# Patient Record
Sex: FEMALE | Race: WHITE | Employment: UNEMPLOYED | URBAN - METROPOLITAN AREA
[De-identification: names, ages, dates, MRNs, and addresses within clinical notes are randomized per-mention and may not be internally consistent; named-entity substitution may affect disease eponyms.]

---

## 2018-02-11 ENCOUNTER — HOSPITAL ENCOUNTER (EMERGENCY)
Age: 58
Discharge: HOME | End: 2018-02-11
Attending: EMERGENCY MEDICINE
Payer: SELF-PAY

## 2018-02-11 ENCOUNTER — APPOINTMENT (OUTPATIENT)
Dept: GENERAL RADIOLOGY | Age: 58
End: 2018-02-11
Attending: EMERGENCY MEDICINE
Payer: SELF-PAY

## 2018-02-11 VITALS
TEMPERATURE: 97.8 F | HEART RATE: 87 BPM | DIASTOLIC BLOOD PRESSURE: 82 MMHG | OXYGEN SATURATION: 97 % | HEIGHT: 66 IN | SYSTOLIC BLOOD PRESSURE: 144 MMHG | RESPIRATION RATE: 16 BRPM | BODY MASS INDEX: 21.69 KG/M2 | WEIGHT: 135 LBS

## 2018-02-11 DIAGNOSIS — J06.9 VIRAL URI WITH COUGH: Primary | ICD-10-CM

## 2018-02-11 PROCEDURE — 71046 X-RAY EXAM CHEST 2 VIEWS: CPT

## 2018-02-11 PROCEDURE — 99283 EMERGENCY DEPT VISIT LOW MDM: CPT | Performed by: EMERGENCY MEDICINE

## 2018-02-11 RX ORDER — AMLODIPINE BESYLATE 10 MG/1
5 TABLET ORAL DAILY
Qty: 30 TAB | Refills: 2 | Status: SHIPPED | OUTPATIENT
Start: 2018-02-11 | End: 2020-09-04

## 2018-02-11 RX ORDER — BENZONATATE 100 MG/1
100 CAPSULE ORAL
Qty: 15 CAP | Refills: 0 | Status: SHIPPED | OUTPATIENT
Start: 2018-02-11 | End: 2018-02-16

## 2018-02-11 RX ORDER — FEXOFENADINE HCL AND PSEUDOEPHEDRINE HCI 180; 240 MG/1; MG/1
1 TABLET, EXTENDED RELEASE ORAL DAILY
Qty: 10 TAB | Refills: 0 | Status: SHIPPED | OUTPATIENT
Start: 2018-02-11 | End: 2020-09-04

## 2018-02-11 NOTE — ED NOTES
I have reviewed discharge instructions with the patient. The patient verbalized understanding. Patient left ED via Discharge Method: ambulatory to Home with self. Opportunity for questions and clarification provided. Patient given 3 scripts. To continue your aftercare when you leave the hospital, you may receive an automated call from our care team to check in on how you are doing. This is a free service and part of our promise to provide the best care and service to meet your aftercare needs.  If you have questions, or wish to unsubscribe from this service please call [phone]. Thank you for Choosing our [facility] Emergency Department.

## 2018-02-11 NOTE — ED PROVIDER NOTES
HPI Comments: 50-year-old white female has had cough and nasal congestion and watering eyes for the past 3 weeks. Was seen by urgent care when symptoms began and was placed on Augmentin and prednisone. She states the prednisone made her sick and the Augmentin gave her diarrhea. She still having symptoms. No fever. No chest pain. She does smoke three quarters of a pack per day. She does have a history of hypertension and has been out of Norvasc for 6 months. Patient is a 60 y.o. female presenting with cough. The history is provided by the patient. Cough   Pertinent negatives include no chest pain, no headaches, no shortness of breath, no nausea and no vomiting. Past Medical History:   Diagnosis Date    Fracture of right clavicle 3/2015    History of kidney stones     Hypertension     no meds--- pt stopped on her own--- off meds x 1 yr-- per pt-- b/p stable    Liver disease dx--2011    HEP C--- not currently seeing a m.d.-- due to  no insurance per pt       Past Surgical History:   Procedure Laterality Date    HX BREAST BIOPSY  1984    cyst    HX GYN      cone bx    HX [facility]OPAEDIC [facility] 3/2015 at [facility]    clavicle surg         Family History:   Problem Relation Age of Onset    Arthritis-osteo Mother     Lung Disease Father     Cancer Father     Kidney Disease Brother        Social History     Social History    Marital status:      Spouse name: N/A    Number of children: N/A    Years of education: N/A     Occupational History    Not on file. Social History Main Topics    Smoking status: Current Every Day Smoker     Packs/day: 1.00     Years: 40.00    Smokeless tobacco: Never Used    Alcohol use Yes      Comment: occ    Drug use: No    Sexual activity: Not on file     Other Topics Concern    Not on file     Social History Narrative         ALLERGIES: Review of patient's allergies indicates no known allergies.     Review of Systems   Constitutional: Negative for fever.   HENT: Positive for congestion. Respiratory: Positive for cough. Negative for shortness of breath. Cardiovascular: Negative for chest pain and leg swelling. Gastrointestinal: Negative for abdominal pain, nausea and vomiting. Genitourinary: Negative for dysuria. Musculoskeletal: Negative for back pain and neck pain. Skin: Negative for rash. Neurological: Negative for headaches. Vitals:    02/11/18 1204   BP: 142/87   Resp: 16   Temp: 98.1 °F (36.7 °C)   SpO2: 93%   Weight: 61.2 kg (135 lb)   Height: 5' 6\" (1.676 m)            Physical Exam   Constitutional: She is oriented to person, place, and time. She appears well-developed and well-nourished. No distress. HENT:   Head: Normocephalic and atraumatic. Mouth/Throat: Oropharynx is clear and moist. No oropharyngeal exudate. Eyes: Conjunctivae are normal. Pupils are equal, round, and reactive to light. Neck: Normal range of motion. Neck supple. Cardiovascular: Normal rate and regular rhythm. No murmur heard. Pulmonary/Chest: Effort normal and breath sounds normal. No stridor. She has no wheezes. Musculoskeletal: Normal range of motion. She exhibits no edema or tenderness. Neurological: She is alert and oriented to person, place, and time. Skin: Skin is warm and dry. No rash noted. Psychiatric: She has a normal mood and affect. Nursing note and vitals reviewed. MDM  Number of Diagnoses or Management Options  Diagnosis management comments: Chest x-ray normal.  Patient is afebrile and nontoxic. She has normal cardiopulmonary exam.  She is not hypoxic. She was negative for influenza 3 weeks ago and has been afebrile therefore I do not suspect this is flu. We'll treat her symptomatically with Allegra-D for rhinorrhea and congestion. Tessalon for cough. We'll refill her Norvasc.        Amount and/or Complexity of Data Reviewed  Tests in the radiology section of CPT®: ordered and reviewed    Risk of Complications, Morbidity, and/or Mortality  Presenting problems: low  Diagnostic procedures: low  Management options: low          ED Course       Procedures

## 2018-02-11 NOTE — DISCHARGE INSTRUCTIONS
Upper Respiratory Infection (Cold): Care Instructions  Your Care Instructions    An upper respiratory infection, or URI, is an infection of the nose, sinuses, or throat. URIs are spread by coughs, sneezes, and direct contact. The common cold is the most frequent kind of URI. The flu and sinus infections are other kinds of URIs. Almost all URIs are caused by viruses. Antibiotics won't cure them. But you can treat most infections with home care. This may include drinking lots of fluids and taking over-the-counter pain medicine. You will probably feel better in 4 to 10 days. The doctor has checked you carefully, but problems can develop later. If you notice any problems or new symptoms, get medical treatment right away. Follow-up care is a key part of your treatment and safety. Be sure to make and go to all appointments, and call your doctor if you are having problems. It's also a good idea to know your test results and keep a list of the medicines you take. How can you care for yourself at home? · To prevent dehydration, drink plenty of fluids, enough so that your urine is light yellow or clear like water. Choose water and other caffeine-free clear liquids until you feel better. If you have kidney, heart, or liver disease and have to limit fluids, talk with your doctor before you increase the amount of fluids you drink. · Take an over-the-counter pain medicine, such as acetaminophen (Tylenol), ibuprofen (Advil, Motrin), or naproxen (Aleve). Read and follow all instructions on the label. · Before you use cough and cold medicines, check the label. These medicines may not be safe for young children or for people with certain health problems. · Be careful when taking over-the-counter cold or flu medicines and Tylenol at the same time. Many of these medicines have acetaminophen, which is Tylenol. Read the labels to make sure that you are not taking more than the recommended dose.  Too much acetaminophen (Tylenol) can be harmful. · Get plenty of rest.  · Do not smoke or allow others to smoke around you. If you need help quitting, talk to your doctor about stop-smoking programs and medicines. These can increase your chances of quitting for good. When should you call for help? Call 911 anytime you think you may need emergency care. For example, call if:  ? · You have severe trouble breathing. ?Call your doctor now or seek immediate medical care if:  ? · You seem to be getting much sicker. ? · You have new or worse trouble breathing. ? · You have a new or higher fever. ? · You have a new rash. ? Watch closely for changes in your health, and be sure to contact your doctor if:  ? · You have a new symptom, such as a sore throat, an earache, or sinus pain. ? · You cough more deeply or more often, especially if you notice more mucus or a change in the color of your mucus. ? · You do not get better as expected. Where can you learn more? Go to http://matt-jose j.info/. Enter B720 in the search box to learn more about \"Upper Respiratory Infection (Cold): Care Instructions. \"  Current as of: May 12, 2017  Content Version: 11.4  © 5122-0824 [employer]. Care instructions adapted under license by [employer] (which disclaims liability or warranty for this information). If you have questions about a medical condition or this instruction, always ask your healthcare professional. [place] [address] any warranty or liability for your use of this information.

## 2018-02-11 NOTE — ED TRIAGE NOTES
[facility] as needed. Pt reports yellow productive cough x 3 weeks. Was given Augmentin and prednisone but did not finish the Augmentin as it made her have diarrhea.

## 2018-08-10 ENCOUNTER — HOSPITAL ENCOUNTER (EMERGENCY)
Age: 58
Discharge: HOME | End: 2018-08-10
Attending: EMERGENCY MEDICINE
Payer: SELF-PAY

## 2018-08-10 ENCOUNTER — APPOINTMENT (OUTPATIENT)
Dept: CT IMAGING | Age: 58
End: 2018-08-10
Attending: EMERGENCY MEDICINE
Payer: SELF-PAY

## 2018-08-10 VITALS
HEIGHT: 66 IN | OXYGEN SATURATION: 96 % | WEIGHT: 130 LBS | DIASTOLIC BLOOD PRESSURE: 94 MMHG | HEART RATE: 87 BPM | BODY MASS INDEX: 20.89 KG/M2 | SYSTOLIC BLOOD PRESSURE: 164 MMHG | RESPIRATION RATE: 16 BRPM

## 2018-08-10 DIAGNOSIS — Q62.11 HYDRONEPHROSIS WITH URETEROPELVIC JUNCTION (UPJ) OBSTRUCTION: ICD-10-CM

## 2018-08-10 DIAGNOSIS — E87.6 HYPOKALEMIA: ICD-10-CM

## 2018-08-10 DIAGNOSIS — R10.9 FLANK PAIN: Primary | ICD-10-CM

## 2018-08-10 LAB
ANION GAP SERPL CALC-SCNC: 9 MMOL/L (ref 7–16)
BASOPHILS # BLD: 0.1 K/UL
BASOPHILS NFR BLD: 1 % (ref 0–2)
BUN SERPL-MCNC: 6 MG/DL (ref 6–23)
CALCIUM SERPL-MCNC: 8.3 MG/DL (ref 8.3–10.4)
CHLORIDE SERPL-SCNC: 94 MMOL/L (ref 98–107)
CO2 SERPL-SCNC: 29 MMOL/L (ref 21–32)
CREAT SERPL-MCNC: 0.72 MG/DL (ref 0.6–1)
DIFFERENTIAL METHOD BLD: ABNORMAL
EOSINOPHIL # BLD: 0.2 K/UL
EOSINOPHIL NFR BLD: 3 % (ref 0.5–7.8)
ERYTHROCYTE [DISTWIDTH] IN BLOOD BY AUTOMATED COUNT: 11.9 %
GLUCOSE SERPL-MCNC: 119 MG/DL (ref 65–100)
HCT VFR BLD AUTO: 42.3 % (ref 35.8–46.3)
HGB BLD-MCNC: 15.1 G/DL (ref 11.7–15.4)
IMM GRANULOCYTES # BLD: 0 K/UL
IMM GRANULOCYTES NFR BLD AUTO: 1 % (ref 0–5)
LYMPHOCYTES # BLD: 1.8 K/UL
LYMPHOCYTES NFR BLD: 37 % (ref 13–44)
MCH RBC QN AUTO: 34.4 PG (ref 26.1–32.9)
MCHC RBC AUTO-ENTMCNC: 35.7 G/DL (ref 31.4–35)
MCV RBC AUTO: 96.4 FL (ref 79.6–97.8)
MONOCYTES # BLD: 0.5 K/UL
MONOCYTES NFR BLD: 11 % (ref 4–12)
NEUTS SEG # BLD: 2.2 K/UL
NEUTS SEG NFR BLD: 47 % (ref 43–78)
NRBC # BLD: 0 K/UL (ref 0–0.2)
PLATELET # BLD AUTO: 185 K/UL (ref 150–450)
PMV BLD AUTO: 9.8 FL (ref 9.4–12.3)
POTASSIUM SERPL-SCNC: 2.7 MMOL/L (ref 3.5–5.1)
RBC # BLD AUTO: 4.39 M/UL (ref 4.05–5.2)
SODIUM SERPL-SCNC: 132 MMOL/L (ref 136–145)
WBC # BLD AUTO: 4.7 K/UL (ref 4.3–11.1)

## 2018-08-10 PROCEDURE — 96374 THER/PROPH/DIAG INJ IV PUSH: CPT | Performed by: EMERGENCY MEDICINE

## 2018-08-10 PROCEDURE — 81003 URINALYSIS AUTO W/O SCOPE: CPT | Performed by: EMERGENCY MEDICINE

## 2018-08-10 PROCEDURE — 74011250636 HC RX REV CODE- 250/636: Performed by: EMERGENCY MEDICINE

## 2018-08-10 PROCEDURE — 96375 TX/PRO/DX INJ NEW DRUG ADDON: CPT | Performed by: EMERGENCY MEDICINE

## 2018-08-10 PROCEDURE — 80048 BASIC METABOLIC PNL TOTAL CA: CPT

## 2018-08-10 PROCEDURE — 85025 COMPLETE CBC W/AUTO DIFF WBC: CPT

## 2018-08-10 PROCEDURE — 74176 CT ABD & PELVIS W/O CONTRAST: CPT

## 2018-08-10 PROCEDURE — 96361 HYDRATE IV INFUSION ADD-ON: CPT | Performed by: EMERGENCY MEDICINE

## 2018-08-10 PROCEDURE — 99284 EMERGENCY DEPT VISIT MOD MDM: CPT | Performed by: EMERGENCY MEDICINE

## 2018-08-10 RX ORDER — HYDROMORPHONE HYDROCHLORIDE 2 MG/ML
1 INJECTION, SOLUTION INTRAMUSCULAR; INTRAVENOUS; SUBCUTANEOUS
Status: COMPLETED | OUTPATIENT
Start: 2018-08-10 | End: 2018-08-10

## 2018-08-10 RX ORDER — ONDANSETRON 2 MG/ML
4 INJECTION INTRAMUSCULAR; INTRAVENOUS
Status: COMPLETED | OUTPATIENT
Start: 2018-08-10 | End: 2018-08-10

## 2018-08-10 RX ORDER — ONDANSETRON 8 MG/1
8 TABLET, ORALLY DISINTEGRATING ORAL
Qty: 10 TAB | Refills: 0 | Status: SHIPPED | OUTPATIENT
Start: 2018-08-10 | End: 2020-09-04

## 2018-08-10 RX ORDER — HYDROCODONE BITARTRATE AND ACETAMINOPHEN 5; 325 MG/1; MG/1
1 TABLET ORAL
Qty: 15 TAB | Refills: 0 | Status: SHIPPED | OUTPATIENT
Start: 2018-08-10 | End: 2020-09-04

## 2018-08-10 RX ORDER — KETOROLAC TROMETHAMINE 30 MG/ML
15 INJECTION, SOLUTION INTRAMUSCULAR; INTRAVENOUS
Status: COMPLETED | OUTPATIENT
Start: 2018-08-10 | End: 2018-08-10

## 2018-08-10 RX ORDER — MORPHINE SULFATE 10 MG/ML
4 INJECTION, SOLUTION INTRAMUSCULAR; INTRAVENOUS
Status: COMPLETED | OUTPATIENT
Start: 2018-08-10 | End: 2018-08-10

## 2018-08-10 RX ORDER — POTASSIUM CHLORIDE 1500 MG/1
20 TABLET, FILM COATED, EXTENDED RELEASE ORAL 2 TIMES DAILY
Qty: 10 TAB | Refills: 0 | Status: SHIPPED | OUTPATIENT
Start: 2018-08-10 | End: 2018-08-15

## 2018-08-10 RX ADMIN — SODIUM CHLORIDE 1000 ML: 900 INJECTION, SOLUTION INTRAVENOUS at 21:33

## 2018-08-10 RX ADMIN — ONDANSETRON 4 MG: 2 INJECTION, SOLUTION INTRAMUSCULAR; INTRAVENOUS at 21:33

## 2018-08-10 RX ADMIN — MORPHINE SULFATE 4 MG: 10 INJECTION INTRAVENOUS at 21:33

## 2018-08-10 RX ADMIN — HYDROMORPHONE HYDROCHLORIDE 1 MG: 2 INJECTION, SOLUTION INTRAMUSCULAR; INTRAVENOUS; SUBCUTANEOUS at 22:23

## 2018-08-10 RX ADMIN — KETOROLAC TROMETHAMINE 15 MG: 30 INJECTION, SOLUTION INTRAMUSCULAR at 22:24

## 2018-08-11 NOTE — ED PROVIDER NOTES
HPI Comments: 50-year-old white female with past history of kidney stones last one approximately one year ago presents with left-sided flank pain which began around 4:00 today. She has had nausea without vomiting. She does report some urinary frequency, discomfort and difficulty but no hematuria. No aggravating or alleviating factors for pain. Patient is a 60 y.o. female presenting with flank pain. The history is provided by the patient. Flank Pain    Pertinent negatives include no chest pain, no fever, no headaches and no abdominal pain. Past Medical History:   Diagnosis Date    Fracture of right clavicle 3/2015    History of kidney stones     Hypertension     no meds--- pt stopped on her own--- off meds x 1 yr-- per pt-- b/p stable    Liver disease dx--2011    HEP C--- not currently seeing a m.d.-- due to  no insurance per pt       Past Surgical History:   Procedure Laterality Date    HX BREAST BIOPSY  1984    cyst    HX GYN      cone bx    HX OR[facility]OP[facility]EDIC [facility] 3/2015 at [facility]    clavicle surg         Family History:   Problem Relation Age of Onset    Arthritis-osteo Mother     Lung Disease Father     Cancer Father     Kidney Disease Brother        Social History     Social History    Marital status:      Spouse name: N/A    Number of children: N/A    Years of education: N/A     Occupational History    Not on file. Social History Main Topics    Smoking status: Current Every Day Smoker     Packs/day: 1.00     Years: 40.00    Smokeless tobacco: Never Used    Alcohol use Yes      Comment: occ    Drug use: No    Sexual activity: Not on file     Other Topics Concern    Not on file     Social History Narrative         ALLERGIES: Review of patient's allergies indicates no known allergies. Review of Systems   Constitutional: Negative for fever. HENT: Negative for congestion. Respiratory: Negative for cough and shortness of breath.     Cardiovascular: Negative for chest pain. Gastrointestinal: Negative for abdominal pain and vomiting. Genitourinary: Positive for flank pain. Musculoskeletal: Negative for back pain and neck pain. Skin: Negative for rash. Neurological: Negative for headaches. Vitals:    08/10/18 2029 08/10/18 2145 08/10/18 2253 08/10/18 2254   BP:  177/83 (!) 164/94    Pulse: 93   87   Resp: 16      SpO2: 97%   96%   Weight: 59 kg (130 lb)      Height: 5' 6\" (1.676 m)               Physical Exam   Constitutional: She appears well-developed and well-nourished. Pacing due to pain   HENT:   Head: Normocephalic and atraumatic. Eyes: Conjunctivae are normal. Pupils are equal, round, and reactive to light. Neck: Normal range of motion. Neck supple. Cardiovascular: Normal rate and regular rhythm. No murmur heard. Pulmonary/Chest: Effort normal and breath sounds normal.   Abdominal: Soft. She exhibits no distension. There is no tenderness. Musculoskeletal: Normal range of motion. She exhibits no edema. Neurological: She is alert. Skin: Skin is warm and dry. Psychiatric: She has a normal mood and affect. Nursing note and vitals reviewed. MDM  Number of Diagnoses or Management Options  Diagnosis management comments: Laboratory unremarkable except for mild hypokalemia at 2.7. Urinalysis no infection. CT scan shows UPJ obstruction with no stone identified. This was noted on a CT scan in 2016 however it is now significantly worse. Patient has been hydrated with normal saline and treated with IV morphine, IV Zofran, IV Toradol and IV Dilaudid. She is now feeling much better. Does appear safe for discharge home but will need to follow up with urology secondary to the hydronephrosis.        Amount and/or Complexity of Data Reviewed  Clinical lab tests: ordered and reviewed  Tests in the radiology section of CPT®: ordered and reviewed  Tests in the medicine section of CPT®: ordered and reviewed  Discuss the patient with other providers: yes  Independent visualization of images, tracings, or specimens: yes    Risk of Complications, Morbidity, and/or Mortality  Presenting problems: moderate  Diagnostic procedures: moderate  Management options: moderate          ED Course       Procedures

## 2018-08-11 NOTE — ED NOTES
I have reviewed discharge instructions with the patient. The patient verbalized understanding. Patient left ED via Discharge Method: ambulatory to Home with (mother). Opportunity for questions and clarification provided. Patient given 3 scripts. To continue your aftercare when you leave the hospital, you may receive an automated call from our care team to check in on how you are doing. This is a free service and part of our promise to provide the best care and service to meet your aftercare needs.  If you have questions, or wish to unsubscribe from this service please call [phone]. Thank you for Choosing our [facility] Emergency Department.

## 2018-08-11 NOTE — ED NOTES
Pt reports pain better after dilaudid and toradol, BP decreased though recommended that she f/u w/ her PMD when her pain is better to reevaluate her BP meds as they have not been changed in >10 yrs.

## 2018-08-11 NOTE — DISCHARGE INSTRUCTIONS
Hypokalemia: Care Instructions  Your Care Instructions    Hypokalemia (say \"si-sr-kof-LORETTA-becka-uh\") is a low level of potassium. The heart, muscles, kidneys, and nervous system all need potassium to work well. This problem has many different causes. Kidney problems, diet, and medicines like diuretics and laxatives can cause it. So can vomiting or diarrhea. In some cases, cancer is the cause. Your doctor may do tests to find the cause of your low potassium levels. You may need medicines to bring your potassium levels back to normal. You may also need regular blood tests to check your potassium. If you have very low potassium, you may need intravenous (IV) medicines. You also may need tests to check the electrical activity of your heart. Heart problems caused by low potassium levels can be very serious. Follow-up care is a key part of your treatment and safety. Be sure to make and go to all appointments, and call your doctor if you are having problems. It's also a good idea to know your test results and keep a list of the medicines you take. How can you care for yourself at home? · If your doctor recommends it, eat foods that have a lot of potassium. These include fresh fruits, juices, and vegetables. They also include nuts, beans, and milk. · Be safe with medicines. If your doctor prescribes medicines or potassium supplements, take them exactly as directed. Call your doctor if you have any problems with your medicines. · Get your potassium levels tested as often as your doctor tells you. When should you call for help? Call 911 anytime you think you may need emergency care. For example, call if:    · You feel like your heart is missing beats. Heart problems caused by low potassium can cause death.     · You passed out (lost consciousness).     · You have a seizure.    Call your doctor now or seek immediate medical care if:    · You feel weak or unusually tired.     · You have severe arm or leg cramps.   · You have tingling or numbness.     · You feel sick to your stomach, or you vomit.     · You have belly cramps.     · You feel bloated or constipated.     · You have to urinate a lot.     · You feel very thirsty most of the time.     · You are dizzy or lightheaded, or you feel like you may faint.     · You feel depressed, or you lose touch with reality.    Watch closely for changes in your health, and be sure to contact your doctor if:    · You do not get better as expected. Where can you learn more? Go to http://matt-jose j.info/. Enter G358 in the search box to learn more about \"Hypokalemia: Care Instructions. \"  Current as of: May 12, 2017  Content Version: 11.7  © 6336-0519 [employer]. Care instructions adapted under license by [employer] (which disclaims liability or warranty for this information). If you have questions about a medical condition or this instruction, always ask your healthcare professional. [place] [address] any warranty or liability for your use of this information. Flank Pain: Care Instructions  Your Care Instructions  Flank pain is pain on the side of the back just below the rib cage and above the waist. It can be on one or both sides. Flank pain has many possible causes, including a kidney stone, a urinary tract infection, or back strain. Flank pain may get better on its own. But don't ignore new symptoms, such as fever, nausea and vomiting, urination problems, pain that gets worse, and dizziness. These may be signs of a more serious problem. You may have to have tests or other treatment. Follow-up care is a key part of your treatment and safety. Be sure to make and go to all appointments, and call your doctor if you are having problems. It's also a good idea to know your test results and keep a list of the medicines you take. How can you care for yourself at home? · Rest until you feel better.   · Take pain medicines exactly as directed. ¨ If the doctor gave you a prescription medicine for pain, take it as prescribed. ¨ If you are not taking a prescription pain medicine, ask your doctor if you can take an over-the-counter pain medicine, such as acetaminophen (Tylenol), ibuprofen (Advil, Motrin), or naproxen (Aleve). Read and follow all instructions on the label. · If your doctor prescribed antibiotics, take them as directed. Do not stop taking them just because you feel better. You need to take the full course of antibiotics. · To apply heat, put a warm water bottle, a heating pad set on low, or a warm cloth on the painful area. Do not go to sleep with a heating pad on your skin. · To prevent dehydration, drink plenty of fluids, enough so that your urine is light yellow or clear like water. Choose water and other caffeine-free clear liquids until you feel better. If you have kidney, heart, or liver disease and have to limit fluids, talk with your doctor before you increase the amount of fluids you drink. When should you call for help? Call your doctor now or seek immediate medical care if:    · Your flank pain gets worse.     · You have new symptoms, such as fever, nausea, or vomiting.     · You have symptoms of a urinary problem. For example:  ¨ You have blood or pus in your urine. ¨ You have chills or body aches. ¨ It hurts to urinate. ¨ You have groin or belly pain.    Watch closely for changes in your health, and be sure to contact your doctor if you do not get better as expected. Where can you learn more? Go to http://matt-jose j.info/. Enter S191 in the search box to learn more about \"Flank Pain: Care Instructions. \"  Current as of: November 20, 2017  Content Version: 11.7  © 3230-1101 [employer]. Care instructions adapted under license by [employer] (which disclaims liability or warranty for this information).  If you have questions about a medical condition or this instruction, always ask your healthcare professional. [place] [address] any warranty or liability for your use of this information.

## 2020-08-27 ENCOUNTER — APPOINTMENT (OUTPATIENT)
Dept: GENERAL RADIOLOGY | Age: 60
DRG: 193 | End: 2020-08-27
Attending: EMERGENCY MEDICINE

## 2020-08-27 ENCOUNTER — APPOINTMENT (OUTPATIENT)
Dept: CT IMAGING | Age: 60
DRG: 193 | End: 2020-08-27
Attending: EMERGENCY MEDICINE

## 2020-08-27 ENCOUNTER — HOSPITAL ENCOUNTER (INPATIENT)
Age: 60
LOS: 6 days | Discharge: HOME | DRG: 193 | End: 2020-09-04
Attending: EMERGENCY MEDICINE | Admitting: INTERNAL MEDICINE

## 2020-08-27 DIAGNOSIS — F41.9 ANXIETY: ICD-10-CM

## 2020-08-27 DIAGNOSIS — E43 SEVERE PROTEIN-CALORIE MALNUTRITION (HCC): Primary | ICD-10-CM

## 2020-08-27 DIAGNOSIS — F10.10 ALCOHOL ABUSE: ICD-10-CM

## 2020-08-27 PROBLEM — R79.89 ELEVATED LFTS: Status: ACTIVE | Noted: 2020-08-27

## 2020-08-27 PROBLEM — E87.6 HYPOKALEMIA: Status: ACTIVE | Noted: 2020-08-27

## 2020-08-27 PROBLEM — E87.1 HYPONATREMIA: Status: ACTIVE | Noted: 2020-08-27

## 2020-08-27 PROBLEM — D72.829 LEUKOCYTOSIS: Status: ACTIVE | Noted: 2020-08-27

## 2020-08-27 PROBLEM — D64.9 NORMOCYTIC ANEMIA: Status: ACTIVE | Noted: 2020-08-27

## 2020-08-27 PROBLEM — F10.20 ALCOHOL DEPENDENCE (HCC): Status: ACTIVE | Noted: 2020-08-27

## 2020-08-27 PROBLEM — J18.9 CAP (COMMUNITY ACQUIRED PNEUMONIA): Status: ACTIVE | Noted: 2020-08-27

## 2020-08-27 LAB
ALBUMIN SERPL-MCNC: 2 G/DL (ref 3.2–4.6)
ALBUMIN/GLOB SERPL: 0.5 {RATIO} (ref 1.2–3.5)
ALP SERPL-CCNC: 129 U/L (ref 50–136)
ALT SERPL-CCNC: 80 U/L (ref 12–65)
ANION GAP SERPL CALC-SCNC: 25 MMOL/L (ref 7–16)
APTT PPP: 25.5 SEC (ref 24.3–35.4)
AST SERPL-CCNC: 381 U/L (ref 15–37)
BACTERIA URNS QL MICRO: ABNORMAL /HPF
BASOPHILS # BLD: 0.1 K/UL (ref 0–0.2)
BASOPHILS NFR BLD: 0 % (ref 0–2)
BILIRUB SERPL-MCNC: 1.3 MG/DL (ref 0.2–1.1)
BUN SERPL-MCNC: 22 MG/DL (ref 8–23)
CALCIUM SERPL-MCNC: 7.9 MG/DL (ref 8.3–10.4)
CASTS URNS QL MICRO: ABNORMAL /LPF
CHLORIDE SERPL-SCNC: 89 MMOL/L (ref 98–107)
CK SERPL-CCNC: 168 U/L (ref 21–215)
CO2 SERPL-SCNC: 15 MMOL/L (ref 21–32)
CREAT SERPL-MCNC: 0.69 MG/DL (ref 0.6–1)
DIFFERENTIAL METHOD BLD: ABNORMAL
EOSINOPHIL # BLD: 0 K/UL (ref 0–0.8)
EOSINOPHIL NFR BLD: 0 % (ref 0.5–7.8)
EPI CELLS #/AREA URNS HPF: 0 /HPF
ERYTHROCYTE [DISTWIDTH] IN BLOOD BY AUTOMATED COUNT: 21.4 % (ref 11.9–14.6)
ETHANOL SERPL-MCNC: 131 MG/DL
GLOBULIN SER CALC-MCNC: 3.7 G/DL (ref 2.3–3.5)
GLUCOSE SERPL-MCNC: 133 MG/DL (ref 65–100)
HCT VFR BLD AUTO: 25.3 % (ref 35.8–46.3)
HGB BLD-MCNC: 8.2 G/DL (ref 11.7–15.4)
IMM GRANULOCYTES # BLD AUTO: 0.6 K/UL (ref 0–0.5)
IMM GRANULOCYTES NFR BLD AUTO: 4 % (ref 0–5)
INR PPP: 1.5
LYMPHOCYTES # BLD: 1 K/UL (ref 0.5–4.6)
LYMPHOCYTES NFR BLD: 6 % (ref 13–44)
MAGNESIUM SERPL-MCNC: 1.5 MG/DL (ref 1.8–2.4)
MCH RBC QN AUTO: 27.2 PG (ref 26.1–32.9)
MCHC RBC AUTO-ENTMCNC: 32.4 G/DL (ref 31.4–35)
MCV RBC AUTO: 84.1 FL (ref 79.6–97.8)
MONOCYTES # BLD: 0.7 K/UL (ref 0.1–1.3)
MONOCYTES NFR BLD: 4 % (ref 4–12)
NEUTS SEG # BLD: 14.8 K/UL (ref 1.7–8.2)
NEUTS SEG NFR BLD: 86 % (ref 43–78)
NRBC # BLD: 0.12 K/UL (ref 0–0.2)
PHOSPHATE SERPL-MCNC: 0.8 MG/DL (ref 2.3–3.7)
PLATELET # BLD AUTO: 178 K/UL (ref 150–450)
PMV BLD AUTO: 10.6 FL (ref 9.4–12.3)
POTASSIUM SERPL-SCNC: 3 MMOL/L (ref 3.5–5.1)
PROT SERPL-MCNC: 5.7 G/DL (ref 6.3–8.2)
PROTHROMBIN TIME: 18.5 SEC (ref 12–14.7)
RBC # BLD AUTO: 3.01 M/UL (ref 4.05–5.2)
RBC #/AREA URNS HPF: ABNORMAL /HPF
SODIUM SERPL-SCNC: 129 MMOL/L (ref 136–145)
TSH SERPL DL<=0.005 MIU/L-ACNC: 0.87 UIU/ML (ref 0.36–3.74)
WBC # BLD AUTO: 17.2 K/UL (ref 4.3–11.1)
WBC URNS QL MICRO: 0 /HPF

## 2020-08-27 PROCEDURE — 84443 ASSAY THYROID STIM HORMONE: CPT

## 2020-08-27 PROCEDURE — 87086 URINE CULTURE/COLONY COUNT: CPT

## 2020-08-27 PROCEDURE — 81015 MICROSCOPIC EXAM OF URINE: CPT

## 2020-08-27 PROCEDURE — 74011250636 HC RX REV CODE- 250/636: Performed by: EMERGENCY MEDICINE

## 2020-08-27 PROCEDURE — 82550 ASSAY OF CK (CPK): CPT

## 2020-08-27 PROCEDURE — 71045 X-RAY EXAM CHEST 1 VIEW: CPT

## 2020-08-27 PROCEDURE — 96372 THER/PROPH/DIAG INJ SC/IM: CPT

## 2020-08-27 PROCEDURE — 80053 COMPREHEN METABOLIC PANEL: CPT

## 2020-08-27 PROCEDURE — 85025 COMPLETE CBC W/AUTO DIFF WBC: CPT

## 2020-08-27 PROCEDURE — 85610 PROTHROMBIN TIME: CPT

## 2020-08-27 PROCEDURE — 99285 EMERGENCY DEPT VISIT HI MDM: CPT

## 2020-08-27 PROCEDURE — 80307 DRUG TEST PRSMV CHEM ANLYZR: CPT

## 2020-08-27 PROCEDURE — 96361 HYDRATE IV INFUSION ADD-ON: CPT

## 2020-08-27 PROCEDURE — 83735 ASSAY OF MAGNESIUM: CPT

## 2020-08-27 PROCEDURE — 85730 THROMBOPLASTIN TIME PARTIAL: CPT

## 2020-08-27 PROCEDURE — 84100 ASSAY OF PHOSPHORUS: CPT

## 2020-08-27 PROCEDURE — 70450 CT HEAD/BRAIN W/O DYE: CPT

## 2020-08-27 RX ORDER — THIAMINE HYDROCHLORIDE 100 MG/ML
100 INJECTION, SOLUTION INTRAMUSCULAR; INTRAVENOUS
Status: COMPLETED | OUTPATIENT
Start: 2020-08-27 | End: 2020-08-27

## 2020-08-27 RX ORDER — SODIUM CHLORIDE 0.9 % (FLUSH) 0.9 %
5-40 SYRINGE (ML) INJECTION EVERY 8 HOURS
Status: DISCONTINUED | OUTPATIENT
Start: 2020-08-27 | End: 2020-08-28 | Stop reason: SDUPTHER

## 2020-08-27 RX ORDER — SODIUM CHLORIDE 9 MG/ML
125 INJECTION, SOLUTION INTRAVENOUS CONTINUOUS
Status: DISCONTINUED | OUTPATIENT
Start: 2020-08-27 | End: 2020-08-28

## 2020-08-27 RX ORDER — SODIUM CHLORIDE 0.9 % (FLUSH) 0.9 %
5-40 SYRINGE (ML) INJECTION AS NEEDED
Status: DISCONTINUED | OUTPATIENT
Start: 2020-08-27 | End: 2020-08-28 | Stop reason: SDUPTHER

## 2020-08-27 RX ORDER — MAGNESIUM SULFATE HEPTAHYDRATE 40 MG/ML
2 INJECTION, SOLUTION INTRAVENOUS
Status: COMPLETED | OUTPATIENT
Start: 2020-08-27 | End: 2020-08-28

## 2020-08-27 RX ADMIN — SODIUM CHLORIDE 1000 ML: 900 INJECTION, SOLUTION INTRAVENOUS at 20:46

## 2020-08-27 RX ADMIN — Medication 5 ML: at 22:20

## 2020-08-27 RX ADMIN — SODIUM CHLORIDE 125 ML/HR: 900 INJECTION, SOLUTION INTRAVENOUS at 22:31

## 2020-08-27 RX ADMIN — THIAMINE HYDROCHLORIDE 100 MG: 100 INJECTION, SOLUTION INTRAMUSCULAR; INTRAVENOUS at 22:18

## 2020-08-28 PROCEDURE — 74011000250 HC RX REV CODE- 250: Performed by: INTERNAL MEDICINE

## 2020-08-28 PROCEDURE — 74011250636 HC RX REV CODE- 250/636: Performed by: EMERGENCY MEDICINE

## 2020-08-28 PROCEDURE — 96375 TX/PRO/DX INJ NEW DRUG ADDON: CPT

## 2020-08-28 PROCEDURE — 74011250637 HC RX REV CODE- 250/637: Performed by: INTERNAL MEDICINE

## 2020-08-28 PROCEDURE — 99218 HC RM OBSERVATION: CPT

## 2020-08-28 PROCEDURE — 96365 THER/PROPH/DIAG IV INF INIT: CPT

## 2020-08-28 PROCEDURE — 74011000302 HC RX REV CODE- 302: Performed by: FAMILY MEDICINE

## 2020-08-28 PROCEDURE — 97166 OT EVAL MOD COMPLEX 45 MIN: CPT

## 2020-08-28 PROCEDURE — 96372 THER/PROPH/DIAG INJ SC/IM: CPT

## 2020-08-28 PROCEDURE — 74011000258 HC RX REV CODE- 258: Performed by: INTERNAL MEDICINE

## 2020-08-28 PROCEDURE — 74011000258 HC RX REV CODE- 258: Performed by: FAMILY MEDICINE

## 2020-08-28 PROCEDURE — 77030038269 HC DRN EXT URIN PURWCK BARD -A

## 2020-08-28 PROCEDURE — 97162 PT EVAL MOD COMPLEX 30 MIN: CPT

## 2020-08-28 PROCEDURE — 87040 BLOOD CULTURE FOR BACTERIA: CPT

## 2020-08-28 PROCEDURE — 96376 TX/PRO/DX INJ SAME DRUG ADON: CPT

## 2020-08-28 PROCEDURE — 74011250637 HC RX REV CODE- 250/637: Performed by: FAMILY MEDICINE

## 2020-08-28 PROCEDURE — 36415 COLL VENOUS BLD VENIPUNCTURE: CPT

## 2020-08-28 PROCEDURE — 86580 TB INTRADERMAL TEST: CPT | Performed by: FAMILY MEDICINE

## 2020-08-28 PROCEDURE — 97535 SELF CARE MNGMENT TRAINING: CPT

## 2020-08-28 PROCEDURE — 74011250636 HC RX REV CODE- 250/636: Performed by: INTERNAL MEDICINE

## 2020-08-28 PROCEDURE — 74011250636 HC RX REV CODE- 250/636: Performed by: FAMILY MEDICINE

## 2020-08-28 PROCEDURE — 97530 THERAPEUTIC ACTIVITIES: CPT

## 2020-08-28 RX ORDER — SODIUM CHLORIDE 0.9 % (FLUSH) 0.9 %
5-40 SYRINGE (ML) INJECTION AS NEEDED
Status: DISCONTINUED | OUTPATIENT
Start: 2020-08-28 | End: 2020-09-04 | Stop reason: HOSPADM

## 2020-08-28 RX ORDER — SODIUM CHLORIDE 0.9 % (FLUSH) 0.9 %
5-40 SYRINGE (ML) INJECTION AS NEEDED
Status: DISCONTINUED | OUTPATIENT
Start: 2020-08-28 | End: 2020-08-28 | Stop reason: SDUPTHER

## 2020-08-28 RX ORDER — ADHESIVE BANDAGE
30 BANDAGE TOPICAL DAILY PRN
Status: DISCONTINUED | OUTPATIENT
Start: 2020-08-28 | End: 2020-09-04 | Stop reason: HOSPADM

## 2020-08-28 RX ORDER — ACETAMINOPHEN 325 MG/1
650 TABLET ORAL
Status: DISCONTINUED | OUTPATIENT
Start: 2020-08-28 | End: 2020-09-04 | Stop reason: HOSPADM

## 2020-08-28 RX ORDER — IPRATROPIUM BROMIDE AND ALBUTEROL SULFATE 2.5; .5 MG/3ML; MG/3ML
3 SOLUTION RESPIRATORY (INHALATION)
Status: DISCONTINUED | OUTPATIENT
Start: 2020-08-28 | End: 2020-09-04 | Stop reason: HOSPADM

## 2020-08-28 RX ORDER — SODIUM CHLORIDE 9 MG/ML
100 INJECTION, SOLUTION INTRAVENOUS CONTINUOUS
Status: DISCONTINUED | OUTPATIENT
Start: 2020-08-28 | End: 2020-08-29

## 2020-08-28 RX ORDER — CHLORDIAZEPOXIDE HYDROCHLORIDE 25 MG/1
25 CAPSULE, GELATIN COATED ORAL
Status: DISCONTINUED | OUTPATIENT
Start: 2020-08-28 | End: 2020-09-03

## 2020-08-28 RX ORDER — AMLODIPINE BESYLATE 5 MG/1
5 TABLET ORAL DAILY
Status: DISCONTINUED | OUTPATIENT
Start: 2020-08-28 | End: 2020-08-28

## 2020-08-28 RX ORDER — LORAZEPAM 2 MG/ML
1 INJECTION INTRAMUSCULAR
Status: DISCONTINUED | OUTPATIENT
Start: 2020-08-28 | End: 2020-09-03

## 2020-08-28 RX ORDER — GUAIFENESIN 600 MG/1
600 TABLET, EXTENDED RELEASE ORAL EVERY 12 HOURS
Status: DISCONTINUED | OUTPATIENT
Start: 2020-08-28 | End: 2020-09-04 | Stop reason: HOSPADM

## 2020-08-28 RX ORDER — LORAZEPAM 2 MG/ML
2 INJECTION INTRAMUSCULAR
Status: ACTIVE | OUTPATIENT
Start: 2020-08-28 | End: 2020-08-28

## 2020-08-28 RX ORDER — ENOXAPARIN SODIUM 100 MG/ML
40 INJECTION SUBCUTANEOUS EVERY 24 HOURS
Status: DISCONTINUED | OUTPATIENT
Start: 2020-08-28 | End: 2020-09-04 | Stop reason: HOSPADM

## 2020-08-28 RX ORDER — SODIUM CHLORIDE 0.9 % (FLUSH) 0.9 %
5-40 SYRINGE (ML) INJECTION EVERY 8 HOURS
Status: DISCONTINUED | OUTPATIENT
Start: 2020-08-28 | End: 2020-08-28 | Stop reason: SDUPTHER

## 2020-08-28 RX ORDER — SODIUM CHLORIDE 0.9 % (FLUSH) 0.9 %
5-40 SYRINGE (ML) INJECTION EVERY 8 HOURS
Status: DISCONTINUED | OUTPATIENT
Start: 2020-08-28 | End: 2020-09-04 | Stop reason: HOSPADM

## 2020-08-28 RX ORDER — LORAZEPAM 1 MG/1
1-2 TABLET ORAL
Status: ACTIVE | OUTPATIENT
Start: 2020-08-28 | End: 2020-08-28

## 2020-08-28 RX ORDER — LORAZEPAM 1 MG/1
3-4 TABLET ORAL
Status: ACTIVE | OUTPATIENT
Start: 2020-08-28 | End: 2020-08-28

## 2020-08-28 RX ADMIN — SODIUM CHLORIDE: 900 INJECTION, SOLUTION INTRAVENOUS at 20:43

## 2020-08-28 RX ADMIN — SODIUM CHLORIDE: 900 INJECTION, SOLUTION INTRAVENOUS at 16:00

## 2020-08-28 RX ADMIN — ENOXAPARIN SODIUM 40 MG: 40 INJECTION SUBCUTANEOUS at 09:42

## 2020-08-28 RX ADMIN — LORAZEPAM 1 MG: 2 INJECTION INTRAMUSCULAR; INTRAVENOUS at 20:51

## 2020-08-28 RX ADMIN — ACETAMINOPHEN 650 MG: 325 TABLET, FILM COATED ORAL at 20:50

## 2020-08-28 RX ADMIN — MAGNESIUM SULFATE HEPTAHYDRATE 2 G: 40 INJECTION, SOLUTION INTRAVENOUS at 00:05

## 2020-08-28 RX ADMIN — GUAIFENESIN 600 MG: 600 TABLET ORAL at 20:41

## 2020-08-28 RX ADMIN — FOLIC ACID: 5 INJECTION, SOLUTION INTRAMUSCULAR; INTRAVENOUS; SUBCUTANEOUS at 09:00

## 2020-08-28 RX ADMIN — PIPERACILLIN SODIUM AND TAZOBACTAM SODIUM 3.38 G: 3; .375 INJECTION, POWDER, LYOPHILIZED, FOR SOLUTION INTRAVENOUS at 21:00

## 2020-08-28 RX ADMIN — GUAIFENESIN 600 MG: 600 TABLET ORAL at 17:56

## 2020-08-28 RX ADMIN — SODIUM CHLORIDE 100 ML/HR: 900 INJECTION, SOLUTION INTRAVENOUS at 05:32

## 2020-08-28 RX ADMIN — CEFTRIAXONE SODIUM 1 G: 1 INJECTION, POWDER, FOR SOLUTION INTRAMUSCULAR; INTRAVENOUS at 05:39

## 2020-08-28 RX ADMIN — ACETAMINOPHEN 650 MG: 325 TABLET, FILM COATED ORAL at 06:10

## 2020-08-28 RX ADMIN — AZITHROMYCIN 500 MG: 500 INJECTION, POWDER, LYOPHILIZED, FOR SOLUTION INTRAVENOUS at 06:11

## 2020-08-28 RX ADMIN — SODIUM CHLORIDE 100 ML/HR: 900 INJECTION, SOLUTION INTRAVENOUS at 16:07

## 2020-08-28 RX ADMIN — PIPERACILLIN SODIUM AND TAZOBACTAM SODIUM 3.38 G: 3; .375 INJECTION, POWDER, LYOPHILIZED, FOR SOLUTION INTRAVENOUS at 17:56

## 2020-08-28 RX ADMIN — TUBERCULIN PURIFIED PROTEIN DERIVATIVE 5 UNITS: 5 INJECTION, SOLUTION INTRADERMAL at 05:42

## 2020-08-28 NOTE — ED NOTES
TRANSFER - OUT REPORT:    Verbal report given to jesus(name) on Tangela Sebastian  being transferred to 81(unit) for routine progression of care       Report consisted of patients Situation, Background, Assessment and   Recommendations(SBAR). Information from the following report(s) SBAR was reviewed with the receiving nurse. Lines:   Peripheral IV 08/27/20 Left; Anterior Wrist (Active)   Site Assessment Clean, dry, & intact 08/27/20 2012   Phlebitis Assessment 0 08/27/20 2012   Infiltration Assessment 0 08/27/20 2012   Dressing Status Clean, dry, & intact 08/27/20 2012        Opportunity for questions and clarification was provided.       Patient transported with:   [employer]

## 2020-08-28 NOTE — ED PROVIDER NOTES
59-year-old lady presents with concerns about abdominal pain that has been present for about 3 days. She said that with this she has had some nausea, vomiting, and diarrhea. Patient is globally deconditioned. She said that she has not been able to get up and walk in about 5 days although she is unable to clarify why. She says approximately 2 days ago she was in a rolling chair and fell out of the chair striking the left side of her head. She does drink several drinks of vodka daily. She denies any known medical problems other than a history of kidney stones and says it has been a couple years since she seen a doctor. Review of her chart indicates a prior history of ER visits but no regular primary care. No other associated symptoms. Elements of this note were created using speech recognition software. As such, errors of speech recognition may be present.            Past Medical History:   Diagnosis Date    Fracture of right clavicle 3/2015    History of kidney stones     Hypertension     no meds--- pt stopped on her own--- off meds x 1 yr-- per pt-- b/p stable    Liver disease dx--2011    HEP C--- not currently seeing a m.d.-- due to  no insurance per pt       Past Surgical History:   Procedure Laterality Date    HX BREAST BIOPSY  1984    cyst    HX GYN      cone bx    HX [facility]OP[facility]EDIC [facility] 3/[facility] at [facility]    clavicle surg         Family History:   Problem Relation Age of Onset    Arthritis-osteo Mother     Lung Disease Father     Cancer Father     Kidney Disease Brother        Social History     Socioeconomic History    Marital status:      Spouse name: Not on file    Number of children: Not on file    Years of education: Not on file    Highest education level: Not on file   Occupational History    Not on file   Social Needs    Financial resource strain: Not on file    Food insecurity     Worry: Not on file     Inability: Not on file   Flipiture needs Medical: Not on file     Non-medical: Not on file   Tobacco Use    Smoking status: Current Every Day Smoker     Packs/day: 1.00     Years: 40.00     Pack years: 40.00    Smokeless tobacco: Never Used   Substance and Sexual Activity    Alcohol use: Yes     Comment: occ    Drug use: No    Sexual activity: Not on file   Lifestyle    Physical activity     Days per week: Not on file     Minutes per session: Not on file    Stress: Not on file   Relationships    Social connections     Talks on phone: Not on file     Gets together: Not on file     Attends Caodaism service: Not on file     Active member of club or organization: Not on file     Attends meetings of clubs or organizations: Not on file     Relationship status: Not on file    Intimate partner violence     Fear of current or ex partner: Not on file     Emotionally abused: Not on file     Physically abused: Not on file     Forced sexual activity: Not on file   Other Topics Concern    Not on file   Social History Narrative    Not on file         ALLERGIES: Patient has no known allergies. Review of Systems   Constitutional: Positive for activity change, appetite change and fatigue. Negative for chills, diaphoresis and fever. HENT: Negative for congestion, rhinorrhea and sore throat. Eyes: Negative for redness and visual disturbance. Respiratory: Negative for cough, chest tightness, shortness of breath and wheezing. Cardiovascular: Negative for chest pain and palpitations. Gastrointestinal: Positive for abdominal pain, diarrhea, nausea and vomiting. Negative for blood in stool. Endocrine: Negative for polydipsia and polyuria. Genitourinary: Negative for dysuria and hematuria. Musculoskeletal: Negative for arthralgias, myalgias and neck stiffness. Skin: Positive for wound. Negative for rash. Allergic/Immunologic: Negative for environmental allergies and food allergies. Neurological: Positive for headaches.  Negative for dizziness and weakness. Hematological: Negative for adenopathy. Does not bruise/bleed easily. Psychiatric/Behavioral: Negative for confusion and sleep disturbance. The patient is not nervous/anxious. Vitals:    08/27/20 2010 08/27/20 2012   BP: 111/70    Pulse: 99    Resp: 18    Temp: 97.7 °F (36.5 °C)    SpO2: 99% 99%   Weight: 52.2 kg (115 lb)    Height: 5' 6\" (1.676 m)             Physical Exam  Vitals signs and nursing note reviewed. Constitutional:       Comments: Very frail borderline cachectic lady who looks much older than her stated age with very poor overall personal hygiene    Sallow appearing   HENT:      Head:      Comments: Contusions to her left orbit and her left cheekbone     Nose: No congestion. Mouth/Throat:      Comments: Tacky mucous membranes  Eyes:      General:         Right eye: No discharge. Left eye: No discharge. Pupils: Pupils are equal, round, and reactive to light. Neck:      Musculoskeletal: No neck rigidity. Cardiovascular:      Rate and Rhythm: Regular rhythm. Pulses: Normal pulses. Comments: Borderline tachycardia  Pulmonary:      Effort: Pulmonary effort is normal.      Breath sounds: Normal breath sounds. Abdominal:      General: Bowel sounds are normal.      Palpations: Abdomen is soft. Tenderness: There is no abdominal tenderness. Musculoskeletal:         General: No swelling or tenderness. Lymphadenopathy:      Cervical: No cervical adenopathy. Skin:     Comments: She has some superficial skin breakdown in her sacral area   Neurological:      General: No focal deficit present. Mental Status: She is alert and oriented to person, place, and time. MDM  Number of Diagnoses or Management Options  Diagnosis management comments: I will check her basic blood work including her LFT, coags, kidney function, and electrolytes. In a recent fall I will get a CT scan of her head. We will also get a CPK and a TSH.     ED Course as of Aug 27 2335   Thu Aug 27, 2020   1819 I spoke with the hospitalist who kindly agreed to see the patient.    Bo Stuart      ED Course User Index  [AC] Mike Alicea MD       Procedures

## 2020-08-28 NOTE — ED NOTES
Pt's clothes soiled with feces, and legs strained with old feces. Pt cleaned, placed in gown, belongings at bedside in pt belonging bag, pt's shirt was wet with vodka and ginger ale per pt for the past 2 days. Pt has skin break down on her sacral area, abd pad placed on area and pt turned in bed with pillow to the left hip. Pt straight cathed for urine sample per MD order. Pt given warm blankets.

## 2020-08-28 NOTE — PROGRESS NOTES
TRANSFER - IN REPORT:    Verbal report received from Chuck Sloan RN(name) on Inga Ulrich  being received from ED(unit) for routine progression of care      Report consisted of patients Situation, Background, Assessment and   Recommendations(SBAR). Information from the following report(s) SBAR, Kardex, ED Summary, MAR, Accordion and Recent Results was reviewed with the receiving nurse. Opportunity for questions and clarification was provided. Assessment completed upon patients arrival to unit and care assumed.

## 2020-08-28 NOTE — PROGRESS NOTES
Problem: Mobility Impaired (Adult and Pediatric)  Goal: *Acute Goals and Plan of Care (Insert Text)  Note:   LTG:  (1.)Ms. Mo Osorio will move from supine to sit and sit to supine , scoot up and down, and roll side to side in bed with CONTACT GUARD ASSIST within 7 treatment day(s). (2.)Ms. Mo Osorio will transfer from bed to chair and chair to bed with MINIMAL ASSIST using the least restrictive device within 7 treatment day(s). (3.)Ms. Mo Osorio will ambulate with MINIMAL ASSIST for 150+ feet with the least restrictive device within 7 treatment day(s). ________________________________________________________________________________________________    PHYSICAL THERAPY: Initial Assessment and AM 8/28/2020  OBSERVATION: PT Visit Days : 1  Payor: /       NAME/AGE/GENDER: Monalisa Mclean is a 63 y.o. female   PRIMARY DIAGNOSIS: CAP (community acquired pneumonia) [J18.9] CAP (community acquired pneumonia) CAP (community acquired pneumonia)        ICD-10: Treatment Diagnosis:    Generalized Muscle Weakness (M62.81)  Difficulty in walking, Not elsewhere classified (R26.2)   Precaution/Allergies:  Patient has no known allergies. ASSESSMENT:     Ms. Mo Osorio presents supine at arrival with resistance to participate. She answered info questions and stating that she would not get up, but at the same time moving up to EOB, she needed initial Jermaine to sit, but able to contain self and hold self balanced. She stood 1 time and sat back down within seconds. She stood again then ambulated 20ft with RW and Jermaine. She returned to room and sat in [address] [place] [address]. Pace was slow and initially resistant, but agreeable with progress. Pt main limitation is self. Her impairments include decreased functional mobility, decreased balance, decreased strength, decreased safety awareness, increased risk for falls. Pt would benefit from further PT while in house to address these impairments to help improve to prior level of independence.       This section established at most recent assessment   PROBLEM LIST (Impairments causing functional limitations):  Decreased Strength  Decreased ADL/Functional Activities  Decreased Transfer Abilities  Decreased Ambulation Ability/Technique  Decreased Balance  Increased Pain  Decreased Activity Tolerance  Increased Shortness of Breath  Decreased Flexibility/Joint Mobility  Edema/Girth   INTERVENTIONS PLANNED: (Benefits and precautions of physical therapy have been discussed with the patient.)  Balance Exercise  Bed Mobility  Gait Training  Home Exercise Program (HEP)  Neuromuscular Re-education/Strengthening  Range of Motion (ROM)  Therapeutic Activites  Therapeutic Exercise/Strengthening  Transfer Training     TREATMENT PLAN: Frequency/Duration: 3 times a week for duration of hospital stay  Rehabilitation Potential For Stated Goals: Good     REHAB RECOMMENDATIONS (at time of discharge pending progress):    Placement: It is my opinion, based on this patient's performance to date, that Ms. Obdulio Zepeda may benefit from intensive therapy at a [address] [place] [address] after discharge due to the functional deficits listed above that are likely to improve with skilled rehabilitation and concerns that he/she may be unsafe to be unsupervised at home due to decreased function . Equipment:   None at this time              HISTORY:   History of Present Injury/Illness (Reason for Referral):  56-year-old lady presents with concerns about abdominal pain that has been present for about 3 days. She said that with this she has had some nausea, vomiting, and diarrhea. Patient is globally deconditioned. She said that she has not been able to get up and walk in about 5 days although she is unable to clarify why. She says approximately 2 days ago she was in a rolling chair and fell out of the chair striking the left side of her head. She does drink several drinks of vodka daily.      She denies any known medical problems other than a history of kidney stones and says it has been a couple years since she seen a doctor. Past Medical History/Comorbidities:   Ms. Karina Willett  has a past medical history of Fracture of right clavicle (3/2015), History of kidney stones, Hypertension, and Liver disease (dx--2011). She also has no past medical history of Adverse effect of anesthesia, Dementia, Difficult intubation, Endocrine disease, Gastrointestinal disorder, Malignant hyperthermia due to anesthesia, Nausea & vomiting, Neurological disorder, Pseudocholinesterase deficiency, Sleep disorder, or Unspecified adverse effect of anesthesia. Ms. Karina Willett  has a past surgical history that includes hx gyn; hx breast biopsy (1984); and hx orthopaedic (Right, 3/2015 at [facility]). Social History/Living Environment:   Home Environment: Other (comment)(lives with friend)  One/Two Story Residence: One story  Living Alone: No  Support Systems: Friends \ neighbors  Patient Expects to be Discharged to[de-identified] Other (comment)  Current DME Used/Available at Home: Cane, straight  Prior Level of Function/Work/Activity:  Pt lives in house with family members and reported she will be living elsewhere [de-identified] a different family member after dc. She states she walks with SPC in either hand, she has a RW she does not use. Number of Personal Factors/Comorbidities that affect the Plan of Care: 0: LOW COMPLEXITY   EXAMINATION:   Most Recent Physical Functioning:   Gross Assessment:  AROM: Generally decreased, functional  Strength: Generally decreased, functional  Coordination: Generally decreased, functional  Tone: Normal  Sensation: Intact               Posture:     Balance:  Sitting: Impaired  Sitting - Static: Fair (occasional)  Sitting - Dynamic: Fair (occasional)  Standing: Impaired  Standing - Static: Fair;Poor  Standing - Dynamic : Fair;Poor Bed Mobility:  Rolling:  Moderate assistance  Supine to Sit: Moderate assistance  Scooting: Minimum assistance  Wheelchair Mobility: Transfers:  Sit to Stand: Moderate assistance  Stand to Sit: Moderate assistance  Bed to Chair: Moderate assistance  Gait:            Body Structures Involved:  Nerves  Eyes and Ears  Bones  Joints  Muscles  Ligaments Body Functions Affected:  Mental  Sensory/Pain  Neuromusculoskeletal  Movement Related Activities and Participation Affected:  General Tasks and Demands  Mobility  [address] [place] [address] and [facility]   Number of elements that affect the Plan of Care: 1-2: LOW COMPLEXITY   CLINICAL PRESENTATION:   Presentation: Stable and uncomplicated: LOW COMPLEXITY   CLINICAL DECISION MAKING:   [place] Form  How much difficulty does the patient currently have. .. Unable A Lot A Little None   1. Turning over in bed (including adjusting bedclothes, sheets and blankets)? [] 1   [x] 2   [] 3   [] 4   2. Sitting down on and standing up from a chair with arms ( e.g., wheelchair, bedside commode, etc.)   [] 1   [x] 2   [] 3   [] 4   3. Moving from lying on back to sitting on the side of the bed? [] 1   [x] 2   [] 3   [] 4   How much help from another person does the patient currently need. .. Total A Lot A Little None   4. Moving to and from a bed to a chair (including a wheelchair)? [] 1   [x] 2   [] 3   [] 4   5. Need to walk in hospital room? [] 1   [] 2   [x] 3   [] 4   6. Climbing 3-5 steps with a railing? [] 1   [x] 2   [] 3   [] 4   © 2007, Trustees of [address] [place] [address], under license to [employer]. All rights reserved      Score:  Initial: 13 Most Recent: X (Date: -- )    Interpretation of Tool:  Represents activities that are increasingly more difficult (i.e. Bed mobility, Transfers, Gait). Medical Necessity:     Skilled intervention continues to be required due to decresed function.   Reason for Services/Other Comments:  Patient continues to require skilled intervention due to   medical complications and patient unable to attend/participate in therapy as expected  . Use of outcome tool(s) and clinical judgement create a POC that gives a: Questionable prediction of patient's progress: MODERATE COMPLEXITY            TREATMENT:   (In addition to Assessment/Re-Assessment sessions the following treatments were rendered)   Pre-treatment Symptoms/Complaints:  none  Pain: Initial:   Pain Intensity 1: 10  Pain Location 1: Buttocks  Post Session:  9     Therapeutic Activity: (    24): Therapeutic activities including Bed transfers, Chair transfers, and Ambulation on level ground to improve mobility, strength, balance, and coordination. Required maximal   to promote static and dynamic balance in standing, promote coordination of bilateral, lower extremity(s), and promote motor control of bilateral, lower extremity(s). Braces/Orthotics/Lines/Etc:   IV  O2 Device: Room air  Treatment/Session Assessment:    Response to Treatment:  see above  Interdisciplinary Collaboration:   Physical Therapist  Registered Nurse  After treatment position/precautions:   Up in chair  Call light within reach  RN notified   Compliance with Program/Exercises: Will assess as treatment progresses  Recommendations/Intent for next treatment session: \"Next visit will focus on advancements to more challenging activities and reduction in assistance provided\".   Total Treatment Duration:  PT Patient Time In/Time Out  Time In: 0915  Time Out: [address] [facility], [facility]

## 2020-08-28 NOTE — H&P
HOSPITALIST INITIAL HISTORY AND PHYSICAL    NAME:  Maria Esther Jones   Age:  63 y.o.  :   1960   MRN:   [id number]  PCP: Yusra Sawyer NP  Consulting MD:  Treatment Team: Attending Provider: Denita Chu MD; Primary Nurse: Tanner Calhoun RN    CHIEF COMPLAINT: weakness    HISTORY OF PRESENT ILLNESS:   Maria Esther Jones is a 63 y.o. female with a past medical history of alcohol dependence, hepatitis C, HTN who presents to the ER with report of weakness and inability to walk for the past 5 days. She admits to falling about 2 days ago out of her rolling chair. Denies any pain, nausea, vomiting, fevers, chills. REVIEW OF SYSTEMS: Comprehensive ROS performed. Denies fevers, chills, nausea, vomiting, otherwise negative. Past Medical History:   Diagnosis Date    Fracture of right clavicle 3/2015    History of kidney stones     Hypertension     no meds--- pt stopped on her own--- off meds x 1 yr-- per pt-- b/p stable    Liver disease dx--    HEP C--- not currently seeing a m.d.-- due to  no insurance per pt        Past Surgical History:   Procedure Laterality Date    HX BREAST BIOPSY  1984    cyst    HX GYN      cone bx    HX ORTHOPAEDIC Right 3/2015 at [facility]    clavicle surg       Prior to Admission Medications   Prescriptions Last Dose Informant Patient Reported? Taking? HYDROcodone-acetaminophen (NORCO) 5-325 mg per tablet   No No   Sig: Take 1 Tab by mouth every six (6) hours as needed for Pain. Max Daily Amount: 4 Tabs. amLODIPine (NORVASC) 10 mg tablet   No No   Sig: Take 0.5 Tabs by mouth daily. fexofenadine-pseudoephedrine (ALLEGRA-D 24) 180-240 mg per tablet   No No   Sig: Take 1 Tab by mouth daily. ondansetron (ZOFRAN ODT) 8 mg disintegrating tablet   No No   Sig: Take 1 Tab by mouth every twelve (12) hours as needed for Nausea. Facility-Administered Medications: None       No Known Allergies    FAMILY HISTORY: Reviewed.  Negative except   Family History Problem Relation Age of Onset    Arthritis-osteo Mother     Lung Disease Father     Cancer Father     Kidney Disease Brother        Social History     Tobacco Use    Smoking status: Current Every Day Smoker     Packs/day: 1.00     Years: 40.00     Pack years: 40.00    Smokeless tobacco: Never Used   Substance Use Topics    Alcohol use: Yes     Comment: occ         Objective:     Visit Vitals  /59 (BP 1 Location: Right arm, BP Patient Position: At rest)   Pulse 96   Temp 97.7 °F (36.5 °C)   Resp 14   Ht 5' 6\" (1.676 m)   Wt 52.2 kg (115 lb)   SpO2 99%   BMI 18.56 kg/m²      Temp (24hrs), Av.7 °F (36.5 °C), Min:97.7 °F (36.5 °C), Max:97.7 °F (36.5 °C)    Oxygen Therapy  O2 Sat (%): 99 % (20 025)  Pulse via Oximetry: 101 beats per minute (20)  O2 Device: Room air (20)  Physical Exam:  General:    The patient is a middle aged female appearing much older than stated age in no acute distress. Head:   Normocephalic/atraumatic. ENT:  External auricular and nasal exam within normal limits. Lungs:   No respiratory distress or accessory muscle use. Abdomen:    non-distended  Skin:     Normal color, texture, and turgor. No rashes, lesions, or jaundice.   Neurologic: CN II-XII grossly intact and symmetrical.   Psychiatric: Somnolent, appropriate affect    Data Review:   Recent Results (from the past 24 hour(s))   ETHYL ALCOHOL    Collection Time: 20  8:26 PM   Result Value Ref Range    ALCOHOL(ETHYL),SERUM 131 MG/DL   CBC WITH AUTOMATED DIFF    Collection Time: 20  8:26 PM   Result Value Ref Range    WBC 17.2 (H) 4.3 - 11.1 K/uL    RBC 3.01 (L) 4.05 - 5.2 M/uL    HGB 8.2 (L) 11.7 - 15.4 g/dL    HCT 25.3 (L) 35.8 - 46.3 %    MCV 84.1 79.6 - 97.8 FL    MCH 27.2 26.1 - 32.9 PG    MCHC 32.4 31.4 - 35.0 g/dL    RDW 21.4 (H) 11.9 - 14.6 %    PLATELET 011 644 - 689 K/uL    MPV 10.6 9.4 - 12.3 FL    ABSOLUTE NRBC 0.12 0.0 - 0.2 K/uL    DF AUTOMATED      NEUTROPHILS 86 (H) 43 - 78 %    LYMPHOCYTES 6 (L) 13 - 44 %    MONOCYTES 4 4.0 - 12.0 %    EOSINOPHILS 0 (L) 0.5 - 7.8 %    BASOPHILS 0 0.0 - 2.0 %    IMMATURE GRANULOCYTES 4 0.0 - 5.0 %    ABS. NEUTROPHILS 14.8 (H) 1.7 - 8.2 K/UL    ABS. LYMPHOCYTES 1.0 0.5 - 4.6 K/UL    ABS. MONOCYTES 0.7 0.1 - 1.3 K/UL    ABS. EOSINOPHILS 0.0 0.0 - 0.8 K/UL    ABS. BASOPHILS 0.1 0.0 - 0.2 K/UL    ABS. IMM. GRANS. 0.6 (H) 0.0 - 0.5 K/UL   MAGNESIUM    Collection Time: 08/27/20  8:26 PM   Result Value Ref Range    Magnesium 1.5 (L) 1.8 - 2.4 mg/dL   METABOLIC PANEL, COMPREHENSIVE    Collection Time: 08/27/20  8:26 PM   Result Value Ref Range    Sodium 129 (L) 136 - 145 mmol/L    Potassium 3.0 (L) 3.5 - 5.1 mmol/L    Chloride 89 (L) 98 - 107 mmol/L    CO2 15 (L) 21 - 32 mmol/L    Anion gap 25 (H) 7 - 16 mmol/L    Glucose 133 (H) 65 - 100 mg/dL    BUN 22 8 - 23 MG/DL    Creatinine 0.69 0.6 - 1.0 MG/DL    GFR est AA >60 >60 ml/min/1.73m2    GFR est non-AA >60 >60 ml/min/1.73m2    Calcium 7.9 (L) 8.3 - 10.4 MG/DL    Bilirubin, total 1.3 (H) 0.2 - 1.1 MG/DL    ALT (SGPT) 80 (H) 12 - 65 U/L    AST (SGOT) 381 (H) 15 - 37 U/L    Alk.  phosphatase 129 50 - 136 U/L    Protein, total 5.7 (L) 6.3 - 8.2 g/dL    Albumin 2.0 (L) 3.2 - 4.6 g/dL    Globulin 3.7 (H) 2.3 - 3.5 g/dL    A-G Ratio 0.5 (L) 1.2 - 3.5     PHOSPHORUS    Collection Time: 08/27/20  8:26 PM   Result Value Ref Range    Phosphorus 0.8 (LL) 2.3 - 3.7 MG/DL   PTT    Collection Time: 08/27/20  8:26 PM   Result Value Ref Range    aPTT 25.5 24.3 - 35.4 SEC   PROTHROMBIN TIME + INR    Collection Time: 08/27/20  8:26 PM   Result Value Ref Range    Prothrombin time 18.5 (H) 12.0 - 14.7 sec    INR 1.5     CK    Collection Time: 08/27/20  8:26 PM   Result Value Ref Range     21 - 215 U/L   TSH 3RD GENERATION    Collection Time: 08/27/20  8:26 PM   Result Value Ref Range    TSH 0.867 0.358 - 3.740 uIU/mL   URINE MICROSCOPIC    Collection Time: 08/27/20 10:03 PM   Result Value Ref Range    WBC 0 0 /hpf    RBC 0-3 0 /hpf    Epithelial cells 0 0 /hpf    Bacteria 1+ (H) 0 /hpf    Casts 0-3 0 /lpf       Imaging /Procedures /Studies:  Ct Head Wo Cont    Result Date: 8/27/2020  IMPRESSION: No acute process. Xr Chest Port    Result Date: 8/27/2020  IMPRESSION: New small right lung base opacity, possibly early pneumonia. Follow-up is recommended to ensure resolution. Assessment and Plan:     Principal Problem:    CAP (community acquired pneumonia) (8/27/2020)    IV Rocephin/Azithromycin. Sputum/blood cultures. Active Problems:    Leukocytosis (8/27/2020)    Follow CBC      Normocytic anemia (8/27/2020)    [place] CBC      Hyponatremia (8/27/2020)    IV NS, follow BMP. Hypokalemia (8/27/2020)    IVF, follow BMP. Elevated LFTs (8/27/2020)    Mild, likely related to alcohol and known Hep C. Consider RUQ US to further evaluate      Alcohol dependence ([facility] [address] (8/27/2020)    [facility] protocol. DVT Prophylaxis: Lovenox      Code Status: FULL CODE      Disposition: Observe on med/surg for evaluation and treatment as per above.       Anticipated discharge: < 2midnights     Signed By: Mario Morales MD     August 28, 2020

## 2020-08-28 NOTE — ED TRIAGE NOTES
Pt arrived via [facility] from home c/o abd pain X3 days, weakness, and NVD. EMS states that pt was diaphoretic, tachypneic, with a weak pulse on arrival. EMS gave 3.375mg zosyn, 4mg zofran, and 1L LR. Pt reports that she has a wound on her sacral area, but unsure if it is open and how long it has been there. Pt also reports daily ETOH intake of vodka. Denies fever and cp. Reports thick productive clear sputum when she coughs +smoker.  MD negro at bedside to evaluate

## 2020-08-28 NOTE — PROGRESS NOTES
Admission assessment complete. Patient alert, oriented x 4. IVF infusing without difficulty. Respirations even and unlabored, no distress noted. Abdomen soft, bowel sounds active in all 4 quadrants. Jalyn pad placed. Patient oriented to room and surroundings. All questions answered. Aware to call should needs arise. Will continue to monitor.

## 2020-08-28 NOTE — PROGRESS NOTES
08/28/20 0514   Dual Skin Pressure Injury Assessment   Dual Skin Pressure Injury Assessment WDL   Second Care Provider (Based on [address] [place] [address]) Blossom Cha RN   Skin Integumentary   Skin Integumentary (WDL) X   Skin Color Appropriate for ethnicity; Ecchymosis (comment)   Skin Condition/Temp Dry;Warm;Flaky   Skin Integrity Abrasion; Excoriation  (sacrum)

## 2020-08-28 NOTE — PROGRESS NOTES
While reconciling PTA meds, patient stated that she no longer took Norvasc, but did take \"something\" for her BP. Dr. Teresa Price notified via [employer].

## 2020-08-28 NOTE — PROGRESS NOTES
Occupational Therapy Note:  OT orders received, chart reviewed, and evaluation attempted. Patient with PT at this time. Will check back as schedule permits and patient is available to initiate occupational therapy evaluation.    Thank you for this consult,   Cindy Maldonado, OTR/L

## 2020-08-28 NOTE — PROGRESS NOTES
Problem: Patient Education: Go to Patient Education Activity  Goal: Patient/Family Education  Outcome: Progressing Towards Goal  Note:   1. Patient will complete total body bathing and dressing with modified independence and adaptive equipment as needed. 2. Patient will complete toileting with modified independence and adaptive equipment as needed. 3. Patient will tolerate 30 minutes of OT treatment with self-incorporated rest breaks to increase activity tolerance for ADLs. 4. Patient will complete functional transfers with modified independence and adaptive equipment as needed. 5. Patient will complete functional mobility for ADLs with modified independence and adaptive equipment as needed. Timeframe: 7 visits        OCCUPATIONAL THERAPY: Initial Assessment, Daily Note, and PM 8/28/2020  OBSERVATION: OT Visit Days: 1  Payor: /      NAME/AGE/GENDER: Inga Ulrich is a 63 y.o. female   PRIMARY DIAGNOSIS:  CAP (community acquired pneumonia) [J18.9] CAP (community acquired pneumonia) CAP (community acquired pneumonia)       ICD-10: Treatment Diagnosis:    · Generalized Muscle Weakness (M62.81)  · History of falling (Z91.81)   Precautions/Allergies:    Fall precautions    Patient has no known allergies. ASSESSMENT:     Ms. Herlinda Fagan is a 63 y.o. female admitted with CAP, weakness. Hx falls. Pt has sacral ulcers. At baseline pt has been living with mother, however her brother is moving the mother to [facility] and selling the home. Pt reports she will live in a friend's camper at d/c. Pt reports independence with ADLs, utilizes her mother's broken SPC for mobility. Assistance for tub transfers. Reports she had been eating some of her mother's food from [facility] but that they d not come anymore and she has not had access to food. Upon arrival pt alert and agreeable to OT evaluation and treatment. BUE assessment revealed AROM and strength decreased in BUEs. Sitting balance intact.  Pt completed functional transfers for ADLs with Mariano/cues for safety. Pt found to be heavily soiled and unaware. Pt practiced toileting with ModA for hygiene thoroughness, rest breaks throughout. RN present to assess and dress sacral ulcers. Upper body bathing in sitting with Mariano. Set up to change into clean gown. Ambulation for ADLs with Mariano-CGA/RW, cues for RW management/proximity. Pt debilitated, fatigues quickly, requires several rest breaks throughout. Sit > supine with SBA, pt reports she had another bowel movement. Rolling in supine with SBA for toileting with total assist. Pt left sidelying L in bed with bony prominences offloaded, head of bed raised, and with call bell within reach. Pt presents with deficits in strength, activity tolerance, balance, ambulation and transfers. Raheem Vidal is currently functioning below baseline and would benefit from continued OT to increase safety and independence with ADLs. Will follow. This section established at most recent assessment   PROBLEM LIST (Impairments causing functional limitations):  1. Decreased Strength  2. Decreased ADL/Functional Activities  3. Decreased Transfer Abilities  4. Decreased Ambulation Ability/Technique  5. Decreased Balance  6. Increased Pain  7. Decreased Activity Tolerance  8. Decreased Pacing Skills  9. Decreased Work Simplification/Energy Conservation Techniques  10. Increased Fatigue  11. Decreased Flexibility/Joint Mobility  12. Decreased Knowledge of Precautions  13. Decreased Skin Integrity/Hygeine  14. Decreased [place] with Home Exercise Program  15. Decreased Cognition   INTERVENTIONS PLANNED: (Benefits and precautions of occupational therapy have been discussed with the patient.)  1. Activities of daily living training  2. Adaptive equipment training  3. Balance training  4. Clothing management  5. Cognitive training  6. Community reintergration  7. Donning&doffing training  8. Hygiene training  9.  Neuromuscular re-eduation  10. Re-evaluation  11. Therapeutic activity  12. Therapeutic exercise     TREATMENT PLAN: Frequency/Duration: Follow patient 3x/week to address above goals. Rehabilitation Potential For Stated Goals: Good     REHAB RECOMMENDATIONS (at time of discharge pending progress):    Placement: It is my opinion, based on this patient's performance to date, that Ms. Maninder Cabello may benefit from intensive therapy at a [address] [place] [address] after discharge due to the functional deficits listed above that are likely to improve with skilled rehabilitation and concerns that he/she may be unsafe to be unsupervised at home due to impaired strength and balance impacting ADLs, increasing risk of falls. Equipment:    RW openPeople              OCCUPATIONAL PROFILE AND HISTORY:   History of Present Injury/Illness (Reason for Referral):  See H&P. Past Medical History/Comorbidities:   Ms. Maninder Cabello  has a past medical history of Fracture of right clavicle (3/2015), History of kidney stones, Hypertension, and Liver disease (dx--2011). She also has no past medical history of Adverse effect of anesthesia, Dementia, Difficult intubation, Endocrine disease, Gastrointestinal disorder, Malignant hyperthermia due to anesthesia, Nausea & vomiting, Neurological disorder, Pseudocholinesterase deficiency, Sleep disorder, or Unspecified adverse effect of anesthesia. Ms. Maninder Cabello  has a past surgical history that includes hx gyn; hx breast biopsy (1984); and hx orthopaedic (Right, 3/2015 at [facility]). Social History/Living Environment:   Home Environment: (Will d/c to friend's camper)  One/Two Story Residence: One story  Living Alone: No  Support Systems: Friends \ neighbors  Patient Expects to be Discharged to[de-identified] Unknown  Current DME Used/Available at Home: Cane, straight  Prior Level of Function/Work/Activity:  At baseline pt has been living with mother, however her brother is moving the mother to [facility] and selling the home.  Pt reports she will live in a friend's camper at d/c. Pt reports independence with ADLs, utilizes her mother's broken SPC for mobility. Assistance for tub transfers. Reports she had been eating some of her mother's food from [facility] but that they d not come anymore and she has not had access to food. Dominant Side:         RIGHT  Personal Factors:          Sex:  female        Age:  63 y.o. Other factors that influence how disability is experienced by the patient:  Multiple co-morbidities, falls    Number of Personal Factors/Comorbidities that affect the Plan of Care: Expanded review of therapy/medical records (1-2):  MODERATE COMPLEXITY   ASSESSMENT OF OCCUPATIONAL PERFORMANCE[de-identified]   Activities of Daily Living:   Basic ADLs (From Assessment) Complex ADLs (From Assessment)   Feeding: Independent  Oral Facial Hygiene/Grooming: Setup  Bathing: Moderate assistance  Upper Body Dressing: Setup  Lower Body Dressing: Moderate assistance  Toileting: Moderate assistance Instrumental ADL  Meal Preparation: Total assistance  Homemaking: Total assistance   Grooming/Bathing/Dressing Activities of Daily Living   Grooming  Washing Face: Set-up Cognitive Retraining  Safety/Judgement: Decreased awareness of environment;Decreased awareness of need for safety;Decreased insight into deficits; Fall prevention   Upper Body Bathing  Bathing Assistance: Minimum assistance  Position Performed: Seated in chair Feeding  Drink to Mouth: Set-up   Lower Body Bathing  Perineal  : Moderate assistance  Position Performed: Standing;Seated on toilet  Adaptive Equipment: Wilmer Fines  Toileting Assistance: Moderate assistance  Bladder Hygiene: Minimum assistance  Bowel Hygiene:  Moderate assistance  Clothing Management: Maximum assistance  Adaptive Equipment: Walker;Grab bars   Upper Body [address]: Set-up Functional Transfers  Bathroom Mobility: Minimum assistance  Toilet Transfer : Minimum assistance     Bed/Mat Mobility  Rolling: Stand-by assistance  Supine to Sit: Moderate assistance  Sit to Supine: Stand-by assistance  Sit to Stand: Minimum assistance  Stand to Sit: Minimum assistance  Bed to Chair: Contact guard assistance  Scooting: Stand-by assistance     Most Recent Physical Functioning:   Gross Assessment:  AROM: Generally decreased, functional(BUEs)  Strength: Generally decreased, functional(BUEs)  Coordination: Generally decreased, functional(BUEs)               Posture:     Balance:  Sitting: Impaired  Sitting - Static: Good (unsupported)  Sitting - Dynamic: Fair (occasional)  Standing: Impaired  Standing - Static: Fair;Constant support  Standing - Dynamic : Fair;Constant support Bed Mobility:  Rolling: Stand-by assistance  Supine to Sit: Moderate assistance  Sit to Supine: Stand-by assistance  Scooting: Stand-by assistance  Wheelchair Mobility:     Transfers:  Sit to Stand: Minimum assistance  Stand to Sit: Minimum assistance  Bed to Chair: Contact guard assistance            Patient Vitals for the past 6 hrs:   BP SpO2 O2 Flow Rate (L/min) Pulse   08/28/20 1245  99 % 0 l/min    08/28/20 1253 106/64 97 %  100       Mental Status  Neurologic State: Alert  Orientation Level: Oriented to person, Oriented to place  Cognition: Follows commands, Impaired decision making  Perception: Appears intact  Perseveration: No perseveration noted  Safety/Judgement: Decreased awareness of environment, Decreased awareness of need for safety, Decreased insight into deficits, Fall prevention                          Physical Skills Involved:  1. Range of Motion  2. Balance  3. Strength  4. Activity Tolerance  5. Gross Motor Control  6. Pain (acute)  7. Pain (Chronic)  8. Skin Integrity Cognitive Skills Affected (resulting in the inability to perform in a timely and safe manner):  1. Executive Function Psychosocial Skills Affected:  1. Habits/Routines  2. Environmental Adaptation  3. Social Interaction  4.  Emotional Regulation  5. Self-Awareness  6. Awareness of Others  7. Social Roles   Number of elements that affect the Plan of Care: 5+:  HIGH COMPLEXITY   CLINICAL DECISION MAKING:   [facility] [employer] AM-PAC 6 Clicks   Daily Activity Inpatient Short Form  How much help from another person does the patient currently need. .. Total A Lot A Little None   1. Putting on and taking off regular lower body clothing? [] 1   [x] 2   [] 3   [] 4   2. Bathing (including washing, rinsing, drying)? [] 1   [x] 2   [] 3   [] 4   3. Toileting, which includes using toilet, bedpan or urinal?   [] 1   [x] 2   [] 3   [] 4   4. Putting on and taking off regular upper body clothing? [] 1   [] 2   [x] 3   [] 4   5. Taking care of personal grooming such as brushing teeth? [] 1   [] 2   [x] 3   [] 4   6. Eating meals? [] 1   [] 2   [] 3   [x] 4   © 2007, Trustees of [employer], under license to [employer]. All rights reserved      Score:  Initial: 16 8/28/2020 Most Recent: X (Date: -- )    Interpretation of Tool:  Represents activities that are increasingly more difficult (i.e. Bed mobility, Transfers, Gait). Medical Necessity:     · Patient demonstrates good rehab potential due to higher previous functional level. Reason for Services/Other Comments:  · Patient continues to require skilled intervention due to inability to complete ADLs a prior level of independence.    Use of outcome tool(s) and clinical judgement create a POC that gives a: MODERATE COMPLEXITY         TREATMENT:   (In addition to Assessment/Re-Assessment sessions the following treatments were rendered)     Pre-treatment Symptoms/Complaints:    Pain: Initial:   Pain Intensity 1: 9  Pain Location 1: Buttocks  Pain Intervention(s) 1: Ambulation/Increased Activity, Repositioned  Post Session:  No complaint of pain at rest      Self Care: (70 minutes): Procedure(s) (per grid) utilized to improve and/or restore self-care/home management as related to dressing, bathing, toileting and grooming. Required moderate visual, verbal, manual and tactile cueing to facilitate activities of daily living skills and compensatory activities. Pt completed functional transfers for ADLs with Mariano/cues for safety. Pt found to be heavily soiled and unaware. Pt practiced toileting with ModA for hygiene thoroughness, rest breaks throughout. RN present to assess and dress sacral ulcers. Upper body bathing in sitting with Mariano. Set up to change into clean gown. Ambulation for ADLs with Mariano-CGA/RW, cues for RW management/proximity. Pt debilitated, fatigues quickly, requires several rest breaks throughout. Sit > supine with SBA, pt reports she had another bowel movement. Rolling in supine with SBA for toileting with total assist.    Braces/Orthotics/Lines/Etc:   · O2 Device: Room air  Treatment/Session Assessment:    Response to Treatment:  Tolerated well  · Interdisciplinary Collaboration:   o Occupational Therapist  o Registered Nurse  o Certified Nursing Assistant/Patient Care Technician  · After treatment position/precautions:   o Supine in bed  o Bed alarm/tab alert on  o Bed/Chair-wheels locked  o Bed in low position  o Call light within reach  o RN notified   · Compliance with Program/Exercises: Compliant all of the time, Will assess as treatment progresses. · Recommendations/Intent for next treatment session: \"Next visit will focus on advancements to more challenging activities and reduction in assistance provided\".   Total Treatment Duration:  OT Patient Time In/Time Out  Time In: 1350  Time Out: [address]. OSITO Lux/GINGER

## 2020-08-28 NOTE — PROGRESS NOTES
MSN, CM:  Spoke with patient this AM about discharge planning. Patient lived with her mother \"Argelia\" in mother's home with 3 steps for entrance. Patient has a brother Jean Amaral" that lives close. Patient stated Marcela Bah is cleaning and selling mother's home r/t he has placed her in an care home. Patient states she is going to live with a friend \"Qian\" in his camper upon discharge. Patient states she is independent with all ADL's and requires no equipment for ambulation. Patient does state she has a cane, walker, rollator, and BSC. Patient was questioned about ETOH use and if she wanted any help. Patient's reply was \"For what? \". Patient then questioned about wound on buttocks and if that could be related to intoxication. Patient replied \"No.\"  Patient was then questioned about her ETOH consumption and being an alcoholic. Patient stated \"Sometimes I drink, Sometimes I don't. \"  Patient stated she receives her money to pay for items from her mother. When questioned about income upon discharge patient states \"I don't know, I can't do anything because of my pain in my butt. \"  Patient has no job, income, PCP, or insurance. Patient will be referred to [facility] and given information about the free clinic to help with medical needs upon discharge. PT and OT consulted for evaluation and recommendations. Wound care also consulted for outpatient wound care needs. Case Management will continue to follow for discharge needs. Care Management Interventions  PCP Verified by CM: Yes(patient has no PCP)  Mode of Transport at Discharge:  Other (see comment)(friend to transport)  Transition of Care Consult (CM Consult): Discharge Planning  Physical Therapy Consult: Yes  Occupational Therapy Consult: Yes  Current Support Network: Relative's Home(mother lives in home)  Confirm Follow Up Transport: Family  Freedom of Choice List was Provided with Basic Dialogue that Supports the Patient's Individualized Plan of Care/Goals, Treatment Preferences and Shares the Quality Data Associated with the Providers?: Yes  Discharge Location  Discharge Placement: Unable to determine at this time

## 2020-08-28 NOTE — CONSULTS
Comprehensive Nutrition Assessment    Type and Reason for Visit: Initial, Consult, Positive nutrition screen    Nutrition Recommendations/Plan:    Continue with regular diet  Continue with Ensure Enlive TID with meals  Encourage increasing po intake at meals    Nutrition Assessment:  Pt presents with PNA. PMH includes EtOH abuse, hepatits C, and HTN. Pt reports consuming <1 meal per day and drinking 1 Ensure High Protein or Boost High Protein drink per day due to loss of appetite. Pt reports drinking 3-4 vodka drinks daily. Pt has only eaten applesauce, jello, and several bites of a cookie today. Noted Ensure Enlive at bedside table opened and observed pt sipping on it during visit. Encouraged pt to increase po intake and drinking ONS between meals. Discussed importance of adequate intake. Pt verbalized understanding. Noted current wt is stated. Order placed to weight pt. Per Lyn Bucio RN, pt is too tired to stand for wt and pt is not in a bed with scale. Pt reports UBW of 125# ~2 years ago, but does not weigh herself regularly. Limited wt hx noted per chart review. NFPE complete.     Malnutrition Assessment:  Malnutrition Status:  Severe malnutrition    Context:  Chronic illness     Findings of the 6 clinical characteristics of malnutrition:   Energy Intake:  7 - 75% or less est energy requirements for 1 month or longer  Body Fat Loss:  7 - Severe body fat loss, Fat overlying ribs, Orbital, Triceps   Muscle Mass Loss:  7 - Severe muscle mass loss, Calf (gastrocnemius), Clavicles (pectoralis &deltoids), Hand (interosseous), Scapula (trapezius), Thigh (quadraceps), Temples (temporalis)     Estimated Daily Nutrient Needs:  Energy (kcal):  1566-1827kcal(30-35kcal/kg (CBW:52.2kg))  Protein (g):  52-63gm(1-1.2gm/kg (CBW: 52.2kg))         Nutrition Related Findings:  severe muscle and subcutaneous fat loss noted      Current Nutrition Therapies:   DIET REGULAR  DIET NUTRITIONAL SUPPLEMENTS Breakfast, Lunch, Dinner; apartum Enlive    Anthropometric Measures:  · Height:  5' 5.98\" (167.6 cm)  · Current Body Wt:  52.2 kg (115 lb 1.3 oz)   · Usual Body Wt:  125 lb     · Ideal Body Wt:  130 lbs:  88.5 %   · BMI Category:  Underweight (BMI less than 18.5)       Nutrition Diagnosis:   · Severe malnutrition, In context of chronic illness related to inadequate protein-energy intake as evidenced by severe loss of subcutaneous fat, severe muscle loss, poor intake prior to admission    Nutrition Interventions:   Food and/or Nutrient Delivery: Continue current diet, Continue oral nutrition supplement  Coordination of Nutrition Care: (Discussed with Naomi Norwood RN)    Goals: Will meet >75% estimated nutrition needs within 7 days       Nutrition Monitoring and Evaluation:   Food/Nutrient Intake Outcomes: Food and nutrient intake, Supplement intake    Discharge Planning:     Too soon to determine     Electronically signed by Kylie Gurrola MS, RUTH, VINITA 8/28/2020 at 4:34 PM  Contact: [phone]

## 2020-08-29 PROBLEM — E43 SEVERE PROTEIN-CALORIE MALNUTRITION (HCC): Status: ACTIVE | Noted: 2020-08-29

## 2020-08-29 LAB
ALBUMIN SERPL-MCNC: 2 G/DL (ref 3.2–4.6)
ALBUMIN/GLOB SERPL: 0.6 {RATIO} (ref 1.2–3.5)
ALP SERPL-CCNC: 176 U/L (ref 50–136)
ALT SERPL-CCNC: 58 U/L (ref 12–65)
ANION GAP SERPL CALC-SCNC: 7 MMOL/L (ref 7–16)
AST SERPL-CCNC: 215 U/L (ref 15–37)
BASOPHILS # BLD: 0 K/UL (ref 0–0.2)
BASOPHILS NFR BLD: 0 % (ref 0–2)
BILIRUB SERPL-MCNC: 0.9 MG/DL (ref 0.2–1.1)
BUN SERPL-MCNC: 10 MG/DL (ref 8–23)
CALCIUM SERPL-MCNC: 7.8 MG/DL (ref 8.3–10.4)
CHLORIDE SERPL-SCNC: 99 MMOL/L (ref 98–107)
CO2 SERPL-SCNC: 31 MMOL/L (ref 21–32)
CREAT SERPL-MCNC: 0.51 MG/DL (ref 0.6–1)
DIFFERENTIAL METHOD BLD: ABNORMAL
EOSINOPHIL # BLD: 0.1 K/UL (ref 0–0.8)
EOSINOPHIL NFR BLD: 1 % (ref 0.5–7.8)
ERYTHROCYTE [DISTWIDTH] IN BLOOD BY AUTOMATED COUNT: 21.1 % (ref 11.9–14.6)
FERRITIN SERPL-MCNC: 378 NG/ML (ref 8–388)
FOLATE SERPL-MCNC: 6.5 NG/ML (ref 3.1–17.5)
GLOBULIN SER CALC-MCNC: 3.2 G/DL (ref 2.3–3.5)
GLUCOSE SERPL-MCNC: 97 MG/DL (ref 65–100)
HCT VFR BLD AUTO: 18.5 % (ref 35.8–46.3)
HGB BLD-MCNC: 6.5 G/DL (ref 11.7–15.4)
HGB BLD-MCNC: 8.9 G/DL (ref 11.7–15.4)
IMM GRANULOCYTES # BLD AUTO: 0.4 K/UL (ref 0–0.5)
IMM GRANULOCYTES NFR BLD AUTO: 5 % (ref 0–5)
IRON SATN MFR SERPL: 22 %
IRON SERPL-MCNC: 41 UG/DL (ref 35–150)
LYMPHOCYTES # BLD: 1.3 K/UL (ref 0.5–4.6)
LYMPHOCYTES NFR BLD: 15 % (ref 13–44)
MAGNESIUM SERPL-MCNC: 1.6 MG/DL (ref 1.8–2.4)
MCH RBC QN AUTO: 28 PG (ref 26.1–32.9)
MCHC RBC AUTO-ENTMCNC: 35.1 G/DL (ref 31.4–35)
MCV RBC AUTO: 79.7 FL (ref 79.6–97.8)
MM INDURATION POC: 0 MM (ref 0–5)
MONOCYTES # BLD: 1 K/UL (ref 0.1–1.3)
MONOCYTES NFR BLD: 11 % (ref 4–12)
NEUTS SEG # BLD: 6.3 K/UL (ref 1.7–8.2)
NEUTS SEG NFR BLD: 69 % (ref 43–78)
NRBC # BLD: 0.19 K/UL (ref 0–0.2)
PHOSPHATE SERPL-MCNC: 0.8 MG/DL (ref 2.3–3.7)
PHOSPHATE SERPL-MCNC: 0.8 MG/DL (ref 2.3–3.7)
PLATELET # BLD AUTO: 114 K/UL (ref 150–450)
PMV BLD AUTO: 10.4 FL (ref 9.4–12.3)
POTASSIUM SERPL-SCNC: 2.2 MMOL/L (ref 3.5–5.1)
POTASSIUM SERPL-SCNC: 2.4 MMOL/L (ref 3.5–5.1)
PPD POC: NEGATIVE NEGATIVE
PROT SERPL-MCNC: 5.2 G/DL (ref 6.3–8.2)
RBC # BLD AUTO: 2.32 M/UL (ref 4.05–5.2)
SODIUM SERPL-SCNC: 137 MMOL/L (ref 136–145)
TIBC SERPL-MCNC: 186 UG/DL (ref 250–450)
TRANSFERRIN SERPL-MCNC: 121 MG/DL (ref 202–364)
VIT B12 SERPL-MCNC: 2384 PG/ML (ref 193–986)
WBC # BLD AUTO: 9.2 K/UL (ref 4.3–11.1)

## 2020-08-29 PROCEDURE — 82746 ASSAY OF FOLIC ACID SERUM: CPT

## 2020-08-29 PROCEDURE — 82607 VITAMIN B-12: CPT

## 2020-08-29 PROCEDURE — 74011250637 HC RX REV CODE- 250/637: Performed by: FAMILY MEDICINE

## 2020-08-29 PROCEDURE — 83540 ASSAY OF IRON: CPT

## 2020-08-29 PROCEDURE — 80053 COMPREHEN METABOLIC PANEL: CPT

## 2020-08-29 PROCEDURE — 74011250636 HC RX REV CODE- 250/636: Performed by: FAMILY MEDICINE

## 2020-08-29 PROCEDURE — 86900 BLOOD TYPING SEROLOGIC ABO: CPT

## 2020-08-29 PROCEDURE — 96372 THER/PROPH/DIAG INJ SC/IM: CPT

## 2020-08-29 PROCEDURE — 65660000000 HC RM CCU STEPDOWN

## 2020-08-29 PROCEDURE — 96376 TX/PRO/DX INJ SAME DRUG ADON: CPT

## 2020-08-29 PROCEDURE — 74011250636 HC RX REV CODE- 250/636: Performed by: INTERNAL MEDICINE

## 2020-08-29 PROCEDURE — 84132 ASSAY OF SERUM POTASSIUM: CPT

## 2020-08-29 PROCEDURE — 36415 COLL VENOUS BLD VENIPUNCTURE: CPT

## 2020-08-29 PROCEDURE — 86923 COMPATIBILITY TEST ELECTRIC: CPT

## 2020-08-29 PROCEDURE — 74011250637 HC RX REV CODE- 250/637: Performed by: INTERNAL MEDICINE

## 2020-08-29 PROCEDURE — 84100 ASSAY OF PHOSPHORUS: CPT

## 2020-08-29 PROCEDURE — 83735 ASSAY OF MAGNESIUM: CPT

## 2020-08-29 PROCEDURE — 85018 HEMOGLOBIN: CPT

## 2020-08-29 PROCEDURE — 99218 HC RM OBSERVATION: CPT

## 2020-08-29 PROCEDURE — 30233N1 TRANSFUSION OF NONAUTOLOGOUS RED BLOOD CELLS INTO PERIPHERAL VEIN, PERCUTANEOUS APPROACH: ICD-10-PCS | Performed by: INTERNAL MEDICINE

## 2020-08-29 PROCEDURE — P9016 RBC LEUKOCYTES REDUCED: HCPCS

## 2020-08-29 PROCEDURE — 85025 COMPLETE CBC W/AUTO DIFF WBC: CPT

## 2020-08-29 PROCEDURE — 82728 ASSAY OF FERRITIN: CPT

## 2020-08-29 PROCEDURE — 36430 TRANSFUSION BLD/BLD COMPNT: CPT

## 2020-08-29 PROCEDURE — 74011000250 HC RX REV CODE- 250: Performed by: INTERNAL MEDICINE

## 2020-08-29 PROCEDURE — 96367 TX/PROPH/DG ADDL SEQ IV INF: CPT

## 2020-08-29 PROCEDURE — 74011000258 HC RX REV CODE- 258: Performed by: INTERNAL MEDICINE

## 2020-08-29 RX ORDER — SODIUM CHLORIDE 9 MG/ML
250 INJECTION, SOLUTION INTRAVENOUS AS NEEDED
Status: DISCONTINUED | OUTPATIENT
Start: 2020-08-29 | End: 2020-09-04 | Stop reason: HOSPADM

## 2020-08-29 RX ORDER — MAGNESIUM SULFATE HEPTAHYDRATE 40 MG/ML
4 INJECTION, SOLUTION INTRAVENOUS ONCE
Status: COMPLETED | OUTPATIENT
Start: 2020-08-29 | End: 2020-08-29

## 2020-08-29 RX ORDER — POTASSIUM CHLORIDE 14.9 MG/ML
20 INJECTION INTRAVENOUS
Status: DISCONTINUED | OUTPATIENT
Start: 2020-08-29 | End: 2020-08-29

## 2020-08-29 RX ORDER — POTASSIUM CHLORIDE 20 MEQ/1
40 TABLET, EXTENDED RELEASE ORAL
Status: COMPLETED | OUTPATIENT
Start: 2020-08-29 | End: 2020-08-29

## 2020-08-29 RX ORDER — MEGESTROL ACETATE 40 MG/ML
400 SUSPENSION ORAL DAILY
Status: DISCONTINUED | OUTPATIENT
Start: 2020-08-29 | End: 2020-09-04 | Stop reason: HOSPADM

## 2020-08-29 RX ADMIN — PIPERACILLIN SODIUM AND TAZOBACTAM SODIUM 3.38 G: 3; .375 INJECTION, POWDER, LYOPHILIZED, FOR SOLUTION INTRAVENOUS at 14:46

## 2020-08-29 RX ADMIN — ACETAMINOPHEN 650 MG: 325 TABLET, FILM COATED ORAL at 15:33

## 2020-08-29 RX ADMIN — POTASSIUM CHLORIDE 20 MEQ: 200 INJECTION, SOLUTION INTRAVENOUS at 07:21

## 2020-08-29 RX ADMIN — PIPERACILLIN SODIUM AND TAZOBACTAM SODIUM 3.38 G: 3; .375 INJECTION, POWDER, LYOPHILIZED, FOR SOLUTION INTRAVENOUS at 21:10

## 2020-08-29 RX ADMIN — PIPERACILLIN SODIUM AND TAZOBACTAM SODIUM 3.38 G: 3; .375 INJECTION, POWDER, LYOPHILIZED, FOR SOLUTION INTRAVENOUS at 07:37

## 2020-08-29 RX ADMIN — CHLORDIAZEPOXIDE HYDROCHLORIDE 25 MG: 25 CAPSULE ORAL at 15:33

## 2020-08-29 RX ADMIN — GUAIFENESIN 600 MG: 600 TABLET ORAL at 08:49

## 2020-08-29 RX ADMIN — MEGESTROL ACETATE 400 MG: 40 SUSPENSION ORAL at 11:59

## 2020-08-29 RX ADMIN — FOLIC ACID: 5 INJECTION, SOLUTION INTRAMUSCULAR; INTRAVENOUS; SUBCUTANEOUS at 12:52

## 2020-08-29 RX ADMIN — Medication 5 ML: at 13:00

## 2020-08-29 RX ADMIN — SODIUM CHLORIDE: 900 INJECTION, SOLUTION INTRAVENOUS at 10:50

## 2020-08-29 RX ADMIN — CHLORDIAZEPOXIDE HYDROCHLORIDE 25 MG: 25 CAPSULE ORAL at 07:33

## 2020-08-29 RX ADMIN — MAGNESIUM SULFATE IN WATER 4 G: 40 INJECTION, SOLUTION INTRAVENOUS at 08:24

## 2020-08-29 RX ADMIN — GUAIFENESIN 600 MG: 600 TABLET ORAL at 21:09

## 2020-08-29 RX ADMIN — SODIUM CHLORIDE: 900 INJECTION, SOLUTION INTRAVENOUS at 17:42

## 2020-08-29 RX ADMIN — POTASSIUM CHLORIDE 40 MEQ: 20 TABLET, EXTENDED RELEASE ORAL at 10:50

## 2020-08-29 RX ADMIN — ENOXAPARIN SODIUM 40 MG: 40 INJECTION SUBCUTANEOUS at 08:49

## 2020-08-29 NOTE — PROGRESS NOTES
Hospitalist Note     Admit Date:  2020  8:01 PM   Name:  Kar Álvarez   Age:  63 y.o.  :  1960   MRN:  [id number]   PCP:  Susan Wilcox NP  Treatment Team: Attending Provider: Abigail Shields MD; Care Manager: Ashwin Simeon RN    HPI/Subjective:   Kar Álvarez is a 63 y.o. female with a past medical history of alcohol dependence, hepatitis C, HTN who presents to the ER with report of weakness and inability to walk for the past 5 days. She admits to falling about 2 days ago out of her rolling chair. Admitted soaked in vodka and feces.   Patient seen and examined at bedside in no acute distress. Patient complaining of severe pain in her lower back region/buttocks. She reports pain as severe. She reports pain is constant. She denies any chest pain or shortness of breath. Objective:     Patient Vitals for the past 24 hrs:   Temp Pulse Resp BP SpO2   20 1000 97.3 °F (36.3 °C) (!) 104 17 106/61 99 %   20 0942 97 °F (36.1 °C) (!) 101 17 110/61 98 %   20 0736 98 °F (36.7 °C) 96 18 104/57 99 %   20 0250 98.5 °F (36.9 °C) 100 19 96/55 98 %   20 0005 98.1 °F (36.7 °C) 100 16 103/60 95 %   20 2002 97.7 °F (36.5 °C) (!) 105 16 104/54 95 %   20 1526 97.3 °F (36.3 °C) (!) 106 16 122/62 100 %   20 1253 98.1 °F (36.7 °C) 100 16 106/64 97 %   20 1245     99 %     Oxygen Therapy  O2 Sat (%): 99 % (20 1000)  Pulse via Oximetry: 115 beats per minute (20 0435)  O2 Device: Room air (20 0722)  O2 Flow Rate (L/min): 0 l/min (20 1245)  FIO2 (%): 21 % (20 1245)    Estimated body mass index is 18.57 kg/m² as calculated from the following:    Height as of this encounter: 5' 5.98\" (1.676 m). Weight as of this encounter: 52.2 kg (115 lb).       Intake/Output Summary (Last 24 hours) at 2020 1023  Last data filed at 2020 1004  Gross per 24 hour   Intake 630 ml   Output 650 ml   Net -20 ml *Note that automatically entered I/Os may not be accurate; dependent on patient compliance with collection and accurate  by techs. General:    Thin malnourished appearing female  CV:   RRR. No murmur, rub, or gallop. Lungs:   CTAB. No wheezing, rhonchi, or rales. Abdomen:   Soft, nontender, nondistended. Extremities: Warm and dry. No cyanosis or edema. Skin:     Stage I sacral ulcer  Neuro:  No gross focal deficits    Data Review:  I have reviewed all labs, meds, and studies from the last 24 hours:    Recent Results (from the past 24 hour(s))   CULTURE, BLOOD    Collection Time: 08/28/20  3:15 PM    Specimen: Blood   Result Value Ref Range    Special Requests: RIGHT  HAND        Culture result: NO GROWTH AFTER 14 HOURS     CULTURE, BLOOD    Collection Time: 08/28/20  3:19 PM    Specimen: Blood   Result Value Ref Range    Special Requests: RIGHT  HAND        Culture result: NO GROWTH AFTER 14 HOURS     PLEASE READ & DOCUMENT PPD TEST IN 24 HRS    Collection Time: 08/29/20  1:52 AM   Result Value Ref Range    PPD Negative Negative    mm Induration 0 0 - 5 mm   CBC WITH AUTOMATED DIFF    Collection Time: 08/29/20  5:01 AM   Result Value Ref Range    WBC 9.2 4.3 - 11.1 K/uL    RBC 2.32 (L) 4.05 - 5.2 M/uL    HGB 6.5 (LL) 11.7 - 15.4 g/dL    HCT 18.5 (LL) 35.8 - 46.3 %    MCV 79.7 79.6 - 97.8 FL    MCH 28.0 26.1 - 32.9 PG    MCHC 35.1 (H) 31.4 - 35.0 g/dL    RDW 21.1 (H) 11.9 - 14.6 %    PLATELET 582 (L) 166 - 450 K/uL    MPV 10.4 9.4 - 12.3 FL    ABSOLUTE NRBC 0.19 0.0 - 0.2 K/uL    DF AUTOMATED      NEUTROPHILS 69 43 - 78 %    LYMPHOCYTES 15 13 - 44 %    MONOCYTES 11 4.0 - 12.0 %    EOSINOPHILS 1 0.5 - 7.8 %    BASOPHILS 0 0.0 - 2.0 %    IMMATURE GRANULOCYTES 5 0.0 - 5.0 %    ABS. NEUTROPHILS 6.3 1.7 - 8.2 K/UL    ABS. LYMPHOCYTES 1.3 0.5 - 4.6 K/UL    ABS. MONOCYTES 1.0 0.1 - 1.3 K/UL    ABS. EOSINOPHILS 0.1 0.0 - 0.8 K/UL    ABS. BASOPHILS 0.0 0.0 - 0.2 K/UL    ABS. IMM. GRANS.  0.4 0.0 - 0.5 K/UL   MAGNESIUM    Collection Time: 08/29/20  5:01 AM   Result Value Ref Range    Magnesium 1.6 (L) 1.8 - 2.4 mg/dL   METABOLIC PANEL, COMPREHENSIVE    Collection Time: 08/29/20  5:01 AM   Result Value Ref Range    Sodium 137 136 - 145 mmol/L    Potassium 2.2 (LL) 3.5 - 5.1 mmol/L    Chloride 99 98 - 107 mmol/L    CO2 31 21 - 32 mmol/L    Anion gap 7 7 - 16 mmol/L    Glucose 97 65 - 100 mg/dL    BUN 10 8 - 23 MG/DL    Creatinine 0.51 (L) 0.6 - 1.0 MG/DL    GFR est AA >60 >60 ml/min/1.73m2    GFR est non-AA >60 >60 ml/min/1.73m2    Calcium 7.8 (L) 8.3 - 10.4 MG/DL    Bilirubin, total 0.9 0.2 - 1.1 MG/DL    ALT (SGPT) 58 12 - 65 U/L    AST (SGOT) 215 (H) 15 - 37 U/L    Alk.  phosphatase 176 (H) 50 - 136 U/L    Protein, total 5.2 (L) 6.3 - 8.2 g/dL    Albumin 2.0 (L) 3.2 - 4.6 g/dL    Globulin 3.2 2.3 - 3.5 g/dL    A-G Ratio 0.6 (L) 1.2 - 3.5     PHOSPHORUS    Collection Time: 08/29/20  5:01 AM   Result Value Ref Range    Phosphorus 0.8 (LL) 2.3 - 3.7 MG/DL   TRANSFERRIN SATURATION    Collection Time: 08/29/20  5:01 AM   Result Value Ref Range    Iron 41 35 - 150 ug/dL    TIBC 186 (L) 250 - 450 ug/dL    Transferrin Saturation 22 >20 %   VITAMIN B12    Collection Time: 08/29/20  5:01 AM   Result Value Ref Range    Vitamin B12 2,384 (H) 193 - 986 pg/mL   FERRITIN    Collection Time: 08/29/20  5:01 AM   Result Value Ref Range    Ferritin 378 8 - 388 NG/ML   FOLATE    Collection Time: 08/29/20  5:01 AM   Result Value Ref Range    Folate 6.5 3.1 - 17.5 ng/mL   TRANSFERRIN    Collection Time: 08/29/20  5:01 AM   Result Value Ref Range    Transferrin 121 (L) 202 - 364 mg/dL   TYPE + CROSSMATCH    Collection Time: 08/29/20  6:29 AM   Result Value Ref Range    Crossmatch Expiration 09/01/2020     ABO/Rh(D) O NEGATIVE     Antibody screen NEG     Unit number [id number]     Blood component type  LR     Unit division 00     Status of unit ALLOCATED     Crossmatch result Compatible     Unit number [id number]     Blood component type RC LR     Unit division 00     Status of unit ISSUED     Crossmatch result Compatible         All Micro Results     Procedure Component Value Units Date/Time    CULTURE, URINE [[id number]] Collected:  08/27/20 2202    Order Status:  Completed Specimen:  Urine from Clean catch Updated:  08/29/20 0915     Special Requests: NO SPECIAL REQUESTS        Culture result:       NO GROWTH AFTER SHORT PERIOD OF INCUBATION. FURTHER RESULTS TO FOLLOW AFTER OVERNIGHT INCUBATION. CULTURE, BLOOD [[id number]] Collected:  08/28/20 1515    Order Status:  Completed Specimen:  Blood Updated:  08/29/20 0613     Special Requests: --        RIGHT  HAND       Culture result: NO GROWTH AFTER 14 HOURS       CULTURE, BLOOD [[id number]] Collected:  08/28/20 1519    Order Status:  Completed Specimen:  Blood Updated:  08/29/20 0613     Special Requests: --        RIGHT  HAND       Culture result: NO GROWTH AFTER 14 HOURS       CULTURE, RESPIRATORY/SPUTUM/BRONCH Armani Thompson [[id number]]     Order Status:  Sent Specimen:  Sputum           No results found for this visit on 08/27/20.     Current Meds:  Current Facility-Administered Medications   Medication Dose Route Frequency    0.9% sodium chloride infusion 250 mL  250 mL IntraVENous PRN    0.9% sodium chloride infusion 250 mL  250 mL IntraVENous PRN    magnesium sulfate 4 g/100 mL IVPB  4 g IntraVENous ONCE    NUTRITIONAL SUPPORT ELECTROLYTE PRN ORDERS   Does Not Apply PRN    potassium phosphate 20 mmol in 0.9% sodium chloride 250 mL infusion   IntraVENous Q4H    megestroL (MEGACE) 400 mg/10 mL (10 mL) oral suspension 200 mg  200 mg Oral DAILY    sodium chloride (NS) flush 5-40 mL  5-40 mL IntraVENous Q8H    sodium chloride (NS) flush 5-40 mL  5-40 mL IntraVENous PRN    acetaminophen (TYLENOL) tablet 650 mg  650 mg Oral Q4H PRN    magnesium hydroxide (MILK OF MAGNESIA) 400 mg/5 mL oral suspension 30 mL  30 mL Oral DAILY PRN    enoxaparin (LOVENOX) injection 40 mg 40 mg SubCUTAneous Q24H    0.9% sodium chloride 1,000 mL with mvi, adult no. 4 with vit K 10 mL, thiamine 675 mg, folic acid 1 mg infusion   IntraVENous Q24H    chlordiazePOXIDE (LIBRIUM) capsule 25 mg  25 mg Oral Q4H PRN    LORazepam (ATIVAN) injection 1 mg  1 mg IntraVENous Q2H PRN    guaiFENesin ER (MUCINEX) tablet 600 mg  600 mg Oral Q12H    piperacillin-tazobactam (ZOSYN) 3.375 g in 0.9% sodium chloride (MBP/ADV) 100 mL  3.375 g IntraVENous Q8H    albuterol-ipratropium (DUO-NEB) 2.5 MG-0.5 MG/3 ML  3 mL Nebulization Q6H PRN       Other Studies (last 24 hours):  No results found. Assessment and Plan:     Hospital Problems as of 8/29/2020 Never Reviewed          Codes Class Noted - Resolved POA    Leukocytosis ICD-10-CM: D72.829  ICD-9-CM: 288.60  8/27/2020 - Present Yes        Normocytic anemia ICD-10-CM: D64.9  ICD-9-CM: 285.9  8/27/2020 - Present Yes        Hyponatremia ICD-10-CM: E87.1  ICD-9-CM: 276.1  8/27/2020 - Present Yes        Hypokalemia ICD-10-CM: E87.6  ICD-9-CM: 276.8  8/27/2020 - Present Unknown        Elevated LFTs ICD-10-CM: R79.89  ICD-9-CM: 790.6  8/27/2020 - Present Yes        Alcohol dependence ([facility] [address] ICD-10-CM: F10.20  ICD-9-CM: 303.90  8/27/2020 - Present Yes        * (Principal) CAP (community acquired pneumonia) ICD-10-CM: J18.9  ICD-9-CM: 024  8/27/2020 - Present Yes              Pneumonia/leukocytosis  CXR: New small right lung base opacity possibly early pneumonia  Given patient's active alcohol abuse most likely aspiration pneumonia    ABX #3    Plan:  -DuoNebs every 6 PRN  -Continue IV antibiotics  -Flutter valve  -Mucinex    Alcohol abuse  Active alcohol abuse at this time  Alcohol usage vodka at least 3 drinks a day  Reportedly found soaked in vodka    Plan:  -Ativan PRN  -Banana bag  -Monitor for signs of withdrawal    Severe protein calorie deficiency malnutrition  Patient reports minimal appetite  Nutrition Following    Plan:  -Nutritional supplements  -Encourage p.o. intake  -Initiate Megace to stimulate appetite    Microcytic anemia  Hgb: 6.5  Iron studies indicative of anemia of chronic disease    Plan:  -Transfuse 1 unit PRBC  -Trend CBC    Elevated LFTs  Most likely secondary to alcohol abuse and known hepatitis C    Plan:  -Trend CMP    Failure to thrive  Patient severely deconditioned  Does not seem to be able to control her own bowels  Looks much older than stated age    Plan:  -Counseled on alcohol cessation  -Will discuss with case work about placement options     Hyponatremia----resolved  Na: 137  Most likely secondary to decreased p.o. intake    Plan:  -Discontinue IVF    Hypomagnesia  -Replete    Low phosphorus  -Replete    Hypokalemia  -Replete      DC planning/Dispo: Pending hospital course      Diet:  DIET REGULAR  DIET NUTRITIONAL SUPPLEMENTS  DVT ppx: Lovenox    Signed:  Ethan Triplett MD

## 2020-08-29 NOTE — PHYSICIAN ADVISORY
UPGRADE LETTER Letter of Status Determination:  
Recommend hospitalization status upgraded from OBSERVATION  to INPATIENT Status Pt Name:  Meme Welsh MR#  
LENORE # [id number] / 
[id number] Payor: /   
ARINA#  [id number] Room and Hospital  [facility]/  @ [address] [place] [address] Hospitalization date  8/27/2020  8:01 PM  
Current Attending Physician  Leann Floyd MD  
Principal diagnosis  CAP (community acquired pneumonia) [J18.9] Clinicals  64 y/o female with a PMH significant for Hypertension, Alcohol Use Disorder and , Liver disease admitted for community-acquired pneumonia, elevated Liver function tests, electrolyte abnormalities anemia and physical debility necessitating ongoing close monitoring and management including with respiratory therapies, as needed  Transfusions, electrolyte correction/repletion with follow up levels, monitoring for withdrawals, PT/OT evaluations with recommendations. Placed at increased risk of decompensation. [place] (MCG) criteria Does   apply STATUS DETERMINATION  Based on documented presenting clinical data, comorbid conditions, high risk of adverse events and deterioration, it is our recommendation that the patient's status be upgraded from OBSERVATION to INPATIENT status. The final decision of the patient's hospitalization status depends on the Attending Physician's judgement. Additional comments Payor: /  
 
The information in this document is a recommendation to be used for utilization review and utilization management purposes only. This recommendation is not an order. The recommendation is made based on the information reviewed at the time of the referral, is pursuant to the [place] [facility] Conditions of Participation (42 CFR Part 482), and is neither a judgment nor an assessment with regard to the appropriateness or quality of clinical care. For all Managed Care patients:  The Criteria are intended solely for use as screening guidelines with respect to the medical appropriateness of healthcare services and not for final clinical or payment determinations concerning the type or level of medical care provided, or proposed to be provided, to a patient. They help the reviewers determine whether a patient is appropriate for observation or inpatient admission at the time a decision to admit the patient is being made. All efforts are made to apply the pertinent payor criteria (MCG or [employer]) as well as the clinical judgements based on the information reviewed at the time of the referral.\" Nothing in this document may be used to limit, alter, or affect clinical services provided to the patient named. Joanne Go MD FACP Physician Advisor [facility] [phone] [email or web address] 
 
4:34 PM 8/29/2020

## 2020-08-30 LAB
ANION GAP SERPL CALC-SCNC: 7 MMOL/L (ref 7–16)
BASOPHILS # BLD: 0.1 K/UL (ref 0–0.2)
BASOPHILS NFR BLD: 1 % (ref 0–2)
BUN SERPL-MCNC: 5 MG/DL (ref 8–23)
CALCIUM SERPL-MCNC: 7.3 MG/DL (ref 8.3–10.4)
CHLORIDE SERPL-SCNC: 98 MMOL/L (ref 98–107)
CO2 SERPL-SCNC: 30 MMOL/L (ref 21–32)
CREAT SERPL-MCNC: 0.35 MG/DL (ref 0.6–1)
DIFFERENTIAL METHOD BLD: ABNORMAL
EOSINOPHIL # BLD: 0.1 K/UL (ref 0–0.8)
EOSINOPHIL NFR BLD: 1 % (ref 0.5–7.8)
ERYTHROCYTE [DISTWIDTH] IN BLOOD BY AUTOMATED COUNT: 19.7 % (ref 11.9–14.6)
GLUCOSE SERPL-MCNC: 129 MG/DL (ref 65–100)
HCT VFR BLD AUTO: 24.9 % (ref 35.8–46.3)
HGB BLD-MCNC: 8.8 G/DL (ref 11.7–15.4)
IMM GRANULOCYTES # BLD AUTO: 0.5 K/UL (ref 0–0.5)
IMM GRANULOCYTES NFR BLD AUTO: 6 % (ref 0–5)
LYMPHOCYTES # BLD: 1.6 K/UL (ref 0.5–4.6)
LYMPHOCYTES NFR BLD: 19 % (ref 13–44)
MAGNESIUM SERPL-MCNC: 1.9 MG/DL (ref 1.8–2.4)
MCH RBC QN AUTO: 28.9 PG (ref 26.1–32.9)
MCHC RBC AUTO-ENTMCNC: 35.3 G/DL (ref 31.4–35)
MCV RBC AUTO: 81.9 FL (ref 79.6–97.8)
MM INDURATION POC: 0 MM (ref 0–5)
MONOCYTES # BLD: 0.7 K/UL (ref 0.1–1.3)
MONOCYTES NFR BLD: 8 % (ref 4–12)
NEUTS SEG # BLD: 5.4 K/UL (ref 1.7–8.2)
NEUTS SEG NFR BLD: 65 % (ref 43–78)
NRBC # BLD: 0.45 K/UL (ref 0–0.2)
PHOSPHATE SERPL-MCNC: 0.8 MG/DL (ref 2.3–3.7)
PLATELET # BLD AUTO: 98 K/UL (ref 150–450)
PLATELET COMMENTS,PCOM: ABNORMAL
PMV BLD AUTO: 10.1 FL (ref 9.4–12.3)
POTASSIUM SERPL-SCNC: 2.5 MMOL/L (ref 3.5–5.1)
PPD POC: NEGATIVE NEGATIVE
RBC # BLD AUTO: 3.04 M/UL (ref 4.05–5.2)
RBC MORPH BLD: ABNORMAL
SODIUM SERPL-SCNC: 135 MMOL/L (ref 136–145)
WBC # BLD AUTO: 8.4 K/UL (ref 4.3–11.1)
WBC MORPH BLD: ABNORMAL

## 2020-08-30 PROCEDURE — 84100 ASSAY OF PHOSPHORUS: CPT

## 2020-08-30 PROCEDURE — 65660000000 HC RM CCU STEPDOWN

## 2020-08-30 PROCEDURE — 74011000258 HC RX REV CODE- 258: Performed by: INTERNAL MEDICINE

## 2020-08-30 PROCEDURE — 77030038269 HC DRN EXT URIN PURWCK BARD -A

## 2020-08-30 PROCEDURE — 74011250637 HC RX REV CODE- 250/637: Performed by: INTERNAL MEDICINE

## 2020-08-30 PROCEDURE — 80048 BASIC METABOLIC PNL TOTAL CA: CPT

## 2020-08-30 PROCEDURE — 74011250636 HC RX REV CODE- 250/636: Performed by: INTERNAL MEDICINE

## 2020-08-30 PROCEDURE — 36415 COLL VENOUS BLD VENIPUNCTURE: CPT

## 2020-08-30 PROCEDURE — 74011000250 HC RX REV CODE- 250: Performed by: INTERNAL MEDICINE

## 2020-08-30 PROCEDURE — 74011250636 HC RX REV CODE- 250/636: Performed by: FAMILY MEDICINE

## 2020-08-30 PROCEDURE — 83735 ASSAY OF MAGNESIUM: CPT

## 2020-08-30 PROCEDURE — 74011250637 HC RX REV CODE- 250/637: Performed by: FAMILY MEDICINE

## 2020-08-30 PROCEDURE — 85025 COMPLETE CBC W/AUTO DIFF WBC: CPT

## 2020-08-30 RX ORDER — AMOXICILLIN AND CLAVULANATE POTASSIUM 875; 125 MG/1; MG/1
1 TABLET, FILM COATED ORAL EVERY 12 HOURS
Status: COMPLETED | OUTPATIENT
Start: 2020-08-30 | End: 2020-09-03

## 2020-08-30 RX ORDER — POTASSIUM CHLORIDE 14.9 MG/ML
20 INJECTION INTRAVENOUS
Status: COMPLETED | OUTPATIENT
Start: 2020-08-30 | End: 2020-08-30

## 2020-08-30 RX ORDER — SODIUM,POTASSIUM PHOSPHATES 280-250MG
2 POWDER IN PACKET (EA) ORAL 3 TIMES DAILY
Status: DISCONTINUED | OUTPATIENT
Start: 2020-08-30 | End: 2020-09-01

## 2020-08-30 RX ORDER — SODIUM,POTASSIUM PHOSPHATES 280-250MG
2 POWDER IN PACKET (EA) ORAL 4 TIMES DAILY
Status: DISCONTINUED | OUTPATIENT
Start: 2020-08-30 | End: 2020-08-30

## 2020-08-30 RX ORDER — DRONABINOL 2.5 MG/1
2.5 CAPSULE ORAL 2 TIMES DAILY
Status: DISCONTINUED | OUTPATIENT
Start: 2020-08-30 | End: 2020-09-04 | Stop reason: HOSPADM

## 2020-08-30 RX ADMIN — GUAIFENESIN 600 MG: 600 TABLET ORAL at 22:15

## 2020-08-30 RX ADMIN — Medication 10 ML: at 06:00

## 2020-08-30 RX ADMIN — ENOXAPARIN SODIUM 40 MG: 40 INJECTION SUBCUTANEOUS at 08:20

## 2020-08-30 RX ADMIN — AMOXICILLIN AND CLAVULANATE POTASSIUM 1 TABLET: 875; 125 TABLET, FILM COATED ORAL at 09:13

## 2020-08-30 RX ADMIN — ACETAMINOPHEN 650 MG: 325 TABLET, FILM COATED ORAL at 14:02

## 2020-08-30 RX ADMIN — Medication 10 ML: at 22:16

## 2020-08-30 RX ADMIN — Medication 5 ML: at 14:06

## 2020-08-30 RX ADMIN — AMOXICILLIN AND CLAVULANATE POTASSIUM 1 TABLET: 875; 125 TABLET, FILM COATED ORAL at 22:15

## 2020-08-30 RX ADMIN — POTASSIUM CHLORIDE 20 MEQ: 200 INJECTION, SOLUTION INTRAVENOUS at 11:16

## 2020-08-30 RX ADMIN — POTASSIUM & SODIUM PHOSPHATES POWDER PACK 280-160-250 MG 2 PACKET: 280-160-250 PACK at 22:15

## 2020-08-30 RX ADMIN — DRONABINOL 2.5 MG: 2.5 CAPSULE ORAL at 16:55

## 2020-08-30 RX ADMIN — GUAIFENESIN 600 MG: 600 TABLET ORAL at 08:19

## 2020-08-30 RX ADMIN — POTASSIUM CHLORIDE 20 MEQ: 200 INJECTION, SOLUTION INTRAVENOUS at 13:57

## 2020-08-30 RX ADMIN — POTASSIUM & SODIUM PHOSPHATES POWDER PACK 280-160-250 MG 2 PACKET: 280-160-250 PACK at 16:55

## 2020-08-30 RX ADMIN — MEGESTROL ACETATE 400 MG: 40 SUSPENSION ORAL at 09:11

## 2020-08-30 RX ADMIN — PIPERACILLIN SODIUM AND TAZOBACTAM SODIUM 3.38 G: 3; .375 INJECTION, POWDER, LYOPHILIZED, FOR SOLUTION INTRAVENOUS at 07:37

## 2020-08-30 RX ADMIN — POTASSIUM CHLORIDE 20 MEQ: 200 INJECTION, SOLUTION INTRAVENOUS at 09:19

## 2020-08-30 RX ADMIN — POTASSIUM & SODIUM PHOSPHATES POWDER PACK 280-160-250 MG 2 PACKET: 280-160-250 PACK at 10:57

## 2020-08-30 RX ADMIN — CHLORDIAZEPOXIDE HYDROCHLORIDE 25 MG: 25 CAPSULE ORAL at 14:02

## 2020-08-30 RX ADMIN — POTASSIUM CHLORIDE 20 MEQ: 200 INJECTION, SOLUTION INTRAVENOUS at 16:47

## 2020-08-30 RX ADMIN — FOLIC ACID: 5 INJECTION, SOLUTION INTRAMUSCULAR; INTRAVENOUS; SUBCUTANEOUS at 08:20

## 2020-08-30 NOTE — PROGRESS NOTES
Problem: Pneumonia: Day 3  Goal: Activity/Safety. Pt is weak and hasn't been able to ambulate in a couple weeks. Needs assist with bed mobility and self care  Outcome: Not Progressing Towards Goal  Goal: Nutrition/Diet  Outcome: Progressing Towards Goal. Poor appetite, malnourished. Electrolytes continue to be repleted. Mirta ensure drinks and liquids. Goal: *Tolerating diet  Outcome: Progressing Towards Goal. Appetite stimulants were ordered. Librium and tylenol prn work well for her symptoms as needed.

## 2020-08-30 NOTE — PROGRESS NOTES
Hospitalist Note     Admit Date:  2020  8:01 PM   Name:  Lord Hook   Age:  63 y.o.  :  1960   MRN:  [id number]   PCP:  Elida Garcia NP  Treatment Team: Attending Provider: Torsten Andersen MD; Care Manager: Leandro Trammell RN    HPI/Subjective:   Lord Hook is a 63 y.o. female with a past medical history of alcohol dependence, hepatitis C, HTN who presents to the ER with report of weakness and inability to walk for the past 5 days. She admits to falling about 2 days ago out of her rolling chair. Admitted soaked in vodka and feces.   Patient seen and examined at bedside in no acute distress. Patient complaining of continued pain in her lower back region/buttocks. She reports some improvement in her appetite. She reports she was trying to eat food yesterday. She reports she has a hard time getting the tray to be close enough for her to eat. Objective:     Patient Vitals for the past 24 hrs:   Temp Pulse Resp BP SpO2   20 0747 97.7 °F (36.5 °C) 97 18 107/70 98 %   20 0347 98.5 °F (36.9 °C) 87 16 112/61 98 %   20 0038 98.6 °F (37 °C) 91 18 105/53 97 %   20 1934 97.4 °F (36.3 °C) 93 18 107/83 98 %   20 1613  94      20 1525 97.9 °F (36.6 °C) (!) 101 18 118/69 94 %   20 1200 97 °F (36.1 °C) 97  110/68 97 %   20 1115  99 18  96 %   20 1057 97.4 °F (36.3 °C) 97 18 104/59 98 %   20 1000 97.3 °F (36.3 °C) (!) 104 17 106/61 99 %   20 0942 97 °F (36.1 °C) (!) 101 17 110/61 98 %     Oxygen Therapy  O2 Sat (%): 98 % (20 0747)  Pulse via Oximetry: 115 beats per minute (20 0435)  O2 Device: Room air (20 1200)  O2 Flow Rate (L/min): 0 l/min (20 1245)  FIO2 (%): 21 % (20 1245)    Estimated body mass index is 18.57 kg/m² as calculated from the following:    Height as of this encounter: 5' 5.98\" (1.676 m). Weight as of this encounter: 52.2 kg (115 lb).       Intake/Output Summary (Last 24 hours) at 8/30/2020 0905  Last data filed at 8/30/2020 0347  Gross per 24 hour   Intake 2430 ml   Output 800 ml   Net 1630 ml       *Note that automatically entered I/Os may not be accurate; dependent on patient compliance with collection and accurate  by techs. General:    Thin malnourished appearing female  CV:   RRR. No murmur, rub, or gallop. Lungs:   CTAB. No wheezing, rhonchi, or rales. Abdomen:   Soft, nontender, nondistended. Extremities: Warm and dry. No cyanosis or edema. Skin:     Stage I sacral ulcer  Neuro:  No gross focal deficits    Data Review:  I have reviewed all labs, meds, and studies from the last 24 hours:    Recent Results (from the past 24 hour(s))   POTASSIUM    Collection Time: 08/29/20  3:54 PM   Result Value Ref Range    Potassium 2.4 (LL) 3.5 - 5.1 mmol/L   PHOSPHORUS    Collection Time: 08/29/20  3:54 PM   Result Value Ref Range    Phosphorus 0.8 (LL) 2.3 - 3.7 MG/DL   HEMOGLOBIN    Collection Time: 08/29/20  3:54 PM   Result Value Ref Range    HGB 8.9 (L) 11.7 - 15.4 g/dL   PLEASE READ & DOCUMENT PPD TEST IN 48 HRS    Collection Time: 08/30/20  5:16 AM   Result Value Ref Range    PPD Negative Negative    mm Induration 0 0 - 5 mm   CBC WITH AUTOMATED DIFF    Collection Time: 08/30/20  6:02 AM   Result Value Ref Range    WBC 8.4 4.3 - 11.1 K/uL    RBC 3.04 (L) 4.05 - 5.2 M/uL    HGB 8.8 (L) 11.7 - 15.4 g/dL    HCT 24.9 (L) 35.8 - 46.3 %    MCV 81.9 79.6 - 97.8 FL    MCH 28.9 26.1 - 32.9 PG    MCHC 35.3 (H) 31.4 - 35.0 g/dL    RDW 19.7 (H) 11.9 - 14.6 %    PLATELET 98 (L) 751 - 450 K/uL    MPV 10.1 9.4 - 12.3 FL    ABSOLUTE NRBC 0.45 (H) 0.0 - 0.2 K/uL    NEUTROPHILS 65 43 - 78 %    LYMPHOCYTES 19 13 - 44 %    MONOCYTES 8 4.0 - 12.0 %    EOSINOPHILS 1 0.5 - 7.8 %    BASOPHILS 1 0.0 - 2.0 %    IMMATURE GRANULOCYTES 6 (H) 0.0 - 5.0 %    ABS. NEUTROPHILS 5.4 1.7 - 8.2 K/UL    ABS. LYMPHOCYTES 1.6 0.5 - 4.6 K/UL    ABS.  MONOCYTES 0.7 0.1 - 1.3 K/UL ABS. EOSINOPHILS 0.1 0.0 - 0.8 K/UL    ABS. BASOPHILS 0.1 0.0 - 0.2 K/UL    ABS. IMM. GRANS. 0.5 0.0 - 0.5 K/UL    RBC COMMENTS OCCASIONAL  POLYCHROMASIA        RBC COMMENTS SLIGHT  ANISOCYTOSIS + POIKILOCYTOSIS        RBC COMMENTS MICROCYTOSIS      WBC COMMENTS SMUDGE CELLS      PLATELET COMMENTS DECREASED      DF AUTOMATED     METABOLIC PANEL, BASIC    Collection Time: 08/30/20  6:02 AM   Result Value Ref Range    Sodium 135 (L) 136 - 145 mmol/L    Potassium 2.5 (LL) 3.5 - 5.1 mmol/L    Chloride 98 98 - 107 mmol/L    CO2 30 21 - 32 mmol/L    Anion gap 7 7 - 16 mmol/L    Glucose 129 (H) 65 - 100 mg/dL    BUN 5 (L) 8 - 23 MG/DL    Creatinine 0.35 (L) 0.6 - 1.0 MG/DL    GFR est AA >60 >60 ml/min/1.73m2    GFR est non-AA >60 >60 ml/min/1.73m2    Calcium 7.3 (L) 8.3 - 10.4 MG/DL   MAGNESIUM    Collection Time: 08/30/20  6:02 AM   Result Value Ref Range    Magnesium 1.9 1.8 - 2.4 mg/dL   PHOSPHORUS    Collection Time: 08/30/20  6:02 AM   Result Value Ref Range    Phosphorus 0.8 (LL) 2.3 - 3.7 MG/DL        All Micro Results     Procedure Component Value Units Date/Time    CULTURE, URINE [[id number]] Collected:  08/27/20 2202    Order Status:  Completed Specimen:  Urine from Clean catch Updated:  08/30/20 0733     Special Requests: NO SPECIAL REQUESTS        Culture result: NO GROWTH 1 DAY       CULTURE, BLOOD [[id number]] Collected:  08/28/20 1515    Order Status:  Completed Specimen:  Blood Updated:  08/30/20 0651     Special Requests: --        RIGHT  HAND       Culture result: NO GROWTH 2 DAYS       CULTURE, BLOOD [[id number]] Collected:  08/28/20 1519    Order Status:  Completed Specimen:  Blood Updated:  08/30/20 0651     Special Requests: --        RIGHT  HAND       Culture result: NO GROWTH 2 DAYS       CULTURE, RESPIRATORY/SPUTUM/BRONCH Mery Khan [[id number]]     Order Status:  Sent Specimen:  Sputum           No results found for this visit on 08/27/20.     Current Meds:  Current Facility-Administered Medications Medication Dose Route Frequency    potassium, sodium phosphates (NEUTRA-PHOS) packet 2 Packet  2 Packet Oral TID    potassium chloride 20 mEq in 100 ml IVPB  20 mEq IntraVENous Q2H    amoxicillin-clavulanate (AUGMENTIN) 875-125 mg per tablet 1 Tab  1 Tab Oral Q12H    dronabinoL (MARINOL) capsule 2.5 mg  2.5 mg Oral BID    0.9% sodium chloride infusion 250 mL  250 mL IntraVENous PRN    0.9% sodium chloride infusion 250 mL  250 mL IntraVENous PRN    NUTRITIONAL SUPPORT ELECTROLYTE PRN ORDERS   Does Not Apply PRN    megestroL (MEGACE) 400 mg/10 mL (10 mL) oral suspension 400 mg  400 mg Oral DAILY    sodium chloride (NS) flush 5-40 mL  5-40 mL IntraVENous Q8H    sodium chloride (NS) flush 5-40 mL  5-40 mL IntraVENous PRN    acetaminophen (TYLENOL) tablet 650 mg  650 mg Oral Q4H PRN    magnesium hydroxide (MILK OF MAGNESIA) 400 mg/5 mL oral suspension 30 mL  30 mL Oral DAILY PRN    enoxaparin (LOVENOX) injection 40 mg  40 mg SubCUTAneous Q24H    chlordiazePOXIDE (LIBRIUM) capsule 25 mg  25 mg Oral Q4H PRN    LORazepam (ATIVAN) injection 1 mg  1 mg IntraVENous Q2H PRN    guaiFENesin ER (MUCINEX) tablet 600 mg  600 mg Oral Q12H    albuterol-ipratropium (DUO-NEB) 2.5 MG-0.5 MG/3 ML  3 mL Nebulization Q6H PRN       Other Studies (last 24 hours):  No results found.     Assessment and Plan:     Hospital Problems as of 8/30/2020 Never Reviewed          Codes Class Noted - Resolved POA    Severe protein-calorie malnutrition ([facility] [address] ICD-10-CM: E43  ICD-9-CM: 262  8/29/2020 - Present Unknown        Leukocytosis ICD-10-CM: D72.829  ICD-9-CM: 288.60  8/27/2020 - Present Yes        Normocytic anemia ICD-10-CM: D64.9  ICD-9-CM: 285.9  8/27/2020 - Present Yes        Hyponatremia ICD-10-CM: E87.1  ICD-9-CM: 276.1  8/27/2020 - Present Yes        Hypokalemia ICD-10-CM: E87.6  ICD-9-CM: 276.8  8/27/2020 - Present Unknown        Elevated LFTs ICD-10-CM: R79.89  ICD-9-CM: 790.6  8/27/2020 - Present Yes        Alcohol dependence [facility]) ICD-10-CM: F10.20  ICD-9-CM: 303.90  8/27/2020 - Present Yes        * (Principal) CAP (community acquired pneumonia) ICD-10-CM: J18.9  ICD-9-CM: 486  8/27/2020 - Present Yes              Pneumonia/leukocytosis  CXR: New small right lung base opacity possibly early pneumonia  Given patient's active alcohol abuse most likely aspiration pneumonia    ABX #4  Zosyn    Plan:  -DuoNebs every 6 PRN  -Switch to oral Augmentin  -Flutter valve  -Mucinex    Alcohol abuse  Active alcohol abuse at this time  Alcohol usage vodka at least 3 drinks a day  Reportedly found soaked in vodka    Plan:  -Ativan PRN  -Banana bag x3 doses   -Monitor for signs of withdrawal    Severe protein calorie deficiency malnutrition  Patient reports minimal appetite  Nutrition Following    Plan:  -Nutritional supplements  -Encourage p.o. intake  -Megace to stimulate appetite  -Trial of marinol to increase appetite     Microcytic anemia  Hgb: 8.8  Iron studies indicative of anemia of chronic disease  1 unit transfused 8/29    Plan:  -Trend CBC    Elevated LFTs  Most likely secondary to alcohol abuse and known hepatitis C  Trending downwards    Plan:  -Trend CMP    Failure to thrive  Patient severely deconditioned  Does not seem to be able to control her own bowels  Looks much older than stated age    Plan:  -Counseled on alcohol cessation  -Will discuss with case work about placement options     Hyponatremia----resolved  Na: 135  Most likely secondary to decreased p.o. intake    Plan:  -Discontinue BMP    Hypomagnesia  -Replete    Low phosphorus  Minimal Improvement with IV supplementation     Plan:  -Replete  -Will try oral supplementation     Hypokalemia  -Replete    DC planning/Dispo: Pending hospital course      Diet:  DIET REGULAR  DIET NUTRITIONAL SUPPLEMENTS  DVT ppx: Lovenox    Signed:  Chioma Bee MD

## 2020-08-30 NOTE — PROGRESS NOTES
Spoke with patient's brother, Trent Vasquez, with update on patient's condition. Mr. Tavares Montelongo is concerned about his sister upon discharge stating that he had to put his mother (with whom patient lived) into assisted living and selling the house.  is aware of this situation. Encourage Mr. Tavares Montelongo to call Marla Oliveira, , to discuss options for patient upon discharge.

## 2020-08-31 LAB
ANION GAP SERPL CALC-SCNC: 7 MMOL/L (ref 7–16)
BACTERIA SPEC CULT: NORMAL
BASOPHILS # BLD: 0 K/UL (ref 0–0.2)
BASOPHILS NFR BLD: 0 % (ref 0–2)
BUN SERPL-MCNC: 6 MG/DL (ref 8–23)
CALCIUM SERPL-MCNC: 7.6 MG/DL (ref 8.3–10.4)
CHLORIDE SERPL-SCNC: 97 MMOL/L (ref 98–107)
CO2 SERPL-SCNC: 29 MMOL/L (ref 21–32)
CREAT SERPL-MCNC: 0.38 MG/DL (ref 0.6–1)
DIFFERENTIAL METHOD BLD: ABNORMAL
EOSINOPHIL # BLD: 0.1 K/UL (ref 0–0.8)
EOSINOPHIL NFR BLD: 1 % (ref 0.5–7.8)
ERYTHROCYTE [DISTWIDTH] IN BLOOD BY AUTOMATED COUNT: 21.8 % (ref 11.9–14.6)
GLUCOSE SERPL-MCNC: 127 MG/DL (ref 65–100)
HCT VFR BLD AUTO: 27.4 % (ref 35.8–46.3)
HGB BLD-MCNC: 9.4 G/DL (ref 11.7–15.4)
IMM GRANULOCYTES # BLD AUTO: 0.2 K/UL (ref 0–0.5)
IMM GRANULOCYTES NFR BLD AUTO: 4 % (ref 0–5)
LYMPHOCYTES # BLD: 1.1 K/UL (ref 0.5–4.6)
LYMPHOCYTES NFR BLD: 21 % (ref 13–44)
MAGNESIUM SERPL-MCNC: 1.6 MG/DL (ref 1.8–2.4)
MCH RBC QN AUTO: 29 PG (ref 26.1–32.9)
MCHC RBC AUTO-ENTMCNC: 34.3 G/DL (ref 31.4–35)
MCV RBC AUTO: 84.6 FL (ref 79.6–97.8)
MONOCYTES # BLD: 0.5 K/UL (ref 0.1–1.3)
MONOCYTES NFR BLD: 10 % (ref 4–12)
NEUTS SEG # BLD: 3.5 K/UL (ref 1.7–8.2)
NEUTS SEG NFR BLD: 64 % (ref 43–78)
NRBC # BLD: 0.11 K/UL (ref 0–0.2)
PHOSPHATE SERPL-MCNC: 1.4 MG/DL (ref 2.3–3.7)
PLATELET # BLD AUTO: 83 K/UL (ref 150–450)
PMV BLD AUTO: 9.8 FL (ref 9.4–12.3)
POTASSIUM SERPL-SCNC: 3.3 MMOL/L (ref 3.5–5.1)
RBC # BLD AUTO: 3.24 M/UL (ref 4.05–5.2)
SERVICE CMNT-IMP: NORMAL
SODIUM SERPL-SCNC: 133 MMOL/L (ref 136–145)
WBC # BLD AUTO: 5.4 K/UL (ref 4.3–11.1)

## 2020-08-31 PROCEDURE — 97530 THERAPEUTIC ACTIVITIES: CPT

## 2020-08-31 PROCEDURE — 65660000000 HC RM CCU STEPDOWN

## 2020-08-31 PROCEDURE — 84100 ASSAY OF PHOSPHORUS: CPT

## 2020-08-31 PROCEDURE — 74011250636 HC RX REV CODE- 250/636: Performed by: INTERNAL MEDICINE

## 2020-08-31 PROCEDURE — 36415 COLL VENOUS BLD VENIPUNCTURE: CPT

## 2020-08-31 PROCEDURE — 74011250637 HC RX REV CODE- 250/637: Performed by: INTERNAL MEDICINE

## 2020-08-31 PROCEDURE — 83735 ASSAY OF MAGNESIUM: CPT

## 2020-08-31 PROCEDURE — 74011250636 HC RX REV CODE- 250/636: Performed by: FAMILY MEDICINE

## 2020-08-31 PROCEDURE — 80048 BASIC METABOLIC PNL TOTAL CA: CPT

## 2020-08-31 PROCEDURE — 85025 COMPLETE CBC W/AUTO DIFF WBC: CPT

## 2020-08-31 RX ORDER — POTASSIUM CHLORIDE 20 MEQ/1
40 TABLET, EXTENDED RELEASE ORAL
Status: COMPLETED | OUTPATIENT
Start: 2020-08-31 | End: 2020-08-31

## 2020-08-31 RX ORDER — OXYCODONE HYDROCHLORIDE 5 MG/1
5 TABLET ORAL
Status: DISCONTINUED | OUTPATIENT
Start: 2020-08-31 | End: 2020-09-04 | Stop reason: HOSPADM

## 2020-08-31 RX ORDER — MAGNESIUM SULFATE HEPTAHYDRATE 40 MG/ML
2 INJECTION, SOLUTION INTRAVENOUS ONCE
Status: COMPLETED | OUTPATIENT
Start: 2020-08-31 | End: 2020-08-31

## 2020-08-31 RX ORDER — TRAMADOL HYDROCHLORIDE 50 MG/1
50 TABLET ORAL
Status: DISCONTINUED | OUTPATIENT
Start: 2020-08-31 | End: 2020-09-04 | Stop reason: HOSPADM

## 2020-08-31 RX ADMIN — Medication 10 ML: at 14:00

## 2020-08-31 RX ADMIN — MEGESTROL ACETATE 400 MG: 40 SUSPENSION ORAL at 09:00

## 2020-08-31 RX ADMIN — DRONABINOL 2.5 MG: 2.5 CAPSULE ORAL at 19:08

## 2020-08-31 RX ADMIN — ENOXAPARIN SODIUM 40 MG: 40 INJECTION SUBCUTANEOUS at 09:24

## 2020-08-31 RX ADMIN — AMOXICILLIN AND CLAVULANATE POTASSIUM 1 TABLET: 875; 125 TABLET, FILM COATED ORAL at 21:48

## 2020-08-31 RX ADMIN — GUAIFENESIN 600 MG: 600 TABLET ORAL at 21:48

## 2020-08-31 RX ADMIN — Medication 10 ML: at 05:41

## 2020-08-31 RX ADMIN — AMOXICILLIN AND CLAVULANATE POTASSIUM 1 TABLET: 875; 125 TABLET, FILM COATED ORAL at 09:24

## 2020-08-31 RX ADMIN — Medication 10 ML: at 21:48

## 2020-08-31 RX ADMIN — MAGNESIUM SULFATE HEPTAHYDRATE 2 G: 40 INJECTION, SOLUTION INTRAVENOUS at 09:25

## 2020-08-31 RX ADMIN — POTASSIUM & SODIUM PHOSPHATES POWDER PACK 280-160-250 MG 2 PACKET: 280-160-250 PACK at 09:24

## 2020-08-31 RX ADMIN — DRONABINOL 2.5 MG: 2.5 CAPSULE ORAL at 09:43

## 2020-08-31 RX ADMIN — POTASSIUM & SODIUM PHOSPHATES POWDER PACK 280-160-250 MG 2 PACKET: 280-160-250 PACK at 19:07

## 2020-08-31 RX ADMIN — POTASSIUM & SODIUM PHOSPHATES POWDER PACK 280-160-250 MG 2 PACKET: 280-160-250 PACK at 21:48

## 2020-08-31 RX ADMIN — GUAIFENESIN 600 MG: 600 TABLET ORAL at 09:24

## 2020-08-31 RX ADMIN — POTASSIUM CHLORIDE 40 MEQ: 20 TABLET, EXTENDED RELEASE ORAL at 09:24

## 2020-08-31 NOTE — PROGRESS NOTES
Hospitalist Note     Admit Date:  2020  8:01 PM   Name:  Monalisa Mclean   Age:  63 y.o.  :  1960   MRN:  [id number]   PCP:  Sandra Dawkins NP  Treatment Team: Attending Provider: Jass Maddox MD; Care Manager: Casi oSng, RN; Physical Therapy Assistant: Karen Alonso PTA    HPI/Subjective:   Monalisa Mclean is a 63 y.o. female with a past medical history of alcohol dependence, hepatitis C, HTN who presents to the ER with report of weakness and inability to walk for the past 5 days. She admits to falling about 2 days ago out of her rolling chair. Admitted soaked in vodka and feces.   Patient seen and examined at bedside in no acute distress. Patient reports continued pain in her lower back region/buttocks. She reports some improvement in her appetite and that she is trying to eat more. Tray at bedside hardly touched. Objective:     Patient Vitals for the past 24 hrs:   Temp Pulse Resp BP SpO2   20 0719 97.9 °F (36.6 °C) 96 18 116/70 96 %   20 0300 98.3 °F (36.8 °C) 94 16 103/61 94 %   20 2320 97.9 °F (36.6 °C) 94 17 103/63 96 %   20 1925 98.6 °F (37 °C) 99 17 102/65 97 %   20 1520 98.9 °F (37.2 °C) 98 18 120/74 93 %   20 1125 98.5 °F (36.9 °C) 100 18 103/68 98 %     Oxygen Therapy  O2 Sat (%): 96 % (20 0719)  Pulse via Oximetry: 115 beats per minute (20 0435)  O2 Device: Room air (20 0747)  O2 Flow Rate (L/min): 0 l/min (20 1245)  FIO2 (%): 21 % (20 1245)    Estimated body mass index is 18.57 kg/m² as calculated from the following:    Height as of this encounter: 5' 5.98\" (1.676 m). Weight as of this encounter: 52.2 kg (115 lb).       Intake/Output Summary (Last 24 hours) at 2020 1057  Last data filed at 2020 1418  Gross per 24 hour   Intake 1020 ml   Output 200 ml   Net 820 ml       *Note that automatically entered I/Os may not be accurate; dependent on patient compliance with collection and accurate  by [facility]. General:    Thin malnourished appearing female  CV:   RRR. No murmur, rub, or gallop. Lungs:   CTAB. No wheezing, rhonchi, or rales. Abdomen:   Soft, nontender, nondistended. Extremities: Warm and dry. No cyanosis or edema. Skin:     Stage I sacral ulcer  Neuro:  No gross focal deficits    Data Review:  I have reviewed all labs, meds, and studies from the last 24 hours:    Recent Results (from the past 24 hour(s))   CBC WITH AUTOMATED DIFF    Collection Time: 08/31/20  5:45 AM   Result Value Ref Range    WBC 5.4 4.3 - 11.1 K/uL    RBC 3.24 (L) 4.05 - 5.2 M/uL    HGB 9.4 (L) 11.7 - 15.4 g/dL    HCT 27.4 (L) 35.8 - 46.3 %    MCV 84.6 79.6 - 97.8 FL    MCH 29.0 26.1 - 32.9 PG    MCHC 34.3 31.4 - 35.0 g/dL    RDW 21.8 (H) 11.9 - 14.6 %    PLATELET 83 (L) 674 - 450 K/uL    MPV 9.8 9.4 - 12.3 FL    ABSOLUTE NRBC 0.11 0.0 - 0.2 K/uL    DF AUTOMATED      NEUTROPHILS 64 43 - 78 %    LYMPHOCYTES 21 13 - 44 %    MONOCYTES 10 4.0 - 12.0 %    EOSINOPHILS 1 0.5 - 7.8 %    BASOPHILS 0 0.0 - 2.0 %    IMMATURE GRANULOCYTES 4 0.0 - 5.0 %    ABS. NEUTROPHILS 3.5 1.7 - 8.2 K/UL    ABS. LYMPHOCYTES 1.1 0.5 - 4.6 K/UL    ABS. MONOCYTES 0.5 0.1 - 1.3 K/UL    ABS. EOSINOPHILS 0.1 0.0 - 0.8 K/UL    ABS. BASOPHILS 0.0 0.0 - 0.2 K/UL    ABS. IMM.  GRANS. 0.2 0.0 - 0.5 K/UL   METABOLIC PANEL, BASIC    Collection Time: 08/31/20  5:45 AM   Result Value Ref Range    Sodium 133 (L) 136 - 145 mmol/L    Potassium 3.3 (L) 3.5 - 5.1 mmol/L    Chloride 97 (L) 98 - 107 mmol/L    CO2 29 21 - 32 mmol/L    Anion gap 7 7 - 16 mmol/L    Glucose 127 (H) 65 - 100 mg/dL    BUN 6 (L) 8 - 23 MG/DL    Creatinine 0.38 (L) 0.6 - 1.0 MG/DL    GFR est AA >60 >60 ml/min/1.73m2    GFR est non-AA >60 >60 ml/min/1.73m2    Calcium 7.6 (L) 8.3 - 10.4 MG/DL   MAGNESIUM    Collection Time: 08/31/20  5:45 AM   Result Value Ref Range    Magnesium 1.6 (L) 1.8 - 2.4 mg/dL   PHOSPHORUS    Collection Time: 08/31/20  5:45 AM Result Value Ref Range    Phosphorus 1.4 (L) 2.3 - 3.7 MG/DL        All Micro Results     Procedure Component Value Units Date/Time    CULTURE, URINE [[id number]] Collected:  08/27/20 2202    Order Status:  Completed Specimen:  Urine from Clean catch Updated:  08/31/20 0708     Special Requests: NO SPECIAL REQUESTS        Culture result: ORGANISM IN STUDY       CULTURE, BLOOD [[id number]] Collected:  08/28/20 1515    Order Status:  Completed Specimen:  Blood Updated:  08/30/20 0651     Special Requests: --        RIGHT  HAND       Culture result: NO GROWTH 2 DAYS       CULTURE, BLOOD [[id number]] Collected:  08/28/20 1519    Order Status:  Completed Specimen:  Blood Updated:  08/30/20 0651     Special Requests: --        RIGHT  HAND       Culture result: NO GROWTH 2 DAYS       CULTURE, RESPIRATORY/SPUTUM/BRONCH Audrey Knock STAIN [[id number]] Collected:  08/28/20 0600    Order Status:  Canceled Specimen:  Sputum           No results found for this visit on 08/27/20.     Current Meds:  Current Facility-Administered Medications   Medication Dose Route Frequency    traMADoL (ULTRAM) tablet 50 mg  50 mg Oral Q6H PRN    oxyCODONE IR (ROXICODONE) tablet 5 mg  5 mg Oral Q6H PRN    potassium, sodium phosphates (NEUTRA-PHOS) packet 2 Packet  2 Packet Oral TID    amoxicillin-clavulanate (AUGMENTIN) 875-125 mg per tablet 1 Tab  1 Tab Oral Q12H    dronabinoL (MARINOL) capsule 2.5 mg  2.5 mg Oral BID    0.9% sodium chloride infusion 250 mL  250 mL IntraVENous PRN    0.9% sodium chloride infusion 250 mL  250 mL IntraVENous PRN    NUTRITIONAL SUPPORT ELECTROLYTE PRN ORDERS   Does Not Apply PRN    megestroL (MEGACE) 400 mg/10 mL (10 mL) oral suspension 400 mg  400 mg Oral DAILY    sodium chloride (NS) flush 5-40 mL  5-40 mL IntraVENous Q8H    sodium chloride (NS) flush 5-40 mL  5-40 mL IntraVENous PRN    acetaminophen (TYLENOL) tablet 650 mg  650 mg Oral Q4H PRN    magnesium hydroxide (MILK OF MAGNESIA) 400 mg/5 mL oral suspension 30 mL  30 mL Oral DAILY PRN    enoxaparin (LOVENOX) injection 40 mg  40 mg SubCUTAneous Q24H    chlordiazePOXIDE (LIBRIUM) capsule 25 mg  25 mg Oral Q4H PRN    LORazepam (ATIVAN) injection 1 mg  1 mg IntraVENous Q2H PRN    guaiFENesin ER (MUCINEX) tablet 600 mg  600 mg Oral Q12H    albuterol-ipratropium (DUO-NEB) 2.5 MG-0.5 MG/3 ML  3 mL Nebulization Q6H PRN       Other Studies (last 24 hours):  No results found. Assessment and Plan:     Hospital Problems as of 8/31/2020 Never Reviewed          Codes Class Noted - Resolved POA    Severe protein-calorie malnutrition ([facility] ICD-10-CM: E43  ICD-9-CM: 262  8/29/2020 - Present Unknown        Leukocytosis ICD-10-CM: D72.829  ICD-9-CM: 288.60  8/27/2020 - Present Yes        Normocytic anemia ICD-10-CM: D64.9  ICD-9-CM: 285.9  8/27/2020 - Present Yes        Hyponatremia ICD-10-CM: E87.1  ICD-9-CM: 276.1  8/27/2020 - Present Yes        Hypokalemia ICD-10-CM: E87.6  ICD-9-CM: 276.8  8/27/2020 - Present Unknown        Elevated LFTs ICD-10-CM: R79.89  ICD-9-CM: 790.6  8/27/2020 - Present Yes        Alcohol dependence ([facility] ICD-10-CM: F10.20  ICD-9-CM: 303.90  8/27/2020 - Present Yes        * (Principal) CAP (community acquired pneumonia) ICD-10-CM: J18.9  ICD-9-CM: 276  8/27/2020 - Present Yes              Pneumonia/leukocytosis  CXR: New small right lung base opacity possibly early pneumonia  Given patient's active alcohol abuse most likely aspiration pneumonia    ABX #5  Augmentin    Plan:  -DuoNebs every 6 PRN  -Will complete 7-day course of oral antibiotics  -Flutter valve  -Mucinex    Alcohol abuse  Active alcohol abuse at this time  Alcohol usage vodka at least 3 drinks a day  Reportedly found soaked in vodka    Plan:  -Ativan PRN  -Banana bag x3 doses   -Monitor for signs of withdrawal    Severe protein calorie deficiency malnutrition  Patient reports minimal appetite  Nutrition Following    Plan:  -Nutritional supplements  -Encourage p.o. intake  -Megace to stimulate appetite  -Trial of marinol to increase appetite     Microcytic anemia  Hgb: 9.4  Iron studies indicative of anemia of chronic disease  1 unit transfused 8/29    Plan:  -Trend CBC    Elevated LFTs  Most likely secondary to alcohol abuse and known hepatitis C  Trending downwards    Plan:  -Trend CMP    Failure to thrive  Patient severely deconditioned  Does not seem to be able to control her own bowels  Looks much older than stated age    Plan:  -Counseled on alcohol cessation  -Will discuss with case work about placement options     Hyponatremia----resolved  Na: 133  Most likely secondary to decreased p.o. intake    Plan:  -Discontinue BMP    Hypomagnesia  -Replete    Low phosphorus  Minimal Improvement with IV supplementation     Plan:  -Replete  -Will try oral supplementation     Hypokalemia  -Replete    DC planning/Dispo: Pending hospital course      Diet:  DIET REGULAR  DIET NUTRITIONAL SUPPLEMENTS  DVT ppx: Lovenox    Signed:  Hope Vega MD

## 2020-08-31 NOTE — PROGRESS NOTES
Problem: Mobility Impaired (Adult and Pediatric)  Goal: *Acute Goals and Plan of Care (Insert Text)  Note:   LTG:  (1.)Ms. Chastity Herring will move from supine to sit and sit to supine , scoot up and down, and roll side to side in bed with CONTACT GUARD ASSIST within 7 treatment day(s). (2.)Ms. Chastity Herring will transfer from bed to chair and chair to bed with MINIMAL ASSIST using the least restrictive device within 7 treatment day(s). (3.)Ms. Chastity Herring will ambulate with MINIMAL ASSIST for 150+ feet with the least restrictive device within 7 treatment day(s). ________________________________________________________________________________________________    PHYSICAL THERAPY: Daily Note and AM 8/31/2020  INPATIENT: PT Visit Days : 2  Payor: /       NAME/AGE/GENDER: Shawanda Chu is a 63 y.o. female   PRIMARY DIAGNOSIS: CAP (community acquired pneumonia) [J18.9]  CAP (community acquired pneumonia) [J18.9]  Severe protein-calorie malnutrition ([facility] [E43] CAP (community acquired pneumonia) CAP (community acquired pneumonia)       ICD-10: Treatment Diagnosis:    · Generalized Muscle Weakness (M62.81)  · Difficulty in walking, Not elsewhere classified (R26.2)   Precaution/Allergies:  Patient has no known allergies. ASSESSMENT:     Ms. Chastity Herring is in sidelying upon contact with PCT assisting with clean up and agreeable to PT treatment and assist with encouragement. She completed rolling side to side with SBA and verbal cues for technique. The patient required a supine rest break due to fatigue and weakness. She then performed supine to sit with min A and verbal cues for technique. Initially the patient attempted to sit straight up, but was unable to due to weakness and required cueing for rolling to improve technique. The patient demonstrated fair sitting balance and then scooted with SBA.   She performed transfers with min A and ambulated 5' with RW and min A/CGA, verbal cues for posture and walker management. She was then positioned for comfort in the recliner chair with pressure relief cushion. Overall the patient is making slow progress toward therapy goals as indicated by activity tolerance, but the patient is limited by weakness and fatigue. Encouraged patient to eat lunch as it arrived at the end of session. Will continue skilled PT treatment as patient is still below functional baseline. This section established at most recent assessment   PROBLEM LIST (Impairments causing functional limitations):  1. Decreased Strength  2. Decreased ADL/Functional Activities  3. Decreased Transfer Abilities  4. Decreased Ambulation Ability/Technique  5. Decreased Balance  6. Increased Pain  7. Decreased Activity Tolerance  8. Increased Shortness of Breath  9. Decreased Flexibility/Joint Mobility  10. Edema/Girth   INTERVENTIONS PLANNED: (Benefits and precautions of physical therapy have been discussed with the patient.)  1. Balance Exercise  2. Bed Mobility  3. Gait Training  4. Home Exercise Program (HEP)  5. Neuromuscular Re-education/Strengthening  6. Range of Motion (ROM)  7. Therapeutic Activites  8. Therapeutic Exercise/Strengthening  9. Transfer Training     TREATMENT PLAN: Frequency/Duration: 3 times a week for duration of hospital stay  Rehabilitation Potential For Stated Goals: Good     REHAB RECOMMENDATIONS (at time of discharge pending progress):    Placement: It is my opinion, based on this patient's performance to date, that Ms. Adia Siddiqui may benefit from intensive therapy at a [facility] after discharge due to the functional deficits listed above that are likely to improve with skilled rehabilitation and concerns that he/she may be unsafe to be unsupervised at home due to decreased function .   Equipment:    None at this time              HISTORY:   History of Present Injury/Illness (Reason for Referral):  48-year-old lady presents with concerns about abdominal pain that has been present for about 3 days. She said that with this she has had some nausea, vomiting, and diarrhea. Patient is globally deconditioned. She said that she has not been able to get up and walk in about 5 days although she is unable to clarify why. She says approximately 2 days ago she was in a rolling chair and fell out of the chair striking the left side of her head. She does drink several drinks of vodka daily. She denies any known medical problems other than a history of kidney stones and says it has been a couple years since she seen a doctor. Past Medical History/Comorbidities:   Ms. Alyssa Pritchard  has a past medical history of Fracture of right clavicle (3/2015), History of kidney stones, Hypertension, Liver disease (dx--2011), and Severe protein-calorie malnutrition ([facility] (8/29/2020). She also has no past medical history of Adverse effect of anesthesia, Dementia, Difficult intubation, Endocrine disease, Malignant hyperthermia due to anesthesia, Nausea & vomiting, Neurological disorder, Pseudocholinesterase deficiency, Sleep disorder, or Unspecified adverse effect of anesthesia. Ms. Alyssa Pirtchard  has a past surgical history that includes hx gyn; hx breast biopsy (1984); and hx orthopaedic (Right, 3/2015 at [facility]). Social History/Living Environment:   Home Environment: (Will d/c to friend's camper)  One/Two Story Residence: One story  Living Alone: No  Support Systems: Friends \ neighbors  Patient Expects to be Discharged to[de-identified] Unknown  Current DME Used/Available at Home: None  Prior Level of Function/Work/Activity:  Pt lives in house with family members and reported she will be living elsewhere withh a different family member after dc. She states she walks with SPC in either hand, she has a RW she does not use.       Number of Personal Factors/Comorbidities that affect the Plan of Care: 0: LOW COMPLEXITY   EXAMINATION:   Most Recent Physical Functioning:   Gross Assessment: Posture:     Balance:  Sitting: Impaired  Sitting - Static: Good (unsupported)  Sitting - Dynamic: Fair (occasional); Good (unsupported)  Standing: Impaired  Standing - Static: Fair  Standing - Dynamic : Fair;Constant support Bed Mobility:  Rolling: Supervision  Supine to Sit: Minimum assistance; Additional time  Scooting: Supervision  Wheelchair Mobility:     Transfers:  Sit to Stand: Minimum assistance  Stand to Sit: Contact guard assistance  Bed to Chair: Contact guard assistance;Minimum assistance  Gait:     Base of Support: Narrowed  Speed/Ale: Slow;Shuffled  Gait Abnormalities: Decreased step clearance  Distance (ft): 5 Feet (ft)  Assistive Device: Walker, rolling  Ambulation - Level of Assistance: Contact guard assistance;Minimal assistance  Interventions: Verbal cues      Body Structures Involved:  1. Nerves  2. Eyes and Ears  3. Bones  4. Joints  5. Muscles  6. Ligaments Body Functions Affected:  1. Mental  2. Sensory/Pain  3. Neuromusculoskeletal  4. Movement Related Activities and Participation Affected:  1. General Tasks and Demands  2. Mobility  3. Self Care  4. Domestic Life  5. Community, Social and [employer]   Number of elements that affect the Plan of Care: 1-2: LOW COMPLEXITY   CLINICAL PRESENTATION:   Presentation: Stable and uncomplicated: LOW COMPLEXITY   CLINICAL DECISION MAKIN [facility] Inpatient Short Form  How much difficulty does the patient currently have. .. Unable A Lot A Little None   1. Turning over in bed (including adjusting bedclothes, sheets and blankets)? [] 1   [x] 2   [] 3   [] 4   2. Sitting down on and standing up from a chair with arms ( e.g., wheelchair, bedside commode, etc.)   [] 1   [x] 2   [] 3   [] 4   3. Moving from lying on back to sitting on the side of the bed? [] 1   [x] 2   [] 3   [] 4   How much help from another person does the patient currently need. .. Total A Lot A Little None   4.   Moving to and from a bed to a chair (including a wheelchair)? [] 1   [x] 2   [] 3   [] 4   5. Need to walk in hospital room? [] 1   [] 2   [x] 3   [] 4   6. Climbing 3-5 steps with a railing? [] 1   [x] 2   [] 3   [] 4   © 2007, Trustees of [address] [place] [address] [address] [address], under license to [employer]. All rights reserved      Score:  Initial: 13 Most Recent: X (Date: -- )    Interpretation of Tool:  Represents activities that are increasingly more difficult (i.e. Bed mobility, Transfers, Gait). Medical Necessity:     · Skilled intervention continues to be required due to decresed function. Reason for Services/Other Comments:  · Patient continues to require skilled intervention due to   · medical complications and patient unable to attend/participate in therapy as expected  · . Use of outcome tool(s) and clinical judgement create a POC that gives a: Questionable prediction of patient's progress: MODERATE COMPLEXITY            TREATMENT:   (In addition to Assessment/Re-Assessment sessions the following treatments were rendered)   Pre-treatment Symptoms/Complaints:  none  Pain: Initial:   Pain Intensity 1: 0  Post Session:  0/10     Therapeutic Activity: (    23): Therapeutic activities including Bed transfers, Chair transfers, and Ambulation on level ground to improve mobility, strength, balance, and coordination. Required moderate Verbal cues to promote static and dynamic balance in standing, promote coordination of bilateral, lower extremity(s), and promote motor control of bilateral, lower extremity(s). Braces/Orthotics/Lines/Etc:   · O2 Device: Room air  Treatment/Session Assessment:    · Response to Treatment:  see above  · Interdisciplinary Collaboration:   o Physical Therapy Assistant  o Registered Nurse  · After treatment position/precautions:   o Up in chair  o Bed/Chair-wheels locked  o Bed in low position  o Call light within reach  o RN notified   · Compliance with Program/Exercises:  Will assess as treatment progresses  · Recommendations/Intent for next treatment session: \"Next visit will focus on advancements to more challenging activities and reduction in assistance provided\".   Total Treatment Duration:  PT Patient Time In/Time Out  Time In: 1140  Time Out: Rusty Calderon 117, PTA

## 2020-08-31 NOTE — WOUND CARE
Pt seen for sacral wound present on admission. Pt is an alcoholic who from previous notes was covered in vodka and feces upon admission. Pt states that she has a lot of pain on her buttocks. Pt turned to right side noted multiple moisture/friction skin tears over sacral area, blanchable erythema to shyanne wound. Recommend allevyn changed every other day, diligent incontinence care and frequent turning and repositioning. May apply zinc barrier cream for comfort as well daily. Wound team will continue to follow while in acute care setting.

## 2020-09-01 LAB
ANION GAP SERPL CALC-SCNC: 8 MMOL/L (ref 7–16)
BASOPHILS # BLD: 0 K/UL (ref 0–0.2)
BASOPHILS NFR BLD: 0 % (ref 0–2)
BUN SERPL-MCNC: 6 MG/DL (ref 8–23)
CALCIUM SERPL-MCNC: 7.8 MG/DL (ref 8.3–10.4)
CHLORIDE SERPL-SCNC: 97 MMOL/L (ref 98–107)
CO2 SERPL-SCNC: 28 MMOL/L (ref 21–32)
CREAT SERPL-MCNC: 0.35 MG/DL (ref 0.6–1)
DIFFERENTIAL METHOD BLD: ABNORMAL
EOSINOPHIL # BLD: 0 K/UL (ref 0–0.8)
EOSINOPHIL NFR BLD: 1 % (ref 0.5–7.8)
ERYTHROCYTE [DISTWIDTH] IN BLOOD BY AUTOMATED COUNT: 23.9 % (ref 11.9–14.6)
GLUCOSE SERPL-MCNC: 116 MG/DL (ref 65–100)
HCT VFR BLD AUTO: 26.2 % (ref 35.8–46.3)
HGB BLD-MCNC: 9 G/DL (ref 11.7–15.4)
IMM GRANULOCYTES # BLD AUTO: 0.1 K/UL (ref 0–0.5)
IMM GRANULOCYTES NFR BLD AUTO: 1 % (ref 0–5)
LYMPHOCYTES # BLD: 0.7 K/UL (ref 0.5–4.6)
LYMPHOCYTES NFR BLD: 11 % (ref 13–44)
MAGNESIUM SERPL-MCNC: 1.7 MG/DL (ref 1.8–2.4)
MCH RBC QN AUTO: 29.5 PG (ref 26.1–32.9)
MCHC RBC AUTO-ENTMCNC: 34.4 G/DL (ref 31.4–35)
MCV RBC AUTO: 85.9 FL (ref 79.6–97.8)
MONOCYTES # BLD: 0.5 K/UL (ref 0.1–1.3)
MONOCYTES NFR BLD: 7 % (ref 4–12)
NEUTS SEG # BLD: 4.9 K/UL (ref 1.7–8.2)
NEUTS SEG NFR BLD: 80 % (ref 43–78)
NRBC # BLD: 0.02 K/UL (ref 0–0.2)
PHOSPHATE SERPL-MCNC: 2.8 MG/DL (ref 2.3–3.7)
PLATELET # BLD AUTO: 83 K/UL (ref 150–450)
PMV BLD AUTO: 10.7 FL (ref 9.4–12.3)
POTASSIUM SERPL-SCNC: 3 MMOL/L (ref 3.5–5.1)
RBC # BLD AUTO: 3.05 M/UL (ref 4.05–5.2)
SODIUM SERPL-SCNC: 133 MMOL/L (ref 136–145)
WBC # BLD AUTO: 6.2 K/UL (ref 4.3–11.1)

## 2020-09-01 PROCEDURE — 97530 THERAPEUTIC ACTIVITIES: CPT

## 2020-09-01 PROCEDURE — 85025 COMPLETE CBC W/AUTO DIFF WBC: CPT

## 2020-09-01 PROCEDURE — 74011250636 HC RX REV CODE- 250/636: Performed by: INTERNAL MEDICINE

## 2020-09-01 PROCEDURE — 84100 ASSAY OF PHOSPHORUS: CPT

## 2020-09-01 PROCEDURE — 83735 ASSAY OF MAGNESIUM: CPT

## 2020-09-01 PROCEDURE — 80048 BASIC METABOLIC PNL TOTAL CA: CPT

## 2020-09-01 PROCEDURE — 65660000000 HC RM CCU STEPDOWN

## 2020-09-01 PROCEDURE — 36415 COLL VENOUS BLD VENIPUNCTURE: CPT

## 2020-09-01 PROCEDURE — 74011250637 HC RX REV CODE- 250/637: Performed by: INTERNAL MEDICINE

## 2020-09-01 PROCEDURE — 74011250636 HC RX REV CODE- 250/636: Performed by: FAMILY MEDICINE

## 2020-09-01 RX ORDER — FERROUS SULFATE, DRIED 160(50) MG
1 TABLET, EXTENDED RELEASE ORAL
Status: DISCONTINUED | OUTPATIENT
Start: 2020-09-01 | End: 2020-09-04 | Stop reason: HOSPADM

## 2020-09-01 RX ORDER — LANOLIN ALCOHOL/MO/W.PET/CERES
100 CREAM (GRAM) TOPICAL DAILY
Status: DISCONTINUED | OUTPATIENT
Start: 2020-09-01 | End: 2020-09-04 | Stop reason: HOSPADM

## 2020-09-01 RX ORDER — MAGNESIUM SULFATE HEPTAHYDRATE 40 MG/ML
2 INJECTION, SOLUTION INTRAVENOUS ONCE
Status: COMPLETED | OUTPATIENT
Start: 2020-09-01 | End: 2020-09-01

## 2020-09-01 RX ORDER — POTASSIUM CHLORIDE 20 MEQ/1
40 TABLET, EXTENDED RELEASE ORAL EVERY 4 HOURS
Status: COMPLETED | OUTPATIENT
Start: 2020-09-01 | End: 2020-09-01

## 2020-09-01 RX ORDER — SODIUM,POTASSIUM PHOSPHATES 280-250MG
1 POWDER IN PACKET (EA) ORAL 3 TIMES DAILY
Status: DISCONTINUED | OUTPATIENT
Start: 2020-09-01 | End: 2020-09-02

## 2020-09-01 RX ORDER — FOLIC ACID 1 MG/1
1 TABLET ORAL DAILY
Status: DISCONTINUED | OUTPATIENT
Start: 2020-09-01 | End: 2020-09-04 | Stop reason: HOSPADM

## 2020-09-01 RX ADMIN — POTASSIUM & SODIUM PHOSPHATES POWDER PACK 280-160-250 MG 2 PACKET: 280-160-250 PACK at 09:06

## 2020-09-01 RX ADMIN — Medication 100 MG: at 09:02

## 2020-09-01 RX ADMIN — CALCIUM CARBONATE-VITAMIN D TAB 500 MG-200 UNIT 1 TABLET: 500-200 TAB at 09:02

## 2020-09-01 RX ADMIN — ENOXAPARIN SODIUM 40 MG: 40 INJECTION SUBCUTANEOUS at 09:02

## 2020-09-01 RX ADMIN — AMOXICILLIN AND CLAVULANATE POTASSIUM 1 TABLET: 875; 125 TABLET, FILM COATED ORAL at 21:48

## 2020-09-01 RX ADMIN — GUAIFENESIN 600 MG: 600 TABLET ORAL at 21:48

## 2020-09-01 RX ADMIN — CHLORDIAZEPOXIDE HYDROCHLORIDE 25 MG: 25 CAPSULE ORAL at 09:02

## 2020-09-01 RX ADMIN — POTASSIUM & SODIUM PHOSPHATES POWDER PACK 280-160-250 MG 1 PACKET: 280-160-250 PACK at 21:48

## 2020-09-01 RX ADMIN — MAGNESIUM SULFATE IN WATER 2 G: 40 INJECTION, SOLUTION INTRAVENOUS at 11:37

## 2020-09-01 RX ADMIN — DRONABINOL 2.5 MG: 2.5 CAPSULE ORAL at 18:11

## 2020-09-01 RX ADMIN — Medication 10 ML: at 14:00

## 2020-09-01 RX ADMIN — DRONABINOL 2.5 MG: 2.5 CAPSULE ORAL at 09:02

## 2020-09-01 RX ADMIN — MEGESTROL ACETATE 400 MG: 40 SUSPENSION ORAL at 09:15

## 2020-09-01 RX ADMIN — GUAIFENESIN 600 MG: 600 TABLET ORAL at 09:02

## 2020-09-01 RX ADMIN — Medication 10 ML: at 06:09

## 2020-09-01 RX ADMIN — POTASSIUM CHLORIDE 40 MEQ: 20 TABLET, EXTENDED RELEASE ORAL at 09:02

## 2020-09-01 RX ADMIN — FOLIC ACID 1 MG: 1 TABLET ORAL at 09:03

## 2020-09-01 RX ADMIN — AMOXICILLIN AND CLAVULANATE POTASSIUM 1 TABLET: 875; 125 TABLET, FILM COATED ORAL at 09:02

## 2020-09-01 RX ADMIN — Medication 10 ML: at 21:49

## 2020-09-01 RX ADMIN — POTASSIUM & SODIUM PHOSPHATES POWDER PACK 280-160-250 MG 1 PACKET: 280-160-250 PACK at 16:20

## 2020-09-01 RX ADMIN — POTASSIUM CHLORIDE 40 MEQ: 20 TABLET, EXTENDED RELEASE ORAL at 12:21

## 2020-09-01 NOTE — PROGRESS NOTES
Problem: Mobility Impaired (Adult and Pediatric)  Goal: *Acute Goals and Plan of Care (Insert Text)  Note:   LTG:  (1.)Ms. John Mcmahan will move from supine to sit and sit to supine , scoot up and down, and roll side to side in bed with CONTACT GUARD ASSIST within 7 treatment day(s). (2.)Ms. John Mcmahan will transfer from bed to chair and chair to bed with MINIMAL ASSIST using the least restrictive device within 7 treatment day(s). (3.)Ms. John Mcmahan will ambulate with MINIMAL ASSIST for 150+ feet with the least restrictive device within 7 treatment day(s). ________________________________________________________________________________________________    PHYSICAL THERAPY: Daily Note and AM 9/1/2020  INPATIENT: PT Visit Days : 3  Payor: /       NAME/AGE/GENDER: Ismael Dickey is a 63 y.o. female   PRIMARY DIAGNOSIS: CAP (community acquired pneumonia) [J18.9]  CAP (community acquired pneumonia) [J18.9]  Severe protein-calorie malnutrition ([facility] [E43] CAP (community acquired pneumonia) CAP (community acquired pneumonia)       ICD-10: Treatment Diagnosis:    · Generalized Muscle Weakness (M62.81)  · Difficulty in walking, Not elsewhere classified (R26.2)   Precaution/Allergies:  Patient has no known allergies. ASSESSMENT:     Ms. John Mcmahan is supine upon contact with all sheets wrapped around her (including hercules) and agreeable to PT with encouragement. She performed bed mobility with min A and cues for technique to perform supine to sit. She demonstrated good sitting balance at the edge of the bed. She stood with min A and cues for hand placement to improve safety. She then ambulated 13' with the RW and min A at a very slow pace. She required increased cueing for sequencing, posture, safety awareness, and walker management. The patient became very fatigued and weak before reaching the chair and was unable to complete the turn to the chair.   She took an extended seated rest break in the straight back chair.  The patient then stood and performed stand pivot transfer with the RW and min A to the recliner chair. She scooted back with SBA and was positioned for comfort and safety in the recliner with cushion under her. Overall the patient is making slow progress toward therapy goals as indicated by increased gait distances, but continued weakness and increasing need for assistance. Will continue skilled PT treatment as patient is still below functional baseline. This section established at most recent assessment   PROBLEM LIST (Impairments causing functional limitations):  1. Decreased Strength  2. Decreased ADL/Functional Activities  3. Decreased Transfer Abilities  4. Decreased Ambulation Ability/Technique  5. Decreased Balance  6. Increased Pain  7. Decreased Activity Tolerance  8. Increased Shortness of Breath  9. Decreased Flexibility/Joint Mobility  10. Edema/Girth   INTERVENTIONS PLANNED: (Benefits and precautions of physical therapy have been discussed with the patient.)  1. Balance Exercise  2. Bed Mobility  3. Gait Training  4. Home Exercise Program (HEP)  5. Neuromuscular Re-education/Strengthening  6. Range of Motion (ROM)  7. Therapeutic Activites  8. Therapeutic Exercise/Strengthening  9. Transfer Training     TREATMENT PLAN: Frequency/Duration: 3 times a week for duration of hospital stay  Rehabilitation Potential For Stated Goals: Good     REHAB RECOMMENDATIONS (at time of discharge pending progress):    Placement: It is my opinion, based on this patient's performance to date, that Ms. Adia Siddiqui may benefit from intensive therapy at a [address] [place] [address] after discharge due to the functional deficits listed above that are likely to improve with skilled rehabilitation and concerns that he/she may be unsafe to be unsupervised at home due to decreased function .   Equipment:    None at this time              HISTORY:   History of Present Injury/Illness (Reason for Referral):  69-year-old lady presents with concerns about abdominal pain that has been present for about 3 days. She said that with this she has had some nausea, vomiting, and diarrhea. Patient is globally deconditioned. She said that she has not been able to get up and walk in about 5 days although she is unable to clarify why. She says approximately 2 days ago she was in a rolling chair and fell out of the chair striking the left side of her head. She does drink several drinks of vodka daily. She denies any known medical problems other than a history of kidney stones and says it has been a couple years since she seen a doctor. Past Medical History/Comorbidities:   Ms. Nicolas Birmingham  has a past medical history of Fracture of right clavicle (3/2015), History of kidney stones, Hypertension, Liver disease (dx--2011), and Severe protein-calorie malnutrition ([facility] (8/29/2020). She also has no past medical history of Adverse effect of anesthesia, Dementia, Difficult intubation, Endocrine disease, Malignant hyperthermia due to anesthesia, Nausea & vomiting, Neurological disorder, Pseudocholinesterase deficiency, Sleep disorder, or Unspecified adverse effect of anesthesia. Ms. Nicolas Birmingham  has a past surgical history that includes hx gyn; hx breast biopsy (1984); and hx orthopaedic (Right, 3/2015 at [facility]). Social History/Living Environment:   Home Environment: (Will d/c to friend's camper)  One/Two Story Residence: One story  Living Alone: No  Support Systems: Friends \ neighbors  Patient Expects to be Discharged to[de-identified] Unknown  Current DME Used/Available at Home: None  Prior Level of Function/Work/Activity:  Pt lives in house with family members and reported she will be living elsewhere withh a different family member after dc. She states she walks with SPC in either hand, she has a RW she does not use.       Number of Personal Factors/Comorbidities that affect the Plan of Care: 0: LOW COMPLEXITY   EXAMINATION:   Most Recent Physical Functioning:   Gross Assessment:                  Posture:     Balance:  Sitting: Impaired  Sitting - Static: Good (unsupported)  Sitting - Dynamic: Fair (occasional); Good (unsupported)  Standing: Impaired  Standing - Static: Fair  Standing - Dynamic : Fair;Poor;Constant support Bed Mobility:  Rolling: Supervision  Supine to Sit: Minimum assistance  Scooting: Supervision  Wheelchair Mobility:     Transfers:  Sit to Stand: Minimum assistance; Moderate assistance  Stand to Sit: Minimum assistance  Bed to Chair: Minimum assistance  Gait:     Base of Support: Narrowed  Speed/Ale: Slow;Shuffled  Gait Abnormalities: Decreased step clearance;Trunk sway increased; Shuffling gait  Distance (ft): 15 Feet (ft)  Assistive Device: Walker, rolling;Gait belt  Ambulation - Level of Assistance: Minimal assistance  Interventions: Tactile cues; Verbal cues;Manual cues; Safety awareness training      Body Structures Involved:  1. Nerves  2. Eyes and Ears  3. Bones  4. Joints  5. Muscles  6. Ligaments Body Functions Affected:  1. Mental  2. Sensory/Pain  3. Neuromusculoskeletal  4. Movement Related Activities and Participation Affected:  1. General Tasks and Demands  2. Mobility  3. Self Care  4. Domestic Life  5. Community, Social and [place]   Number of elements that affect the Plan of Care: 1-2: LOW COMPLEXITY   CLINICAL PRESENTATION:   Presentation: Stable and uncomplicated: LOW COMPLEXITY   CLINICAL DECISION MAKIN [facility] Inpatient Short Form  How much difficulty does the patient currently have. .. Unable A Lot A Little None   1. Turning over in bed (including adjusting bedclothes, sheets and blankets)? [] 1   [x] 2   [] 3   [] 4   2. Sitting down on and standing up from a chair with arms ( e.g., wheelchair, bedside commode, etc.)   [] 1   [x] 2   [] 3   [] 4   3. Moving from lying on back to sitting on the side of the bed?    [] 1   [x] 2   [] 3   [] 4   How much help from another person does the patient currently need. .. Total A Lot A Little None   4. Moving to and from a bed to a chair (including a wheelchair)? [] 1   [x] 2   [] 3   [] 4   5. Need to walk in hospital room? [] 1   [] 2   [x] 3   [] 4   6. Climbing 3-5 steps with a railing? [] 1   [x] 2   [] 3   [] 4   © 2007, Trustees of [address] [place] [address] [address] [id number], under license to [employer]. All rights reserved      Score:  Initial: 13 Most Recent: X (Date: -- )    Interpretation of Tool:  Represents activities that are increasingly more difficult (i.e. Bed mobility, Transfers, Gait). Medical Necessity:     · Skilled intervention continues to be required due to decresed function. Reason for Services/Other Comments:  · Patient continues to require skilled intervention due to   · medical complications and patient unable to attend/participate in therapy as expected  · . Use of outcome tool(s) and clinical judgement create a POC that gives a: Questionable prediction of patient's progress: MODERATE COMPLEXITY            TREATMENT:   (In addition to Assessment/Re-Assessment sessions the following treatments were rendered)   Pre-treatment Symptoms/Complaints:  none  Pain: Initial:   Pain Intensity 1: 0  Post Session:  0/10     Therapeutic Activity: (    25 min): Therapeutic activities including Bed transfers, Chair transfers, walker training, standing balance, safety awareness, and Ambulation on level ground to improve mobility, strength, balance, and coordination. Required moderate Tactile cues; Verbal cues;Manual cues; Safety awareness training to promote static and dynamic balance in standing, promote coordination of bilateral, lower extremity(s), and promote motor control of bilateral, lower extremity(s).          Braces/Orthotics/Lines/Etc:   · O2 Device: Room air  Treatment/Session Assessment:    · Response to Treatment:  see above  · Interdisciplinary Collaboration:   o Physical Therapy Assistant  o Registered Nurse  · After treatment position/precautions:   o Up in chair  o Bed/Chair-wheels locked  o Bed in low position  o Call light within reach  o RN notified   · Compliance with Program/Exercises: Will assess as treatment progresses  · Recommendations/Intent for next treatment session: \"Next visit will focus on advancements to more challenging activities and reduction in assistance provided\".   Total Treatment Duration:  PT Patient Time In/Time Out  Time In: 1120  Time Out: [place] [address], PTA

## 2020-09-01 NOTE — PROGRESS NOTES
Problem: Falls - Risk of  Goal: *Absence of Falls  Description: Document Jade Matos Fall Risk and appropriate interventions in the flowsheet.   Outcome: Progressing Towards Goal  Note: Fall Risk Interventions:  Mobility Interventions: Bed/chair exit alarm, Patient to call before getting OOB    Mentation Interventions: Bed/chair exit alarm, Adequate sleep, hydration, pain control    Medication Interventions: Teach patient to arise slowly, Patient to call before getting OOB, Bed/chair exit alarm    Elimination Interventions: Bed/chair exit alarm, Call light in reach, Patient to call for help with toileting needs    History of Falls Interventions: Bed/chair exit alarm

## 2020-09-01 NOTE — PROGRESS NOTES
Hospitalist Note     Admit Date:  2020  8:01 PM   Name:  Shawanda Chu   Age:  63 y.o.  :  1960   MRN:  [id number]   PCP:  Yamila Carmen NP  Treatment Team: Attending Provider: Slava Grey MD; Care Manager: Sabas Russell RN; Physical Therapy Assistant: Daphney Michel; Occupational Therapy Assistant: Yessi Capone    HPI/Subjective:   Shawanda Chu is a 63 y.o. female with a past medical history of alcohol dependence, hepatitis C, HTN who presents to the ER with report of weakness and inability to walk for the past 5 days. She admits to falling about 2 days ago out of her rolling chair. Admitted soaked in vodka and feces.   Patient seen and examined at bedside in no acute distress. Patient reports improvement in sacral pain. She denies any chest pain or shortness of breath. No new complaints at this time. Objective:     Patient Vitals for the past 24 hrs:   Temp Pulse Resp BP SpO2   20 09     92 %   20 0824 98.2 °F (36.8 °C) (!) 109 17 99/53 91 %   20 0330 99.1 °F (37.3 °C) 98 18 110/62 93 %   20 0035 99 °F (37.2 °C) (!) 105 18 113/70 94 %   20 1929 98.8 °F (37.1 °C) (!) 106 20 103/62 97 %   20 1615 98.1 °F (36.7 °C) (!) 108 20 107/65 96 %   20 1130 98 °F (36.7 °C) 100 20 100/69 95 %     Oxygen Therapy  O2 Sat (%): 92 % (20)  Pulse via Oximetry: 112 beats per minute (20)  O2 Device: Room air (20)  O2 Flow Rate (L/min): 0 l/min (20 1245)  FIO2 (%): 21 % (20)    Estimated body mass index is 18.57 kg/m² as calculated from the following:    Height as of this encounter: 5' 5.98\" (1.676 m). Weight as of this encounter: 52.2 kg (115 lb). No intake or output data in the 24 hours ending 20 1057    *Note that automatically entered I/Os may not be accurate; dependent on patient compliance with collection and accurate  by techs.     General: Thin malnourished appearing female  CV:   RRR. No murmur, rub, or gallop. Lungs:   CTAB. No wheezing, rhonchi, or rales. Abdomen:   Soft, nontender, nondistended. Extremities: Warm and dry. No cyanosis or edema. Skin:     Stage I sacral ulcer  Neuro:  No gross focal deficits    Data Review:  I have reviewed all labs, meds, and studies from the last 24 hours:    Recent Results (from the past 24 hour(s))   CBC WITH AUTOMATED DIFF    Collection Time: 09/01/20  6:37 AM   Result Value Ref Range    WBC 6.2 4.3 - 11.1 K/uL    RBC 3.05 (L) 4.05 - 5.2 M/uL    HGB 9.0 (L) 11.7 - 15.4 g/dL    HCT 26.2 (L) 35.8 - 46.3 %    MCV 85.9 79.6 - 97.8 FL    MCH 29.5 26.1 - 32.9 PG    MCHC 34.4 31.4 - 35.0 g/dL    RDW 23.9 (H) 11.9 - 14.6 %    PLATELET 83 (L) 117 - 450 K/uL    MPV 10.7 9.4 - 12.3 FL    ABSOLUTE NRBC 0.02 0.0 - 0.2 K/uL    DF AUTOMATED      NEUTROPHILS 80 (H) 43 - 78 %    LYMPHOCYTES 11 (L) 13 - 44 %    MONOCYTES 7 4.0 - 12.0 %    EOSINOPHILS 1 0.5 - 7.8 %    BASOPHILS 0 0.0 - 2.0 %    IMMATURE GRANULOCYTES 1 0.0 - 5.0 %    ABS. NEUTROPHILS 4.9 1.7 - 8.2 K/UL    ABS. LYMPHOCYTES 0.7 0.5 - 4.6 K/UL    ABS. MONOCYTES 0.5 0.1 - 1.3 K/UL    ABS. EOSINOPHILS 0.0 0.0 - 0.8 K/UL    ABS. BASOPHILS 0.0 0.0 - 0.2 K/UL    ABS. IMM.  GRANS. 0.1 0.0 - 0.5 K/UL   METABOLIC PANEL, BASIC    Collection Time: 09/01/20  6:37 AM   Result Value Ref Range    Sodium 133 (L) 136 - 145 mmol/L    Potassium 3.0 (L) 3.5 - 5.1 mmol/L    Chloride 97 (L) 98 - 107 mmol/L    CO2 28 21 - 32 mmol/L    Anion gap 8 7 - 16 mmol/L    Glucose 116 (H) 65 - 100 mg/dL    BUN 6 (L) 8 - 23 MG/DL    Creatinine 0.35 (L) 0.6 - 1.0 MG/DL    GFR est AA >60 >60 ml/min/1.73m2    GFR est non-AA >60 >60 ml/min/1.73m2    Calcium 7.8 (L) 8.3 - 10.4 MG/DL   MAGNESIUM    Collection Time: 09/01/20  6:37 AM   Result Value Ref Range    Magnesium 1.7 (L) 1.8 - 2.4 mg/dL   PHOSPHORUS    Collection Time: 09/01/20  6:37 AM   Result Value Ref Range    Phosphorus 2.8 2.3 - 3.7 MG/DL        All Micro Results     Procedure Component Value Units Date/Time    CULTURE, BLOOD [[id number]] Collected:  08/28/20 1515    Order Status:  Completed Specimen:  Blood Updated:  09/01/20 0646     Special Requests: --        RIGHT  HAND       Culture result: NO GROWTH 4 DAYS       CULTURE, BLOOD [[id number]] Collected:  08/28/20 1519    Order Status:  Completed Specimen:  Blood Updated:  09/01/20 0646     Special Requests: --        RIGHT  HAND       Culture result: NO GROWTH 4 DAYS       CULTURE, URINE [[id number]] Collected:  08/27/20 2202    Order Status:  Completed Specimen:  Urine from Clean catch Updated:  08/31/20 1312     Special Requests: NO SPECIAL REQUESTS        Culture result:       10,000 to 50,000  COLONIES/mL  NORMAL SKIN BRIAN ISOLATED      CULTURE, RESPIRATORY/SPUTUM/BRONCH Virlinda Sleigh STAIN [[id number]] Collected:  08/28/20 0600    Order Status:  Canceled Specimen:  Sputum           No results found for this visit on 08/27/20.     Current Meds:  Current Facility-Administered Medications   Medication Dose Route Frequency    calcium-vitamin D (OS-PAT) 500 mg-200 unit tablet  1 Tab Oral DAILY WITH BREAKFAST    folic acid (FOLVITE) tablet 1 mg  1 mg Oral DAILY    thiamine HCL (B-1) tablet 100 mg  100 mg Oral DAILY    potassium chloride (K-DUR, KLOR-CON) SR tablet 40 mEq  40 mEq Oral Q4H    potassium, sodium phosphates (NEUTRA-PHOS) packet 1 Packet  1 Packet Oral TID    magnesium sulfate 2 g/50 ml IVPB (premix or compounded)  2 g IntraVENous ONCE    traMADoL (ULTRAM) tablet 50 mg  50 mg Oral Q6H PRN    oxyCODONE IR (ROXICODONE) tablet 5 mg  5 mg Oral Q6H PRN    amoxicillin-clavulanate (AUGMENTIN) 875-125 mg per tablet 1 Tab  1 Tab Oral Q12H    dronabinoL (MARINOL) capsule 2.5 mg  2.5 mg Oral BID    0.9% sodium chloride infusion 250 mL  250 mL IntraVENous PRN    0.9% sodium chloride infusion 250 mL  250 mL IntraVENous PRN    NUTRITIONAL SUPPORT ELECTROLYTE PRN ORDERS   Does Not Apply PRN  megestroL (MEGACE) 400 mg/10 mL (10 mL) oral suspension 400 mg  400 mg Oral DAILY    sodium chloride (NS) flush 5-40 mL  5-40 mL IntraVENous Q8H    sodium chloride (NS) flush 5-40 mL  5-40 mL IntraVENous PRN    acetaminophen (TYLENOL) tablet 650 mg  650 mg Oral Q4H PRN    magnesium hydroxide (MILK OF MAGNESIA) 400 mg/5 mL oral suspension 30 mL  30 mL Oral DAILY PRN    enoxaparin (LOVENOX) injection 40 mg  40 mg SubCUTAneous Q24H    chlordiazePOXIDE (LIBRIUM) capsule 25 mg  25 mg Oral Q4H PRN    LORazepam (ATIVAN) injection 1 mg  1 mg IntraVENous Q2H PRN    guaiFENesin ER (MUCINEX) tablet 600 mg  600 mg Oral Q12H    albuterol-ipratropium (DUO-NEB) 2.5 MG-0.5 MG/3 ML  3 mL Nebulization Q6H PRN       Other Studies (last 24 hours):  No results found.     Assessment and Plan:     Hospital Problems as of 9/1/2020 Never Reviewed          Codes Class Noted - Resolved POA    Severe protein-calorie malnutrition ([facility] ICD-10-CM: E43  ICD-9-CM: 262  8/29/2020 - Present Unknown        Leukocytosis ICD-10-CM: D72.829  ICD-9-CM: 288.60  8/27/2020 - Present Yes        Normocytic anemia ICD-10-CM: D64.9  ICD-9-CM: 285.9  8/27/2020 - Present Yes        Hyponatremia ICD-10-CM: E87.1  ICD-9-CM: 276.1  8/27/2020 - Present Yes        Hypokalemia ICD-10-CM: E87.6  ICD-9-CM: 276.8  8/27/2020 - Present Unknown        Elevated LFTs ICD-10-CM: R79.89  ICD-9-CM: 790.6  8/27/2020 - Present Yes        Alcohol dependence ([facility] ICD-10-CM: F10.20  ICD-9-CM: 303.90  8/27/2020 - Present Yes        * (Principal) CAP (community acquired pneumonia) ICD-10-CM: J18.9  ICD-9-CM: 328  8/27/2020 - Present Yes              Pneumonia/leukocytosis  CXR: New small right lung base opacity possibly early pneumonia  Given patient's active alcohol abuse most likely aspiration pneumonia    ABX #6  Augmentin    Plan:  -DuoNebs every 6 PRN  -Will complete 7-day course of oral antibiotics  -Flutter valve  -Mucinex    Alcohol abuse  Active alcohol abuse at this time  Alcohol usage vodka at least 3 drinks a day  Reportedly found soaked in vodka    Plan:  -Ativan PRN  -Thiamine  -Folic acid  -Monitor for signs of withdrawal    Severe protein calorie deficiency malnutrition  Patient reports minimal appetite  Nutrition Following    Plan:  -Nutritional supplements  -Encourage p.o. intake  -Megace to stimulate appetite  -Trial of marinol to increase appetite     Microcytic anemia  Hgb: 9.0  Iron studies indicative of anemia of chronic disease  1 unit transfused 8/29    Plan:  -Trend CBC    Elevated LFTs  Most likely secondary to alcohol abuse and known hepatitis C  Trending downwards    Plan:  -Trend CMP    Failure to thrive  Patient severely deconditioned  Does not seem to be able to control her own bowels  Looks much older than stated age    Plan:  -Counseled on alcohol cessation  -Will discuss with case work about placement options     Hyponatremia----resolved  Na: 133  Most likely secondary to decreased p.o. intake    Plan:  -Discontinue BMP    Hypomagnesia  -Replete    Low phosphorus  Minimal Improvement with IV supplementation     Plan:  -Replete  -Will try oral supplementation     Hypokalemia  -Replete    DC planning/Dispo: Pending hospital course      Diet:  DIET REGULAR  DIET NUTRITIONAL SUPPLEMENTS  DVT ppx: Lovenox    Signed:  Lauro Stokes MD

## 2020-09-01 NOTE — PROGRESS NOTES
MSN, CM:  Received a phone call from [facility] about insurance paperwork. Nurse stated patient would not fill it out and the patient stated she would do it later. RN questioned patient about it again the next day and patient stated she could not see it. This CM entered patient's room and explained to patient that paperwork was for possible insurance and that I was trying to help her. Patient educated on no help would be received  without this paperwork because she has no insurance. Patient verbalized understanding. Patient told that paperwork needed to be complete within the next few hours r/t she has already had paperwork for two days. Patient verbalized understanding.

## 2020-09-02 ENCOUNTER — APPOINTMENT (OUTPATIENT)
Dept: GENERAL RADIOLOGY | Age: 60
DRG: 193 | End: 2020-09-02
Attending: INTERNAL MEDICINE

## 2020-09-02 LAB
ABO + RH BLD: NORMAL
ANION GAP SERPL CALC-SCNC: 8 MMOL/L (ref 7–16)
BACTERIA SPEC CULT: NORMAL
BACTERIA SPEC CULT: NORMAL
BASOPHILS # BLD: 0 K/UL (ref 0–0.2)
BASOPHILS NFR BLD: 0 % (ref 0–2)
BLD PROD TYP BPU: NORMAL
BLD PROD TYP BPU: NORMAL
BLOOD GROUP ANTIBODIES SERPL: NORMAL
BPU ID: NORMAL
BPU ID: NORMAL
BUN SERPL-MCNC: 5 MG/DL (ref 8–23)
CALCIUM SERPL-MCNC: 8.1 MG/DL (ref 8.3–10.4)
CHLORIDE SERPL-SCNC: 100 MMOL/L (ref 98–107)
CO2 SERPL-SCNC: 26 MMOL/L (ref 21–32)
CREAT SERPL-MCNC: 0.37 MG/DL (ref 0.6–1)
CROSSMATCH RESULT,%XM: NORMAL
CROSSMATCH RESULT,%XM: NORMAL
DIFFERENTIAL METHOD BLD: ABNORMAL
EOSINOPHIL # BLD: 0.1 K/UL (ref 0–0.8)
EOSINOPHIL NFR BLD: 1 % (ref 0.5–7.8)
ERYTHROCYTE [DISTWIDTH] IN BLOOD BY AUTOMATED COUNT: 24.7 % (ref 11.9–14.6)
GLUCOSE SERPL-MCNC: 112 MG/DL (ref 65–100)
HCT VFR BLD AUTO: 25.9 % (ref 35.8–46.3)
HGB BLD-MCNC: 8.5 G/DL (ref 11.7–15.4)
IMM GRANULOCYTES # BLD AUTO: 0.1 K/UL (ref 0–0.5)
IMM GRANULOCYTES NFR BLD AUTO: 1 % (ref 0–5)
LACTATE SERPL-SCNC: 2 MMOL/L (ref 0.4–2)
LYMPHOCYTES # BLD: 0.7 K/UL (ref 0.5–4.6)
LYMPHOCYTES NFR BLD: 12 % (ref 13–44)
MAGNESIUM SERPL-MCNC: 1.8 MG/DL (ref 1.8–2.4)
MCH RBC QN AUTO: 28.8 PG (ref 26.1–32.9)
MCHC RBC AUTO-ENTMCNC: 32.8 G/DL (ref 31.4–35)
MCV RBC AUTO: 87.8 FL (ref 79.6–97.8)
MONOCYTES # BLD: 0.5 K/UL (ref 0.1–1.3)
MONOCYTES NFR BLD: 9 % (ref 4–12)
NEUTS SEG # BLD: 4.3 K/UL (ref 1.7–8.2)
NEUTS SEG NFR BLD: 76 % (ref 43–78)
NRBC # BLD: 0 K/UL (ref 0–0.2)
PHOSPHATE SERPL-MCNC: 3.9 MG/DL (ref 2.3–3.7)
PLATELET # BLD AUTO: 98 K/UL (ref 150–450)
PMV BLD AUTO: 11.3 FL (ref 9.4–12.3)
POTASSIUM SERPL-SCNC: 3.2 MMOL/L (ref 3.5–5.1)
RBC # BLD AUTO: 2.95 M/UL (ref 4.05–5.2)
SERVICE CMNT-IMP: NORMAL
SERVICE CMNT-IMP: NORMAL
SODIUM SERPL-SCNC: 134 MMOL/L (ref 136–145)
SPECIMEN EXP DATE BLD: NORMAL
STATUS OF UNIT,%ST: NORMAL
STATUS OF UNIT,%ST: NORMAL
UNIT DIVISION, %UDIV: 0
UNIT DIVISION, %UDIV: 0
WBC # BLD AUTO: 5.7 K/UL (ref 4.3–11.1)

## 2020-09-02 PROCEDURE — 71045 X-RAY EXAM CHEST 1 VIEW: CPT

## 2020-09-02 PROCEDURE — 83605 ASSAY OF LACTIC ACID: CPT

## 2020-09-02 PROCEDURE — 97530 THERAPEUTIC ACTIVITIES: CPT

## 2020-09-02 PROCEDURE — 74011250637 HC RX REV CODE- 250/637: Performed by: INTERNAL MEDICINE

## 2020-09-02 PROCEDURE — 36415 COLL VENOUS BLD VENIPUNCTURE: CPT

## 2020-09-02 PROCEDURE — 87040 BLOOD CULTURE FOR BACTERIA: CPT

## 2020-09-02 PROCEDURE — 65660000000 HC RM CCU STEPDOWN

## 2020-09-02 PROCEDURE — 83735 ASSAY OF MAGNESIUM: CPT

## 2020-09-02 PROCEDURE — 85025 COMPLETE CBC W/AUTO DIFF WBC: CPT

## 2020-09-02 PROCEDURE — 77030040393 HC DRSG OPTIFOAM GENT MDII -B

## 2020-09-02 PROCEDURE — 74011250636 HC RX REV CODE- 250/636: Performed by: FAMILY MEDICINE

## 2020-09-02 PROCEDURE — 84100 ASSAY OF PHOSPHORUS: CPT

## 2020-09-02 PROCEDURE — 80048 BASIC METABOLIC PNL TOTAL CA: CPT

## 2020-09-02 RX ORDER — POTASSIUM CHLORIDE 20 MEQ/1
40 TABLET, EXTENDED RELEASE ORAL EVERY 4 HOURS
Status: COMPLETED | OUTPATIENT
Start: 2020-09-02 | End: 2020-09-02

## 2020-09-02 RX ADMIN — MEGESTROL ACETATE 400 MG: 40 SUSPENSION ORAL at 09:21

## 2020-09-02 RX ADMIN — AMOXICILLIN AND CLAVULANATE POTASSIUM 1 TABLET: 875; 125 TABLET, FILM COATED ORAL at 21:44

## 2020-09-02 RX ADMIN — ENOXAPARIN SODIUM 40 MG: 40 INJECTION SUBCUTANEOUS at 09:25

## 2020-09-02 RX ADMIN — POTASSIUM CHLORIDE 40 MEQ: 20 TABLET, EXTENDED RELEASE ORAL at 09:24

## 2020-09-02 RX ADMIN — GUAIFENESIN 600 MG: 600 TABLET ORAL at 09:24

## 2020-09-02 RX ADMIN — GUAIFENESIN 600 MG: 600 TABLET ORAL at 21:44

## 2020-09-02 RX ADMIN — POTASSIUM CHLORIDE 40 MEQ: 20 TABLET, EXTENDED RELEASE ORAL at 12:34

## 2020-09-02 RX ADMIN — DRONABINOL 2.5 MG: 2.5 CAPSULE ORAL at 17:53

## 2020-09-02 RX ADMIN — AMOXICILLIN AND CLAVULANATE POTASSIUM 1 TABLET: 875; 125 TABLET, FILM COATED ORAL at 09:23

## 2020-09-02 RX ADMIN — CALCIUM CARBONATE-VITAMIN D TAB 500 MG-200 UNIT 1 TABLET: 500-200 TAB at 09:24

## 2020-09-02 RX ADMIN — DRONABINOL 2.5 MG: 2.5 CAPSULE ORAL at 09:24

## 2020-09-02 RX ADMIN — Medication 5 ML: at 21:44

## 2020-09-02 RX ADMIN — Medication 100 MG: at 09:24

## 2020-09-02 RX ADMIN — CHLORDIAZEPOXIDE HYDROCHLORIDE 25 MG: 25 CAPSULE ORAL at 09:37

## 2020-09-02 RX ADMIN — FOLIC ACID 1 MG: 1 TABLET ORAL at 09:24

## 2020-09-02 NOTE — PROGRESS NOTES
Hospitalist Note     Admit Date:  2020  8:01 PM   Name:  Linda Caballero   Age:  63 y.o.  :  1960   MRN:  [id number]   PCP:  Estephanie Hopkins NP  Treatment Team: Attending Provider: Dalton Mooney MD; Care Manager: Adryan Zambrano RN; Physical Therapy Assistant: Jed Valadez; Occupational Therapist: Yakelin Mckenna; Utilization Review: Aria Mcallister RN    HPI/Subjective:   Linda Caballero is a 63 y.o. female with a past medical history of alcohol dependence, hepatitis C, HTN who presents to the ER with report of weakness and inability to walk for the past 5 days. She admits to falling about 2 days ago out of her rolling chair. Admitted soaked in vodka and feces.   Patient seen and examined at bedside in no acute distress. Patient reports continued sacral pain. She denies any chest pain or shortness of breath. No new complaints at this time. Noted to have fever yesterday    Objective:     Patient Vitals for the past 24 hrs:   Temp Pulse Resp BP SpO2   20 0711 98.5 °F (36.9 °C) 92 16 120/73 96 %   20 0431 98.4 °F (36.9 °C) 92 18 121/72 94 %   20 2352 99.5 °F (37.5 °C) 100 18 104/67 95 %   20 1954 98.6 °F (37 °C) 92 18 115/75 94 %   20 1624 (!) 100.8 °F (38.2 °C) (!) 112 17 109/68 91 %   20 1252 99.3 °F (37.4 °C) (!) 110 15 (!) 146/130 94 %     Oxygen Therapy  O2 Sat (%): 96 % (20 0711)  Pulse via Oximetry: 112 beats per minute (20 09)  O2 Device: Room air (20)  O2 Flow Rate (L/min): 0 l/min (20 1245)  FIO2 (%): 21 % (20)    Estimated body mass index is 18.57 kg/m² as calculated from the following:    Height as of this encounter: 5' 5.98\" (1.676 m). Weight as of this encounter: 52.2 kg (115 lb).       Intake/Output Summary (Last 24 hours) at 2020 1124  Last data filed at 2020 0921  Gross per 24 hour   Intake 120 ml   Output    Net 120 ml       *Note that automatically entered I/Os may not be accurate; dependent on patient compliance with collection and accurate  by techs. General:    Thin malnourished appearing female  CV:   RRR. No murmur, rub, or gallop. Lungs:   CTAB. No wheezing, rhonchi, or rales. Abdomen:   Soft, nontender, nondistended. Extremities: Warm and dry. No cyanosis or edema. Skin:     Stage I sacral ulcer  Neuro:  No gross focal deficits    Data Review:  I have reviewed all labs, meds, and studies from the last 24 hours:    Recent Results (from the past 24 hour(s))   CBC WITH AUTOMATED DIFF    Collection Time: 09/02/20  6:06 AM   Result Value Ref Range    WBC 5.7 4.3 - 11.1 K/uL    RBC 2.95 (L) 4.05 - 5.2 M/uL    HGB 8.5 (L) 11.7 - 15.4 g/dL    HCT 25.9 (L) 35.8 - 46.3 %    MCV 87.8 79.6 - 97.8 FL    MCH 28.8 26.1 - 32.9 PG    MCHC 32.8 31.4 - 35.0 g/dL    RDW 24.7 (H) 11.9 - 14.6 %    PLATELET 98 (L) 148 - 450 K/uL    MPV 11.3 9.4 - 12.3 FL    ABSOLUTE NRBC 0.00 0.0 - 0.2 K/uL    DF AUTOMATED      NEUTROPHILS 76 43 - 78 %    LYMPHOCYTES 12 (L) 13 - 44 %    MONOCYTES 9 4.0 - 12.0 %    EOSINOPHILS 1 0.5 - 7.8 %    BASOPHILS 0 0.0 - 2.0 %    IMMATURE GRANULOCYTES 1 0.0 - 5.0 %    ABS. NEUTROPHILS 4.3 1.7 - 8.2 K/UL    ABS. LYMPHOCYTES 0.7 0.5 - 4.6 K/UL    ABS. MONOCYTES 0.5 0.1 - 1.3 K/UL    ABS. EOSINOPHILS 0.1 0.0 - 0.8 K/UL    ABS. BASOPHILS 0.0 0.0 - 0.2 K/UL    ABS. IMM.  GRANS. 0.1 0.0 - 0.5 K/UL   METABOLIC PANEL, BASIC    Collection Time: 09/02/20  6:06 AM   Result Value Ref Range    Sodium 134 (L) 136 - 145 mmol/L    Potassium 3.2 (L) 3.5 - 5.1 mmol/L    Chloride 100 98 - 107 mmol/L    CO2 26 21 - 32 mmol/L    Anion gap 8 7 - 16 mmol/L    Glucose 112 (H) 65 - 100 mg/dL    BUN 5 (L) 8 - 23 MG/DL    Creatinine 0.37 (L) 0.6 - 1.0 MG/DL    GFR est AA >60 >60 ml/min/1.73m2    GFR est non-AA >60 >60 ml/min/1.73m2    Calcium 8.1 (L) 8.3 - 10.4 MG/DL   MAGNESIUM    Collection Time: 09/02/20  6:06 AM   Result Value Ref Range    Magnesium 1.8 1.8 - 2.4 mg/dL   PHOSPHORUS    Collection Time: 09/02/20  6:06 AM   Result Value Ref Range    Phosphorus 3.9 (H) 2.3 - 3.7 MG/DL   LACTIC ACID    Collection Time: 09/02/20  9:03 AM   Result Value Ref Range    Lactic acid 2.0 0.4 - 2.0 MMOL/L        All Micro Results     Procedure Component Value Units Date/Time    CULTURE, BLOOD [[id number]] Collected:  09/02/20 0907    Order Status:  Completed Specimen:  Blood Updated:  09/02/20 0951    CULTURE, BLOOD [[id number]] Collected:  09/02/20 0903    Order Status:  Completed Specimen:  Blood Updated:  09/02/20 0951    CULTURE, BLOOD [[id number]] Collected:  08/28/20 1519    Order Status:  Completed Specimen:  Blood Updated:  09/02/20 0649     Special Requests: --        RIGHT  HAND       Culture result: NO GROWTH 5 DAYS       CULTURE, BLOOD [[id number]] Collected:  08/28/20 1515    Order Status:  Completed Specimen:  Blood Updated:  09/02/20 0649     Special Requests: --        RIGHT  HAND       Culture result: NO GROWTH 5 DAYS       CULTURE, URINE [[id number]] Collected:  08/27/20 2202    Order Status:  Completed Specimen:  Urine from Clean catch Updated:  08/31/20 1312     Special Requests: NO SPECIAL REQUESTS        Culture result:       10,000 to 50,000  COLONIES/mL  NORMAL SKIN BRIAN ISOLATED      CULTURE, RESPIRATORY/SPUTUM/BRONCH Hettie Hollering STAIN [[id number]] Collected:  08/28/20 0600    Order Status:  Canceled Specimen:  Sputum           No results found for this visit on 08/27/20.     Current Meds:  Current Facility-Administered Medications   Medication Dose Route Frequency    potassium chloride (K-DUR, KLOR-CON) SR tablet 40 mEq  40 mEq Oral Q4H    calcium-vitamin D (OS-PAT) 500 mg-200 unit tablet  1 Tab Oral DAILY WITH BREAKFAST    folic acid (FOLVITE) tablet 1 mg  1 mg Oral DAILY    thiamine HCL (B-1) tablet 100 mg  100 mg Oral DAILY    traMADoL (ULTRAM) tablet 50 mg  50 mg Oral Q6H PRN    oxyCODONE IR (ROXICODONE) tablet 5 mg  5 mg Oral Q6H PRN    amoxicillin-clavulanate (AUGMENTIN) 875-125 mg per tablet 1 Tab  1 Tab Oral Q12H    dronabinoL (MARINOL) capsule 2.5 mg  2.5 mg Oral BID    0.9% sodium chloride infusion 250 mL  250 mL IntraVENous PRN    0.9% sodium chloride infusion 250 mL  250 mL IntraVENous PRN    NUTRITIONAL SUPPORT ELECTROLYTE PRN ORDERS   Does Not Apply PRN    megestroL (MEGACE) 400 mg/10 mL (10 mL) oral suspension 400 mg  400 mg Oral DAILY    sodium chloride (NS) flush 5-40 mL  5-40 mL IntraVENous Q8H    sodium chloride (NS) flush 5-40 mL  5-40 mL IntraVENous PRN    acetaminophen (TYLENOL) tablet 650 mg  650 mg Oral Q4H PRN    magnesium hydroxide (MILK OF MAGNESIA) 400 mg/5 mL oral suspension 30 mL  30 mL Oral DAILY PRN    enoxaparin (LOVENOX) injection 40 mg  40 mg SubCUTAneous Q24H    chlordiazePOXIDE (LIBRIUM) capsule 25 mg  25 mg Oral Q4H PRN    LORazepam (ATIVAN) injection 1 mg  1 mg IntraVENous Q2H PRN    guaiFENesin ER (MUCINEX) tablet 600 mg  600 mg Oral Q12H    albuterol-ipratropium (DUO-NEB) 2.5 MG-0.5 MG/3 ML  3 mL Nebulization Q6H PRN       Other Studies (last 24 hours):  Xr Chest Sngl V    Result Date: 9/2/2020  Portable chest x-ray CLINICAL INDICATION: Fever FINDINGS: Single AP view the chest compared to a similar exam dated 8/27/2020 show the lungs to be slightly underexpanded. There is now a small left pleural effusion. No definite airspace density appreciated. The cardiac silhouette and mediastinum are unremarkable. The bones are osteopenic. IMPRESSION: Small left pleural effusion.       Assessment and Plan:     Hospital Problems as of 9/2/2020 Never Reviewed          Codes Class Noted - Resolved POA    Severe protein-calorie malnutrition ([facility] [address] ICD-10-CM: E43  ICD-9-CM: 262  8/29/2020 - Present Unknown        Leukocytosis ICD-10-CM: D72.829  ICD-9-CM: 288.60  8/27/2020 - Present Yes        Normocytic anemia ICD-10-CM: D64.9  ICD-9-CM: 285.9  8/27/2020 - Present Yes        Hyponatremia ICD-10-CM: E87.1  ICD-9-CM: 276.1  8/27/2020 - Present Yes        Hypokalemia ICD-10-CM: E87.6  ICD-9-CM: 276.8  8/27/2020 - Present Unknown        Elevated LFTs ICD-10-CM: R79.89  ICD-9-CM: 790.6  8/27/2020 - Present Yes        Alcohol dependence ([facility] [address] ICD-10-CM: F10.20  ICD-9-CM: 303.90  8/27/2020 - Present Yes        * (Principal) CAP (community acquired pneumonia) ICD-10-CM: J18.9  ICD-9-CM: 486  8/27/2020 - Present Yes              Pneumonia/leukocytosis  CXR: New small right lung base opacity possibly early pneumonia  Given patient's active alcohol abuse most likely aspiration pneumonia    ABX #7  Augmentin    Plan:  -DuoNebs every 6 PRN  -Will complete 7-day course of oral antibiotics  -Flutter valve  -Mucinex    Fever  Patient was fever 9/1  T-max 100.8    Plan:  -CXR  -Blood cultures  -Urinalysis  -Monitor fever curve    Alcohol abuse  Active alcohol abuse at this time  Alcohol usage vodka at least 3 drinks a day  Reportedly found soaked in vodka    Plan:  -Ativan PRN  -Thiamine  -Folic acid  -Monitor for signs of withdrawal    Severe protein calorie deficiency malnutrition  Patient reports minimal appetite  Nutrition Following    Plan:  -Nutritional supplements  -Encourage p.o. intake  -Megace to stimulate appetite  -Trial of marinol to increase appetite     Microcytic anemia  Hgb: 8.5  Iron studies indicative of anemia of chronic disease  1 unit transfused 8/29    Plan:  -Trend CBC    Elevated LFTs  Most likely secondary to alcohol abuse and known hepatitis C  Trending downwards    Plan:  -Trend CMP    Failure to thrive  Patient severely deconditioned  Does not seem to be able to control her own bowels  Looks much older than stated age    Plan:  -Counseled on alcohol cessation  -Will discuss with case work about placement options     Hyponatremia----resolved  Na: 134  Most likely secondary to decreased p.o. intake    Plan:  -Discontinue BMP    Hypomagnesia  -Replete    Low phosphorus  Minimal Improvement with IV supplementation Plan:  -Replete  -Will try oral supplementation     Hypokalemia  -Replete    DC planning/Dispo: Pending hospital course      Diet:  DIET REGULAR  DIET NUTRITIONAL SUPPLEMENTS  DVT ppx: Patricia    Signed:  Warren Mercado MD

## 2020-09-02 NOTE — PROGRESS NOTES
Problem: Falls - Risk of  Goal: *Absence of Falls  Description: Document Scarlett Liu Fall Risk and appropriate interventions in the flowsheet.   Outcome: Progressing Towards Goal  Note: Fall Risk Interventions:  Mobility Interventions: Patient to call before getting OOB    Mentation Interventions: Increase mobility    Medication Interventions: Patient to call before getting OOB, Teach patient to arise slowly    Elimination Interventions: Bed/chair exit alarm    History of Falls Interventions: Bed/chair exit alarm         Problem: Patient Education: Go to Patient Education Activity  Goal: Patient/Family Education  Outcome: Progressing Towards Goal     Problem: Nutrition Deficit  Goal: *Optimize nutritional status  Outcome: Progressing Towards Goal

## 2020-09-02 NOTE — PROGRESS NOTES
initial visit, met with patient at bedside, offered support, assurance of spiritual care staff's prayers.     Anu MONACO

## 2020-09-02 NOTE — PROGRESS NOTES
Problem: Mobility Impaired (Adult and Pediatric)  Goal: *Acute Goals and Plan of Care (Insert Text)  Note:   LTG:  (1.)Ms. Qiana Fritz will move from supine to sit and sit to supine , scoot up and down, and roll side to side in bed with CONTACT GUARD ASSIST within 7 treatment day(s). (2.)Ms. Qiana Fritz will transfer from bed to chair and chair to bed with MINIMAL ASSIST using the least restrictive device within 7 treatment day(s). (3.)Ms. Qiana Fritz will ambulate with MINIMAL ASSIST for 150+ feet with the least restrictive device within 7 treatment day(s). ________________________________________________________________________________________________    PHYSICAL THERAPY: Daily Note and AM 9/2/2020  INPATIENT: PT Visit Days : 4  Payor: /       NAME/AGE/GENDER: Raheem Vidal is a 63 y.o. female   PRIMARY DIAGNOSIS: CAP (community acquired pneumonia) [J18.9]  CAP (community acquired pneumonia) [J18.9]  Se[facility] [E43] CAP (community acquired pneumonia) CAP (community acquired pneumonia)       ICD-10: Treatment Diagnosis:    · Generalized Muscle Weakness (M62.81)  · Difficulty in walking, Not elsewhere classified (R26.2)   Precaution/Allergies:  Patient has no known allergies. ASSESSMENT:     Ms. Qiana Fritz is supine upon contact and agreeable to PT with encouragement. She performed bed mobility with min A and cues for technique to perform supine to sit. She demonstrated good sitting balance at the edge of the bed. She was then found to be soiled and returned to supine with min A. She performed rolling from side to side with min A and verbal cues for technique while PCT assisted with clean up. She then returned to sitting with the same. She stood with min A and cues for hand placement to improve safety and assist due to fatigue. She then ambulated 5' with the RW and min A at a very slow pace.   She required increased cueing for sequencing, posture, safety awareness, and walker management. The patient attempted to sit before safe and required additional cues and assist to direct into the chair. The patient then performed multiple sit to stands from the chair for positioning. She scooted back with SBA and was positioned for comfort and safety in the recliner with cushion under her. Overall the patient is making slow progress toward therapy goals as indicated by increased activity tolerance, but continued fatigue and weakness. Will continue skilled PT treatment as patient is still below functional baseline. This section established at most recent assessment   PROBLEM LIST (Impairments causing functional limitations):  1. Decreased Strength  2. Decreased ADL/Functional Activities  3. Decreased Transfer Abilities  4. Decreased Ambulation Ability/Technique  5. Decreased Balance  6. Increased Pain  7. Decreased Activity Tolerance  8. Increased Shortness of Breath  9. Decreased Flexibility/Joint Mobility  10. Edema/Girth   INTERVENTIONS PLANNED: (Benefits and precautions of physical therapy have been discussed with the patient.)  1. Balance Exercise  2. Bed Mobility  3. Gait Training  4. Home Exercise Program (HEP)  5. Neuromuscular Re-education/Strengthening  6. Range of Motion (ROM)  7. Therapeutic Activites  8. Therapeutic Exercise/Strengthening  9. Transfer Training     TREATMENT PLAN: Frequency/Duration: 3 times a week for duration of hospital stay  Rehabilitation Potential For Stated Goals: Good     REHAB RECOMMENDATIONS (at time of discharge pending progress):    Placement: It is my opinion, based on this patient's performance to date, that Ms. Tavares Montelongo may benefit from intensive therapy at a [address] [place] [address] after discharge due to the functional deficits listed above that are likely to improve with skilled rehabilitation and concerns that he/she may be unsafe to be unsupervised at home due to decreased function .   Equipment:    None at this time HISTORY:   History of Present Injury/Illness (Reason for Referral):  23-year-old lady presents with concerns about abdominal pain that has been present for about 3 days. She said that with this she has had some nausea, vomiting, and diarrhea. Patient is globally deconditioned. She said that she has not been able to get up and walk in about 5 days although she is unable to clarify why. She says approximately 2 days ago she was in a rolling chair and fell out of the chair striking the left side of her head. She does drink several drinks of vodka daily. She denies any known medical problems other than a history of kidney stones and says it has been a couple years since she seen a doctor. Past Medical History/Comorbidities:   Ms. Lorenzo Laird  has a past medical history of Fracture of right clavicle (3/2015), History of kidney stones, Hypertension, Liver disease (dx--2011), and Severe protein-calorie malnutrition ([facility] [address] (8/29/2020). She also has no past medical history of Adverse effect of anesthesia, Dementia, Difficult intubation, Endocrine disease, Malignant hyperthermia due to anesthesia, Nausea & vomiting, Neurological disorder, Pseudocholinesterase deficiency, Sleep disorder, or Unspecified adverse effect of anesthesia. Ms. Lorenzo Laird  has a past surgical history that includes hx gyn; hx breast biopsy (1984); and hx orthopaedic (Right, 3/2015 at [facility]). Social History/Living Environment:   Home Environment: (Will d/c to friend's camper)  One/Two Story Residence: One story  Living Alone: No  Support Systems: Friends \ neighbors  Patient Expects to be Discharged to[de-identified] Unknown  Current DME Used/Available at Home: None  Prior Level of Function/Work/Activity:  Pt lives in house with family members and reported she will be living elsewhere withh a different family member after dc. She states she walks with SPC in either hand, she has a RW she does not use.       Number of Personal Factors/Comorbidities that affect the Plan of Care: 0: LOW COMPLEXITY   EXAMINATION:   Most Recent Physical Functioning:   Gross Assessment:                  Posture:     Balance:  Sitting: Impaired  Sitting - Static: Good (unsupported)  Sitting - Dynamic: Good (unsupported)  Standing: Impaired  Standing - Static: Fair  Standing - Dynamic : Fair;Constant support Bed Mobility:  Rolling: Supervision  Supine to Sit: Minimum assistance  Sit to Supine: Stand-by assistance  Scooting: Supervision  Wheelchair Mobility:     Transfers:  Sit to Stand: Minimum assistance  Stand to Sit: Contact guard assistance  Bed to Chair: Minimum assistance  Gait:     Base of Support: Narrowed  Speed/Ale: Slow;Shuffled  Gait Abnormalities: Decreased step clearance;Trunk sway increased; Path deviations  Distance (ft): 5 Feet (ft)  Assistive Device: Walker, rolling;Gait belt  Ambulation - Level of Assistance: Minimal assistance  Interventions: Verbal cues; Tactile cues; Safety awareness training      Body Structures Involved:  1. Nerves  2. Eyes and Ears  3. Bones  4. Joints  5. Muscles  6. Ligaments Body Functions Affected:  1. Mental  2. Sensory/Pain  3. Neuromusculoskeletal  4. Movement Related Activities and Participation Affected:  1. General Tasks and Demands  2. Mobility  3. Self Care  4. Domestic Life  5. Community, Social and [place]   Number of elements that affect the Plan of Care: 1-2: LOW COMPLEXITY   CLINICAL PRESENTATION:   Presentation: Stable and uncomplicated: LOW COMPLEXITY   CLINICAL DECISION MAKIN [facility] Inpatient Short Form  How much difficulty does the patient currently have. .. Unable A Lot A Little None   1. Turning over in bed (including adjusting bedclothes, sheets and blankets)? [] 1   [x] 2   [] 3   [] 4   2. Sitting down on and standing up from a chair with arms ( e.g., wheelchair, bedside commode, etc.)   [] 1   [x] 2   [] 3   [] 4   3.   Moving from lying on back to sitting on the side of the bed? [] 1   [x] 2   [] 3   [] 4   How much help from another person does the patient currently need. .. Total A Lot A Little None   4. Moving to and from a bed to a chair (including a wheelchair)? [] 1   [x] 2   [] 3   [] 4   5. Need to walk in hospital room? [] 1   [] 2   [x] 3   [] 4   6. Climbing 3-5 steps with a railing? [] 1   [x] 2   [] 3   [] 4   © 2007, Trustees of [address] [place] [address] [address] [address], under license to [employer]. All rights reserved      Score:  Initial: 13 Most Recent: X (Date: -- )    Interpretation of Tool:  Represents activities that are increasingly more difficult (i.e. Bed mobility, Transfers, Gait). Medical Necessity:     · Skilled intervention continues to be required due to decresed function. Reason for Services/Other Comments:  · Patient continues to require skilled intervention due to   · medical complications and patient unable to attend/participate in therapy as expected  · . Use of outcome tool(s) and clinical judgement create a POC that gives a: Questionable prediction of patient's progress: MODERATE COMPLEXITY            TREATMENT:   (In addition to Assessment/Re-Assessment sessions the following treatments were rendered)   Pre-treatment Symptoms/Complaints:  none  Pain: Initial:   Pain Intensity 1: 0  Post Session:  0/10     Therapeutic Activity: (    28 min): Therapeutic activities including bed mobility, Bed transfers, Chair transfers, walker training, standing balance, safety awareness, and Ambulation on level ground to improve mobility, strength, balance, and coordination. Required moderate Verbal cues; Tactile cues; Safety awareness training to promote static and dynamic balance in standing, promote coordination of bilateral, lower extremity(s), and promote motor control of bilateral, lower extremity(s).          Braces/Orthotics/Lines/Etc:   · O2 Device: Room air  Treatment/Session Assessment:    · Response to Treatment:  see above  · Interdisciplinary Collaboration:   o Physical Therapy Assistant  o Registered Nurse  o Certified Nursing Assistant/Patient Care Technician  · After treatment position/precautions:   o Up in chair  o Bed/Chair-wheels locked  o Bed in low position  o Call light within reach  o RN notified   · Compliance with Program/Exercises: Will assess as treatment progresses  · Recommendations/Intent for next treatment session: \"Next visit will focus on advancements to more challenging activities and reduction in assistance provided\".   Total Treatment Duration:  PT Patient Time In/Time Out  Time In: 1100  Time Out: [address] [place], [facility]

## 2020-09-03 LAB
ANION GAP SERPL CALC-SCNC: 7 MMOL/L (ref 7–16)
BASOPHILS # BLD: 0 K/UL (ref 0–0.2)
BASOPHILS NFR BLD: 0 % (ref 0–2)
BUN SERPL-MCNC: 7 MG/DL (ref 8–23)
CALCIUM SERPL-MCNC: 8.3 MG/DL (ref 8.3–10.4)
CHLORIDE SERPL-SCNC: 101 MMOL/L (ref 98–107)
CO2 SERPL-SCNC: 26 MMOL/L (ref 21–32)
CREAT SERPL-MCNC: 0.35 MG/DL (ref 0.6–1)
DIFFERENTIAL METHOD BLD: ABNORMAL
EOSINOPHIL # BLD: 0.1 K/UL (ref 0–0.8)
EOSINOPHIL NFR BLD: 1 % (ref 0.5–7.8)
ERYTHROCYTE [DISTWIDTH] IN BLOOD BY AUTOMATED COUNT: 25.3 % (ref 11.9–14.6)
GLUCOSE SERPL-MCNC: 103 MG/DL (ref 65–100)
HCT VFR BLD AUTO: 27.2 % (ref 35.8–46.3)
HGB BLD-MCNC: 9.2 G/DL (ref 11.7–15.4)
IMM GRANULOCYTES # BLD AUTO: 0 K/UL (ref 0–0.5)
IMM GRANULOCYTES NFR BLD AUTO: 1 % (ref 0–5)
LYMPHOCYTES # BLD: 0.7 K/UL (ref 0.5–4.6)
LYMPHOCYTES NFR BLD: 11 % (ref 13–44)
MCH RBC QN AUTO: 29.6 PG (ref 26.1–32.9)
MCHC RBC AUTO-ENTMCNC: 33.8 G/DL (ref 31.4–35)
MCV RBC AUTO: 87.5 FL (ref 79.6–97.8)
MONOCYTES # BLD: 0.6 K/UL (ref 0.1–1.3)
MONOCYTES NFR BLD: 10 % (ref 4–12)
NEUTS SEG # BLD: 4.6 K/UL (ref 1.7–8.2)
NEUTS SEG NFR BLD: 77 % (ref 43–78)
NRBC # BLD: 0 K/UL (ref 0–0.2)
PLATELET # BLD AUTO: 138 K/UL (ref 150–450)
PMV BLD AUTO: 11.5 FL (ref 9.4–12.3)
POTASSIUM SERPL-SCNC: 3.7 MMOL/L (ref 3.5–5.1)
RBC # BLD AUTO: 3.11 M/UL (ref 4.05–5.2)
SODIUM SERPL-SCNC: 134 MMOL/L (ref 136–145)
WBC # BLD AUTO: 6 K/UL (ref 4.3–11.1)

## 2020-09-03 PROCEDURE — 77030038269 HC DRN EXT URIN PURWCK BARD -A

## 2020-09-03 PROCEDURE — 74011250637 HC RX REV CODE- 250/637: Performed by: INTERNAL MEDICINE

## 2020-09-03 PROCEDURE — 85025 COMPLETE CBC W/AUTO DIFF WBC: CPT

## 2020-09-03 PROCEDURE — 74011250636 HC RX REV CODE- 250/636: Performed by: FAMILY MEDICINE

## 2020-09-03 PROCEDURE — 97535 SELF CARE MNGMENT TRAINING: CPT

## 2020-09-03 PROCEDURE — 36415 COLL VENOUS BLD VENIPUNCTURE: CPT

## 2020-09-03 PROCEDURE — 65660000000 HC RM CCU STEPDOWN

## 2020-09-03 PROCEDURE — 74011250636 HC RX REV CODE- 250/636: Performed by: INTERNAL MEDICINE

## 2020-09-03 PROCEDURE — 80048 BASIC METABOLIC PNL TOTAL CA: CPT

## 2020-09-03 RX ORDER — CHLORDIAZEPOXIDE HYDROCHLORIDE 25 MG/1
25 CAPSULE, GELATIN COATED ORAL
Status: DISCONTINUED | OUTPATIENT
Start: 2020-09-03 | End: 2020-09-04 | Stop reason: HOSPADM

## 2020-09-03 RX ADMIN — Medication 5 ML: at 21:28

## 2020-09-03 RX ADMIN — FOLIC ACID 1 MG: 1 TABLET ORAL at 09:22

## 2020-09-03 RX ADMIN — Medication 10 ML: at 14:27

## 2020-09-03 RX ADMIN — AMOXICILLIN AND CLAVULANATE POTASSIUM 1 TABLET: 875; 125 TABLET, FILM COATED ORAL at 21:28

## 2020-09-03 RX ADMIN — MEGESTROL ACETATE 400 MG: 40 SUSPENSION ORAL at 09:23

## 2020-09-03 RX ADMIN — DRONABINOL 2.5 MG: 2.5 CAPSULE ORAL at 17:55

## 2020-09-03 RX ADMIN — GUAIFENESIN 600 MG: 600 TABLET ORAL at 21:28

## 2020-09-03 RX ADMIN — CALCIUM CARBONATE-VITAMIN D TAB 500 MG-200 UNIT 1 TABLET: 500-200 TAB at 09:22

## 2020-09-03 RX ADMIN — DRONABINOL 2.5 MG: 2.5 CAPSULE ORAL at 11:55

## 2020-09-03 RX ADMIN — LORAZEPAM 1 MG: 2 INJECTION INTRAMUSCULAR; INTRAVENOUS at 11:54

## 2020-09-03 RX ADMIN — Medication 100 MG: at 09:22

## 2020-09-03 RX ADMIN — GUAIFENESIN 600 MG: 600 TABLET ORAL at 09:22

## 2020-09-03 RX ADMIN — AMOXICILLIN AND CLAVULANATE POTASSIUM 1 TABLET: 875; 125 TABLET, FILM COATED ORAL at 09:22

## 2020-09-03 RX ADMIN — ENOXAPARIN SODIUM 40 MG: 40 INJECTION SUBCUTANEOUS at 09:22

## 2020-09-03 RX ADMIN — Medication 5 ML: at 05:21

## 2020-09-03 NOTE — PROGRESS NOTES
Problem: Patient Education: Go to Patient Education Activity  Goal: Patient/Family Education  Outcome: Progressing Towards Goal  Note:   1. Patient will complete total body bathing and dressing with modified independence and adaptive equipment as needed. 2. Patient will complete toileting with modified independence and adaptive equipment as needed. PROGRESSING 9/3/2020  3. Patient will tolerate 30 minutes of OT treatment with self-incorporated rest breaks to increase activity tolerance for ADLs. PROGRESSING 9/3/2020  4. Patient will complete functional transfers with modified independence and adaptive equipment as needed. PROGRESSING 9/3/2020  5. Patient will complete functional mobility for ADLs with modified independence and adaptive equipment as needed. Timeframe: 7 visits        OCCUPATIONAL THERAPY: Daily Note and PM 9/3/2020  INPATIENT: OT Visit Days: 2  Payor: MEDICAID PENDING / Plan: [place] PENDING / Product Type: Medicaid /      NAME/AGE/GENDER: Maria Esther Jones is a 63 y.o. female   PRIMARY DIAGNOSIS:  CAP (community acquired pneumonia) [J18.9]  CAP (community acquired pneumonia) [J18.9]  Severe protein-calorie malnutrition ([facility] [address] [address] [E43] CAP (community acquired pneumonia) CAP (community acquired pneumonia)       ICD-10: Treatment Diagnosis:    · Generalized Muscle Weakness (M62.81)  · History of falling (Z91.81)   Precautions/Allergies:    Fall precautions    Patient has no known allergies. ASSESSMENT:   Per Initial Assessment:  Ms. Obdulio Zepeda is a 63 y.o. female admitted with CAP, weakness. Hx falls. Pt has sacral ulcers. At baseline pt has been living with mother, however her brother is moving the mother to [facility] and selling the home. Pt reports she will live in a friend's camper at d/c. Pt reports independence with ADLs, utilizes her mother's broken SPC for mobility. Assistance for tub transfers.  Reports she had been eating some of her mother's food from [facility] [employer] but that they d not come anymore and she has not had access to food. 9/3/2020: Pt found seated edge of bed with CNA upon arrival, alert and agreeable to OT treatment, brief soiled. Pt practiced functional transfers with MinAx2 initially, ModAx2 throughout session as pt fatigued. Ambulation for ADLs with Min-ModAx2/RW, cues for RW management, posture, B knee extension. B knees buckling at times with transfers and ambulation. ModA for toilet tranfser, pt trying to sit prematurely due to fatigue. Toileted with MaxA for shyanne hygiene, Max-total assist for bowel hygiene in standing. Pt fatigues quickly, several seated rest breaks required throughout. Poor standing balance and tolerance. SBA to change gowns. Pt having diarrhea requiring 4 brief changes, as each time she was clean she would have another episode of diarrhea on the floor. RN notified. Pt with decreased cognition throughout, max cueing throughout for sequencing. Pt left seated in chair on cushion with call bell within reach. Pt is making slow progress towards goals, still presents with significant debility requiring rehabilitation at this time. See above. Continue OT POC. This section established at most recent assessment   PROBLEM LIST (Impairments causing functional limitations):  1. Decreased Strength  2. Decreased ADL/Functional Activities  3. Decreased Transfer Abilities  4. Decreased Ambulation Ability/Technique  5. Decreased Balance  6. Increased Pain  7. Decreased Activity Tolerance  8. Decreased Pacing Skills  9. Decreased Work Simplification/Energy Conservation Techniques  10. Increased Fatigue  11. Decreased Flexibility/Joint Mobility  12. Decreased Knowledge of Precautions  13. Decreased Skin Integrity/Hygeine  14. Decreased Cameron with Home Exercise Program  15. Decreased Cognition   INTERVENTIONS PLANNED: (Benefits and precautions of occupational therapy have been discussed with the patient.)  1. Activities of daily living training  2.  Adaptive equipment training  3. Balance training  4. Clothing management  5. Cognitive training  6. Community reintergration  7. Donning&doffing training  8. Hygiene training  9. Neuromuscular re-eduation  10. Re-evaluation  11. Therapeutic activity  12. Therapeutic exercise     TREATMENT PLAN: Frequency/Duration: Follow patient 3x/week to address above goals. Rehabilitation Potential For Stated Goals: Good     REHAB RECOMMENDATIONS (at time of discharge pending progress):    Placement: It is my opinion, based on this patient's performance to date, that Ms. William Alfaro may benefit from intensive therapy at a [address] [place] [address] after discharge due to the functional deficits listed above that are likely to improve with skilled rehabilitation and concerns that he/she may be unsafe to be unsupervised at home due to impaired strength and balance impacting ADLs, increasing risk of falls. However pt uninsured   Equipment:    RW vs SPC              OCCUPATIONAL PROFILE AND HISTORY:   History of Present Injury/Illness (Reason for Referral):  See H&P. Past Medical History/Comorbidities:   Ms. William Alfaro  has a past medical history of Fracture of right clavicle (3/2015), History of kidney stones, Hypertension, Liver disease (dx--2011), and Severe protein-calorie malnutrition ([facility] (8/29/2020). She also has no past medical history of Adverse effect of anesthesia, Dementia, Difficult intubation, Endocrine disease, Malignant hyperthermia due to anesthesia, Nausea & vomiting, Neurological disorder, Pseudocholinesterase deficiency, Sleep disorder, or Unspecified adverse effect of anesthesia. Ms. William Alfaro  has a past surgical history that includes hx gyn; hx breast biopsy (1984); and hx orthopaedic (Right, 3/2015 at [facility]).   Social History/Living Environment:   Home Environment: (Will d/c to friend's camper)  One/Two Story Residence: One story  Living Alone: No  Support Systems: Friends \ neighbors  Patient Expects to be Discharged to[de-identified] Unknown  Current DME Used/Available at Home: None  Prior Level of Function/Work/Activity:  At baseline pt has been living with mother, however her brother is moving the mother to [facility] and selling the home. Pt reports she will live in a friend's camper at d/c. Pt reports independence with ADLs, utilizes her mother's broken SPC for mobility. Assistance for tub transfers. Reports she had been eating some of her mother's food from [employer] but that they d not come anymore and she has not had access to food. Dominant Side:         RIGHT  Personal Factors:          Sex:  female        Age:  63 y.o. Other factors that influence how disability is experienced by the patient:  Multiple co-morbidities, falls    Number of Personal Factors/Comorbidities that affect the Plan of Care: Expanded review of therapy/medical records (1-2):  MODERATE COMPLEXITY   ASSESSMENT OF OCCUPATIONAL PERFORMANCE[de-identified]   Activities of Daily Living:   Basic ADLs (From Assessment) Complex ADLs (From Assessment)   Feeding: Independent  Oral Facial Hygiene/Grooming: Setup  Bathing: Moderate assistance  Upper Body Dressing: Setup  Lower Body Dressing: Moderate assistance  Toileting: Moderate assistance Instrumental ADL  Meal Preparation: Total assistance  Homemaking: Total assistance   Grooming/Bathing/Dressing Activities of Daily Living                 Toileting  Bladder Hygiene: Maximum assistance  Bowel Hygiene: Maximum assistance; Total assistance (dependent)  Clothing Management: Total assistance (dependent)  Adaptive Equipment: Walker   [address] Gown: Stand-by assistance Functional Transfers  Bathroom Mobility: Moderate assistance  Toilet Transfer : Moderate assistance     Bed/Mat Mobility  Sit to Stand: Minimum assistance; Moderate assistance;Assist x2  Stand to Sit: Minimum assistance; Moderate assistance;Assist x2  Bed to Chair: Moderate assistance;Assist x2  Scooting: Stand-by assistance     Most Recent Physical Functioning:   Gross Assessment:  AROM: Generally decreased, functional(BUEs)  Strength: Generally decreased, functional(BUEs)  Coordination: Generally decreased, functional(BUEs)               Posture:     Balance:  Sitting - Static: Fair (occasional)  Sitting - Dynamic: Fair (occasional)  Standing: Impaired  Standing - Static: Fair;Constant support  Standing - Dynamic : Fair;Poor;Constant support Bed Mobility:  Scooting: Stand-by assistance  Wheelchair Mobility:     Transfers:  Sit to Stand: Minimum assistance; Moderate assistance;Assist x2  Stand to Sit: Minimum assistance; Moderate assistance;Assist x2  Bed to Chair: Moderate assistance;Assist x2            Patient Vitals for the past 6 hrs:   BP BP Patient Position SpO2 Pulse   20 1127 112/64 At rest 96 % 97       Mental Status  Neurologic State: Alert  Orientation Level: Oriented X4  Cognition: Follows commands  Perception: Appears intact  Perseveration: No perseveration noted  Safety/Judgement: Decreased awareness of environment, Decreased awareness of need for safety, Decreased insight into deficits, Fall prevention                          Physical Skills Involved:  1. Range of Motion  2. Balance  3. Strength  4. Activity Tolerance  5. Gross Motor Control  6. Pain (acute)  7. Pain (Chronic)  8. Skin Integrity Cognitive Skills Affected (resulting in the inability to perform in a timely and safe manner):  1. Executive Function Psychosocial Skills Affected:  1. Habits/Routines  2. Environmental Adaptation  3. Social Interaction  4. Emotional Regulation  5. Self-Awareness  6. Awareness of Others  7. Social Roles   Number of elements that affect the Plan of Care: 5+:  HIGH COMPLEXITY   CLINICAL DECISION MAKIN [facility] [address] [address] AM-PAC 6 Clicks   Daily Activity Inpatient Short Form  How much help from another person does the patient currently need. .. Total A Lot A Little None   1. Putting on and taking off regular lower body clothing?    [] 1   [x] 2 [] 3   [] 4   2. Bathing (including washing, rinsing, drying)? [] 1   [x] 2   [] 3   [] 4   3. Toileting, which includes using toilet, bedpan or urinal?   [] 1   [x] 2   [] 3   [] 4   4. Putting on and taking off regular upper body clothing? [] 1   [] 2   [x] 3   [] 4   5. Taking care of personal grooming such as brushing teeth? [] 1   [] 2   [x] 3   [] 4   6. Eating meals? [] 1   [] 2   [] 3   [x] 4   © 2007, Trustees of [address] [place] [address] [address] [address], under license to [employer]. All rights reserved      Score:  Initial: 16 8/28/2020 Most Recent: X (Date: -- )    Interpretation of Tool:  Represents activities that are increasingly more difficult (i.e. Bed mobility, Transfers, Gait). Medical Necessity:     · Patient demonstrates good rehab potential due to higher previous functional level. Reason for Services/Other Comments:  · Patient continues to require skilled intervention due to inability to complete ADLs a prior level of independence. Use of outcome tool(s) and clinical judgement create a POC that gives a: MODERATE COMPLEXITY         TREATMENT:   (In addition to Assessment/Re-Assessment sessions the following treatments were rendered)     Pre-treatment Symptoms/Complaints:    Pain: Initial:   Pain Intensity 1: 0  Pain Location 1: Buttocks  Pain Intervention(s) 1: Ambulation/Increased Activity, Repositioned  Post Session:  No complaint of pain at rest      Self Care: (45 minutes): Procedure(s) (per grid) utilized to improve and/or restore self-care/home management as related to dressing and toileting. Required moderate to maximal visual, verbal, manual and tactile cueing to facilitate activities of daily living skills and compensatory activities. Pt practiced functional transfers with MinAx2 initially, ModAx2 throughout session as pt fatigued. Ambulation for ADLs with Min-ModAx2/RW, cues for RW management, posture, B knee extension. B knees buckling at times with transfers and ambulation.  [facility] for toilet tranfser, pt trying to sit prematurely due to fatigue. Toileted with MaxA for shyanne hygiene, Max-total assist for bowel hygiene in standing. Pt fatigues quickly, several seated rest breaks required throughout. SBA to change gowns. Pt having diarrhea requiring 4 brief changes, as each time she was clean she would have another episode of diarrhea on the floor. RN notified. Pt with decreased cognition throughout, max cueing throughout for sequencing. Braces/Orthotics/Lines/Etc:   · O2 Device: Room air  Treatment/Session Assessment:    Response to Treatment:  Decreased activity tolerance and cognition   · Interdisciplinary Collaboration:   o Occupational Therapist  o Registered Nurse  o Certified Nursing Assistant/Patient Care Technician  · After treatment position/precautions:   o Up in chair  o Bed/Chair-wheels locked  o Bed in low position  o Call light within reach  o RN notified   · Compliance with Program/Exercises: Compliant all of the time, Will assess as treatment progresses. · Recommendations/Intent for next treatment session: \"Next visit will focus on advancements to more challenging activities and reduction in assistance provided\".   Total Treatment Duration:  OT Patient Time In/Time Out  Time In: 1329  Time Out: 3038 [address], OTR/L

## 2020-09-03 NOTE — PROGRESS NOTES
Hospitalist Note     Admit Date:  2020  8:01 PM   Name:  Raheem Vidal   Age:  63 y.o.  :  1960   MRN:  [id number]   PCP:  Fuentes Mendoza NP  Treatment Team: Attending Provider: Breezy Kilgore MD; Care Manager: Della Pandey, REBECA; Utilization Review: Carlos Nicolas RN; Physical Therapy Assistant: Ramesh Martin PTA; Occupational Therapist: Franky Bergman OTR/L; Primary Nurse: Larissa Pope    HPI/Subjective:   Raheem Vidal is a 63 y.o. female with a past medical history of alcohol dependence, hepatitis C, HTN who presents to the ER with report of weakness and inability to walk for the past 5 days. She admits to falling about 2 days ago out of her rolling chair. Admitted soaked in vodka and feces. 9/3  Patient seen and examined at bedside in no acute distress. Patient reports continued sacral pain. She denies any chest pain or shortness of breath. No new complaints at this time. No repeated fevers    Discussed discharge with patient. Patient reports he feels unsafe going home. Patient reports he cannot walk and is unsteady on her feet. Objective:     Patient Vitals for the past 24 hrs:   Temp Pulse Resp BP SpO2   20 0736 97.8 °F (36.6 °C) 94 14 129/72 97 %   20 0425 97.9 °F (36.6 °C) 99 18 126/75 94 %   20 2356     96 %   20 2346 98.5 °F (36.9 °C) 100 18 115/71 94 %   20 2100     96 %   20 1934 98.4 °F (36.9 °C) 90 18 107/66 95 %   20 1508 98.5 °F (36.9 °C) 100 16 101/58 97 %     Oxygen Therapy  O2 Sat (%): 97 % (20 0736)  Pulse via Oximetry: 97 beats per minute (20)  O2 Device: Room air (20)  O2 Flow Rate (L/min): 0 l/min (20 1245)  FIO2 (%): 21 % (20 0900)    Estimated body mass index is 18.57 kg/m² as calculated from the following:    Height as of this encounter: 5' 5.98\" (1.676 m). Weight as of this encounter: 52.2 kg (115 lb).       Intake/Output Summary (Last 24 hours) at 9/3/2020 1219  Last data filed at 9/3/2020 0226  Gross per 24 hour   Intake    Output 600 ml   Net -600 ml       *Note that automatically entered I/Os may not be accurate; dependent on patient compliance with collection and accurate  by techs. General:    Thin malnourished appearing female  CV:   RRR. No murmur, rub, or gallop. Lungs:   CTAB. No wheezing, rhonchi, or rales. Abdomen:   Soft, nontender, nondistended. Extremities: Warm and dry. No cyanosis or edema. Skin:     Stage I sacral ulcer  Neuro:  No gross focal deficits    Data Review:  I have reviewed all labs, meds, and studies from the last 24 hours:    Recent Results (from the past 24 hour(s))   CBC WITH AUTOMATED DIFF    Collection Time: 09/03/20  7:14 AM   Result Value Ref Range    WBC 6.0 4.3 - 11.1 K/uL    RBC 3.11 (L) 4.05 - 5.2 M/uL    HGB 9.2 (L) 11.7 - 15.4 g/dL    HCT 27.2 (L) 35.8 - 46.3 %    MCV 87.5 79.6 - 97.8 FL    MCH 29.6 26.1 - 32.9 PG    MCHC 33.8 31.4 - 35.0 g/dL    RDW 25.3 (H) 11.9 - 14.6 %    PLATELET 269 (L) 256 - 450 K/uL    MPV 11.5 9.4 - 12.3 FL    ABSOLUTE NRBC 0.00 0.0 - 0.2 K/uL    DF AUTOMATED      NEUTROPHILS 77 43 - 78 %    LYMPHOCYTES 11 (L) 13 - 44 %    MONOCYTES 10 4.0 - 12.0 %    EOSINOPHILS 1 0.5 - 7.8 %    BASOPHILS 0 0.0 - 2.0 %    IMMATURE GRANULOCYTES 1 0.0 - 5.0 %    ABS. NEUTROPHILS 4.6 1.7 - 8.2 K/UL    ABS. LYMPHOCYTES 0.7 0.5 - 4.6 K/UL    ABS. MONOCYTES 0.6 0.1 - 1.3 K/UL    ABS. EOSINOPHILS 0.1 0.0 - 0.8 K/UL    ABS. BASOPHILS 0.0 0.0 - 0.2 K/UL    ABS. IMM.  GRANS. 0.0 0.0 - 0.5 K/UL   METABOLIC PANEL, BASIC    Collection Time: 09/03/20  7:14 AM   Result Value Ref Range    Sodium 134 (L) 136 - 145 mmol/L    Potassium 3.7 3.5 - 5.1 mmol/L    Chloride 101 98 - 107 mmol/L    CO2 26 21 - 32 mmol/L    Anion gap 7 7 - 16 mmol/L    Glucose 103 (H) 65 - 100 mg/dL    BUN 7 (L) 8 - 23 MG/DL    Creatinine 0.35 (L) 0.6 - 1.0 MG/DL    GFR est AA >60 >60 ml/min/1.73m2    GFR est non-AA >60 >60 ml/min/1.73m2    Calcium 8.3 8.3 - 10.4 MG/DL        All Micro Results     Procedure Component Value Units Date/Time    CULTURE, BLOOD [[id number]] Collected:  09/02/20 0903    Order Status:  Completed Specimen:  Blood Updated:  09/03/20 0703     Special Requests: --        RIGHT  ARM       Culture result: NO GROWTH AFTER 21 HOURS       CULTURE, BLOOD [[id number]] Collected:  09/02/20 0907    Order Status:  Completed Specimen:  Blood Updated:  09/03/20 0703     Special Requests: --        RIGHT  HAND       Culture result: NO GROWTH AFTER 21 HOURS       CULTURE, BLOOD [[id number]] Collected:  08/28/20 1519    Order Status:  Completed Specimen:  Blood Updated:  09/02/20 0649     Special Requests: --        RIGHT  HAND       Culture result: NO GROWTH 5 DAYS       CULTURE, BLOOD [[id number]] Collected:  08/28/20 1515    Order Status:  Completed Specimen:  Blood Updated:  09/02/20 0649     Special Requests: --        RIGHT  HAND       Culture result: NO GROWTH 5 DAYS       CULTURE, URINE [[id number]] Collected:  08/27/20 2202    Order Status:  Completed Specimen:  Urine from Clean catch Updated:  08/31/20 1312     Special Requests: NO SPECIAL REQUESTS        Culture result:       10,000 to 50,000  COLONIES/mL  NORMAL SKIN BRIAN ISOLATED      CULTURE, RESPIRATORY/SPUTUM/BRONCH Thereasa Gravely STAIN [[id number]] Collected:  08/28/20 0600    Order Status:  Canceled Specimen:  Sputum           No results found for this visit on 08/27/20.     Current Meds:  Current Facility-Administered Medications   Medication Dose Route Frequency    calcium-vitamin D (OS-PAT) 500 mg-200 unit tablet  1 Tab Oral DAILY WITH BREAKFAST    folic acid (FOLVITE) tablet 1 mg  1 mg Oral DAILY    thiamine HCL (B-1) tablet 100 mg  100 mg Oral DAILY    traMADoL (ULTRAM) tablet 50 mg  50 mg Oral Q6H PRN    oxyCODONE IR (ROXICODONE) tablet 5 mg  5 mg Oral Q6H PRN    amoxicillin-clavulanate (AUGMENTIN) 875-125 mg per tablet 1 Tab  1 Tab Oral Q12H    dronabinoL (MARINOL) capsule 2.5 mg  2.5 mg Oral BID    0.9% sodium chloride infusion 250 mL  250 mL IntraVENous PRN    0.9% sodium chloride infusion 250 mL  250 mL IntraVENous PRN    NUTRITIONAL SUPPORT ELECTROLYTE PRN ORDERS   Does Not Apply PRN    megestroL (MEGACE) 400 mg/10 mL (10 mL) oral suspension 400 mg  400 mg Oral DAILY    sodium chloride (NS) flush 5-40 mL  5-40 mL IntraVENous Q8H    sodium chloride (NS) flush 5-40 mL  5-40 mL IntraVENous PRN    acetaminophen (TYLENOL) tablet 650 mg  650 mg Oral Q4H PRN    magnesium hydroxide (MILK OF MAGNESIA) 400 mg/5 mL oral suspension 30 mL  30 mL Oral DAILY PRN    enoxaparin (LOVENOX) injection 40 mg  40 mg SubCUTAneous Q24H    chlordiazePOXIDE (LIBRIUM) capsule 25 mg  25 mg Oral Q4H PRN    LORazepam (ATIVAN) injection 1 mg  1 mg IntraVENous Q2H PRN    guaiFENesin ER (MUCINEX) tablet 600 mg  600 mg Oral Q12H    albuterol-ipratropium (DUO-NEB) 2.5 MG-0.5 MG/3 ML  3 mL Nebulization Q6H PRN       Other Studies (last 24 hours):  No results found.     Assessment and Plan:     Hospital Problems as of 9/3/2020 Never Reviewed          Codes Class Noted - Resolved POA    Severe protein-calorie malnutrition ([facility] ICD-10-CM: E43  ICD-9-CM: 262  8/29/2020 - Present Unknown        Leukocytosis ICD-10-CM: D72.829  ICD-9-CM: 288.60  8/27/2020 - Present Yes        Normocytic anemia ICD-10-CM: D64.9  ICD-9-CM: 285.9  8/27/2020 - Present Yes        Hyponatremia ICD-10-CM: E87.1  ICD-9-CM: 276.1  8/27/2020 - Present Yes        Hypokalemia ICD-10-CM: E87.6  ICD-9-CM: 276.8  8/27/2020 - Present Unknown        Elevated LFTs ICD-10-CM: R79.89  ICD-9-CM: 790.6  8/27/2020 - Present Yes        Alcohol dependence ([facility] ICD-10-CM: F10.20  ICD-9-CM: 303.90  8/27/2020 - Present Yes        * (Principal) CAP (community acquired pneumonia) ICD-10-CM: J18.9  ICD-9-CM: 717  8/27/2020 - Present Yes              Pneumonia/leukocytosis  CXR: New small right lung base opacity possibly early pneumonia  Given patient's active alcohol abuse most likely aspiration pneumonia    Leukocytosis resolved    ABX complete today--completed 7-day course    Plan:  -DuoNebs every 6 PRN  -Flutter valve  -Mucinex    Fever  Patient wwith fever 9/1  Has been afebrile 48hrs  Repeat CXR: No worsening of consolidation  Repeat blood cultures: NGTD    Alcohol abuse  Active alcohol abuse at this time  Alcohol usage vodka at least 3 drinks a day  Reportedly found soaked in vodka    Plan:  -Ativan PRN  -Thiamine  -Folic acid  -Monitor for signs of withdrawal    Severe protein calorie deficiency malnutrition  Patient reports minimal appetite  Nutrition Following    Plan:  -Nutritional supplements  -Encourage p.o. intake  -Megace to stimulate appetite  -Trial of marinol to increase appetite     Microcytic anemia  Hgb: 9.2  Iron studies indicative of anemia of chronic disease  1 unit transfused 8/29    Plan:  -Trend CBC    Elevated LFTs  Most likely secondary to alcohol abuse and known hepatitis C  Trending downwards    Plan:  -Trend CMP    Failure to thrive  Patient severely deconditioned  Does not seem to be able to control her own bowels  Looks much older than stated age    Plan:  -Counseled on alcohol cessation  -Will discuss with case work about placement options     Hyponatremia----resolved  Na: 134  Most likely secondary to decreased p.o. intake    Plan:  -Discontinue BMP    Hypomagnesia  -Replete    Low phosphorus  Minimal Improvement with IV supplementation     Plan:  -Replete  -Will try oral supplementation     Hypokalemia  -Replete    DC planning/Dispo: Patient without insurance; PT recommending SNF ; patient reports she does not feel safe going home and unsteady on her feet        Diet:  DIET REGULAR  DIET NUTRITIONAL SUPPLEMENTS  DVT ppx: Lovenox    Signed:  Jose Chen MD

## 2020-09-03 NOTE — PROGRESS NOTES
Problem: Patient Education: Go to Patient Education Activity  Goal: Patient/Family Education  Outcome: Progressing Towards Goal     Problem: Pneumonia: Day 1  Goal: Off Pathway (Use only if patient is Off Pathway)  Outcome: Progressing Towards Goal  Goal: Activity/Safety  Outcome: Progressing Towards Goal  Goal: Consults, if ordered  Outcome: Progressing Towards Goal  Goal: Diagnostic Test/Procedures  Outcome: Progressing Towards Goal  Goal: Nutrition/Diet  Outcome: Progressing Towards Goal  Goal: Medications  Outcome: Progressing Towards Goal  Goal: Respiratory  Outcome: Progressing Towards Goal  Goal: Treatments/Interventions/Procedures  Outcome: Progressing Towards Goal  Goal: Psychosocial  Outcome: Progressing Towards Goal  Goal: *Oxygen saturation within defined limits  Outcome: Progressing Towards Goal  Goal: *Influenza vaccine administered (October-March)  Outcome: Progressing Towards Goal  Goal: *Pneumoccocal vaccine administered  Outcome: Progressing Towards Goal  Goal: *Hemodynamically stable  Outcome: Progressing Towards Goal  Goal: *Demonstrates progressive activity  Outcome: Progressing Towards Goal  Goal: *Tolerating diet  Outcome: Progressing Towards Goal     Problem: Pneumonia: Day 2  Goal: Off Pathway (Use only if patient is Off Pathway)  Outcome: Progressing Towards Goal  Goal: Activity/Safety  Outcome: Progressing Towards Goal  Goal: Consults, if ordered  Outcome: Progressing Towards Goal  Goal: Diagnostic Test/Procedures  Outcome: Progressing Towards Goal  Goal: Nutrition/Diet  Outcome: Progressing Towards Goal  Goal: Discharge Planning  Outcome: Progressing Towards Goal  Goal: Medications  Outcome: Progressing Towards Goal  Goal: Respiratory  Outcome: Progressing Towards Goal  Goal: Treatments/Interventions/Procedures  Outcome: Progressing Towards Goal  Goal: Psychosocial  Outcome: Progressing Towards Goal  Goal: *Oxygen saturation within defined limits  Outcome: Progressing Towards Goal  Goal: *Hemodynamically stable  Outcome: Progressing Towards Goal  Goal: *Demonstrates progressive activity  Outcome: Progressing Towards Goal  Goal: *Tolerating diet  Outcome: Progressing Towards Goal  Goal: *Optimal pain control at patient's stated goal  Outcome: Progressing Towards Goal     Problem: Pneumonia: Day 3  Goal: Off Pathway (Use only if patient is Off Pathway)  Outcome: Progressing Towards Goal  Goal: Activity/Safety  Outcome: Progressing Towards Goal  Goal: Consults, if ordered  Outcome: Progressing Towards Goal  Goal: Diagnostic Test/Procedures  Outcome: Progressing Towards Goal  Goal: Nutrition/Diet  Outcome: Progressing Towards Goal  Goal: Discharge Planning  Outcome: Progressing Towards Goal  Goal: Medications  Outcome: Progressing Towards Goal  Goal: Respiratory  Outcome: Progressing Towards Goal  Goal: Treatments/Interventions/Procedures  Outcome: Progressing Towards Goal  Goal: Psychosocial  Outcome: Progressing Towards Goal  Goal: *Oxygen saturation within defined limits  Outcome: Progressing Towards Goal  Goal: *Hemodynamically stable  Outcome: Progressing Towards Goal  Goal: *Demonstrates progressive activity  Outcome: Progressing Towards Goal  Goal: *Tolerating diet  Outcome: Progressing Towards Goal  Goal: *Describes available resources and support systems  Outcome: Progressing Towards Goal  Goal: *Optimal pain control at patient's stated goal  Outcome: Progressing Towards Goal     Problem: Pneumonia: Day 4  Goal: Off Pathway (Use only if patient is Off Pathway)  Outcome: Progressing Towards Goal  Goal: Activity/Safety  Outcome: Progressing Towards Goal  Goal: Nutrition/Diet  Outcome: Progressing Towards Goal  Goal: Discharge Planning  Outcome: Progressing Towards Goal  Goal: Medications  Outcome: Progressing Towards Goal  Goal: Respiratory  Outcome: Progressing Towards Goal  Goal: Treatments/Interventions/Procedures  Outcome: Progressing Towards Goal  Goal: Psychosocial  Outcome: Progressing Towards Goal     Problem: Pneumonia: Discharge Outcomes  Goal: *Demonstrates progressive activity  Outcome: Progressing Towards Goal  Goal: *Describes follow-up/return visits to physicians  Outcome: Progressing Towards Goal  Goal: *Tolerating diet  Outcome: Progressing Towards Goal  Goal: *Verbalizes name, dosage, time, side effects, and number of days to continue medications  Outcome: Progressing Towards Goal  Goal: *Influenza immunization  Outcome: Progressing Towards Goal  Goal: *Pneumococcal immunization  Outcome: Progressing Towards Goal  Goal: *Respiratory status at baseline  Outcome: Progressing Towards Goal  Goal: *Vital signs within defined limits  Outcome: Progressing Towards Goal  Goal: *Describes available resources and support systems  Outcome: Progressing Towards Goal  Goal: *Optimal pain control at patient's stated goal  Outcome: Progressing Towards Goal     Problem: Falls - Risk of  Goal: *Absence of Falls  Description: Document Valente Barreto Fall Risk and appropriate interventions in the flowsheet.   Outcome: Progressing Towards Goal  Note: Fall Risk Interventions:  Mobility Interventions: Patient to call before getting OOB    Mentation Interventions: Increase mobility    Medication Interventions: Patient to call before getting OOB    Elimination Interventions: Call light in reach    History of Falls Interventions: Bed/chair exit alarm

## 2020-09-03 NOTE — DISCHARGE INSTR - DIET
· Good nutrition is important when healing from an illness, injury, or surgery. Follow any nutrition recommendations given to you during your hospital stay. · If you were given an oral nutrition supplement while in the hospital, continue to take this supplement at home. You can take it with meals, in-between meals, and/or before bedtime. These supplements can be purchased at most local grocery stores, pharmacies, and chain super-stores. · If you have any questions about your diet or nutrition, call the hospital and ask for the dietitian. Discharge Nutrition Plan: Continue Oral Nutrition Supplement (ONS) at discharge. Recommend Ensure Enlive or a comparable/similar product Three times daily for 30 days unless otherwise directed by your Primary Care Physician.

## 2020-09-03 NOTE — PROGRESS NOTES
Comprehensive Nutrition Assessment    Type and Reason for Visit: Reassess, Wound   Best Practice Alert for Pressure Injury stage 2 or greater    Nutrition Recommendations/Plan:    Continue with current diet and ONS  Addition of Ryder BID  Weigh pt    Nutrition Assessment:  Pt presents with PNA. PMH includes EtOH abuse, hepatits C, and HTN. Pt seen with lunch tray untouched and ~1/3 Ensure Enlive left. Pt reports improved appetite; however, also reports consuming ~25% of meals. Noted pt's intake of 100% for both breakfast and ONS in flowsheet. Per flowsheets, 4 other intakes recorded with 3 intakes of 0% and one of 25%. No wt taken yet. MD ordered megace on 8/29 and trial of marinol on 8/30. Discussed addition of Ryder to aid in wound healing. Pt verbalized understanding.     Malnutrition Assessment:  Malnutrition Status:  Severe malnutrition    Context:  Chronic illness     Findings of the 6 clinical characteristics of malnutrition:   Energy Intake:  7 - 75% or less est energy requirements for 1 month or longer  Body Fat Loss:  7 - Severe body fat loss, Fat overlying ribs, Orbital, Triceps   Muscle Mass Loss:  7 - Severe muscle mass loss, Calf (gastrocnemius), Clavicles (pectoralis &deltoids), Hand (interosseous), Scapula (trapezius), Thigh (quadraceps), Temples (temporalis)    Estimated Daily Nutrient Needs:  Energy (kcal):  6389-0787MXUP(11-31RCAE/KX (CBW: 52.2kg))  Protein (g):  63-73gm(1.2-1.4gm/kg (CBW: 52.2kg))         Nutrition Related Findings:  severe muscle and subcutaneous fat loss noted      Wounds:    Stage II       Current Nutrition Therapies:   DIET REGULAR  DIET NUTRITIONAL SUPPLEMENTS Breakfast, Lunch, Dinner; Clipik    Anthropometric Measures:  · Height:  5' 5.98\" (167.6 cm)  · Current Body Wt:  52.2 kg (115 lb 1.3 oz)   · Usual Body Wt:  125 lb     · Ideal Body Wt:  130 lbs:  88.5 %   · BMI Category:  Underweight (BMI less than 18.5)       Nutrition Diagnosis:   · Severe malnutrition, In context of chronic illness related to inadequate protein-energy intake as evidenced by severe loss of subcutaneous fat, severe muscle loss, poor intake prior to admission    Nutrition Interventions:   Food and/or Nutrient Delivery: Continue current diet, Continue oral nutrition supplement  Coordination of Nutrition Care: (Discussed with Elma Campbell RN)    Goals:   Will meet >75% estimated nutrition needs within 7 days       Nutrition Monitoring and Evaluation:   Food/Nutrient Intake Outcomes: Food and nutrient intake, Supplement intake    Discharge Planning:    Continue oral nutrition supplement     Electronically signed by Robert Hammond MS, RDN, LD 9/3/2020 at 2:30 PM  Contact: [phone]

## 2020-09-04 ENCOUNTER — HOME HEALTH ADMISSION (OUTPATIENT)
Dept: HOME HEALTH SERVICES | Facility: HOME HEALTH | Age: 60
End: 2020-09-04

## 2020-09-04 VITALS
SYSTOLIC BLOOD PRESSURE: 119 MMHG | WEIGHT: 115 LBS | BODY MASS INDEX: 18.48 KG/M2 | TEMPERATURE: 97.7 F | RESPIRATION RATE: 16 BRPM | DIASTOLIC BLOOD PRESSURE: 67 MMHG | OXYGEN SATURATION: 96 % | HEART RATE: 95 BPM | HEIGHT: 66 IN

## 2020-09-04 LAB
ALBUMIN SERPL-MCNC: 1.9 G/DL (ref 3.2–4.6)
ALBUMIN/GLOB SERPL: 0.5 {RATIO} (ref 1.2–3.5)
ALP SERPL-CCNC: 130 U/L (ref 50–136)
ALT SERPL-CCNC: 29 U/L (ref 12–65)
ANION GAP SERPL CALC-SCNC: 9 MMOL/L (ref 7–16)
AST SERPL-CCNC: 36 U/L (ref 15–37)
BILIRUB SERPL-MCNC: 0.5 MG/DL (ref 0.2–1.1)
BUN SERPL-MCNC: 7 MG/DL (ref 8–23)
CALCIUM SERPL-MCNC: 8.5 MG/DL (ref 8.3–10.4)
CHLORIDE SERPL-SCNC: 103 MMOL/L (ref 98–107)
CO2 SERPL-SCNC: 24 MMOL/L (ref 21–32)
CREAT SERPL-MCNC: 0.32 MG/DL (ref 0.6–1)
GLOBULIN SER CALC-MCNC: 3.7 G/DL (ref 2.3–3.5)
GLUCOSE SERPL-MCNC: 112 MG/DL (ref 65–100)
MAGNESIUM SERPL-MCNC: 1.5 MG/DL (ref 1.8–2.4)
PHOSPHATE SERPL-MCNC: 4.2 MG/DL (ref 2.3–3.7)
POTASSIUM SERPL-SCNC: 3.5 MMOL/L (ref 3.5–5.1)
PROT SERPL-MCNC: 5.6 G/DL (ref 6.3–8.2)
SODIUM SERPL-SCNC: 136 MMOL/L (ref 136–145)

## 2020-09-04 PROCEDURE — 80053 COMPREHEN METABOLIC PANEL: CPT

## 2020-09-04 PROCEDURE — 83735 ASSAY OF MAGNESIUM: CPT

## 2020-09-04 PROCEDURE — 74011250637 HC RX REV CODE- 250/637: Performed by: INTERNAL MEDICINE

## 2020-09-04 PROCEDURE — 74011250636 HC RX REV CODE- 250/636: Performed by: FAMILY MEDICINE

## 2020-09-04 PROCEDURE — 84100 ASSAY OF PHOSPHORUS: CPT

## 2020-09-04 PROCEDURE — 74011250636 HC RX REV CODE- 250/636: Performed by: INTERNAL MEDICINE

## 2020-09-04 PROCEDURE — 36415 COLL VENOUS BLD VENIPUNCTURE: CPT

## 2020-09-04 PROCEDURE — 77030038269 HC DRN EXT URIN PURWCK BARD -A

## 2020-09-04 RX ORDER — FOLIC ACID 1 MG/1
1 TABLET ORAL DAILY
Qty: 30 TAB | Refills: 0 | Status: SHIPPED | OUTPATIENT
Start: 2020-09-04 | End: 2020-10-28 | Stop reason: CLARIF

## 2020-09-04 RX ORDER — LANOLIN ALCOHOL/MO/W.PET/CERES
100 CREAM (GRAM) TOPICAL DAILY
Qty: 30 TAB | Refills: 0 | Status: SHIPPED | OUTPATIENT
Start: 2020-09-04 | End: 2020-10-28 | Stop reason: CLARIF

## 2020-09-04 RX ORDER — SERTRALINE HYDROCHLORIDE 25 MG/1
25 TABLET, FILM COATED ORAL DAILY
Qty: 30 TAB | Refills: 0 | Status: SHIPPED | OUTPATIENT
Start: 2020-09-04 | End: 2020-10-28 | Stop reason: CLARIF

## 2020-09-04 RX ORDER — POTASSIUM CHLORIDE 20 MEQ/1
40 TABLET, EXTENDED RELEASE ORAL
Status: COMPLETED | OUTPATIENT
Start: 2020-09-04 | End: 2020-09-04

## 2020-09-04 RX ORDER — MAGNESIUM 200 MG
1 TABLET ORAL DAILY
Qty: 30 TAB | Refills: 0 | Status: SHIPPED | OUTPATIENT
Start: 2020-09-04 | End: 2020-10-28 | Stop reason: CLARIF

## 2020-09-04 RX ORDER — ALPRAZOLAM 0.25 MG/1
0.25 TABLET ORAL
Qty: 9 TAB | Refills: 0 | Status: SHIPPED | OUTPATIENT
Start: 2020-09-04 | End: 2020-09-04

## 2020-09-04 RX ORDER — MAGNESIUM SULFATE HEPTAHYDRATE 40 MG/ML
2 INJECTION, SOLUTION INTRAVENOUS ONCE
Status: COMPLETED | OUTPATIENT
Start: 2020-09-04 | End: 2020-09-04

## 2020-09-04 RX ORDER — SERTRALINE HYDROCHLORIDE 25 MG/1
25 TABLET, FILM COATED ORAL DAILY
Qty: 30 TAB | Refills: 0 | Status: SHIPPED | OUTPATIENT
Start: 2020-09-04 | End: 2020-09-04 | Stop reason: SDUPTHER

## 2020-09-04 RX ADMIN — ENOXAPARIN SODIUM 40 MG: 40 INJECTION SUBCUTANEOUS at 08:03

## 2020-09-04 RX ADMIN — MAGNESIUM SULFATE HEPTAHYDRATE 2 G: 40 INJECTION, SOLUTION INTRAVENOUS at 08:04

## 2020-09-04 RX ADMIN — POTASSIUM CHLORIDE 40 MEQ: 20 TABLET, EXTENDED RELEASE ORAL at 08:02

## 2020-09-04 RX ADMIN — Medication 100 MG: at 08:02

## 2020-09-04 RX ADMIN — GUAIFENESIN 600 MG: 600 TABLET ORAL at 08:02

## 2020-09-04 RX ADMIN — FOLIC ACID 1 MG: 1 TABLET ORAL at 08:02

## 2020-09-04 RX ADMIN — CHLORDIAZEPOXIDE HYDROCHLORIDE 25 MG: 25 CAPSULE ORAL at 08:11

## 2020-09-04 RX ADMIN — CALCIUM CARBONATE-VITAMIN D TAB 500 MG-200 UNIT 1 TABLET: 500-200 TAB at 08:02

## 2020-09-04 RX ADMIN — Medication 5 ML: at 06:13

## 2020-09-04 RX ADMIN — DRONABINOL 2.5 MG: 2.5 CAPSULE ORAL at 08:02

## 2020-09-04 NOTE — DISCHARGE INSTRUCTIONS
DISCHARGE SUMMARY from Nurse    PATIENT INSTRUCTIONS:    After general anesthesia or intravenous sedation, for 24 hours or while taking prescription Narcotics:  · Limit your activities  · Do not drive and operate hazardous machinery  · Do not make important personal or business decisions  · Do  not drink alcoholic beverages  · If you have not urinated within 8 hours after discharge, please contact your surgeon on call. What to do at Home:  Recommended activity: Activity as tolerated. If you experience any of the following symptoms temp > 101.5, worsening cough or wheezing, shortness of breath or fatigue not relieved with rest, please follow up with MD.    *  Please give a list of your current medications to your Primary Care Provider. *  Please update this list whenever your medications are discontinued, doses are      changed, or new medications (including over-the-counter products) are added. *  Please carry medication information at all times in case of emergency situations. These are general instructions for a healthy lifestyle:    No smoking/ No tobacco products/ Avoid exposure to second hand smoke  Surgeon General's Warning:  Quitting smoking now greatly reduces serious risk to your health. Obesity, smoking, and sedentary lifestyle greatly increases your risk for illness    A healthy diet, regular physical exercise & weight monitoring are important for maintaining a healthy lifestyle    You may be retaining fluid if you have a history of heart failure or if you experience any of the following symptoms:  Weight gain of 3 pounds or more overnight or 5 pounds in a week, increased swelling in our hands or feet or shortness of breath while lying flat in bed. Please call your doctor as soon as you notice any of these symptoms; do not wait until your next office visit. The discharge information has been reviewed with the patient. The patient verbalized understanding.   Discharge medications reviewed with the patient and appropriate educational materials and side effects teaching were provided. Patient Education        Pneumonia: Care Instructions  Your Care Instructions     Pneumonia is an infection of the lungs. Most cases are caused by infections from bacteria or viruses. Pneumonia may be mild or very severe. If it is caused by bacteria, you will be treated with antibiotics. It may take a few weeks to a few months to recover fully from pneumonia, depending on how sick you were and whether your overall health is good. Follow-up care is a key part of your treatment and safety. Be sure to make and go to all appointments, and call your doctor if you are having problems. It's also a good idea to know your test results and keep a list of the medicines you take. How can you care for yourself at home? · Take your antibiotics exactly as directed. Do not stop taking the medicine just because you are feeling better. You need to take the full course of antibiotics. · Take your medicines exactly as prescribed. Call your doctor if you think you are having a problem with your medicine. · Get plenty of rest and sleep. You may feel weak and tired for a while, but your energy level will improve with time. · To prevent dehydration, drink plenty of fluids, enough so that your urine is light yellow or clear like water. Choose water and other caffeine-free clear liquids until you feel better. If you have kidney, heart, or liver disease and have to limit fluids, talk with your doctor before you increase the amount of fluids you drink. · Take care of your cough so you can rest. A cough that brings up mucus from your lungs is common with pneumonia. It is one way your body gets rid of the infection. But if coughing keeps you from resting or causes severe fatigue and chest-wall pain, talk to your doctor. He or she may suggest that you take a medicine to reduce the cough.   · Use a vaporizer or humidifier to add moisture to your bedroom. Follow the directions for cleaning the machine. · Do not smoke or allow others to smoke around you. Smoke will make your cough last longer. If you need help quitting, talk to your doctor about stop-smoking programs and medicines. These can increase your chances of quitting for good. · Take an over-the-counter pain medicine, such as acetaminophen (Tylenol), ibuprofen (Advil, Motrin), or naproxen (Aleve). Read and follow all instructions on the label. · Do not take two or more pain medicines at the same time unless the doctor told you to. Many pain medicines have acetaminophen, which is Tylenol. Too much acetaminophen (Tylenol) can be harmful. · If you were given a spirometer to measure how well your lungs are working, use it as instructed. This can help your doctor tell how your recovery is going. · To prevent pneumonia in the future, talk to your doctor about getting a flu vaccine (once a year) and a pneumococcal vaccine (one time only for most people). When should you call for help? Call 911 anytime you think you may need emergency care. For example, call if:    · You have severe trouble breathing. Call your doctor now or seek immediate medical care if:    · You cough up dark brown or bloody mucus (sputum).     · You have new or worse trouble breathing.     · You are dizzy or lightheaded, or you feel like you may faint. Watch closely for changes in your health, and be sure to contact your doctor if:    · You have a new or higher fever.     · You are coughing more deeply or more often.     · You are not getting better after 2 days (48 hours).     · You do not get better as expected. Where can you learn more? Go to http://matt-jose j.info/  Enter D336 in the search box to learn more about \"Pneumonia: Care Instructions. \"  Current as of: February 24, 2020               Content Version: 12.6  © 6679-5741 [employer].    Care instructions adapted under license by [employer] (which disclaims liability or warranty for this information). If you have questions about a medical condition or this instruction, always ask your healthcare professional. Niki Horvath any warranty or liability for your use of this information. Patient Education        Learning About Alcohol Use Disorder  What is alcohol use disorder? Alcohol use disorder means that a person drinks alcohol even though it causes harm to themselves or others. It can range from mild to severe. The more signs of this disorder you have, the more severe it may be. Moderate to severe alcohol use disorder is sometimes called addiction. People who have it may find it hard to control their use of alcohol. People who have this disorder may argue with others about how much they're drinking. Their job may be affected because of drinking. They may drink when it's dangerous or illegal, such as when they drive. They also may have a strong need, or craving, to drink. They may feel like they must drink just to get by. Their drinking may increase their risk of getting hurt or being in a car crash. Over time, drinking too much alcohol may cause health problems. These may include high blood pressure, liver problems, or problems with digestion. What are the signs? Maybe you've wondered about your alcohol habits, or how to tell if your drinking is becoming a problem. Here are some of the signs of alcohol use disorder. You may have it if you have two or more of the following signs:  · You drink larger amounts of alcohol than you ever meant to. Or you've been drinking for a longer time than you ever meant to. · You can't cut down or control your use. Or you constantly wish you could cut down. · You spend a lot of time getting or drinking alcohol or recovering from its effects. · You have strong cravings for alcohol.   · You can no longer do your main jobs at work, at school, or at home. · You keep drinking alcohol, even though your use hurts your relationships. · You have stopped doing important activities because of your alcohol use. · You drink alcohol in situations where doing so is dangerous. · You keep drinking alcohol even though you know it's causing health problems. · You need more and more alcohol to get the same effect, or you get less effect from the same amount over time. This is called tolerance. · You have uncomfortable symptoms when you stop drinking alcohol or use less. This is called withdrawal.  Alcohol use disorder can range from mild to severe. The more signs you have, the more severe the disorder may be. Moderate to severe alcohol use disorder is sometimes called addiction. You might not realize that your drinking is a problem. You might not drink large amounts when you drink. Or you might go for days or weeks between drinking episodes. But even if you don't drink very often, your drinking could still be harmful and put you at risk. How is alcohol use disorder treated? Getting help is up to you. But you don't have to do it alone. There are many people and kinds of treatments that can help. Treatment for alcohol use disorder can include:  · Group therapy, one or more types of counseling, and alcohol education. · Medicines that help to:  ? Reduce withdrawal symptoms and help you safely stop drinking. ? Reduce cravings for alcohol. · Support groups. These groups include Alcoholics Anonymous and [employer] (Self-Management and Recovery Training). Some people are able to stop or cut back on drinking with help from a counselor. People who have moderate to severe alcohol use disorder may need medical treatment. They may need to stay in a hospital or treatment center. You may have a treatment team to help you. This team may include a psychologist or psychiatrist, counselors, doctors, social workers, nurses, and a .  A  helps plan and manage your treatment. Follow-up care is a key part of your treatment and safety. Be sure to make and go to all appointments, and call your doctor if you are having problems. It's also a good idea to know your test results and keep a list of the medicines you take. Where can you learn more? Go to http://matt-jose j.info/  Enter H758 in the search box to learn more about \"Learning About Alcohol Use Disorder. \"  Current as of: June 29, 2020               Content Version: 12.6  © 2006-2020 [employer]. Care instructions adapted under license by [employer] (which disclaims liability or warranty for this information). If you have questions about a medical condition or this instruction, always ask your healthcare professional. [address] any warranty or liability for your use of this information.        ___________________________________________________________________________________________________________________________________

## 2020-09-04 NOTE — PROGRESS NOTES
Discharge instructions, follow up information, medication list, prescriptions, and Good RX card and coupons for prescriptions provided and explained to the pt. IVs removed from right wrist and left forearm, remote telemetry removed. Opportunity for questions provided. Instructed to call once ready to leave.

## 2020-09-04 NOTE — DISCHARGE SUMMARY
Hospitalist Discharge Summary     Admit Date:  2020  8:01 PM   Name:  Raheem Vidal   Age:  63 y.o.  :  1960   MRN:  [id number]   PCP:  Fuentes Mendoza NP  Treatment Team: Attending Provider: Breezy Kilgore MD; Care Manager: Della Pandey, REBECA; Utilization Review: Carlos Nicolas RN; Physical Therapy Assistant: Ramesh Martin PTA    Problem List for this Hospitalization:  Hospital Problems as of 2020 Never Reviewed          Codes Class Noted - Resolved POA    Severe protein-calorie malnutrition ([facility] ICD-10-CM: E43  ICD-9-CM: 262  2020 - Present Unknown        Leukocytosis ICD-10-CM: D72.829  ICD-9-CM: 288.60  2020 - Present Yes        Normocytic anemia ICD-10-CM: D64.9  ICD-9-CM: 285.9  2020 - Present Yes        Hyponatremia ICD-10-CM: E87.1  ICD-9-CM: 276.1  2020 - Present Yes        Hypokalemia ICD-10-CM: E87.6  ICD-9-CM: 276.8  2020 - Present Unknown        Elevated LFTs ICD-10-CM: R79.89  ICD-9-CM: 790.6  2020 - Present Yes        Alcohol dependence ([facility] ICD-10-CM: F10.20  ICD-9-CM: 303.90  2020 - Present Yes        * (Principal) CAP (community acquired pneumonia) ICD-10-CM: J18.9  ICD-9-CM: 979  2020 - Present Yes                Admission HPI from 2020:    Kathleen Johnston is a 63 y.o. female with a past medical history of alcohol dependence, hepatitis C, HTN who presents to the ER with report of weakness and inability to walk for the past 5 days. She admits to falling about 2 days ago out of her rolling chair. Denies any pain, nausea, vomiting, fevers, chills. \"    Hospital Course:  Patient admitted with extreme debility and failure to thrive. Patient with significant alcohol abuse. She was treated for alcohol abuse and alcohol withdrawal with tapered benzodiazepines. She was treated with thiamine and folate supplementation. She was noted to have severe electrolyte abnormalities that were repleted.   In addition patient was noted to be anemic. Anemia work-up indicated anemia of chronic disease. She was transfused 1 unit of blood. In addition patient noted to have pneumonia and leukocytosis on admission. She completed a 7-day course of antibiotics. Blood cultures were negative. Patient with severe protein calorie deficiency malnutrition. She was evaluated by nutrition. Patient with increased p.o. intake during hospital admission. She is to be discharged with sertraline to assist with appetite and her anxiety. Side effects discussed with patient. Patient with elevated LFTs on admission. This is most likely secondary to alcohol abuse and known hepatitis C. Patient was evaluated by physical therapy. Noted to be significantly debilitated. Due to lack of insurance and her inability to pay for rehab/SNF placement , no option except for to discharge to self-care. Patient has begun work on [employer] application, but this will take an estimated 3 months before she can get coverage. Patient seen and examined on day of discharge. All questions and concerns addressed. Discharge plan discussed in detail. Disposition: Home or Self Care  Activity: Ambulate with assistance  Diet: DIET REGULAR  DIET NUTRITIONAL SUPPLEMENTS Breakfast, Lunch, Dinner; Ensure [employer]  DIET NUTRITIONAL SUPPLEMENTS Breakfast, Dinner; Other ([place])  Code Status: Full Code      Follow up instructions, discharge meds at bottom of this note. Plan was discussed with patient. All questions answered. Patient was stable at time of discharge. Patient will call a physician or return if any concerns. Diagnostic Imaging/Tests:   Ct Head Wo Cont    Result Date: 8/27/2020  EXAM: Noncontrast CT head. INDICATION: Weakness. COMPARISON: None. TECHNIQUE: Noncontrast CT images of the head were obtained. Radiation dose reduction techniques were used for this study.   Our CT scanners use one or all of the following:  Automated exposure control, adjustment of the mA or kV according to patient size, iterative reconstruction. FINDINGS: There is mild volume loss. A few scattered nonspecific white matter hypodensities probably relate to chronic small vessel ischemia. No acute infarct, hemorrhage or mass is identified. There is no mass effect, midline shift or depressed fracture. The visualized paranasal sinuses and mastoid air cells are clear. IMPRESSION: No acute process. Xr Chest Port    Result Date: 8/27/2020  EXAM: Chest x-ray. INDICATION: Cough. COMPARISON: Prior chest x-ray on February 11, 2018. TECHNIQUE: Frontal view chest x-ray. FINDINGS: There is a new small focal opacity at the right lung base, possibly early pneumonia. The left lung is clear. The cardiac size, mediastinal contour and pulmonary vasculature are normal. No pneumothorax or pleural effusion is seen. Again noted are atherosclerotic calcifications in the thoracic aorta. IMPRESSION: New small right lung base opacity, possibly early pneumonia. Follow-up is recommended to ensure resolution. Echocardiogram results:  No results found for this visit on 08/27/20.     Procedures done this admission:  * No surgery found *    All Micro Results     Procedure Component Value Units Date/Time    CULTURE, BLOOD [[id number]] Collected:  09/02/20 0903    Order Status:  Completed Specimen:  Blood Updated:  09/04/20 0816     Special Requests: --        RIGHT  ARM       Culture result: NO GROWTH 2 DAYS       CULTURE, BLOOD [[id number]] Collected:  09/02/20 0907    Order Status:  Completed Specimen:  Blood Updated:  09/04/20 0816     Special Requests: --        RIGHT  HAND       Culture result: NO GROWTH 2 DAYS       CULTURE, BLOOD [[id number]] Collected:  08/28/20 1519    Order Status:  Completed Specimen:  Blood Updated:  09/02/20 0649     Special Requests: --        RIGHT  HAND       Culture result: NO GROWTH 5 DAYS       CULTURE, BLOOD [[id number]] Collected:  08/28/20 1515    Order Status:  Completed Specimen:  Blood Updated:  09/02/20 0649     Special Requests: --        RIGHT  HAND       Culture result: NO GROWTH 5 DAYS       CULTURE, URINE [[id number]] Collected:  08/27/20 2202    Order Status:  Completed Specimen:  Urine from Clean catch Updated:  08/31/20 1312     Special Requests: NO SPECIAL REQUESTS        Culture result:       10,000 to 50,000  COLONIES/mL  NORMAL SKIN BRIAN ISOLATED      CULTURE, RESPIRATORY/SPUTUM/BRONCH Verlie Nam STAIN [[id number]] Collected:  08/28/20 0600    Order Status:  Canceled Specimen:  Sputum           Labs: Results:       BMP, Mg, Phos Recent Labs     09/04/20  0637 09/03/20  0714 09/02/20  0606    134* 134*   K 3.5 3.7 3.2*    101 100   CO2 24 26 26   AGAP 9 7 8   BUN 7* 7* 5*   CREA 0.32* 0.35* 0.37*   CA 8.5 8.3 8.1*   * 103* 112*   MG 1.5*  --  1.8   PHOS 4.2*  --  3.9*      CBC Recent Labs     09/03/20  0714 09/02/20  0606   WBC 6.0 5.7   RBC 3.11* 2.95*   HGB 9.2* 8.5*   HCT 27.2* 25.9*   * 98*   GRANS 77 76   LYMPH 11* 12*   EOS 1 1   MONOS 10 9   BASOS 0 0   IG 1 1   ANEU 4.6 4.3   ABL 0.7 0.7   JOSE 0.1 0.1   ABM 0.6 0.5   ABB 0.0 0.0   AIG 0.0 0.1      LFT Recent Labs     09/04/20  0637   ALT 29      TP 5.6*   ALB 1.9*   GLOB 3.7*   AGRAT 0.5*      Cardiac Testing Lab Results   Component Value Date/Time     08/27/2020 08:26 PM      Coagulation Tests Lab Results   Component Value Date/Time    Prothrombin time 18.5 (H) 08/27/2020 08:26 PM    INR 1.5 08/27/2020 08:26 PM    aPTT 25.5 08/27/2020 08:26 PM      A1c No results found for: HBA1C, HGBE8, [id number]   Lipid Panel No results found for: CHOL, CHOLPOCT, CHOLX, CHLST, CHOLV, 191686, HDL, HDLP, LDL, LDLC, DLDLP, 558567, VLDLC, VLDL, TGLX, TRIGL, TRIGP, TGLPOCT, [facility], [facility]   Thyroid Panel Lab Results   Component Value Date/Time    TSH 0.867 08/27/2020 08:26 PM        Most Recent UA Lab Results   Component Value Date/Time    WBC 0 08/27/2020 10:03 PM    RBC 0-3 08/27/2020 10:03 PM Epithelial cells 0 08/27/2020 10:03 PM    Bacteria 1+ (H) 08/27/2020 10:03 PM    Casts 0-3 08/27/2020 10:03 PM    Crystals, urine 0 06/05/2013 11:02 PM    Mucus 0 06/05/2013 11:02 PM        No Known Allergies  Immunization History   Administered Date(s) Administered    TB Skin Test (PPD) Intradermal 08/28/2020    TDAP Vaccine 05/03/2012       All Labs from Last 24 Hrs:  Recent Results (from the past 24 hour(s))   MAGNESIUM    Collection Time: 09/04/20  6:37 AM   Result Value Ref Range    Magnesium 1.5 (L) 1.8 - 2.4 mg/dL   PHOSPHORUS    Collection Time: 09/04/20  6:37 AM   Result Value Ref Range    Phosphorus 4.2 (H) 2.3 - 3.7 MG/DL   METABOLIC PANEL, COMPREHENSIVE    Collection Time: 09/04/20  6:37 AM   Result Value Ref Range    Sodium 136 136 - 145 mmol/L    Potassium 3.5 3.5 - 5.1 mmol/L    Chloride 103 98 - 107 mmol/L    CO2 24 21 - 32 mmol/L    Anion gap 9 7 - 16 mmol/L    Glucose 112 (H) 65 - 100 mg/dL    BUN 7 (L) 8 - 23 MG/DL    Creatinine 0.32 (L) 0.6 - 1.0 MG/DL    GFR est AA >60 >60 ml/min/1.73m2    GFR est non-AA >60 >60 ml/min/1.73m2    Calcium 8.5 8.3 - 10.4 MG/DL    Bilirubin, total 0.5 0.2 - 1.1 MG/DL    ALT (SGPT) 29 12 - 65 U/L    AST (SGOT) 36 15 - 37 U/L    Alk.  phosphatase 130 50 - 136 U/L    Protein, total 5.6 (L) 6.3 - 8.2 g/dL    Albumin 1.9 (L) 3.2 - 4.6 g/dL    Globulin 3.7 (H) 2.3 - 3.5 g/dL    A-G Ratio 0.5 (L) 1.2 - 3.5         Current Med List in Hospital:   Current Facility-Administered Medications   Medication Dose Route Frequency    chlordiazePOXIDE (LIBRIUM) capsule 25 mg  25 mg Oral Q6H PRN    calcium-vitamin D (OS-PAT) 500 mg-200 unit tablet  1 Tab Oral DAILY WITH BREAKFAST    folic acid (FOLVITE) tablet 1 mg  1 mg Oral DAILY    thiamine HCL (B-1) tablet 100 mg  100 mg Oral DAILY    traMADoL (ULTRAM) tablet 50 mg  50 mg Oral Q6H PRN    oxyCODONE IR (ROXICODONE) tablet 5 mg  5 mg Oral Q6H PRN    dronabinoL (MARINOL) capsule 2.5 mg  2.5 mg Oral BID    0.9% sodium chloride infusion 250 mL  250 mL IntraVENous PRN    0.9% sodium chloride infusion 250 mL  250 mL IntraVENous PRN    NUTRITIONAL SUPPORT ELECTROLYTE PRN ORDERS   Does Not Apply PRN    megestroL (MEGACE) 400 mg/10 mL (10 mL) oral suspension 400 mg  400 mg Oral DAILY    sodium chloride (NS) flush 5-40 mL  5-40 mL IntraVENous Q8H    sodium chloride (NS) flush 5-40 mL  5-40 mL IntraVENous PRN    acetaminophen (TYLENOL) tablet 650 mg  650 mg Oral Q4H PRN    magnesium hydroxide (MILK OF MAGNESIA) 400 mg/5 mL oral suspension 30 mL  30 mL Oral DAILY PRN    enoxaparin (LOVENOX) injection 40 mg  40 mg SubCUTAneous Q24H    guaiFENesin ER (MUCINEX) tablet 600 mg  600 mg Oral Q12H    albuterol-ipratropium (DUO-NEB) 2.5 MG-0.5 MG/3 ML  3 mL Nebulization Q6H PRN       Discharge Exam:  Patient Vitals for the past 24 hrs:   Temp Pulse Resp BP SpO2   09/04/20 0807 97.7 °F (36.5 °C) 95 16 119/67 96 %   09/04/20 0330 97.9 °F (36.6 °C) 96 18 120/67 95 %   09/03/20 2306 99.3 °F (37.4 °C) 95 18 122/70 96 %   09/03/20 1950 97.4 °F (36.3 °C) 99 16 126/75 98 %   09/03/20 1619 98.4 °F (36.9 °C) 100 16 126/71 98 %   09/03/20 1127 97.7 °F (36.5 °C) 97 16 112/64 96 %     Oxygen Therapy  O2 Sat (%): 96 % (09/04/20 0807)  Pulse via Oximetry: 97 beats per minute (09/02/20 2356)  O2 Device: Room air (09/04/20 0802)  O2 Flow Rate (L/min): 0 l/min (08/28/20 1245)  FIO2 (%): 21 % (09/01/20 0900)    Estimated body mass index is 18.57 kg/m² as calculated from the following:    Height as of this encounter: 5' 5.98\" (1.676 m). Weight as of this encounter: 52.2 kg (115 lb). Intake/Output Summary (Last 24 hours) at 9/4/2020 1043  Last data filed at 9/4/2020 4099  Gross per 24 hour   Intake    Output 450 ml   Net -450 ml       *Note that automatically entered I/Os may not be accurate; dependent on patient compliance with collection and accurate  by assistants. General:    Frail female in no acute distress  Eyes:   Normal sclerae. Extraocular movements intact. ENT:  Normocephalic, atraumatic. Moist mucous membranes  CV:   Regular rate and rhythm. No murmur, rub, or gallop. Lungs:  Clear to auscultation bilaterally. No wheezing, rhonchi, or rales. Abdomen: Soft, nontender, nondistended. Bowel sounds normal.   Extremities: Warm and dry. No cyanosis or edema. Neurologic: No gross focal deficits  Skin:     No rashes or jaundice. Psych:  Normal mood and affect. Discharge Info:   Current Discharge Medication List      START taking these medications    Details   folic acid (FOLVITE) 1 mg tablet Take 1 Tab by mouth daily. Qty: 30 Tab, Refills: 0      thiamine HCL (B-1) 100 mg tablet Take 1 Tab by mouth daily. Qty: 30 Tab, Refills: 0      magnesium 200 mg tab Take 1 Tab by mouth daily. Qty: 30 Tab, Refills: 0      sertraline (ZOLOFT) 25 mg tablet Take 1 Tab by mouth daily. Qty: 30 Tab, Refills: 0         STOP taking these medications       ondansetron (ZOFRAN ODT) 8 mg disintegrating tablet Comments:   Reason for Stopping:         HYDROcodone-acetaminophen (NORCO) 5-325 mg per tablet Comments:   Reason for Stopping:         amLODIPine (NORVASC) 10 mg tablet Comments:   Reason for Stopping:         fexofenadine-pseudoephedrine (ALLEGRA-D 24) 180-240 mg per tablet Comments:   Reason for Stopping: Follow Up Orders: Follow-up Appointments   Procedures    FOLLOW UP VISIT Appointment in: One Week Establish care with primary care provider within 1 week     Establish care with primary care provider within 1 week     Standing Status:   Standing     Number of Occurrences:   1     Order Specific Question:   Appointment in     Answer: One Week       Follow-up Information     Follow up With Specialties Details Why Contact Info    Jericho Gentile NP Family Medicine On 9/10/2020 Apt time 8:20 am            Time spent in patient discharge planning and coordination 35 minutes.     Signed:  Malena Butler MD

## 2020-09-04 NOTE — PROGRESS NOTES
Problem: Patient Education: Go to Patient Education Activity  Goal: Patient/Family Education  Outcome: Progressing Towards Goal     Problem: Pneumonia: Day 1  Goal: Off Pathway (Use only if patient is Off Pathway)  Outcome: Progressing Towards Goal  Goal: Activity/Safety  Outcome: Progressing Towards Goal  Goal: Consults, if ordered  Outcome: Progressing Towards Goal  Goal: Diagnostic Test/Procedures  Outcome: Progressing Towards Goal  Goal: Nutrition/Diet  Outcome: Progressing Towards Goal  Goal: Medications  Outcome: Progressing Towards Goal  Goal: Respiratory  Outcome: Progressing Towards Goal  Goal: Treatments/Interventions/Procedures  Outcome: Progressing Towards Goal  Goal: Psychosocial  Outcome: Progressing Towards Goal  Goal: *Oxygen saturation within defined limits  Outcome: Progressing Towards Goal  Goal: *Influenza vaccine administered (October-March)  Outcome: Progressing Towards Goal  Goal: *Pneumoccocal vaccine administered  Outcome: Progressing Towards Goal  Goal: *Hemodynamically stable  Outcome: Progressing Towards Goal  Goal: *Demonstrates progressive activity  Outcome: Progressing Towards Goal  Goal: *Tolerating diet  Outcome: Progressing Towards Goal     Problem: Pneumonia: Day 2  Goal: Off Pathway (Use only if patient is Off Pathway)  Outcome: Progressing Towards Goal  Goal: Activity/Safety  Outcome: Progressing Towards Goal  Goal: Consults, if ordered  Outcome: Progressing Towards Goal  Goal: Diagnostic Test/Procedures  Outcome: Progressing Towards Goal  Goal: Nutrition/Diet  Outcome: Progressing Towards Goal  Goal: Discharge Planning  Outcome: Progressing Towards Goal  Goal: Medications  Outcome: Progressing Towards Goal  Goal: Respiratory  Outcome: Progressing Towards Goal  Goal: Treatments/Interventions/Procedures  Outcome: Progressing Towards Goal  Goal: Psychosocial  Outcome: Progressing Towards Goal  Goal: *Oxygen saturation within defined limits  Outcome: Progressing Towards Goal  Goal: *Hemodynamically stable  Outcome: Progressing Towards Goal  Goal: *Demonstrates progressive activity  Outcome: Progressing Towards Goal  Goal: *Tolerating diet  Outcome: Progressing Towards Goal  Goal: *Optimal pain control at patient's stated goal  Outcome: Progressing Towards Goal     Problem: Pneumonia: Day 3  Goal: Off Pathway (Use only if patient is Off Pathway)  Outcome: Progressing Towards Goal  Goal: Activity/Safety  Outcome: Progressing Towards Goal  Goal: Consults, if ordered  Outcome: Progressing Towards Goal  Goal: Diagnostic Test/Procedures  Outcome: Progressing Towards Goal  Goal: Nutrition/Diet  Outcome: Progressing Towards Goal  Goal: Discharge Planning  Outcome: Progressing Towards Goal  Goal: Medications  Outcome: Progressing Towards Goal  Goal: Respiratory  Outcome: Progressing Towards Goal  Goal: Treatments/Interventions/Procedures  Outcome: Progressing Towards Goal  Goal: Psychosocial  Outcome: Progressing Towards Goal  Goal: *Oxygen saturation within defined limits  Outcome: Progressing Towards Goal  Goal: *Hemodynamically stable  Outcome: Progressing Towards Goal  Goal: *Demonstrates progressive activity  Outcome: Progressing Towards Goal  Goal: *Tolerating diet  Outcome: Progressing Towards Goal  Goal: *Describes available resources and support systems  Outcome: Progressing Towards Goal  Goal: *Optimal pain control at patient's stated goal  Outcome: Progressing Towards Goal     Problem: Pneumonia: Day 4  Goal: Off Pathway (Use only if patient is Off Pathway)  Outcome: Progressing Towards Goal  Goal: Activity/Safety  Outcome: Progressing Towards Goal  Goal: Nutrition/Diet  Outcome: Progressing Towards Goal  Goal: Discharge Planning  Outcome: Progressing Towards Goal  Goal: Medications  Outcome: Progressing Towards Goal  Goal: Respiratory  Outcome: Progressing Towards Goal  Goal: Treatments/Interventions/Procedures  Outcome: Progressing Towards Goal  Goal: Psychosocial  Outcome: Progressing Towards Goal     Problem: Pneumonia: Discharge Outcomes  Goal: *Demonstrates progressive activity  Outcome: Progressing Towards Goal  Goal: *Describes follow-up/return visits to physicians  Outcome: Progressing Towards Goal  Goal: *Tolerating diet  Outcome: Progressing Towards Goal  Goal: *Verbalizes name, dosage, time, side effects, and number of days to continue medications  Outcome: Progressing Towards Goal  Goal: *Influenza immunization  Outcome: Progressing Towards Goal  Goal: *Pneumococcal immunization  Outcome: Progressing Towards Goal  Goal: *Respiratory status at baseline  Outcome: Progressing Towards Goal  Goal: *Vital signs within defined limits  Outcome: Progressing Towards Goal  Goal: *Describes available resources and support systems  Outcome: Progressing Towards Goal  Goal: *Optimal pain control at patient's stated goal  Outcome: Progressing Towards Goal     Problem: Falls - Risk of  Goal: *Absence of Falls  Description: Document Roseline Blanc Fall Risk and appropriate interventions in the flowsheet.   Outcome: Progressing Towards Goal  Note: Fall Risk Interventions:  Mobility Interventions: Patient to call before getting OOB    Mentation Interventions: Bed/chair exit alarm, Door open when patient unattended    Medication Interventions: Patient to call before getting OOB    Elimination Interventions: Call light in reach    History of Falls Interventions: Door open when patient unattended         Problem: Patient Education: Go to Patient Education Activity  Goal: Patient/Family Education  Outcome: Progressing Towards Goal

## 2020-09-05 ENCOUNTER — PATIENT OUTREACH (OUTPATIENT)
Dept: CASE MANAGEMENT | Age: 60
End: 2020-09-05

## 2020-09-07 LAB
BACTERIA SPEC CULT: NORMAL
BACTERIA SPEC CULT: NORMAL
SERVICE CMNT-IMP: NORMAL
SERVICE CMNT-IMP: NORMAL

## 2020-09-08 ENCOUNTER — TELEPHONE (OUTPATIENT)
Dept: HOME HEALTH SERVICES | Facility: HOME HEALTH | Age: 60
End: 2020-09-08

## 2020-09-08 NOTE — TELEPHONE ENCOUNTER
[address] [place] [address] Sherif Ferrer Pharmacist consult. Ms. He Zhong was discharged Friday from [facility] after having CAP. I have called to review her discharge medications with her. I have gotten her voice mail. Due to her debilitation, her new meds were folic acid, thiamin tab, magnesium tab and sertraline. She lives in [place], so is not eligible to be a [facility] patient. I will still be glad to help her with any medication issues. I left my name and cell phone number. I asked her to call me with any medication questions or issues.

## 2020-10-28 ENCOUNTER — HOSPITAL ENCOUNTER (EMERGENCY)
Age: 60
Discharge: HOME | End: 2020-10-28
Attending: EMERGENCY MEDICINE

## 2020-10-28 ENCOUNTER — APPOINTMENT (OUTPATIENT)
Dept: GENERAL RADIOLOGY | Age: 60
End: 2020-10-28
Attending: EMERGENCY MEDICINE

## 2020-10-28 ENCOUNTER — APPOINTMENT (OUTPATIENT)
Dept: CT IMAGING | Age: 60
End: 2020-10-28
Attending: EMERGENCY MEDICINE

## 2020-10-28 VITALS
HEART RATE: 97 BPM | HEIGHT: 66 IN | WEIGHT: 94 LBS | BODY MASS INDEX: 15.11 KG/M2 | TEMPERATURE: 98 F | SYSTOLIC BLOOD PRESSURE: 133 MMHG | OXYGEN SATURATION: 99 % | RESPIRATION RATE: 16 BRPM | DIASTOLIC BLOOD PRESSURE: 70 MMHG

## 2020-10-28 DIAGNOSIS — S40.011A CONTUSION OF MULTIPLE SITES OF RIGHT SHOULDER, INITIAL ENCOUNTER: ICD-10-CM

## 2020-10-28 DIAGNOSIS — S09.90XA CLOSED HEAD INJURY, INITIAL ENCOUNTER: Primary | ICD-10-CM

## 2020-10-28 DIAGNOSIS — S30.0XXA CONTUSION OF SACRUM, INITIAL ENCOUNTER: ICD-10-CM

## 2020-10-28 DIAGNOSIS — S29.019A ACUTE THORACIC MYOFASCIAL STRAIN, INITIAL ENCOUNTER: ICD-10-CM

## 2020-10-28 PROCEDURE — 70450 CT HEAD/BRAIN W/O DYE: CPT

## 2020-10-28 PROCEDURE — 74011250637 HC RX REV CODE- 250/637: Performed by: EMERGENCY MEDICINE

## 2020-10-28 PROCEDURE — 72070 X-RAY EXAM THORAC SPINE 2VWS: CPT

## 2020-10-28 PROCEDURE — 99283 EMERGENCY DEPT VISIT LOW MDM: CPT

## 2020-10-28 PROCEDURE — 72170 X-RAY EXAM OF PELVIS: CPT

## 2020-10-28 PROCEDURE — 73030 X-RAY EXAM OF SHOULDER: CPT

## 2020-10-28 RX ORDER — HYDROCODONE BITARTRATE AND ACETAMINOPHEN 5; 325 MG/1; MG/1
1 TABLET ORAL
Qty: 10 TAB | Refills: 0 | Status: SHIPPED | OUTPATIENT
Start: 2020-10-28 | End: 2020-10-31

## 2020-10-28 RX ORDER — HYDROCODONE BITARTRATE AND ACETAMINOPHEN 7.5; 325 MG/1; MG/1
1 TABLET ORAL
Status: COMPLETED | OUTPATIENT
Start: 2020-10-28 | End: 2020-10-28

## 2020-10-28 RX ADMIN — HYDROCODONE BITARTRATE AND ACETAMINOPHEN 1 TABLET: 7.5; 325 TABLET ORAL at 17:31

## 2020-10-28 NOTE — ED NOTES
I have reviewed discharge instructions with the patient. The patient verbalized understanding. Patient left ED via Discharge Method: ambulatory to Home with self. Opportunity for questions and clarification provided. Patient given 1 scripts. To continue your aftercare when you leave the hospital, you may receive an automated call from our care team to check in on how you are doing. This is a free service and part of our promise to provide the best care and service to meet your aftercare needs.  If you have questions, or wish to unsubscribe from this service please call [phone]. Thank you for Choosing our [facility] Emergency Department.

## 2020-10-28 NOTE — ED PROVIDER NOTES
This is a 56-year-old female has history of failure to thrive days with hep C and also alcohol-related disease. She also has a history of kidney stones and a previous surgery for right clavicle fracture. Patient states she stood up yesterday turned to the side but tripped or lost her balance and fell. Struck the right side of her head without loss of consciousness. Complains of some headache along with right shoulder pain buttocks pain as well and back pain. No shortness of breath. No abdominal pain or vomiting. No focal numbness or weakness. Old records were reviewed. Patient has a long history of alcoholism. She has a history of failure to thrive. She has had 2 admissions last 2 months because of electrolyte derangements and failure to thrive. She was noted last admission to have some sacral breakdown. She at one point was so weak she was just in a wheelchair. However today she is ambulatory with a cane. Denies to me any fever or dysuria. The history is provided by the patient. Fall   The accident occurred 12 to 24 hours ago. The fall occurred while standing. She fell from a height of ground level. She landed on hard floor. There was no blood loss. The point of impact was the head. The pain is moderate. She was ambulatory at the scene. Associated symptoms include a fever and headaches. Pertinent negatives include no abdominal pain, no nausea, no vomiting, no extremity weakness, no loss of consciousness and no laceration. The symptoms are aggravated by activity. She has tried nothing for the symptoms.         Past Medical History:   Diagnosis Date    Fracture of right clavicle 3/2015    History of kidney stones     Hypertension     no meds--- pt stopped on her own--- off meds x 1 yr-- per pt-- b/p stable    Liver disease dx--2011    HEP C--- not currently seeing a m.d.-- due to  no insurance per pt    Severe protein-calorie malnutrition ([facility] [address] 8/29/2020       Past Surgical History: Procedure Laterality Date    HX BREAST BIOPSY  1984    cyst    HX GYN      cone bx    HX ORTHOPAEDIC Right 3/2015 [facility]    clavicle surg         Family History:   Problem Relation Age of Onset    Arthritis-osteo Mother     Lung Disease Father     Cancer Father     Kidney Disease Brother        Social History     Socioeconomic History    Marital status:      Spouse name: Not on file    Number of children: Not on file    Years of education: Not on file    Highest education level: Not on file   Occupational History    Not on file   Social Needs    Financial resource strain: Not on file    Food insecurity     Worry: Not on file     Inability: Not on file    Transportation needs     Medical: Not on file     Non-medical: Not on file   Tobacco Use    Smoking status: Current Every Day Smoker     Packs/day: 1.00     Years: 40.00     Pack years: 40.00    Smokeless tobacco: Never Used   Substance and Sexual Activity    Alcohol use: Yes     Comment: every other day    Drug use: No    Sexual activity: Not on file   Lifestyle    Physical activity     Days per week: Not on file     Minutes per session: Not on file    Stress: Not on file   Relationships    Social connections     Talks on phone: Not on file     Gets together: Not on file     Attends [employer]ism service: Not on file     Active member of club or organization: Not on file     Attends meetings of clubs or organizations: Not on file     Relationship status: Not on file    Intimate partner violence     Fear of current or ex partner: Not on file     Emotionally abused: Not on file     Physically abused: Not on file     Forced sexual activity: Not on file   Other Topics Concern    Not on file   Social History Narrative    Not on file         ALLERGIES: Patient has no known allergies. Review of Systems   Constitutional: Positive for fever. Respiratory: Negative for choking and shortness of breath.     Gastrointestinal: Negative for abdominal pain, nausea and vomiting. Genitourinary: Negative for dysuria. Musculoskeletal: Positive for back pain. Negative for extremity weakness. Neurological: Positive for headaches. Negative for dizziness, seizures, loss of consciousness, syncope, weakness and light-headedness. Vitals:    10/28/20 1427   BP: 137/83   Pulse: 100   Resp: 16   Temp: 98 °F (36.7 °C)   SpO2: 99%   Weight: 42.6 kg (94 lb)   Height: 5' 6\" (1.676 m)            Physical Exam  Vitals signs and nursing note reviewed. Constitutional:       General: She is not in acute distress. Appearance: She is well-developed. Comments: Thin appearing   HENT:      Head: Normocephalic. Right Ear: External ear normal.      Left Ear: External ear normal.      Mouth/Throat:      Pharynx: No oropharyngeal exudate. Eyes:      Conjunctiva/sclera: Conjunctivae normal.      Pupils: Pupils are equal, round, and reactive to light. Neck:      Musculoskeletal: Normal range of motion and neck supple. No spinous process tenderness or muscular tenderness. Cardiovascular:      Rate and Rhythm: Normal rate and regular rhythm. Heart sounds: No murmur. Pulmonary:      Effort: No respiratory distress. Breath sounds: Normal breath sounds. Abdominal:      General: Bowel sounds are normal.      Palpations: Abdomen is soft. There is no mass. Tenderness: There is no abdominal tenderness. There is no guarding or rebound. Hernia: No hernia is present. Musculoskeletal:      Right shoulder: She exhibits tenderness and bony tenderness. She exhibits normal range of motion. Right hip: Normal.      Left hip: Normal.      Thoracic back: She exhibits tenderness and bony tenderness. Legs:    Skin:     General: Skin is warm and dry. Findings: No laceration. Neurological:      Mental Status: She is alert and oriented to person, place, and time. GCS: GCS eye subscore is 4. GCS verbal subscore is 5.  GCS motor subscore is 6. Gait: Gait normal.      Comments: Nl speech  Ambulatory   Psychiatric:         Speech: Speech normal.          MDM  Number of Diagnoses or Management Options  Diagnosis management comments: Nonsyncopal fall. Needs head CT due to headache, taking aspirin and history of alcohol use. No evidence for seizure. Tenderness of the right shoulder is mostly over the scapula. Check x-ray. Also tender on central T-spine. Check T-spine films. Lungs are clear no tenderness over the ribs. Imaging of the sacrum to check for fracture. Amount and/or Complexity of Data Reviewed  Tests in the radiology section of CPT®: ordered and reviewed    Risk of Complications, Morbidity, and/or Mortality  Presenting problems: moderate  Diagnostic procedures: low  Management options: moderate    Patient Progress  Patient progress: stable         Procedures    Xr Spine Thorac 2 V    Result Date: 10/28/2020  Examination: XR SHOULDER RT AP/LAT MIN 2 V, XR PELV AP ONLY, XR SPINE THORAC 2 V INDICATION: Pain after fall COMPARISON: None FINDINGS: Right shoulder: Diffuse osteopenia. No evidence of acute fracture or malalignment. There are mild degenerative changes at the acromioclavicular joint. Glenohumeral joint is relatively preserved. Soft tissues are without focal abnormality. Thoracic spine: There is levocurvature of the thoracic spine centered at T4. No spondylolisthesis. Vertebral body heights are preserved. The cervicothoracic junction is poorly evaluated on the lateral view due to overlapping structures, however. Very mild degenerative disc disease involving the upper and mid thoracic spine. Bone mineralization is decreased. Soft tissues are grossly unremarkable. Atherosclerotic calcifications involving the thoracic aorta. Pelvis: There is no evidence of acute fracture or malalignment. Hip joints are preserved. SI joints and lower lumbar spine demonstrate mild degenerative changes.  Bone mineralization is decreased. Soft tissues are without focal abnormality. . Atherosclerotic calcifications present. IMPRESSION: Right shoulder: 1. No acute osseous abnormality. 2. Mild AC joint osteoarthrosis. Thoracic spine: 1. No radiographic evidence of thoracic spine fracture. There is persistent concern, consider cross-sectional imaging. 2. Multilevel degenerative changes with levocurvature of the upper thoracic spine. Pelvis: 1. No acute osseous abnormality. If there is persistent concern for pelvic fracture, consider MRI. 2. Mild SI joint and lower lumbar spine degenerative changes. 3. Osteopenia throughout. Xr Pelv Ap Only    Result Date: 10/28/2020  Examination: XR SHOULDER RT AP/LAT MIN 2 V, XR PELV AP ONLY, XR SPINE THORAC 2 V INDICATION: Pain after fall COMPARISON: None FINDINGS: Right shoulder: Diffuse osteopenia. No evidence of acute fracture or malalignment. There are mild degenerative changes at the acromioclavicular joint. Glenohumeral joint is relatively preserved. Soft tissues are without focal abnormality. Thoracic spine: There is levocurvature of the thoracic spine centered at T4. No spondylolisthesis. Vertebral body heights are preserved. The cervicothoracic junction is poorly evaluated on the lateral view due to overlapping structures, however. Very mild degenerative disc disease involving the upper and mid thoracic spine. Bone mineralization is decreased. Soft tissues are grossly unremarkable. Atherosclerotic calcifications involving the thoracic aorta. Pelvis: There is no evidence of acute fracture or malalignment. Hip joints are preserved. SI joints and lower lumbar spine demonstrate mild degenerative changes. Bone mineralization is decreased. Soft tissues are without focal abnormality. . Atherosclerotic calcifications present. IMPRESSION: Right shoulder: 1. No acute osseous abnormality. 2. Mild AC joint osteoarthrosis. Thoracic spine: 1. No radiographic evidence of thoracic spine fracture.  There is persistent concern, consider cross-sectional imaging. 2. Multilevel degenerative changes with levocurvature of the upper thoracic spine. Pelvis: 1. No acute osseous abnormality. If there is persistent concern for pelvic fracture, consider MRI. 2. Mild SI joint and lower lumbar spine degenerative changes. 3. Osteopenia throughout. Xr Shoulder Rt Ap/lat Min 2 V    Result Date: 10/28/2020  Examination: XR SHOULDER RT AP/LAT MIN 2 V, XR PELV AP ONLY, XR SPINE THORAC 2 V INDICATION: Pain after fall COMPARISON: None FINDINGS: Right shoulder: Diffuse osteopenia. No evidence of acute fracture or malalignment. There are mild degenerative changes at the acromioclavicular joint. Glenohumeral joint is relatively preserved. Soft tissues are without focal abnormality. Thoracic spine: There is levocurvature of the thoracic spine centered at T4. No spondylolisthesis. Vertebral body heights are preserved. The cervicothoracic junction is poorly evaluated on the lateral view due to overlapping structures, however. Very mild degenerative disc disease involving the upper and mid thoracic spine. Bone mineralization is decreased. Soft tissues are grossly unremarkable. Atherosclerotic calcifications involving the thoracic aorta. Pelvis: There is no evidence of acute fracture or malalignment. Hip joints are preserved. SI joints and lower lumbar spine demonstrate mild degenerative changes. Bone mineralization is decreased. Soft tissues are without focal abnormality. . Atherosclerotic calcifications present. IMPRESSION: Right shoulder: 1. No acute osseous abnormality. 2. Mild AC joint osteoarthrosis. Thoracic spine: 1. No radiographic evidence of thoracic spine fracture. There is persistent concern, consider cross-sectional imaging. 2. Multilevel degenerative changes with levocurvature of the upper thoracic spine. Pelvis: 1. No acute osseous abnormality. If there is persistent concern for pelvic fracture, consider MRI.  2. Mild SI joint and lower lumbar spine degenerative changes. 3. Osteopenia throughout. Ct Head Wo Cont    Result Date: 10/28/2020  EXAMINATION: HEAD CT WITHOUT CONTRAST 10/28/2020 4:28 PM ACCESSION NUMBER: [id number] INDICATION: Fall, right parietal trauma. COMPARISON: CT of the head, 8/27/2020 TECHNIQUE: Multiple-row detector helical CT examination of the head without intravenous contrast. Radiation dose reduction techniques were used for this study:  Our CT scanners use one or all of the following: Automated exposure control, adjustment of the mA and/or kVp according to patient's size, iterative reconstruction. FINDINGS: No evidence of intracranial mass, hemorrhage, or large territorial infarct. There is mild generalized parenchymal volume loss. The ventricles are normal in size and position. The basal cisterns are patent. No extra-axial fluid collection or mass effect. The orbital contents are within normal limits. The paranasal sinuses are clear. The mastoid air cells and middle ears are clear. No evidence of fracture. There is minimal scalp soft tissue contusion along the right frontoparietal region. IMPRESSION: No acute intracranial abnormality or fracture.

## 2020-10-28 NOTE — DISCHARGE INSTRUCTIONS
Tylenol or prescription for pain medication. Cool compresses to injured areas. Call your primary care doctor to recheck 3 to 4 days if not improving. Recheck sooner for worse or worrisome symptoms. Patient Education        Learning About a Closed Head Injury  What is a closed head injury? A closed head injury happens when your head gets hit hard. The strong force of the blow causes your brain to shake in your skull. This movement can cause the brain to bruise, swell, or tear. Sometimes nerves or blood vessels also get damaged. This can cause bleeding in or around the brain. A concussion is a type of closed head injury. What are the symptoms? If you have a mild concussion, you may have a mild headache or feel \"not quite right. \" These symptoms are common. They usually go away over a few days to 4 weeks. But sometimes after a concussion, you feel like you can't function as well as before the injury. And you have new symptoms. This is called postconcussive syndrome. You may:  · Find it harder to solve problems, think, concentrate, or remember. · Have headaches. · Have changes in your sleep patterns, such as not being able to sleep or sleeping all the time. · Have changes in your personality. · Not be interested in your usual activities. · Feel angry or anxious without a clear reason. · Lose your sense of taste or smell. · Be dizzy, lightheaded, or unsteady. It may be hard to stand or walk. How is a closed head injury treated? Any person who may have a concussion needs to see a doctor. Some people have to stay in the hospital to be watched. Others can go home safely. If you go home, follow your doctor's instructions. He or she will tell you if you need someone to watch you closely for the next 24 hours or longer. Rest is the best treatment. Get plenty of sleep at night. And try to rest during the day. · Avoid activities that are physically or mentally demanding.  These include housework, exercise, and schoolwork. And don't play video games, send text messages, or use the computer. You may need to change your school or work schedule to be able to avoid these activities. · Ask your doctor when it's okay to drive, ride a bike, or operate machinery. · Take an over-the-counter pain medicine, such as acetaminophen (Tylenol), ibuprofen (Advil, Motrin), or naproxen (Aleve). Be safe with medicines. Read and follow all instructions on the label. · Check with your doctor before you use any other medicines for pain. · Do not drink alcohol or use illegal drugs. They can slow recovery. They can also increase your risk of getting a second head injury. Follow-up care is a key part of your treatment and safety. Be sure to make and go to all appointments, and call your doctor if you are having problems. It's also a good idea to know your test results and keep a list of the medicines you take. Where can you learn more? Go to http://www.gray.com/  Enter E235 in the search box to learn more about \"Learning About a Closed Head Injury. \"  Current as of: November 20, 2019               Content Version: 12.6  © 7058-5837 [employer]. Care instructions adapted under license by [employer] (which disclaims liability or warranty for this information). If you have questions about a medical condition or this instruction, always ask your healthcare professional. [place] [address] any warranty or liability for your use of this information. Patient Education        Low Back Contusion: Care Instructions  Your Care Instructions     Contusion is the medical term for a bruise. When you have a low back bruise, it's often caused by a direct blow or an impact, such as falling against a counter or table. Bruises are common sports injuries. Most people think of a bruise as a black-and-blue spot.  This happens when small blood vessels get torn and leak blood under the skin. But bones, muscles, and organs can also get bruised. If these deep tissues are damaged, you may not always see a bruise. The doctor will examine your bruise. You may also have tests to make sure you do not have a more serious injury, such as a broken bone or nerve damage. Tests may include X-rays or other imaging tests like a CT scan or MRI. Low back bruises may cause pain and swelling. But if there is no serious damage, they will often get better with home treatment in several days to a few weeks. The doctor has checked you carefully, but problems can develop later. If you notice any problems or new symptoms, get medical treatment right away. Follow-up care is a key part of your treatment and safety. Be sure to make and go to all appointments, and call your doctor if you are having problems. It's also a good idea to know your test results and keep a list of the medicines you take. How can you care for yourself at home? · Put ice or a cold pack on the sore area for 10 to 20 minutes at a time to stop swelling. Put a thin cloth between the ice pack and your skin. · Be safe with medicines. Read and follow all instructions on the label. ? If the doctor gave you a prescription medicine for pain, take it as prescribed. ? If you are not taking a prescription pain medicine, ask your doctor if you can take an over-the-counter medicine. · For the first day or two of pain, take it easy. But as soon as possible, get back to your normal daily life and activities. · Get gentle exercise, such as walking. Movement keeps your spine flexible and helps your muscles stay strong. When should you call for help? Call 911 anytime you think you may need emergency care. For example, call if:    · You are unable to move a leg at all. Call your doctor now or seek immediate medical care if:    · You have new or worse symptoms in your legs or buttocks.  Symptoms may include:  ? Numbness or tingling. ? Weakness. ? Pain.     · You lose bladder or bowel control.     · You have blood in your urine. Watch closely for changes in your health, and be sure to contact your doctor if:    · You do not get better as expected. Where can you learn more? Go to http://www.gray.com/  Enter V337 in the search box to learn more about \"Low Back Contusion: Care Instructions. \"  Current as of: June 26, 2019               Content Version: 12.6  © 0927-6405 [employer]. Care instructions adapted under license by [employer] (which disclaims liability or warranty for this information). If you have questions about a medical condition or this instruction, always ask your healthcare professional. [address] any warranty or liability for your use of this information.

## 2020-10-28 NOTE — ED TRIAGE NOTES
Pt to ER with multiple complaints. States she is having high blood pressure, abscess on buttocks and fall last night with pain in right shoulder and right side of head. Denies LOC. Pt has a mask on in triage.

## 2020-10-29 ENCOUNTER — HOSPITAL ENCOUNTER (EMERGENCY)
Age: 60
Discharge: HOME | End: 2020-10-29

## 2020-10-29 ENCOUNTER — APPOINTMENT (OUTPATIENT)
Dept: GENERAL RADIOLOGY | Age: 60
End: 2020-10-29

## 2020-10-29 VITALS
HEIGHT: 66 IN | OXYGEN SATURATION: 97 % | RESPIRATION RATE: 18 BRPM | SYSTOLIC BLOOD PRESSURE: 172 MMHG | DIASTOLIC BLOOD PRESSURE: 77 MMHG | BODY MASS INDEX: 16.07 KG/M2 | TEMPERATURE: 97.3 F | HEART RATE: 120 BPM | WEIGHT: 100 LBS

## 2020-10-29 DIAGNOSIS — F10.20 ALCOHOLISM (HCC): Primary | ICD-10-CM

## 2020-10-29 LAB
ALBUMIN SERPL-MCNC: 2.8 G/DL (ref 3.2–4.6)
ALBUMIN/GLOB SERPL: 0.6 {RATIO} (ref 1.2–3.5)
ALP SERPL-CCNC: 131 U/L (ref 50–130)
ALT SERPL-CCNC: 33 U/L (ref 12–65)
ANION GAP SERPL CALC-SCNC: 10 MMOL/L (ref 7–16)
AST SERPL-CCNC: 101 U/L (ref 15–37)
BASOPHILS # BLD: 0 K/UL (ref 0–0.2)
BASOPHILS NFR BLD: 1 % (ref 0–2)
BILIRUB SERPL-MCNC: 0.4 MG/DL (ref 0.2–1.1)
BUN SERPL-MCNC: 4 MG/DL (ref 8–23)
CALCIUM SERPL-MCNC: 8.2 MG/DL (ref 8.3–10.4)
CHLORIDE SERPL-SCNC: 103 MMOL/L (ref 98–107)
CO2 SERPL-SCNC: 24 MMOL/L (ref 21–32)
CREAT SERPL-MCNC: 0.46 MG/DL (ref 0.6–1)
DIFFERENTIAL METHOD BLD: ABNORMAL
EOSINOPHIL # BLD: 0.1 K/UL (ref 0–0.8)
EOSINOPHIL NFR BLD: 1 % (ref 0.5–7.8)
ERYTHROCYTE [DISTWIDTH] IN BLOOD BY AUTOMATED COUNT: 15.2 % (ref 11.9–14.6)
ETHANOL SERPL-MCNC: 297 MG/DL
GLOBULIN SER CALC-MCNC: 4.6 G/DL (ref 2.3–3.5)
GLUCOSE SERPL-MCNC: 80 MG/DL (ref 65–100)
HCT VFR BLD AUTO: 38.2 % (ref 35.8–46.3)
HGB BLD-MCNC: 12.6 G/DL (ref 11.7–15.4)
IMM GRANULOCYTES # BLD AUTO: 0 K/UL (ref 0–0.5)
IMM GRANULOCYTES NFR BLD AUTO: 1 % (ref 0–5)
LYMPHOCYTES # BLD: 1.3 K/UL (ref 0.5–4.6)
LYMPHOCYTES NFR BLD: 22 % (ref 13–44)
MAGNESIUM SERPL-MCNC: 1.8 MG/DL (ref 1.8–2.4)
MCH RBC QN AUTO: 31.7 PG (ref 26.1–32.9)
MCHC RBC AUTO-ENTMCNC: 33 G/DL (ref 31.4–35)
MCV RBC AUTO: 96 FL (ref 79.6–97.8)
MONOCYTES # BLD: 0.5 K/UL (ref 0.1–1.3)
MONOCYTES NFR BLD: 8 % (ref 4–12)
NEUTS SEG # BLD: 3.8 K/UL (ref 1.7–8.2)
NEUTS SEG NFR BLD: 68 % (ref 43–78)
NRBC # BLD: 0 K/UL (ref 0–0.2)
PLATELET # BLD AUTO: 304 K/UL (ref 150–450)
PMV BLD AUTO: 10.2 FL (ref 9.4–12.3)
POTASSIUM SERPL-SCNC: 5.1 MMOL/L (ref 3.5–5.1)
PROT SERPL-MCNC: 7.4 G/DL (ref 6.3–8.2)
RBC # BLD AUTO: 3.98 M/UL (ref 4.05–5.2)
SODIUM SERPL-SCNC: 137 MMOL/L (ref 136–145)
WBC # BLD AUTO: 5.7 K/UL (ref 4.3–11.1)

## 2020-10-29 PROCEDURE — 74011000250 HC RX REV CODE- 250

## 2020-10-29 PROCEDURE — 99283 EMERGENCY DEPT VISIT LOW MDM: CPT

## 2020-10-29 PROCEDURE — 74011250636 HC RX REV CODE- 250/636

## 2020-10-29 PROCEDURE — 71045 X-RAY EXAM CHEST 1 VIEW: CPT

## 2020-10-29 PROCEDURE — 85025 COMPLETE CBC W/AUTO DIFF WBC: CPT

## 2020-10-29 PROCEDURE — 74011250637 HC RX REV CODE- 250/637

## 2020-10-29 PROCEDURE — 83735 ASSAY OF MAGNESIUM: CPT

## 2020-10-29 PROCEDURE — 80307 DRUG TEST PRSMV CHEM ANLYZR: CPT

## 2020-10-29 PROCEDURE — 96365 THER/PROPH/DIAG IV INF INIT: CPT

## 2020-10-29 PROCEDURE — 80053 COMPREHEN METABOLIC PANEL: CPT

## 2020-10-29 RX ORDER — ACETAMINOPHEN 325 MG/1
650 TABLET ORAL
Status: COMPLETED | OUTPATIENT
Start: 2020-10-29 | End: 2020-10-29

## 2020-10-29 RX ADMIN — THIAMINE HYDROCHLORIDE: 100 INJECTION, SOLUTION INTRAMUSCULAR; INTRAVENOUS at 14:57

## 2020-10-29 RX ADMIN — ACETAMINOPHEN 650 MG: 325 TABLET, FILM COATED ORAL at 15:09

## 2020-10-29 NOTE — PROGRESS NOTES
LYNN met with patient who is reportedly homeless with alcohol addiction. SW called [facility] this morning and was advised that the patient would need to call at 2 pm to conduct an interview. Patient provided with numbers and addresses of all local shelters and advised to call to see if they'll have a weather shelter later tonight due to the wind/storms. Patient also provided with a list of food pantries, soup riya, and other homeless resources. She was also provided with [employer] information and given education on this resource. Patient agreeable to consider this as an option. LYNN offered the patient a cab ride. She states she'll think about it.      Aurora Espinoza, [address] [facility]    [address] [facility]    * [email or web address]    ( [phone]

## 2020-10-29 NOTE — ED TRIAGE NOTES
Pt discharged here last night. Pt found sleeping under truck one block from hospital. Pt states everything hurts today but has no specific complaint.

## 2020-10-29 NOTE — PROGRESS NOTES
Patient states she would like a ride to the [facility] [address] in [place]. Patient provided with a Lyft ride. Patient does not have any shoes. SW provided her with a pair of sneakers, a sweater, as well as an umbrella.      Sridevi Puckett, [address] [facility]    [address] [facility]    * [email or web address]    ( [phone]
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
## 2020-10-29 NOTE — DISCHARGE INSTRUCTIONS
Patient Education        Learning About Alcohol Use Disorder  What is alcohol use disorder? Alcohol use disorder means that a person drinks alcohol even though it causes harm to themselves or others. It can range from mild to severe. The more signs of this disorder you have, the more severe it may be. Moderate to severe alcohol use disorder is sometimes called addiction. People who have it may find it hard to control their use of alcohol. People who have this disorder may argue with others about how much they're drinking. Their job may be affected because of drinking. They may drink when it's dangerous or illegal, such as when they drive. They also may have a strong need, or craving, to drink. They may feel like they must drink just to get by. Their drinking may increase their risk of getting hurt or being in a car crash. Over time, drinking too much alcohol may cause health problems. These may include high blood pressure, liver problems, or problems with digestion. What are the signs? Maybe you've wondered about your alcohol habits, or how to tell if your drinking is becoming a problem. Here are some of the signs of alcohol use disorder. You may have it if you have two or more of the following signs:  · You drink larger amounts of alcohol than you ever meant to. Or you've been drinking for a longer time than you ever meant to. · You can't cut down or control your use. Or you constantly wish you could cut down. · You spend a lot of time getting or drinking alcohol or recovering from its effects. · You have strong cravings for alcohol. · You can no longer do your main jobs at work, at school, or at home. · You keep drinking alcohol, even though your use hurts your relationships. · You have stopped doing important activities because of your alcohol use. · You drink alcohol in situations where doing so is dangerous. · You keep drinking alcohol even though you know it's causing health problems.   · You need more and more alcohol to get the same effect, or you get less effect from the same amount over time. This is called tolerance. · You have uncomfortable symptoms when you stop drinking alcohol or use less. This is called withdrawal.  Alcohol use disorder can range from mild to severe. The more signs you have, the more severe the disorder may be. Moderate to severe alcohol use disorder is sometimes called addiction. You might not realize that your drinking is a problem. You might not drink large amounts when you drink. Or you might go for days or weeks between drinking episodes. But even if you don't drink very often, your drinking could still be harmful and put you at risk. How is alcohol use disorder treated? Getting help is up to you. But you don't have to do it alone. There are many people and kinds of treatments that can help. Treatment for alcohol use disorder can include:  · Group therapy, one or more types of counseling, and alcohol education. · Medicines that help to:  ? Reduce withdrawal symptoms and help you safely stop drinking. ? Reduce cravings for alcohol. · Support groups. These groups include Alcoholics Anonymous and [employer] (Self-Management and Recovery Training). Some people are able to stop or cut back on drinking with help from a counselor. People who have moderate to severe alcohol use disorder may need medical treatment. They may need to stay in a hospital or treatment center. You may have a treatment team to help you. This team may include a psychologist or psychiatrist, counselors, doctors, social workers, nurses, and a . A  helps plan and manage your treatment. Follow-up care is a key part of your treatment and safety. Be sure to make and go to all appointments, and call your doctor if you are having problems. It's also a good idea to know your test results and keep a list of the medicines you take. Where can you learn more?   Go to http://www.gray.com/  Enter [id number] in the search box to learn more about \"Learning About Alcohol Use Disorder. \"  Current as of: June 29, 2020               Content Version: 12.6  © 3106-2162 [employer]. Care instructions adapted under license by [employer] (which disclaims liability or warranty for this information). If you have questions about a medical condition or this instruction, always ask your healthcare professional. [place] [address] any warranty or liability for your use of this information.

## 2020-10-31 ENCOUNTER — HOSPITAL ENCOUNTER (EMERGENCY)
Age: 60
Discharge: HOME | End: 2020-10-31
Attending: EMERGENCY MEDICINE

## 2020-10-31 ENCOUNTER — APPOINTMENT (OUTPATIENT)
Dept: GENERAL RADIOLOGY | Age: 60
End: 2020-10-31
Attending: EMERGENCY MEDICINE

## 2020-10-31 VITALS
RESPIRATION RATE: 16 BRPM | DIASTOLIC BLOOD PRESSURE: 63 MMHG | OXYGEN SATURATION: 97 % | SYSTOLIC BLOOD PRESSURE: 119 MMHG | TEMPERATURE: 97.9 F | HEART RATE: 62 BPM

## 2020-10-31 DIAGNOSIS — M19.019 SHOULDER ARTHRITIS: ICD-10-CM

## 2020-10-31 DIAGNOSIS — S31.000D WOUND OF SACRAL REGION, SUBSEQUENT ENCOUNTER: ICD-10-CM

## 2020-10-31 DIAGNOSIS — Z59.00 HOMELESSNESS: Primary | ICD-10-CM

## 2020-10-31 PROCEDURE — 73030 X-RAY EXAM OF SHOULDER: CPT

## 2020-10-31 PROCEDURE — 74011250637 HC RX REV CODE- 250/637: Performed by: EMERGENCY MEDICINE

## 2020-10-31 PROCEDURE — 99284 EMERGENCY DEPT VISIT MOD MDM: CPT

## 2020-10-31 RX ORDER — IBUPROFEN 800 MG/1
800 TABLET ORAL
Status: COMPLETED | OUTPATIENT
Start: 2020-10-31 | End: 2020-10-31

## 2020-10-31 RX ORDER — TRAMADOL HYDROCHLORIDE 50 MG/1
50 TABLET ORAL
Status: COMPLETED | OUTPATIENT
Start: 2020-10-31 | End: 2020-10-31

## 2020-10-31 RX ADMIN — TRAMADOL HYDROCHLORIDE 50 MG: 50 TABLET, FILM COATED ORAL at 20:00

## 2020-10-31 RX ADMIN — IBUPROFEN 800 MG: 800 TABLET, FILM COATED ORAL at 18:17

## 2020-10-31 SDOH — ECONOMIC STABILITY - HOUSING INSECURITY: HOMELESSNESS UNSPECIFIED: Z59.00

## 2020-10-31 NOTE — DISCHARGE INSTRUCTIONS
[facility]  Return to the ER for any new, worsening or life-threatening symptoms      Shoulder Arthritis: Exercises  Introduction  Here are some examples of exercises for you to try. The exercises may be suggested for a condition or for rehabilitation. Start each exercise slowly. Ease off the exercises if you start to have pain. You will be told when to start these exercises and which ones will work best for you. How to do the exercises  Shoulder flexion (lying down)   To make a wand for this exercise, use a piece of PVC pipe or a broom handle with the broom removed. Make the wand about a foot wider than your shoulders. 1. Lie on your back, holding a wand with both hands. Your palms should face down as you hold the wand. 2. Keeping your elbows straight, slowly raise your arms over your head. Raise them until you feel a stretch in your shoulders, upper back, and chest.  3. Hold for 15 to 30 seconds. 4. Repeat 2 to 4 times. Shoulder rotation (lying down)   To make a wand for this exercise, use a piece of PVC pipe or a broom handle with the broom removed. Make the wand about a foot wider than your shoulders. 1. Lie on your back. Hold a wand with both hands with your elbows bent and palms up. 2. Keep your elbows close to your body, and move the wand across your body toward the sore arm. 3. Hold for 8 to 12 seconds. 4. Repeat 2 to 4 times. Shoulder internal rotation with towel   1. Hold a towel above and behind your head with the arm that is not sore. 2. With your sore arm, reach behind your back and grasp the towel. 3. With the arm above your head, pull the towel upward. Do this until you feel a stretch on the front and outside of your sore shoulder. 4. Hold 15 to 30 seconds. 5. Repeat 2 to 4 times. Shoulder blade squeeze   1. Stand with your arms at your sides, and squeeze your shoulder blades together. Do not raise your shoulders up as you squeeze.   2. Hold 6 seconds. 3. Repeat 8 to 12 times. Resisted rows   For this exercise, you will need elastic exercise material, such as surgical tubing or Thera-Band. 1. Put the band around a solid object at about waist level. (A bedpost will work well.) Each hand should hold an end of the band. 2. With your elbows at your sides and bent to 90 degrees, pull the band back. Your shoulder blades should move toward each other. Return to the starting position. 3. Repeat 8 to 12 times. External rotator strengthening exercise   1. Start by tying a piece of elastic exercise material to a doorknob. You can use surgical tubing or Thera-Band. (You may also hold one end of the band in each hand.)  2. Stand or sit with your shoulder relaxed and your elbow bent 90 degrees. Your upper arm should rest comfortably against your side. Squeeze a rolled towel between your elbow and your body for comfort. This will help keep your arm at your side. 3. Hold one end of the elastic band with the hand of the painful arm. 4. Start with your forearm across your belly. Slowly rotate the forearm out away from your body. Keep your elbow and upper arm tucked against the towel roll or the side of your body until you begin to feel tightness in your shoulder. Slowly move your arm back to where you started. 5. Repeat 8 to 12 times. Internal rotator strengthening exercise   1. Start by tying a piece of elastic exercise material to a doorknob. You can use surgical tubing or Thera-Band. 2. Stand or sit with your shoulder relaxed and your elbow bent 90 degrees. Your upper arm should rest comfortably against your side. Squeeze a rolled towel between your elbow and your body for comfort. This will help keep your arm at your side. 3. Hold one end of the elastic band in the hand of the painful arm. 4. Slowly rotate your forearm toward your body until it touches your belly. Slowly move it back to where you started.   5. Keep your elbow and upper arm firmly tucked against the towel roll or at your side. 6. Repeat 8 to 12 times. Pendulum swing   If you have pain in your back, do not do this exercise. 1. Hold on to a table or the back of a chair with your good arm. Then bend forward a little and let your sore arm hang straight down. This exercise does not use the arm muscles. Rather, use your legs and your hips to create movement that makes your arm swing freely. 2. Use the movement from your hips and legs to guide the slightly swinging arm back and forth like a pendulum (or elephant trunk). Then guide it in circles that start small (about the size of a dinner plate). Make the circles a bit larger each day, as your pain allows. 3. Do this exercise for 5 minutes, 5 to 7 times each day. 4. As you have less pain, try bending over a little farther to do this exercise. This will increase the amount of movement at your shoulder. Follow-up care is a key part of your treatment and safety. Be sure to make and go to all appointments, and call your doctor if you are having problems. It's also a good idea to know your test results and keep a list of the medicines you take. Where can you learn more? Go to http://www.gray.com/  Enter H562 in the search box to learn more about \"Shoulder Arthritis: Exercises. \"  Current as of: March 2, 2020               Content Version: 12.6  © 2006-2020 [employer]. Care instructions adapted under license by [employer] (which disclaims liability or warranty for this information). If you have questions about a medical condition or this instruction, always ask your healthcare professional. [place] [address] any warranty or liability for your use of this information. Patient Education        Skin Tears: Care Instructions  Your Care Instructions  As we get older, our skin gets drier and more fragile.  Sometimes this can cause the outer layers of skin to split and tear open.  Skin tears are treated in different ways. In some cases, doctors use pieces of tape called Steri-Strips to pull the skin together and help it heal. Other times, it's best to leave the tear open and cover it with a special wound-care bandage. Skin tears are usually not serious. They usually heal in a few weeks. But how long you take to heal depends on your body and the type of tear you have. Sometimes the torn piece of skin is used to protect the wound while it heals. But that piece of skin does not heal. It may fall off on its own. Or the doctor may remove it. As your tear heals, it's important to keep it clean to help prevent infection. The doctor has checked you carefully, but problems can develop later. If you notice any problems or new symptoms, get medical treatment right away. Follow-up care is a key part of your treatment and safety. Be sure to make and go to all appointments, and call your doctor if you are having problems. It's also a good idea to know your test results and keep a list of the medicines you take. How can you care for yourself at home? · If you have pain, ask your doctor if you can take an over-the-counter pain medicine, such as acetaminophen (Tylenol), ibuprofen (Advil, Motrin), or naproxen (Aleve). Be safe with medicines. Read and follow all instructions on the label. · If you have a bandage, follow your doctor's instructions for changing it. · If you have Steri-Strips, leave them on until they fall off. · Follow your doctor's instructions about bathing. · Gently wash the skin tear with plain water 2 times a day. Do not rub the area. · Let the area air dry. Or you can pat it carefully with a soft towel. When should you call for help? Call your doctor now or seek immediate medical care if:    · You have signs of infection, such as:  ? Increased pain, swelling, warmth, or redness around the tear. ? Red streaks leading from the tear. ? Pus draining from the tear. ?  A fever.     · The tear starts to bleed a lot. Small amounts of blood are normal.   Watch closely for changes in your health, and be sure to contact your doctor if:    · You do not get better as expected. Where can you learn more? Go to http://www.gray.com/  Enter G899 in the search box to learn more about \"Skin Tears: Care Instructions. \"  Current as of: June 26, 2019               Content Version: 12.6  © 4927-3739 [employer]. Care instructions adapted under license by [employer] (which disclaims liability or warranty for this information). If you have questions about a medical condition or this instruction, always ask your healthcare professional. [address] any warranty or liability for your use of this information.

## 2020-10-31 NOTE — ED PROVIDER NOTES
Patient presents to the ER with multiple complaints. Reports she is homeless. States she has had pain in her right shoulder for a couple weeks. Has a history of previous clavicle fracture. Denies any new injuries. Also reports a wound on her right buttocks. Denies any fevers or vomiting    The history is provided by the patient. Shoulder Pain    The incident occurred more than 1 week ago. There was no injury mechanism. The right shoulder is affected. The pain is at a severity of 2/10. The pain is mild. The pain does not radiate. She has no other injuries. Pertinent negatives include no numbness and no muscle weakness.         Past Medical History:   Diagnosis Date    Fracture of right clavicle 3/2015    History of kidney stones     Hypertension     no meds--- pt stopped on her own--- off meds x 1 yr-- per pt-- b/p stable    Liver disease dx--2011    HEP C--- not currently seeing a m.d.-- due to  no insurance per pt    Severe protein-calorie malnutrition ([facility] [address] 8/29/2020       Past Surgical History:   Procedure Laterality Date    HX BREAST BIOPSY  1984    cyst    HX GYN      cone bx    HX OR[facility]OPAEDIC Right 3/2015 at [facility]    clavicle surg         Family History:   Problem Relation Age of Onset    Arthritis-osteo Mother     Lung Disease Father     Cancer Father     Kidney Disease Brother        Social History     Socioeconomic History    Marital status:      Spouse name: Not on file    Number of children: Not on file    Years of education: Not on file    Highest education level: Not on file   Occupational History    Not on file   Social Needs    Financial resource strain: Not on file    Food insecurity     Worry: Not on file     Inability: Not on file    Transportation needs     Medical: Not on file     Non-medical: Not on file   Tobacco Use    Smoking status: Current Every Day Smoker     Packs/day: 1.00     Years: 40.00     Pack years: 40.00    Smokeless tobacco: Never Used Substance and Sexual Activity    Alcohol use: Yes     Comment: every other day    Drug use: No    Sexual activity: Not on file   Lifestyle    Physical activity     Days per week: Not on file     Minutes per session: Not on file    Stress: Not on file   Relationships    Social connections     Talks on phone: Not on file     Gets together: Not on file     Attends Roman Catholic service: Not on file     Active member of club or organization: Not on file     Attends meetings of clubs or organizations: Not on file     Relationship status: Not on file    Intimate partner violence     Fear of current or ex partner: Not on file     Emotionally abused: Not on file     Physically abused: Not on file     Forced sexual activity: Not on file   Other Topics Concern    Not on file   Social History Narrative    Not on file         ALLERGIES: Patient has no known allergies. Review of Systems   Constitutional: Negative for fatigue, fever and unexpected weight change. HENT: Negative for congestion and dental problem. Eyes: Negative for photophobia, pain and visual disturbance. Respiratory: Negative for cough, chest tightness and wheezing. Cardiovascular: Negative for palpitations. Gastrointestinal: Negative for abdominal pain, anal bleeding, nausea and vomiting. Genitourinary: Negative for flank pain, urgency, vaginal discharge and vaginal pain. Musculoskeletal: Positive for myalgias. Negative for back pain, neck pain and neck stiffness. Skin: Negative for color change and pallor. Neurological: Negative for syncope, speech difficulty and numbness. Hematological: Negative for adenopathy. Does not bruise/bleed easily. Psychiatric/Behavioral: Negative for behavioral problems and confusion. All other systems reviewed and are negative.       Vitals:    10/31/20 1701   BP: 114/69   Pulse: (!) 105   Resp: 16   Temp: 97.8 °F (36.6 °C)   SpO2: 98%            Physical Exam  Vitals signs and nursing note reviewed. Constitutional:       Appearance: Normal appearance. HENT:      Head: Normocephalic and atraumatic. Right Ear: Tympanic membrane normal.      Left Ear: Tympanic membrane normal.      Nose: No congestion or rhinorrhea. Mouth/Throat:      Mouth: Mucous membranes are moist.   Eyes:      Extraocular Movements: Extraocular movements intact. Conjunctiva/sclera: Conjunctivae normal.      Pupils: Pupils are equal, round, and reactive to light. Neck:      Musculoskeletal: Normal range of motion and neck supple. Cardiovascular:      Rate and Rhythm: Normal rate and regular rhythm. Pulses: Normal pulses. Heart sounds: Normal heart sounds. No murmur. No gallop. Pulmonary:      Effort: Pulmonary effort is normal. No respiratory distress. Breath sounds: Normal breath sounds. No stridor. No wheezing or rhonchi. Abdominal:      General: Abdomen is flat. Bowel sounds are normal. There is no distension. Palpations: There is no mass. Tenderness: There is no abdominal tenderness. Musculoskeletal:      Right shoulder: She exhibits no bony tenderness and no deformity. Skin:     General: Skin is warm and dry. Capillary Refill: Capillary refill takes less than 2 seconds. Coloration: Skin is not jaundiced or pale. Findings: No bruising. Neurological:      General: No focal deficit present. Mental Status: She is alert. Mental status is at baseline. Cranial Nerves: No cranial nerve deficit. Motor: No weakness. Psychiatric:         Mood and Affect: Mood normal.          MDM  Number of Diagnoses or Management Options  Diagnosis management comments: shoulder pain appears to be related to a chronic problem. Will place patient in her room to evaluate her wound. 5:39 PM  Her wounds appear to be stable without any obvious signs of infection.     7:09 PM  Shoulder x-ray shows no acute abnormality, possible some acromioclavicular osteoarthritis noted    Will discharge home, try to get patient to the shelter       Amount and/or Complexity of Data Reviewed  Tests in the radiology section of CPT®: ordered and reviewed    Risk of Complications, Morbidity, and/or Mortality  Presenting problems: low  Diagnostic procedures: low  Management options: low    Patient Progress  Patient progress: stable         Procedures             Voice dictation software was used during the making of this note. This software is not perfect and grammatical and other typographical errors may be present. This note has been proofread, but may still contain errors.   Matthew Chand MD; 10/31/2020 @5:39 PM   ===================================================================

## 2020-10-31 NOTE — ED TRIAGE NOTES
Pt arrives via EMS from [facility]. [employer] was on scene r/t well check after bystander called. Pt reports homelessness. Pt states she has chronic pain in her buttocks from healed wound. Pt states she was jailed a week ago and while lying on the bench she fell on floor and hit right shoulder and right side of her head. Pt has shoulder pain that has continued. Pt reports her main complaint is that it will get cold tonight and she has no where to go. VSS in route. Pt states she has been living on the streets for 2 weeks. Pt states she was living with her mother in an assisted living, but can't stay there anymore. Pt states she was living in her truck until jailed last week and it was impounded. Pt states she drinks and used to drink everyday but can't get ETOH everyday. Pt states she last drank wine yesterday. Pt states she has called all of the shelters around and tell her they are full.

## 2020-11-01 NOTE — ED NOTES
I have reviewed discharge instructions with the patient. The patient verbalized understanding. Patient left ED via Discharge Method: ambulatory to street with self. Opportunity for questions and clarification provided. Patient given 0 scripts. To continue your aftercare when you leave the hospital, you may receive an automated call from our care team to check in on how you are doing. This is a free service and part of our promise to provide the best care and service to meet your aftercare needs.  If you have questions, or wish to unsubscribe from this service please call [phone]. Thank you for Choosing our [facility] Emergency Department.

## 2021-01-09 ENCOUNTER — APPOINTMENT (OUTPATIENT)
Dept: GENERAL RADIOLOGY | Age: 61
End: 2021-01-09
Attending: EMERGENCY MEDICINE

## 2021-01-09 PROCEDURE — 73630 X-RAY EXAM OF FOOT: CPT

## 2021-01-09 PROCEDURE — 99284 EMERGENCY DEPT VISIT MOD MDM: CPT

## 2021-01-10 ENCOUNTER — HOSPITAL ENCOUNTER (EMERGENCY)
Age: 61
Discharge: HOME | End: 2021-01-10
Attending: EMERGENCY MEDICINE

## 2021-01-10 VITALS
SYSTOLIC BLOOD PRESSURE: 127 MMHG | WEIGHT: 110 LBS | HEIGHT: 66 IN | OXYGEN SATURATION: 99 % | HEART RATE: 89 BPM | DIASTOLIC BLOOD PRESSURE: 68 MMHG | RESPIRATION RATE: 16 BRPM | TEMPERATURE: 98 F | BODY MASS INDEX: 17.68 KG/M2

## 2021-01-10 DIAGNOSIS — S92.155A CLOSED NONDISPLACED AVULSION FRACTURE OF LEFT TALUS, INITIAL ENCOUNTER: Primary | ICD-10-CM

## 2021-01-10 PROCEDURE — 74011250637 HC RX REV CODE- 250/637: Performed by: EMERGENCY MEDICINE

## 2021-01-10 RX ORDER — HYDROCODONE BITARTRATE AND ACETAMINOPHEN 5; 325 MG/1; MG/1
1 TABLET ORAL ONCE
Status: COMPLETED | OUTPATIENT
Start: 2021-01-10 | End: 2021-01-10

## 2021-01-10 RX ORDER — DICLOFENAC SODIUM 50 MG/1
50 TABLET, DELAYED RELEASE ORAL 2 TIMES DAILY
Qty: 14 TAB | Refills: 0 | Status: SHIPPED | OUTPATIENT
Start: 2021-01-10 | End: 2021-01-17

## 2021-01-10 RX ADMIN — HYDROCODONE BITARTRATE AND ACETAMINOPHEN 1 TABLET: 5; 325 TABLET ORAL at 00:24

## 2021-01-10 NOTE — ED NOTES
I have reviewed discharge instructions with the patient. The patient verbalized understanding. Patient left ED via Discharge Method: ambulatory to Home with self. Opportunity for questions and clarification provided. Patient given 1 scripts. To continue your aftercare when you leave the hospital, you may receive an automated call from our care team to check in on how you are doing. This is a free service and part of our promise to provide the best care and service to meet your aftercare needs.  If you have questions, or wish to unsubscribe from this service please call [phone]. Thank you for Choosing our [facility] Emergency Department.

## 2021-01-10 NOTE — DISCHARGE INSTRUCTIONS
Use the crutches to not put any weight on your left foot. Return with any redness, fevers, worsening symptoms, or additional concerns. Follow-up with the orthopedist for further evaluation.

## 2021-01-10 NOTE — ED TRIAGE NOTES
Pt c/o left foot pain and swelling x2 days. Pt states she fell two days but states she landed on her buttocks. Pt masked upon arrival to the ED.

## 2021-01-10 NOTE — ED TRIAGE NOTES
Pt arrived to ED via EMS from [place] on [address] Per EMS pt is homeless. Per EMS, pt c/o left ankle pain that started today. Per EMS no deformities noted.

## 2021-01-10 NOTE — ED PROVIDER NOTES
28-year-old lady presents with concerns about 2 days of pain on the top of her left foot gets worse with walking. She states she had a fall a couple of days ago but did not specifically remember injuring her foot. She thinks she may have twisted a little bit when she fell. She denies any head injury or loss of consciousness. She has no weakness or numbness. No other associated symptoms.                Past Medical History:   Diagnosis Date    Fracture of right clavicle 3/2015    History of kidney stones     Hypertension     no meds--- pt stopped on her own--- off meds x 1 yr-- per pt-- b/p stable    Liver disease dx--2011    HEP C--- not currently seeing a m.d.-- due to  no insurance per pt    Severe protein-calorie malnutrition ([facility] [address] 8/29/2020       Past Surgical History:   Procedure Laterality Date    HX BREAST BIOPSY  1984    cyst    HX GYN      cone bx    HX OR[facility]OP[facility]EDIC Right 3/2015 at [facility]    clavicle surg         Family History:   Problem Relation Age of Onset    Arthritis-osteo Mother     Lung Disease Father     Cancer Father     Kidney Disease Brother        Social History     Socioeconomic History    Marital status:      Spouse name: Not on file    Number of children: Not on file    Years of education: Not on file    Highest education level: Not on file   Occupational History    Not on file   Social Needs    Financial resource strain: Not on file    Food insecurity     Worry: Not on file     Inability: Not on file    Transportation needs     Medical: Not on file     Non-medical: Not on file   Tobacco Use    Smoking status: Current Every Day Smoker     Packs/day: 1.00     Years: 40.00     Pack years: 40.00    Smokeless tobacco: Never Used   Substance and Sexual Activity    Alcohol use: Yes     Comment: every other day    Drug use: No    Sexual activity: Not on file   Lifestyle    Physical activity     Days per week: Not on file     Minutes per session: Not on file  Stress: Not on file   Relationships    Social connections     Talks on phone: Not on file     Gets together: Not on file     Attends Taoism service: Not on file     Active member of club or organization: Not on file     Attends meetings of clubs or organizations: Not on file     Relationship status: Not on file    Intimate partner violence     Fear of current or ex partner: Not on file     Emotionally abused: Not on file     Physically abused: Not on file     Forced sexual activity: Not on file   Other Topics Concern    Not on file   Social History Narrative    Not on file         ALLERGIES: Patient has no known allergies. Review of Systems   Constitutional: Negative for chills and fever. Musculoskeletal: Positive for arthralgias, gait problem and joint swelling. Skin: Negative for color change and wound. Vitals:    01/09/21 2242 01/10/21 0004   BP: (!) 163/78    Pulse: (!) 104    Resp: 16    Temp: 97.9 °F (36.6 °C)    SpO2: 99% 99%   Weight: 49.9 kg (110 lb)    Height: 5' 6\" (1.676 m)             Physical Exam  Vitals signs and nursing note reviewed. Constitutional:       Appearance: Normal appearance. Musculoskeletal:      Comments: mild tenderness on the dorsal aspect of her left foot. Skin:     General: Skin is warm. Findings: No lesion. Neurological:      General: No focal deficit present. Mental Status: She is alert and oriented to person, place, and time. MDM  Number of Diagnoses or Management Options  Closed nondisplaced avulsion fracture of left talus, initial encounter  Diagnosis management comments: Tray shows a small possible nondisplaced talus avulsion fracture. I will give her crutches and some anti-inflammatory medicines.          Procedures

## 2021-07-08 NOTE — PROGRESS NOTES
Hospitalist Note     Admit Date:  2020  8:01 PM   Name:  Camilla Louie   Age:  63 y.o.  :  1960   MRN:  [id number]   PCP:  Lucas Holguin NP  Treatment Team: Attending Provider: Gia Burks MD; Physical Therapist: Ella Astudillo DPT; Occupational Therapist: Leslie Lam OTR/L; Care Manager: Charu Gan RN    HPI/Subjective:   Camilla Louie is a 63 y.o. female with a past medical history of alcohol dependence, hepatitis C, HTN who presents to the ER with report of weakness and inability to walk for the past 5 days. She admits to falling about 2 days ago out of her rolling chair.   Patient seen and examined at bedside in no acute distress. Patient confirms information from H&P. She reports she does not eat at home because she does not have an appetite. She reports drinking at least 3 alcoholic beverages a day primarily vodka. She reports she is extremely weak. She denies any chest pain or shortness of breath.     Objective:     Patient Vitals for the past 24 hrs:   Temp Pulse Resp BP SpO2   20 1253 98.1 °F (36.7 °C) 100 16 106/64 97 %   20 1245     99 %   20 0759 98.4 °F (36.9 °C) 100 20 101/55 97 %   20 0533 97.5 °F (36.4 °C) 77 16 116/70 100 %   20 0435 98 °F (36.7 °C) 94 14 101/63 98 %   20 0419     99 %   20 0250  96 14 111/59 99 %   20 0215     100 %   20 0112     100 %   20 0005  94  106/62    20 2240    101/58 100 %   20 2219    104/57 100 %   20     99 %   20 97.7 °F (36.5 °C) 99 18 111/70 99 %     Oxygen Therapy  O2 Sat (%): 97 % (20 1253)  Pulse via Oximetry: 115 beats per minute (20 0435)  O2 Device: Room air (20 124)  O2 Flow Rate (L/min): 0 l/min (20)  FIO2 (%): 21 % (20)    Estimated body mass index is 18.56 kg/m² as calculated from the following:    Height as of this encounter: 5' 6\" (1.676 m). Weight as of this encounter: 52.2 kg (115 lb). No intake or output data in the 24 hours ending 08/28/20 1411    *Note that automatically entered I/Os may not be accurate; dependent on patient compliance with collection and accurate  by techs. General:    Thin malnourished appearing female  CV:   RRR. No murmur, rub, or gallop. Lungs:   CTAB. No wheezing, rhonchi, or rales. Abdomen:   Soft, nontender, nondistended. Extremities: Warm and dry. No cyanosis or edema. Skin:     No rashes or jaundice. Neuro:  No gross focal deficits    Data Review:  I have reviewed all labs, meds, and studies from the last 24 hours:    Recent Results (from the past 24 hour(s))   ETHYL ALCOHOL    Collection Time: 08/27/20  8:26 PM   Result Value Ref Range    ALCOHOL(ETHYL),SERUM 131 MG/DL   CBC WITH AUTOMATED DIFF    Collection Time: 08/27/20  8:26 PM   Result Value Ref Range    WBC 17.2 (H) 4.3 - 11.1 K/uL    RBC 3.01 (L) 4.05 - 5.2 M/uL    HGB 8.2 (L) 11.7 - 15.4 g/dL    HCT 25.3 (L) 35.8 - 46.3 %    MCV 84.1 79.6 - 97.8 FL    MCH 27.2 26.1 - 32.9 PG    MCHC 32.4 31.4 - 35.0 g/dL    RDW 21.4 (H) 11.9 - 14.6 %    PLATELET 721 281 - 548 K/uL    MPV 10.6 9.4 - 12.3 FL    ABSOLUTE NRBC 0.12 0.0 - 0.2 K/uL    DF AUTOMATED      NEUTROPHILS 86 (H) 43 - 78 %    LYMPHOCYTES 6 (L) 13 - 44 %    MONOCYTES 4 4.0 - 12.0 %    EOSINOPHILS 0 (L) 0.5 - 7.8 %    BASOPHILS 0 0.0 - 2.0 %    IMMATURE GRANULOCYTES 4 0.0 - 5.0 %    ABS. NEUTROPHILS 14.8 (H) 1.7 - 8.2 K/UL    ABS. LYMPHOCYTES 1.0 0.5 - 4.6 K/UL    ABS. MONOCYTES 0.7 0.1 - 1.3 K/UL    ABS. EOSINOPHILS 0.0 0.0 - 0.8 K/UL    ABS. BASOPHILS 0.1 0.0 - 0.2 K/UL    ABS. IMM.  GRANS. 0.6 (H) 0.0 - 0.5 K/UL   MAGNESIUM    Collection Time: 08/27/20  8:26 PM   Result Value Ref Range    Magnesium 1.5 (L) 1.8 - 2.4 mg/dL   METABOLIC PANEL, COMPREHENSIVE    Collection Time: 08/27/20  8:26 PM   Result Value Ref Range    Sodium 129 (L) 136 - 145 mmol/L    Potassium 3.0 (L) . 3.5 - 5.1 mmol/L    Chloride 89 (L) 98 - 107 mmol/L    CO2 15 (L) 21 - 32 mmol/L    Anion gap 25 (H) 7 - 16 mmol/L    Glucose 133 (H) 65 - 100 mg/dL    BUN 22 8 - 23 MG/DL    Creatinine 0.69 0.6 - 1.0 MG/DL    GFR est AA >60 >60 ml/min/1.73m2    GFR est non-AA >60 >60 ml/min/1.73m2    Calcium 7.9 (L) 8.3 - 10.4 MG/DL    Bilirubin, total 1.3 (H) 0.2 - 1.1 MG/DL    ALT (SGPT) 80 (H) 12 - 65 U/L    AST (SGOT) 381 (H) 15 - 37 U/L    Alk. phosphatase 129 50 - 136 U/L    Protein, total 5.7 (L) 6.3 - 8.2 g/dL    Albumin 2.0 (L) 3.2 - 4.6 g/dL    Globulin 3.7 (H) 2.3 - 3.5 g/dL    A-G Ratio 0.5 (L) 1.2 - 3.5     PHOSPHORUS    Collection Time: 08/27/20  8:26 PM   Result Value Ref Range    Phosphorus 0.8 (LL) 2.3 - 3.7 MG/DL   PTT    Collection Time: 08/27/20  8:26 PM   Result Value Ref Range    aPTT 25.5 24.3 - 35.4 SEC   PROTHROMBIN TIME + INR    Collection Time: 08/27/20  8:26 PM   Result Value Ref Range    Prothrombin time 18.5 (H) 12.0 - 14.7 sec    INR 1.5     CK    Collection Time: 08/27/20  8:26 PM   Result Value Ref Range     21 - 215 U/L   TSH 3RD GENERATION    Collection Time: 08/27/20  8:26 PM   Result Value Ref Range    TSH 0.867 0.358 - 3.740 uIU/mL   URINE MICROSCOPIC    Collection Time: 08/27/20 10:03 PM   Result Value Ref Range    WBC 0 0 /hpf    RBC 0-3 0 /hpf    Epithelial cells 0 0 /hpf    Bacteria 1+ (H) 0 /hpf    Casts 0-3 0 /lpf        All Micro Results     Procedure Component Value Units Date/Time    CULTURE, BLOOD [[id number]]     Order Status:  Sent Specimen:  Blood     CULTURE, BLOOD [[id number]]     Order Status:  Sent Specimen:  Blood     CULTURE, URINE [[id number]]     Order Status:  Sent Specimen:  Urine from Clean catch     CULTURE, RESPIRATORY/SPUTUM/BRONCH Ezjose alberto Richardsontz [[id number]]     Order Status:  Sent Specimen:  Sputum           No results found for this visit on 08/27/20.     Current Meds:  Current Facility-Administered Medications   Medication Dose Route Frequency    sodium chloride (NS) flush 5-40 mL  5-40 mL IntraVENous Q8H    sodium chloride (NS) flush 5-40 mL  5-40 mL IntraVENous PRN    cefTRIAXone (ROCEPHIN) 1 g in 0.9% sodium chloride (MBP/ADV) 50 mL  1 g IntraVENous Q24H    acetaminophen (TYLENOL) tablet 650 mg  650 mg Oral Q4H PRN    magnesium hydroxide (MILK OF MAGNESIA) 400 mg/5 mL oral suspension 30 mL  30 mL Oral DAILY PRN    0.9% sodium chloride infusion  100 mL/hr IntraVENous CONTINUOUS    azithromycin (ZITHROMAX) 500 mg in 0.9% sodium chloride (MBP/ADV) 250 mL  500 mg IntraVENous Q24H    enoxaparin (LOVENOX) injection 40 mg  40 mg SubCUTAneous Q24H    tuberculin injection 5 Units  5 Units IntraDERMal ONCE    NUTRITIONAL SUPPORT ELECTROLYTE PRN ORDERS   Does Not Apply PRN    0.9% sodium chloride 1,000 mL with mvi, adult no. 4 with vit K 10 mL, thiamine 113 mg, folic acid 1 mg infusion   IntraVENous Q24H    potassium phosphate 20 mmol in 0.9% sodium chloride 250 mL infusion   IntraVENous Q4H    chlordiazePOXIDE (LIBRIUM) capsule 25 mg  25 mg Oral Q4H PRN    LORazepam (ATIVAN) injection 1 mg  1 mg IntraVENous Q2H PRN       Other Studies (last 24 hours):  Ct Head Wo Cont    Result Date: 8/27/2020  EXAM: Noncontrast CT head. INDICATION: Weakness. COMPARISON: None. TECHNIQUE: Noncontrast CT images of the head were obtained. Radiation dose reduction techniques were used for this study. Our CT scanners use one or all of the following:  Automated exposure control, adjustment of the mA or kV according to patient size, iterative reconstruction. FINDINGS: There is mild volume loss. A few scattered nonspecific white matter hypodensities probably relate to chronic small vessel ischemia. No acute infarct, hemorrhage or mass is identified. There is no mass effect, midline shift or depressed fracture. The visualized paranasal sinuses and mastoid air cells are clear. IMPRESSION: No acute process. Xr Chest Port    Result Date: 8/27/2020  EXAM: Chest x-ray. INDICATION: Cough. COMPARISON: Prior chest x-ray on February 11, 2018. TECHNIQUE: Frontal view chest x-ray. FINDINGS: There is a new small focal opacity at the right lung base, possibly early pneumonia. The left lung is clear. The cardiac size, mediastinal contour and pulmonary vasculature are normal. No pneumothorax or pleural effusion is seen. Again noted are atherosclerotic calcifications in the thoracic aorta. IMPRESSION: New small right lung base opacity, possibly early pneumonia. Follow-up is recommended to ensure resolution.       Assessment and Plan:     Hospital Problems as of 8/28/2020 Never Reviewed          Codes Class Noted - Resolved POA    Leukocytosis ICD-10-CM: D72.829  ICD-9-CM: 288.60  8/27/2020 - Present Yes        Normocytic anemia ICD-10-CM: D64.9  ICD-9-CM: 285.9  8/27/2020 - Present Yes        Hyponatremia ICD-10-CM: E87.1  ICD-9-CM: 276.1  8/27/2020 - Present Yes        Hypokalemia ICD-10-CM: E87.6  ICD-9-CM: 276.8  8/27/2020 - Present Unknown        Elevated LFTs ICD-10-CM: R79.89  ICD-9-CM: 790.6  8/27/2020 - Present Yes        Alcohol dependence ([facility] [address] ICD-10-CM: F10.20  ICD-9-CM: 303.90  8/27/2020 - Present Yes        * (Principal) CAP (community acquired pneumonia) ICD-10-CM: J18.9  ICD-9-CM: 231  8/27/2020 - Present Yes              Pneumonia/leukocytosis  CXR: New small right lung base opacity possibly early pneumonia  Given patient's active alcohol abuse most likely aspiration pneumonia    Plan:  -DuoNebs every 6 PRN  -Switch to IV Zosyn  -Flutter valve  -Mucinex    Alcohol abuse  Active alcohol abuse at this time  Alcohol usage vodka at least 3 drinks a day  Reportedly found soaked in vodka    Plan:  -Ativan PRN  -Banana bag  -Monitor for signs of withdrawal    Elevated LFTs  Most likely secondary to alcohol abuse and known hepatitis C    Plan:  -Trend CMP    Failure to thrive  Related to patient's alcohol abuse    Plan:  -Counseled on alcohol cessation  -Nutrition consultation    Hyponatremia  Na: 129  Most likely secondary to decreased p.o. intake    Plan:  -Goal rate of correction 8 to 10 mEq in 24-hour.   -IVF    Hypomagnesia  -Replete    Low phosphorus  -Replete    Hypokalemia  -Replete      DC planning/Dispo: Pending hospital course      Diet:  DIET REGULAR  DIET NUTRITIONAL SUPPLEMENTS  DVT ppx: Lovenox    Signed:  Siri Fothergill, MD

## 2022-03-18 PROBLEM — F10.20 ALCOHOL DEPENDENCE (HCC): Status: ACTIVE | Noted: 2020-08-27

## 2022-03-18 PROBLEM — E43 SEVERE PROTEIN-CALORIE MALNUTRITION (HCC): Status: ACTIVE | Noted: 2020-08-29

## 2022-03-19 PROBLEM — E87.1 HYPONATREMIA: Status: ACTIVE | Noted: 2020-08-27

## 2022-03-19 PROBLEM — E87.6 HYPOKALEMIA: Status: ACTIVE | Noted: 2020-08-27

## 2022-03-19 PROBLEM — R79.89 ELEVATED LFTS: Status: ACTIVE | Noted: 2020-08-27

## 2022-03-19 PROBLEM — J18.9 CAP (COMMUNITY ACQUIRED PNEUMONIA): Status: ACTIVE | Noted: 2020-08-27

## 2022-03-19 PROBLEM — D64.9 NORMOCYTIC ANEMIA: Status: ACTIVE | Noted: 2020-08-27

## 2022-03-19 PROBLEM — D72.829 LEUKOCYTOSIS: Status: ACTIVE | Noted: 2020-08-27

## 2022-08-29 ENCOUNTER — HOSPITAL ENCOUNTER (EMERGENCY)
Age: 62
Discharge: HOME | End: 2022-08-29
Attending: EMERGENCY MEDICINE

## 2022-08-29 VITALS
DIASTOLIC BLOOD PRESSURE: 56 MMHG | HEART RATE: 96 BPM | SYSTOLIC BLOOD PRESSURE: 124 MMHG | HEIGHT: 66 IN | TEMPERATURE: 97.8 F | WEIGHT: 130 LBS | RESPIRATION RATE: 18 BRPM | BODY MASS INDEX: 20.89 KG/M2 | OXYGEN SATURATION: 99 %

## 2022-08-29 DIAGNOSIS — M54.40 ACUTE BACK PAIN WITH SCIATICA, UNSPECIFIED LATERALITY: Primary | ICD-10-CM

## 2022-08-29 PROCEDURE — 6370000000 HC RX 637 (ALT 250 FOR IP): Performed by: EMERGENCY MEDICINE

## 2022-08-29 PROCEDURE — 99283 EMERGENCY DEPT VISIT LOW MDM: CPT

## 2022-08-29 RX ORDER — PREDNISONE 20 MG/1
40 TABLET ORAL DAILY
Qty: 20 TABLET | Refills: 0 | Status: SHIPPED | OUTPATIENT
Start: 2022-08-29 | End: 2022-09-03

## 2022-08-29 RX ORDER — LIDOCAINE 4 G/G
1 PATCH TOPICAL EVERY 24 HOURS
Qty: 30 PATCH | Refills: 0 | Status: SHIPPED | OUTPATIENT
Start: 2022-08-29 | End: 2022-09-28

## 2022-08-29 RX ORDER — PREDNISONE 10 MG/1
40 TABLET ORAL
Status: COMPLETED | OUTPATIENT
Start: 2022-08-29 | End: 2022-08-29

## 2022-08-29 RX ORDER — ACETAMINOPHEN 325 MG/1
650 TABLET ORAL ONCE
Status: COMPLETED | OUTPATIENT
Start: 2022-08-29 | End: 2022-08-29

## 2022-08-29 RX ORDER — LIDOCAINE 4 G/G
2 PATCH TOPICAL ONCE
Status: DISCONTINUED | OUTPATIENT
Start: 2022-08-29 | End: 2022-08-30 | Stop reason: HOSPADM

## 2022-08-29 RX ORDER — ACETAMINOPHEN 500 MG
500 TABLET ORAL EVERY 6 HOURS PRN
Qty: 120 TABLET | Refills: 5 | Status: SHIPPED | OUTPATIENT
Start: 2022-08-29

## 2022-08-29 RX ADMIN — PREDNISONE 40 MG: 10 TABLET ORAL at 21:56

## 2022-08-29 RX ADMIN — ACETAMINOPHEN 650 MG: 325 TABLET, FILM COATED ORAL at 21:56

## 2022-08-29 ASSESSMENT — PAIN - FUNCTIONAL ASSESSMENT: PAIN_FUNCTIONAL_ASSESSMENT: 0-10

## 2022-08-29 ASSESSMENT — PAIN SCALES - GENERAL: PAINLEVEL_OUTOF10: 0

## 2022-08-29 NOTE — Clinical Note
Norman Mera was seen and treated in our emergency department on 8/29/2022. She may return to work on 08/31/2022. If you have any questions or concerns, please don't hesitate to call.       Parveen Hackett MD

## 2022-08-29 NOTE — ED TRIAGE NOTES
Pt states she has sciatica and the pain has been shooting into her hips and both legs. Pt states the pain has been going on for a few months but is worsening today.

## 2022-08-30 NOTE — ED NOTES
I have reviewed discharge instructions with the patient. The patient verbalized understanding. Patient left ED via Discharge Method: ambulatory to Home with Uber/Lyft. Opportunity for questions and clarification provided. Patient given 3 scripts. To continue your aftercare when you leave the hospital, you may receive an automated call from our care team to check in on how you are doing. This is a free service and part of our promise to provide the best care and service to meet your aftercare needs.  If you have questions, or wish to unsubscribe from this service please call [phone]. Thank you for Choosing our [address] [place] [address] Emergency Department. Shaista Vanegas  Virgil Surekha, [place]  08/29/22 [id number]

## 2022-08-30 NOTE — DISCHARGE INSTRUCTIONS
I do not think we need imaging on today's visit. I do think he needs to see a primary care doctor about your continued back pain. You would definitely benefit from a physical therapist.  They may want to do imaging in the future if your pain continues. The short-term, continuous external milligrams of ibuprofen every 6-8 hours. You can take 500 mg of Tylenol every 6-8 hours, and you can use the Lidoderm patches. We will also do 5 days of prednisone to see if that helps reduce the inflammation. Come back to the ER if you have loss of control of your bowel or bladder, numbness or weakness in your lower extremities, or if you have any other concerns.

## 2022-08-31 NOTE — CARE COORDINATION
Face sheet left for CM to follow with pt about assisting with PCP. I LM with pt to please call for any assistance. 1st attempt.

## 2022-09-09 ENCOUNTER — HOSPITAL ENCOUNTER (EMERGENCY)
Dept: GENERAL RADIOLOGY | Age: 62
Discharge: HOME | DRG: 392 | End: 2022-09-12

## 2022-09-09 ENCOUNTER — HOSPITAL ENCOUNTER (INPATIENT)
Age: 62
LOS: 5 days | Discharge: HOME | DRG: 392 | End: 2022-09-14
Attending: EMERGENCY MEDICINE | Admitting: STUDENT IN AN ORGANIZED HEALTH CARE EDUCATION/TRAINING PROGRAM

## 2022-09-09 DIAGNOSIS — I48.91 ATRIAL FIBRILLATION WITH RVR (HCC): ICD-10-CM

## 2022-09-09 DIAGNOSIS — K92.2 GASTROINTESTINAL HEMORRHAGE, UNSPECIFIED GASTROINTESTINAL HEMORRHAGE TYPE: Primary | ICD-10-CM

## 2022-09-09 DIAGNOSIS — I48.92 ATRIAL FLUTTER, UNSPECIFIED TYPE (HCC): ICD-10-CM

## 2022-09-09 DIAGNOSIS — R79.89 ELEVATED D-DIMER: ICD-10-CM

## 2022-09-09 DIAGNOSIS — D62 ANEMIA DUE TO ACUTE BLOOD LOSS: ICD-10-CM

## 2022-09-09 PROBLEM — B19.20 HEPATITIS C: Status: ACTIVE | Noted: 2022-09-09

## 2022-09-09 PROBLEM — E83.42 HYPOMAGNESEMIA: Status: ACTIVE | Noted: 2022-09-09

## 2022-09-09 PROBLEM — K92.1 MELENA: Status: ACTIVE | Noted: 2022-09-09

## 2022-09-09 PROBLEM — D64.9 SYMPTOMATIC ANEMIA: Status: ACTIVE | Noted: 2020-08-27

## 2022-09-09 LAB
D DIMER PPP FEU-MCNC: 1.75 UG/ML(FEU)
ERYTHROCYTE [DISTWIDTH] IN BLOOD BY AUTOMATED COUNT: 18.4 %
FERRITIN SERPL-MCNC: 5 NG/ML (ref 8–388)
HCT VFR BLD AUTO: 15.9 % (ref 35.8–46.3)
HGB BLD-MCNC: 4.5 G/DL (ref 11.7–15.4)
HISTORY CHECK: NORMAL
INR PPP: 1.4
IRON SATN MFR SERPL: 5 %
IRON SERPL-MCNC: 16 UG/DL (ref 35–150)
MAGNESIUM SERPL-MCNC: 1.6 MG/DL (ref 1.8–2.4)
MCH RBC QN AUTO: 22.7 PG (ref 26.1–32.9)
MCHC RBC AUTO-ENTMCNC: 28.3 G/DL (ref 31.4–35)
MCV RBC AUTO: 80.3 FL (ref 79.6–97.8)
NRBC # BLD: 0.06 K/UL
NT PRO BNP: 183 PG/ML (ref 5–125)
PHOSPHATE SERPL-MCNC: 2.4 MG/DL (ref 2.3–3.7)
PLATELET # BLD AUTO: 111 K/UL
PMV BLD AUTO: 12.2 FL (ref 9.4–12.3)
PROTHROMBIN TIME: 17.3 SEC (ref 12.6–14.5)
RBC # BLD AUTO: 1.98 M/UL
SARS-COV-2 RDRP RESP QL NAA+PROBE: NOT DETECTED
SOURCE: NORMAL
TIBC SERPL-MCNC: 330 UG/DL (ref 250–450)
TRANSFERRIN SERPL-MCNC: 250 MG/DL (ref 202–364)
TROPONIN I SERPL HS-MCNC: 100 PG/ML (ref 0–14)
TSH W FREE THYROID IF ABNORMAL: 2.57 UIU/ML (ref 0.36–3.74)
WBC # BLD AUTO: 9.3 K/UL (ref 4.3–11.1)

## 2022-09-09 PROCEDURE — 82728 ASSAY OF FERRITIN: CPT

## 2022-09-09 PROCEDURE — 87635 SARS-COV-2 COVID-19 AMP PRB: CPT

## 2022-09-09 PROCEDURE — 2500000003 HC RX 250 WO HCPCS: Performed by: EMERGENCY MEDICINE

## 2022-09-09 PROCEDURE — 6360000002 HC RX W HCPCS: Performed by: EMERGENCY MEDICINE

## 2022-09-09 PROCEDURE — 83540 ASSAY OF IRON: CPT

## 2022-09-09 PROCEDURE — C9113 INJ PANTOPRAZOLE SODIUM, VIA: HCPCS | Performed by: EMERGENCY MEDICINE

## 2022-09-09 PROCEDURE — 6360000002 HC RX W HCPCS: Performed by: FAMILY MEDICINE

## 2022-09-09 PROCEDURE — 6370000000 HC RX 637 (ALT 250 FOR IP): Performed by: EMERGENCY MEDICINE

## 2022-09-09 PROCEDURE — 85025 COMPLETE CBC W/AUTO DIFF WBC: CPT

## 2022-09-09 PROCEDURE — 86923 COMPATIBILITY TEST ELECTRIC: CPT

## 2022-09-09 PROCEDURE — 1100000000 HC RM PRIVATE

## 2022-09-09 PROCEDURE — 2580000003 HC RX 258: Performed by: EMERGENCY MEDICINE

## 2022-09-09 PROCEDURE — A4216 STERILE WATER/SALINE, 10 ML: HCPCS | Performed by: EMERGENCY MEDICINE

## 2022-09-09 PROCEDURE — P9016 RBC LEUKOCYTES REDUCED: HCPCS

## 2022-09-09 PROCEDURE — 83735 ASSAY OF MAGNESIUM: CPT

## 2022-09-09 PROCEDURE — 85610 PROTHROMBIN TIME: CPT

## 2022-09-09 PROCEDURE — 94762 N-INVAS EAR/PLS OXIMTRY CONT: CPT

## 2022-09-09 PROCEDURE — 84100 ASSAY OF PHOSPHORUS: CPT

## 2022-09-09 PROCEDURE — 84484 ASSAY OF TROPONIN QUANT: CPT

## 2022-09-09 PROCEDURE — 84443 ASSAY THYROID STIM HORMONE: CPT

## 2022-09-09 PROCEDURE — 83880 ASSAY OF NATRIURETIC PEPTIDE: CPT

## 2022-09-09 PROCEDURE — 96374 THER/PROPH/DIAG INJ IV PUSH: CPT

## 2022-09-09 PROCEDURE — 86901 BLOOD TYPING SEROLOGIC RH(D): CPT

## 2022-09-09 PROCEDURE — 99285 EMERGENCY DEPT VISIT HI MDM: CPT

## 2022-09-09 PROCEDURE — 71046 X-RAY EXAM CHEST 2 VIEWS: CPT

## 2022-09-09 PROCEDURE — 85379 FIBRIN DEGRADATION QUANT: CPT

## 2022-09-09 RX ORDER — 0.9 % SODIUM CHLORIDE 0.9 %
1000 INTRAVENOUS SOLUTION INTRAVENOUS
Status: ACTIVE | OUTPATIENT
Start: 2022-09-09 | End: 2022-09-10

## 2022-09-09 RX ORDER — SODIUM CHLORIDE 9 MG/ML
INJECTION, SOLUTION INTRAVENOUS CONTINUOUS
Status: DISCONTINUED | OUTPATIENT
Start: 2022-09-09 | End: 2022-09-14 | Stop reason: HOSPADM

## 2022-09-09 RX ORDER — POLYETHYLENE GLYCOL 3350 17 G/17G
17 POWDER, FOR SOLUTION ORAL DAILY PRN
Status: DISCONTINUED | OUTPATIENT
Start: 2022-09-09 | End: 2022-09-14 | Stop reason: HOSPADM

## 2022-09-09 RX ORDER — SODIUM CHLORIDE 9 MG/ML
INJECTION, SOLUTION INTRAVENOUS PRN
Status: DISCONTINUED | OUTPATIENT
Start: 2022-09-09 | End: 2022-09-14 | Stop reason: HOSPADM

## 2022-09-09 RX ORDER — MAGNESIUM SULFATE 1 G/100ML
1000 INJECTION INTRAVENOUS ONCE
Status: COMPLETED | OUTPATIENT
Start: 2022-09-09 | End: 2022-09-09

## 2022-09-09 RX ORDER — ONDANSETRON 4 MG/1
4 TABLET, ORALLY DISINTEGRATING ORAL EVERY 8 HOURS PRN
Status: DISCONTINUED | OUTPATIENT
Start: 2022-09-09 | End: 2022-09-14 | Stop reason: HOSPADM

## 2022-09-09 RX ORDER — DILTIAZEM HYDROCHLORIDE 5 MG/ML
10 INJECTION INTRAVENOUS
Status: DISCONTINUED | OUTPATIENT
Start: 2022-09-09 | End: 2022-09-09

## 2022-09-09 RX ORDER — ASPIRIN 81 MG/1
324 TABLET, CHEWABLE ORAL ONCE
Status: COMPLETED | OUTPATIENT
Start: 2022-09-09 | End: 2022-09-09

## 2022-09-09 RX ORDER — SODIUM CHLORIDE 0.9 % (FLUSH) 0.9 %
5-40 SYRINGE (ML) INJECTION PRN
Status: DISCONTINUED | OUTPATIENT
Start: 2022-09-09 | End: 2022-09-14 | Stop reason: HOSPADM

## 2022-09-09 RX ORDER — ONDANSETRON 2 MG/ML
4 INJECTION INTRAMUSCULAR; INTRAVENOUS EVERY 6 HOURS PRN
Status: DISCONTINUED | OUTPATIENT
Start: 2022-09-09 | End: 2022-09-14 | Stop reason: HOSPADM

## 2022-09-09 RX ORDER — 0.9 % SODIUM CHLORIDE 0.9 %
500 INTRAVENOUS SOLUTION INTRAVENOUS
Status: ACTIVE | OUTPATIENT
Start: 2022-09-09 | End: 2022-09-10

## 2022-09-09 RX ORDER — SODIUM CHLORIDE 0.9 % (FLUSH) 0.9 %
5-40 SYRINGE (ML) INJECTION EVERY 12 HOURS SCHEDULED
Status: DISCONTINUED | OUTPATIENT
Start: 2022-09-09 | End: 2022-09-14 | Stop reason: HOSPADM

## 2022-09-09 RX ADMIN — ASPIRIN 324 MG: 81 TABLET, CHEWABLE ORAL at 18:30

## 2022-09-09 RX ADMIN — SODIUM CHLORIDE 40 MG: 9 INJECTION, SOLUTION INTRAMUSCULAR; INTRAVENOUS; SUBCUTANEOUS at 21:12

## 2022-09-09 RX ADMIN — MAGNESIUM SULFATE HEPTAHYDRATE 1000 MG: 1 INJECTION, SOLUTION INTRAVENOUS at 22:36

## 2022-09-09 ASSESSMENT — PAIN - FUNCTIONAL ASSESSMENT: PAIN_FUNCTIONAL_ASSESSMENT: NONE - DENIES PAIN

## 2022-09-09 ASSESSMENT — ENCOUNTER SYMPTOMS: SHORTNESS OF BREATH: 1

## 2022-09-09 NOTE — ED TRIAGE NOTES
Patient arrives via EMS from home c/o palpitations, dizziness, malaise, exertional dyspnea, and seeing spots for weeks. Worsening x2 days. EMS found A fib RVR on 12 lead EKG. .  1 L NS and 20 mg Cardizem given through L hand 18 gauge IV. HR decreased to 120. /60, o2 Sat 100% RR 20 on RA, . NAD.

## 2022-09-09 NOTE — ED PROVIDER NOTES
Emergency Department Provider Note                   PCP: JENN Dia NP               Age: 60 y.o. Sex: female       ICD-10-CM    1. Gastrointestinal hemorrhage, unspecified gastrointestinal hemorrhage type  K92.2       2. Anemia due to acute blood loss  D62       3.  Atrial fibrillation with RVR (HCC)  I48.91           DISPOSITION Decision To Admit 09/09/2022 08:50:39 PM       MDM  Number of Diagnoses or Management Options  Anemia due to acute blood loss  Atrial fibrillation with RVR (HCC)  Gastrointestinal hemorrhage, unspecified gastrointestinal hemorrhage type  Diagnosis management comments: CHF, COPD, pneumonia, PE,    MI, coronary artery disease, unstable angina, coronary artery disease,    Atrial fibrillation, cardiac arrhythmia, PVC, medication induced palpitations, heart block,  electrolyte induced palpitations,    Aortic dissection, aortic aneurysm,    GERD, musculoskeletal pain, costochondritis, rib fracture, pleurisy,         Amount and/or Complexity of Data Reviewed  Clinical lab tests: ordered and reviewed  Tests in the radiology section of CPT®: reviewed and ordered  Tests in the medicine section of CPT®: ordered and reviewed  Review and summarize past medical records: yes  Independent visualization of images, tracings, or specimens: yes         Orders Placed This Encounter   Procedures    Critical Care    COVID-19, Rapid    XR CHEST (2 VW)    CBC with Auto Differential    Magnesium    Troponin    D-Dimer, Quantitative    Brain Natriuretic Peptide    Hemoglobin and Hematocrit    Cardiac Monitor - ED Only    Verify hospital blood product consent form has been signed and witnessed    Vital Signs For Blood Product Transfusion    Transfusion Reaction Management    Initiate Oxygen Therapy Protocol    Pulse oximetry, continuous    EKG 12 Lead    TYPE AND SCREEN    PREPARE RBC (CROSSMATCH), 2 Units    Saline lock IV        Medications   0.9 % sodium chloride bolus (has no administration in time range)   0.9 % sodium chloride infusion (has no administration in time range)   pantoprazole (PROTONIX) 40 mg in sodium chloride (PF) 10 mL injection (has no administration in time range)   aspirin chewable tablet 324 mg (324 mg Oral Given 9/9/22 1830)       New Prescriptions    No medications on file        Norman Mera is a 60 y.o. female who presents to the Emergency Department with chief complaint of    Chief Complaint   Patient presents with    Palpitations      18-year-old female presenting to the emergency department by EMS secondary to fatigue and shortness of breath. The patient states that for the last 2 months she has had palpitations on and off. She has no known history of atrial fibrillation. The patient was found to be in A. fib RVR with a rate of 140. She was given 20 mg of Cardizem by EMS in route and her heart rate dropped to 70 bpm and remained in atrial fibrillation. Before arrival to the ER however heart rate was back in of the 1 30-1 50 range. The patient is not on any blood thinners. She did not have a cardiologist.  The patient states that a year and a half ago she stopped smoking cigarettes drinking alcohol and using recreational drugs. The patient used to smoke about a pack a day and has a 48-pack-year history. Patient drank almost fifth the day and used marijuana cocaine and \"pretty much any drug he can get on the street during my life. \"      All other systems reviewed and are negative unless otherwise stated in the history of present illness section. Review of Systems   Constitutional:  Positive for activity change, appetite change and fatigue. Respiratory:  Positive for shortness of breath. Cardiovascular:  Positive for chest pain and palpitations. All other systems reviewed and are negative.     Past Medical History:   Diagnosis Date    Fracture of right clavicle 3/2015    History of kidney stones     Hypertension     no meds--- pt stopped on her own--- off meds x 1 yr-- per pt-- b/p stable    Liver disease dx--2011    HEP C--- not currently seeing a m.d.-- due to  no insurance per pt    Severe protein-calorie malnutrition ([facility] [address] 8/29/2020        Past Surgical History:   Procedure Laterality Date    BREAST BIOPSY  1984    cyst    GYN      cone bx    ORTHOPEDIC SURGERY Right 3/2015 at [facility]    clavicle surg        Family History   Problem Relation Age of Onset    Osteoarthritis Mother     Lung Disease Father     Cancer Father     Kidney Disease Brother         Social History     Socioeconomic History    Marital status:    Tobacco Use    Smoking status: Every Day     Packs/day: 1.00     Types: Cigarettes    Smokeless tobacco: Never   Substance and Sexual Activity    Alcohol use: Yes    Drug use: No        Allergies: Patient has no known allergies. Previous Medications    ACETAMINOPHEN (TYLENOL) 500 MG TABLET    Take 1 tablet by mouth every 6 hours as needed for Pain    LIDOCAINE 4 % EXTERNAL PATCH    Place 1 patch onto the skin every 24 hours Place 1 patch onto the skin daily 12 hours on, 12 hours off. Vitals signs and nursing note reviewed. Patient Vitals for the past 4 hrs:   Temp Pulse Resp BP SpO2   09/09/22 1836 -- (!) 155 -- (!) 90/48 97 %   09/09/22 1830 -- (!) 144 28 (!) 88/47 96 %   09/09/22 1817 98 °F (36.7 °C) (!) 143 20 (!) 100/45 97 %   09/09/22 1816 -- (!) 141 20 (!) 100/45 97 %          Physical Exam     GENERAL:The patient has Body mass index is 20.98 kg/m². Well-hydrated. VITAL SIGNS: Heart rate, blood pressure, respiratory rate reviewed as recorded in  nurse's notes  EYES: Pupils reactive. Extraocular motion intact. No conjunctival redness or drainage. NECK: Supple, no meningeal signs. Trachea midline. No masses or thyromegaly. LUNGS: Breath sounds clear and equal bilaterally no accessory muscle use. CHEST: No deformity  CARDIOVASCULAR: Irregularly irregular in the rates of 130-150.   Patient did have typographical errors may be present. This note has not been completely proofread for errors.        Jacquie Batres,   09/09/22 [id number]

## 2022-09-10 ENCOUNTER — APPOINTMENT (OUTPATIENT)
Dept: CT IMAGING | Age: 62
DRG: 392 | End: 2022-09-10

## 2022-09-10 ENCOUNTER — APPOINTMENT (OUTPATIENT)
Dept: GENERAL RADIOLOGY | Age: 62
DRG: 392 | End: 2022-09-10

## 2022-09-10 ENCOUNTER — APPOINTMENT (OUTPATIENT)
Dept: ULTRASOUND IMAGING | Age: 62
DRG: 392 | End: 2022-09-10

## 2022-09-10 PROBLEM — K92.2 GASTROINTESTINAL HEMORRHAGE: Status: ACTIVE | Noted: 2022-09-10

## 2022-09-10 LAB
ALBUMIN SERPL-MCNC: 2.8 G/DL (ref 3.2–4.6)
ALBUMIN SERPL-MCNC: 2.9 G/DL (ref 3.2–4.6)
ALBUMIN/GLOB SERPL: 1 {RATIO} (ref 1.2–3.5)
ALBUMIN/GLOB SERPL: 1 {RATIO} (ref 1.2–3.5)
ALP SERPL-CCNC: 65 U/L (ref 50–136)
ALP SERPL-CCNC: 67 U/L (ref 50–136)
ALT SERPL-CCNC: 28 U/L (ref 12–65)
ALT SERPL-CCNC: 31 U/L (ref 12–65)
ANION GAP SERPL CALC-SCNC: 7 MMOL/L (ref 4–13)
ANION GAP SERPL CALC-SCNC: 7 MMOL/L (ref 4–13)
APPEARANCE UR: CLEAR
AST SERPL-CCNC: 24 U/L (ref 15–37)
AST SERPL-CCNC: 40 U/L (ref 15–37)
BACTERIA URNS QL MICRO: 0 /HPF
BASOPHILS # BLD: 0 K/UL (ref 0–0.2)
BASOPHILS NFR BLD: 0 % (ref 0–2)
BILIRUB SERPL-MCNC: 1.3 MG/DL (ref 0.2–1.1)
BILIRUB SERPL-MCNC: 1.4 MG/DL (ref 0.2–1.1)
BILIRUB UR QL: NEGATIVE
BUN SERPL-MCNC: 12 MG/DL (ref 8–23)
BUN SERPL-MCNC: 12 MG/DL (ref 8–23)
CALCIUM SERPL-MCNC: 7.6 MG/DL (ref 8.3–10.4)
CALCIUM SERPL-MCNC: 7.7 MG/DL (ref 8.3–10.4)
CASTS URNS QL MICRO: ABNORMAL /LPF
CHLORIDE SERPL-SCNC: 113 MMOL/L (ref 101–110)
CHLORIDE SERPL-SCNC: 114 MMOL/L (ref 101–110)
CO2 SERPL-SCNC: 20 MMOL/L (ref 21–32)
CO2 SERPL-SCNC: 20 MMOL/L (ref 21–32)
COLOR UR: YELLOW
CREAT SERPL-MCNC: 0.6 MG/DL (ref 0.6–1)
CREAT SERPL-MCNC: 0.7 MG/DL (ref 0.6–1)
CRYSTALS URNS QL MICRO: 0 /LPF
DIFFERENTIAL METHOD BLD: ABNORMAL
EOSINOPHIL # BLD: 0.2 K/UL (ref 0–0.8)
EOSINOPHIL NFR BLD: 3 % (ref 0.5–7.8)
EPI CELLS #/AREA URNS HPF: ABNORMAL /HPF
ERYTHROCYTE [DISTWIDTH] IN BLOOD BY AUTOMATED COUNT: 17.3 % (ref 11.9–14.6)
GLOBULIN SER CALC-MCNC: 2.8 G/DL (ref 2.3–3.5)
GLOBULIN SER CALC-MCNC: 2.8 G/DL (ref 2.3–3.5)
GLUCOSE SERPL-MCNC: 94 MG/DL (ref 65–100)
GLUCOSE SERPL-MCNC: 96 MG/DL (ref 65–100)
GLUCOSE UR STRIP.AUTO-MCNC: NEGATIVE MG/DL
HCT VFR BLD AUTO: 21.7 % (ref 35.8–46.3)
HCT VFR BLD AUTO: 22.2 % (ref 35.8–46.3)
HCT VFR BLD AUTO: 23.6 % (ref 35.8–46.3)
HCT VFR BLD AUTO: 30.1 % (ref 35.8–46.3)
HGB BLD-MCNC: 6.8 G/DL (ref 11.7–15.4)
HGB BLD-MCNC: 6.9 G/DL (ref 11.7–15.4)
HGB BLD-MCNC: 7.1 G/DL (ref 11.7–15.4)
HGB BLD-MCNC: 9.2 G/DL (ref 11.7–15.4)
HGB UR QL STRIP: NEGATIVE
HISTORY CHECK: NORMAL
IMM GRANULOCYTES # BLD AUTO: 0 K/UL (ref 0–0.5)
IMM GRANULOCYTES NFR BLD AUTO: 0 % (ref 0–5)
KETONES UR QL STRIP.AUTO: NEGATIVE MG/DL
LEUKOCYTE ESTERASE UR QL STRIP.AUTO: ABNORMAL
LYMPHOCYTES # BLD: 1.8 K/UL (ref 0.5–4.6)
LYMPHOCYTES NFR BLD: 28 % (ref 13–44)
MAGNESIUM SERPL-MCNC: 2.3 MG/DL (ref 1.8–2.4)
MCH RBC QN AUTO: 24.6 PG (ref 26.1–32.9)
MCHC RBC AUTO-ENTMCNC: 31.1 G/DL (ref 31.4–35)
MCV RBC AUTO: 79 FL (ref 79.6–97.8)
MONOCYTES # BLD: 0.5 K/UL (ref 0.1–1.3)
MONOCYTES NFR BLD: 8 % (ref 4–12)
MUCOUS THREADS URNS QL MICRO: 0 /LPF
NEUTS SEG # BLD: 3.9 K/UL (ref 1.7–8.2)
NEUTS SEG NFR BLD: 60 % (ref 43–78)
NITRITE UR QL STRIP.AUTO: NEGATIVE
NRBC # BLD: 0.04 K/UL (ref 0–0.2)
PH UR STRIP: 7 [PH] (ref 5–9)
PHOSPHATE SERPL-MCNC: 2.3 MG/DL (ref 2.3–3.7)
PLATELET # BLD AUTO: 100 K/UL (ref 150–450)
PMV BLD AUTO: 10.3 FL (ref 9.4–12.3)
POTASSIUM SERPL-SCNC: 3.8 MMOL/L (ref 3.5–5.1)
POTASSIUM SERPL-SCNC: 4.1 MMOL/L (ref 3.5–5.1)
PROCALCITONIN SERPL-MCNC: <0.05 NG/ML (ref 0–0.49)
PROT SERPL-MCNC: 5.6 G/DL (ref 6.3–8.2)
PROT SERPL-MCNC: 5.7 G/DL (ref 6.3–8.2)
PROT UR STRIP-MCNC: NEGATIVE MG/DL
RBC # BLD AUTO: 2.81 M/UL (ref 4.05–5.2)
RBC #/AREA URNS HPF: ABNORMAL /HPF
SODIUM SERPL-SCNC: 140 MMOL/L (ref 136–145)
SODIUM SERPL-SCNC: 141 MMOL/L (ref 136–145)
SP GR UR REFRACTOMETRY: 1 (ref 1–1.02)
TROPONIN I SERPL HS-MCNC: 161.4 PG/ML (ref 0–14)
TROPONIN I SERPL HS-MCNC: 179.2 PG/ML (ref 0–14)
URINE CULTURE IF INDICATED: ABNORMAL
UROBILINOGEN UR QL STRIP.AUTO: 0.2 EU/DL (ref 0.2–1)
WBC # BLD AUTO: 6.4 K/UL (ref 4.3–11.1)
WBC URNS QL MICRO: ABNORMAL /HPF

## 2022-09-10 PROCEDURE — 85014 HEMATOCRIT: CPT

## 2022-09-10 PROCEDURE — A4216 STERILE WATER/SALINE, 10 ML: HCPCS | Performed by: FAMILY MEDICINE

## 2022-09-10 PROCEDURE — 93005 ELECTROCARDIOGRAM TRACING: CPT | Performed by: STUDENT IN AN ORGANIZED HEALTH CARE EDUCATION/TRAINING PROGRAM

## 2022-09-10 PROCEDURE — 85025 COMPLETE CBC W/AUTO DIFF WBC: CPT

## 2022-09-10 PROCEDURE — 6360000004 HC RX CONTRAST MEDICATION: Performed by: STUDENT IN AN ORGANIZED HEALTH CARE EDUCATION/TRAINING PROGRAM

## 2022-09-10 PROCEDURE — 2580000003 HC RX 258: Performed by: FAMILY MEDICINE

## 2022-09-10 PROCEDURE — 94760 N-INVAS EAR/PLS OXIMETRY 1: CPT

## 2022-09-10 PROCEDURE — 6360000002 HC RX W HCPCS: Performed by: FAMILY MEDICINE

## 2022-09-10 PROCEDURE — 81001 URINALYSIS AUTO W/SCOPE: CPT

## 2022-09-10 PROCEDURE — 6360000004 HC RX CONTRAST MEDICATION

## 2022-09-10 PROCEDURE — 99253 IP/OBS CNSLTJ NEW/EST LOW 45: CPT | Performed by: INTERNAL MEDICINE

## 2022-09-10 PROCEDURE — 80053 COMPREHEN METABOLIC PANEL: CPT

## 2022-09-10 PROCEDURE — 36430 TRANSFUSION BLD/BLD COMPNT: CPT

## 2022-09-10 PROCEDURE — C9113 INJ PANTOPRAZOLE SODIUM, VIA: HCPCS | Performed by: FAMILY MEDICINE

## 2022-09-10 PROCEDURE — P9016 RBC LEUKOCYTES REDUCED: HCPCS

## 2022-09-10 PROCEDURE — 1100000000 HC RM PRIVATE

## 2022-09-10 PROCEDURE — 84100 ASSAY OF PHOSPHORUS: CPT

## 2022-09-10 PROCEDURE — 6370000000 HC RX 637 (ALT 250 FOR IP): Performed by: FAMILY MEDICINE

## 2022-09-10 PROCEDURE — 87040 BLOOD CULTURE FOR BACTERIA: CPT

## 2022-09-10 PROCEDURE — 93970 EXTREMITY STUDY: CPT

## 2022-09-10 PROCEDURE — 71260 CT THORAX DX C+: CPT

## 2022-09-10 PROCEDURE — 84484 ASSAY OF TROPONIN QUANT: CPT

## 2022-09-10 PROCEDURE — 83735 ASSAY OF MAGNESIUM: CPT

## 2022-09-10 PROCEDURE — 71045 X-RAY EXAM CHEST 1 VIEW: CPT

## 2022-09-10 PROCEDURE — 36415 COLL VENOUS BLD VENIPUNCTURE: CPT

## 2022-09-10 PROCEDURE — 84145 PROCALCITONIN (PCT): CPT

## 2022-09-10 RX ORDER — ACETAMINOPHEN 325 MG/1
650 TABLET ORAL ONCE
Status: COMPLETED | OUTPATIENT
Start: 2022-09-10 | End: 2022-09-10

## 2022-09-10 RX ADMIN — DIATRIZOATE MEGLUMINE AND DIATRIZOATE SODIUM 15 ML: 660; 100 LIQUID ORAL; RECTAL at 09:50

## 2022-09-10 RX ADMIN — SODIUM CHLORIDE, PRESERVATIVE FREE 10 ML: 5 INJECTION INTRAVENOUS at 21:43

## 2022-09-10 RX ADMIN — SODIUM CHLORIDE, PRESERVATIVE FREE 10 ML: 5 INJECTION INTRAVENOUS at 09:49

## 2022-09-10 RX ADMIN — SODIUM CHLORIDE, PRESERVATIVE FREE 40 MG: 5 INJECTION INTRAVENOUS at 09:49

## 2022-09-10 RX ADMIN — SODIUM CHLORIDE, PRESERVATIVE FREE 40 MG: 5 INJECTION INTRAVENOUS at 21:41

## 2022-09-10 RX ADMIN — ACETAMINOPHEN 650 MG: 325 TABLET, FILM COATED ORAL at 00:40

## 2022-09-10 RX ADMIN — IOPAMIDOL 100 ML: 755 INJECTION, SOLUTION INTRAVENOUS at 10:48

## 2022-09-10 NOTE — ED NOTES
Pt spiked fever of 100.5 at 2352. Pt reported no other sx or complaints at that time. Blood stopped at 2359.  Pt was given 650 of tylenol and Blood was restarted at isankat 56 per MD orders     Jagruti Schultz RN  09/10/22 [id number]

## 2022-09-10 NOTE — PLAN OF CARE
Problem: Discharge Planning  Goal: Discharge to home or other facility with appropriate resources  Flowsheets  Taken 9/10/2022 0548  Discharge to home or other facility with appropriate resources: Identify barriers to discharge with patient and caregiver  Taken 9/10/2022 0200  Discharge to home or other facility with appropriate resources: Identify barriers to discharge with patient and caregiver     Problem: Safety - Adult  Goal: Free from fall injury  Flowsheets (Taken 9/10/2022 0548)  Free From Fall Injury:   Instruct family/caregiver on patient safety   Based on caregiver fall risk screen, instruct family/caregiver to ask for assistance with transferring infant if caregiver noted to have fall risk factors     Problem: ABCDS Injury Assessment  Goal: Absence of physical injury  Flowsheets (Taken 9/10/2022 0548)  Absence of Physical Injury: Implement safety measures based on patient assessment

## 2022-09-10 NOTE — H&P
Hospitalist Admission History and Physical         NAME:            Ilia Burkett    Age:                60 y.o.    :               1960    MRN:              [id number]    PCP: JENN Posey NP    Consulting MD:    Treatment Team: Attending Provider: Delgado Bailey DO; Registered Nurse: JARROD Thomas         Chief Complaint   Patient presents with    Palpitations   HPI:    Patient is a 60 y.o. female who presented to the ED for cc weakness and SOB on and off for the past two months. Found to be in A fib RVR on arrival. Given 20mg Cardizem and now HR in 80s. Cardizem stopped due to hypotension. Hg also noted to be low. Admits to dark stools for months. Not on ASA or anticoagulation but does admit to ibuprofen use. Hx of hep C currently being treated, former heavy ETOH use now sober. Hg 4.5. Mg 1.6. EKG on arrival with atrial flutter. Past Medical History:   Diagnosis Date    Fracture of right clavicle 3/2015    History of kidney stones     Hypertension     no meds--- pt stopped on her own--- off meds x 1 yr-- per pt-- b/p stable    Liver disease dx--    HEP C--- not currently seeing a m.d.-- due to  no insurance per pt    Severe protein-calorie malnutrition ([facility] [address] 2020            Past Surgical History:   Procedure Laterality Date    BREAST BIOPSY  1984    cyst    GYN      cone bx    ORTHOPEDIC SURGERY Right 3/2015 at [facility]    clavicle surg            Family History   Problem Relation Age of Onset    Osteoarthritis Mother     Lung Disease Father     Cancer Father     Kidney Disease Brother        Family history reviewed and negative except as noted above. Social History     Social History Narrative    Not on file            Social History     Tobacco Use    Smoking status: Every Day     Packs/day: 1.00     Types: Cigarettes    Smokeless tobacco: Never   Substance Use Topics    Alcohol use:  Yes            Social History     Substance and Sexual Activity   Drug (LL) 11.7 - 15.4 g/dL    Hematocrit 15.9 (L) 35.8 - 46.3 %    MCV 80.3 79.6 - 97.8 FL    MCH 22.7 (L) 26.1 - 32.9 PG    MCHC 28.3 (L) 31.4 - 35.0 g/dL    RDW 18.4 %    Platelets 039 K/uL    MPV 12.2 9.4 - 12.3 FL    nRBC 0.06 K/uL   Magnesium    Collection Time: 09/09/22  6:28 PM   Result Value Ref Range    Magnesium 1.6 (L) 1.8 - 2.4 mg/dL   Troponin    Collection Time: 09/09/22  6:28 PM   Result Value Ref Range    Troponin, High Sensitivity 100.0 (H) 0 - 14 pg/mL   COVID-19, Rapid    Collection Time: 09/09/22  6:28 PM    Specimen: Nasopharyngeal   Result Value Ref Range    Source NASAL      SARS-CoV-2, Rapid Not detected NOTD     D-Dimer, Quantitative    Collection Time: 09/09/22  6:28 PM   Result Value Ref Range    D-Dimer, Quant 1.75 (H) <0.56 ug/ml(FEU)   Brain Natriuretic Peptide    Collection Time: 09/09/22  6:28 PM   Result Value Ref Range    NT Pro- (H) 5 - 125 PG/ML            Physical Exam:         General:    Alert, cooperative, no distress, appears stated age. Eyes:    Conjunctivae/corneas clear. PERRL    Ears:    Normal     Nose:    Wearing mask     Mouth/Throat:    Wearing mask     Neck:    no JVD. Back:     deferred    Lungs:     Clear to auscultation bilaterally. Heart:    Regular rate and rhythm, S1, S2 normal    Abdomen:     Soft, non-tender. Bowel sounds normal. No masses,  No organomegaly. Extremities:    Extremities normal, atraumatic, no cyanosis or edema. Skin:    Skin color, texture, turgor normal. No rashes or lesions    Neurologic:    CNII-XII intact. Normal strength, sensation and reflexes throughout. Assessment and Plan         Principal Problem:    Melena  Active Problems:    Hepatitis C    Hypomagnesemia    Atrial flutter (HCC)    Symptomatic anemia  Resolved Problems:    * No resolved hospital problems. *    Melena with symptomatic anemia - Iron studies. 2 units PRBC ordered in ER. Trend Hg. Remote tele. GI in room speaking with patient now.  Clears, NPO past midnight    Atrial flutter - Likely reactive to her significant anemia. Tx as above. Check phosphorus and TSH. Currently in sinus rhythm.  No anticoagulation     Hypomagnesemia - Supplement     Hep C - holding oral meds for now    DVT prophylaxis - SCDs    Signed By:    Jamilah Ward DO    September 9, 2022

## 2022-09-10 NOTE — CONSULTS
[facility] Consult Note           Referring Provider:  Dr Brewster Arms Date:  9/9/2022    Admit Date:  9/9/2022    Chief Complaint:  Anemia, Heme +    Subjective:     History of Present Illness:  Patient is a 60 y.o. female with PMH including but not limited to , who is seen in consultation at the request of Dr. Radha Wood for anemia with heme + stool. The pt has a hx of multi-substance abuse but has been clean for 18mos. She has been in shelter and has seen [facility] who started her on Epclusa about 3mos ago. In looking at her records from [facility], it appears she has cirrhosis on previous imaging. She presented the ER tonight with decreased energy, SOB, and palpitations found to have a Hgb of 4.5 (no BUN available). The pt reports having \"blonde\" colored stools. She denies any melena, hematochezia or hematemesis. She was told several yrs ago she had anemia and \"was treated\"- unclear how. She denies every having an EGD or colonoscopy. She admits to NSAID use though its unclear how often (has sciatica). In the ER she was found to be in Afib with RVR and elevated troponins. PMH:  Past Medical History:   Diagnosis Date    Fracture of right clavicle 3/2015    History of kidney stones     Hypertension     no meds--- pt stopped on her own--- off meds x 1 yr-- per pt-- b/p stable    Liver disease dx--2011    HEP C--- not currently seeing a m.d.-- due to  no insurance per pt    Severe protein-calorie malnutrition ([facility] [address] 8/29/2020       PSH:  Past Surgical History:   Procedure Laterality Date    BREAST BIOPSY  1984    cyst    GYN      cone bx    ORTHOPEDIC SURGERY Right 3/2015 at [facility]    clavicle surg       Allergies:  No Known Allergies    Home Medications:  Prior to Admission medications    Medication Sig Start Date End Date Taking?  Authorizing Provider   lidocaine 4 % external patch Place 1 patch onto the skin every 24 hours Place 1 patch onto the skin daily 12 hours on, 12 hours off. 8/29/22 9/28/22  Ana Paula Dean MD   acetaminophen (TYLENOL) 500 MG tablet Take 1 tablet by mouth every 6 hours as needed for Pain 8/29/22   Ana Paula Dean MD       Hospital Medications:  Current Facility-Administered Medications   Medication Dose Route Frequency    0.9 % sodium chloride bolus  500 mL IntraVENous NOW    0.9 % sodium chloride infusion   IntraVENous PRN    magnesium sulfate 1000 mg in dextrose 5% 100 mL IVPB  1,000 mg IntraVENous Once    [START ON 9/10/2022] pantoprazole (PROTONIX) 40 mg in sodium chloride (PF) 10 mL injection  40 mg IntraVENous Q12H    0.9 % sodium chloride infusion   IntraVENous Continuous    sodium chloride flush 0.9 % injection 5-40 mL  5-40 mL IntraVENous 2 times per day    sodium chloride flush 0.9 % injection 5-40 mL  5-40 mL IntraVENous PRN    0.9 % sodium chloride infusion   IntraVENous PRN    ondansetron (ZOFRAN-ODT) disintegrating tablet 4 mg  4 mg Oral Q8H PRN    Or    ondansetron (ZOFRAN) injection 4 mg  4 mg IntraVENous Q6H PRN    polyethylene glycol (GLYCOLAX) packet 17 g  17 g Oral Daily PRN     Current Outpatient Medications   Medication Sig    lidocaine 4 % external patch Place 1 patch onto the skin every 24 hours Place 1 patch onto the skin daily 12 hours on, 12 hours off.    acetaminophen (TYLENOL) 500 MG tablet Take 1 tablet by mouth every 6 hours as needed for Pain       Social History:  Social History     Tobacco Use    Smoking status: Every Day     Packs/day: 1.00     Types: Cigarettes    Smokeless tobacco: Never   Substance Use Topics    Alcohol use: Yes           Family History:  Family History   Problem Relation Age of Onset    Osteoarthritis Mother     Lung Disease Father     Cancer Father     Kidney Disease Brother        Review of Systems:  A detailed 10 system ROS is obtained, with pertinent positives as listed above. All others are negative.     Diet:      Objective:     Physical Exam:  Vitals:  BP (!) 90/48   Pulse (!) 155   Temp 98 °F (36.7 °C) (Oral) Resp 28   Ht 5' 6\" (1.676 m)   Wt 130 lb (59 kg)   SpO2 97%   BMI 20.98 kg/m²   Gen:  Pt is alert, cooperative, no acute distress  Skin:  Extremities and face reveal no rashes. HEENT: Sclerae anicteric. The neck is supple. Cardiovascular: Regular rate and rhythm. No murmurs, gallops, or rubs. Respiratory:  Comfortable breathing with no accessory muscle use. Clear breath sounds anteriorly with no wheezes, rales, or rhonchi. GI:  Abdomen nondistended, soft, and nontender. Normal active bowel sounds. Rectal:  Deferred  Musculoskeletal:  No pitting edema of the lower legs. Neurological:  Gross memory appears intact. Patient is alert and oriented. Laboratory:    Recent Labs     09/09/22  1828   WBC 9.3   HGB 4.5*   HCT 15.9*      MCV 80.3   MG 1.6*          Assessment:     Principal Problem:    Melena  Resolved Problems:    * No resolved hospital problems. *      Plan:     60yo WF with hx of substance abuse (abstinent for 18mos), HCV (currently on treatment- Epclusa), and cirrhosis presenting with afib with RVR, found to have a hgb of 4.5 and heme + stool. She denies any melena, hematochezia or hematemesis. Heme + stool and anemia does not equal acute GI bleed. She has become anemic slowly to the point where she is now symptomatic. This could be due to slow GI blood loss or other etiologies (Epclusa can cause anemia).  She has not signs of acute GI hemorrhage.    - Would hold on urgent endoscopy at this time until other issues resolved (anemia, afib with RVR)  - Agree with transfusion now  - Start CLD  - If she improves in the next day or 2, will proceed with prepping for colonoscopy and EGD for anemia work up while she is here  - Kevin Dee for now until her ID ([facility]) doctor is updated with current situation    AMADO Sunshine MD

## 2022-09-10 NOTE — H&P
[address] Cardiology Initial Cardiac Evaluation      Date of  Admission: 9/9/2022  6:10 PM     Primary Care Physician: JENN Pham NP  Primary Cardiologist: None  Referring Physician: Dr Mariposa Garnica  Supervising Physician: Dr Jose Juan Valentin     CC/Reason for evaluation: Atrial fibrillation     HPI:  Misael Keyes is a 60 y.o. female with prior history of substance abuse (abstinent for 18 months), HCV currently on Epclusa and cirrhosis who presented to [facility] ED via EMS on 9/9 with complaint of palpitations, dizziness, exertional dyspnea and malaise. Reports symptoms have been ongoing for last 3-4 weeks. EMS reported atrial fibrillation with rate of 150. Was given 1 L NS and 20 mg IV Cardizem by EMS. Upon evaluation in ED, /45 and . Labs showed WBC 9.3, H/H 4.5/15.9, Ptl 111, Mag 1.6, trop 100, pBNP 183 and D-Dimer 1.75. Rapid COVID negative. Heme positive stools. No BMP/CMP was completed. CXR showed no radiographic evidence of acute cardiopulmonary disease. EKG reported to show atrial fibrillation with RVR but no EKG on chart or scanned under media. Cardizem bolus and gtt were ordered but not given secondary to hypotension and improved heart rate. Patient admitted to medicine team for symptomatic anemia and atrial fibrillation with RVR.       Past Medical History:   Diagnosis Date    Fracture of right clavicle 3/2015    History of kidney stones     Hypertension     no meds--- pt stopped on her own--- off meds x 1 yr-- per pt-- b/p stable    Liver disease dx--2011    HEP C--- not currently seeing a m.d.-- due to  no insurance per pt    Severe protein-calorie malnutrition ([facility] [address] 8/29/2020      Past Surgical History:   Procedure Laterality Date    BREAST BIOPSY  1984    cyst    GYN      cone bx    ORTHOPEDIC SURGERY Right 3/2015 at [facility]    clavicle surg       No Known Allergies   Social History     Socioeconomic History    Marital status:      Spouse name: Not on file    Number of children: Not on file    Years of education: Not on file    Highest education level: Not on file   Occupational History    Not on file   Tobacco Use    Smoking status: Every Day     Packs/day: 1.00     Types: Cigarettes    Smokeless tobacco: Never   Substance and Sexual Activity    Alcohol use: Yes    Drug use: No    Sexual activity: Not on file   Other Topics Concern    Not on file   Social History Narrative    Not on file     Social Determinants of Health     Financial Resource Strain: Not on file   Food Insecurity: Not on file   Transportation Needs: Not on file   Physical Activity: Not on file   Stress: Not on file   Social Connections: Not on file   Intimate Partner Violence: Not on file   Housing Stability: Not on file     Social History       Tobacco History       Smoking Status  Every Day Smoking Frequency  1.00 packs/day Smoking Tobacco Type  Cigarettes      Smokeless Tobacco Use  Never              Alcohol History       Alcohol Use Status  Yes              Drug Use       Drug Use Status  No              Sexual Activity       Sexually Active  Not Asked                    Family History   Problem Relation Age of Onset    Osteoarthritis Mother     Lung Disease Father     Cancer Father     Kidney Disease Brother         Current Facility-Administered Medications   Medication Dose Route Frequency    diatrizoate meglumine-sodium (GASTROGRAFIN) 66-10 % solution 15 mL  15 mL Oral ONCE PRN    0.9 % sodium chloride infusion   IntraVENous PRN    pantoprazole (PROTONIX) 40 mg in sodium chloride (PF) 10 mL injection  40 mg IntraVENous Q12H    0.9 % sodium chloride infusion   IntraVENous Continuous    sodium chloride flush 0.9 % injection 5-40 mL  5-40 mL IntraVENous 2 times per day    sodium chloride flush 0.9 % injection 5-40 mL  5-40 mL IntraVENous PRN    0.9 % sodium chloride infusion   IntraVENous PRN    ondansetron (ZOFRAN-ODT) disintegrating tablet 4 mg  4 mg Oral Q8H PRN    Or    ondansetron (ZOFRAN) injection 4 mg  4 mg IntraVENous Q6H PRN    polyethylene glycol (GLYCOLAX) packet 17 g  17 g Oral Daily PRN       Review of Symptoms:    General: Positive for generalized weakness. No weight changes. No fever or chills  Skin: no rashes, lumps, or other skin changes  HEENT: Positive for dizziness, lightheadedness. No headache,vision changes, hearing changes, tinnitus, vertigo, sinus pressure/pain, bleeding gums, sore throat, or hoarseness  Neck: no swollen glands, goiter, pain or stiffness  Respiratory: Positive for dyspnea. No cough, sputum, hemoptysis, wheezing  Cardiovascular: + as per HPI  Gastrointestinal: no GERD, constipation, diarrhea, liver problems, or h/o GI bleed  Urinary: no frequency, urgency , hematuria, burning/pain with urination, recent flank pain, polyuria, nocturia, or difficulty urinating  Peripheral Vascular: no claudication, leg cramps, prior DVTs, swelling of calves, legs, or feet, color change, or swelling with redness or tenderness  Musculoskeletal: no muscle or joint pain/stiffness, joint swelling, erythema of joints, or back pain  Psychiatric: no depression or excessive stress  Neurological: no sensory or motor loss, seizures, syncope, tremors, numbness, no dementia  Hematologic: no anemia, easy bruising or bleeding  Endocrine: No thyroid problems, heat or cold intolerance, excessive sweating, polyuria, polydipsia, diabetes.        Subjective:     Physical Exam:    Vitals:    09/10/22 0400 09/10/22 0528 09/10/22 0530 09/10/22 0821   BP: (!) 105/56 (!) 125/59 (!) 125/59 (!) 116/56   Pulse: 90 93 93 96   Resp: 18 18 18 20   Temp: 100 °F (37.8 °C) 99.6 °F (37.6 °C) 99.6 °F (37.6 °C) 99.1 °F (37.3 °C)   TempSrc: Oral   Oral   SpO2: 95%  95% 97%   Weight:       Height:         General: Well Developed, Well Nourished, No Acute Distress  HEENT: pupils equal and round, no abnormalities noted  Neck: supple, no JVD, no carotid bruits  Heart: S1S2 with RRR without murmurs or gallops  Lungs: Clear throughout auscultation bilaterally without adventitious sounds  Abd: soft, nontender, nondistended, with good bowel sounds  Ext: warm, no edema, calves supple/nontender, pulses 2+ bilaterally  Skin: warm and dry  Psychiatric: Normal mood and affect  Neurologic: Alert and oriented X 3    Cardiographics    Telemetry: normal sinus rhythm  Echocardiogram: ordered     Labs:     Recent Labs     09/10/22  0807 09/10/22  0625 09/09/22  1828   WBC 6.4  --  9.3   HGB 6.9* 6.8* 4.5*   HCT 22.2* 21.7* 15.9*   MCV 79.0*  --  80.3   *  --  111     Lab Results   Component Value Date    WBC 6.4 09/10/2022    HGB 6.9 (LL) 09/10/2022    HCT 22.2 (L) 09/10/2022     (L) 09/10/2022    ALT PENDING 09/10/2022    AST PENDING 09/10/2022    NA PENDING 09/10/2022    K PENDING 09/10/2022    CL PENDING 09/10/2022    CREATININE PENDING 09/10/2022    BUN PENDING 09/10/2022    CO2 PENDING 09/10/2022    TSH 0.867 08/27/2020    INR 1.4 09/09/2022        Recent Labs     09/09/22  1828   DDIMER 1.75*     Pt has been seen and examined by Dr. Kacey Luther. He agrees with the following assessment and plan. Assessment/Plan:       Principal Problem:    Symptomatic anemia     Melena/Heme positive stools  - Hgb 4.5 on arrival  - s/p transfusion 2 units PRBCs  - GI feels she became anemic slowly and plans colonoscopy/EGD on Monday    Active Problems:    Atrial fibrillation  - No strips or EKG to review/confirm, currently in sinus rhythm   - no prior history documented   - No OAC at this time secondary to symptomatic anemia  - ECHO ordered   - continue to monitor on telemetry for atrial fibrillation       Hepatitis C  - per primary team       Hypomagnesemia  - 1.6 on arrival  - replaced  - 2.3 today     Thank you for requesting cardiac evaluation and allowing us to participate in the care of this patient. We will continue to follow along with you.     Cali Ortez PA-C  Supervising Physician: Dr Kacey Luther     Attending Addendum    Patient independently seen and examined by me. Agree with above note by physician extender with the following additions and exceptions: 1 y.o. female with prior history of substance abuse (abstinent for 18 months), HCV currently on Epclusa and cirrhosis who presented to [facility] ED via EMS on 9/9 with complaint of palpitations, dizziness, exertional dyspnea and malaise and was reportedly in afib with RVR, however, currently no strips or EKG to review rhythm and has been in NSR since admission    Key findings are:  No CP or FRIEDMAN  CV- RRR without murmur  Lungs- Clear bilaterally  Ext- no edema    Plan: discussed with staff the need to find strip or EKG to confirm afib presenting rhythm       --after stabilization, would add low dose beta blocker       --check TTE, no AC 2/2 anemia       --will reassess pending clinical course     Carlos Strong [facility] Cardiology

## 2022-09-10 NOTE — PROGRESS NOTES
[facility] Progress Note         Admit Date:  9/9/2022    Today's Date:  9/10/2022    CC:  Anemia    Subjective:     Patient feeling better after transfusions. Tolerating clears. Medications:   Current Facility-Administered Medications   Medication Dose Route Frequency    diatrizoate meglumine-sodium (GASTROGRAFIN) 66-10 % solution 15 mL  15 mL Oral ONCE PRN    iopamidol (ISOVUE-370) 76 % injection 100 mL  100 mL IntraVENous ONCE PRN    0.9 % sodium chloride infusion   IntraVENous PRN    pantoprazole (PROTONIX) 40 mg in sodium chloride (PF) 10 mL injection  40 mg IntraVENous Q12H    0.9 % sodium chloride infusion   IntraVENous Continuous    sodium chloride flush 0.9 % injection 5-40 mL  5-40 mL IntraVENous 2 times per day    sodium chloride flush 0.9 % injection 5-40 mL  5-40 mL IntraVENous PRN    0.9 % sodium chloride infusion   IntraVENous PRN    ondansetron (ZOFRAN-ODT) disintegrating tablet 4 mg  4 mg Oral Q8H PRN    Or    ondansetron (ZOFRAN) injection 4 mg  4 mg IntraVENous Q6H PRN    polyethylene glycol (GLYCOLAX) packet 17 g  17 g Oral Daily PRN       Review of Systems:  ROS was obtained, with pertinent positives as listed above. No chest pain or SOB. Diet:      Objective:   Vitals:  BP (!) 116/56   Pulse 96   Temp 99.1 °F (37.3 °C) (Oral)   Resp 20   Ht 5' 6\" (1.676 m)   Wt 130 lb (59 kg)   SpO2 97%   BMI 20.98 kg/m²   Intake/Output:  No intake/output data recorded. 09/08 1901 - 09/10 0700  In: 745 [I.V.:100]  Out: 500 [Urine:500]  Exam:  General appearance: alert, cooperative, no distress  Lungs: clear to auscultation bilaterally anteriorly  Heart: regular rate and rhythm  Abdomen: soft, non-tender.  Bowel sounds normal. No masses, no organomegaly  Extremities: extremities normal, atraumatic, no cyanosis or edema  Neuro:  alert and oriented    Data Review (Labs):    Recent Labs     09/09/22  1828 09/10/22  0625 09/10/22  0807   WBC 9.3  --  6.4   HGB 4.5* 6.8* 6.9*   HCT 15.9* 21.7* 22.2*     --  100*   MCV 80.3  --  79.0*   MG 1.6*  --   --    INR 1.4  --   --        Assessment:     Principal Problem:    Melena  Active Problems:    Hepatitis C    Hypomagnesemia    Atrial flutter (HCC)    Symptomatic anemia  Resolved Problems:    * No resolved hospital problems. *      Plan:     60yo WF with hx of substance abuse (abstinent for 18mos), HCV (currently on treatment- Epclusa), and cirrhosis presenting with afib with RVR, found to have a hgb of 4.5 and heme + stool. She denies any melena, hematochezia or hematemesis. Heme + stool and anemia does not equal acute GI bleed. She has become anemic slowly to the point where she is now symptomatic. This could be due to slow GI blood loss or other etiologies (Epclusa could cause anemia).  She has no signs of acute GI hemorrhage.    - Would hold on urgent endoscopy at this time until other issues resolved (anemia, afib with RVR)  - S/p transfusion: labs pending  - Continue CLD  - Will plan on proceeding with prepping tomorrow for colonoscopy and EGD Monday for anemia work up   [employer] for now until her ID ([facility]) doctor is updated with current situation    Nicole Cesar MD

## 2022-09-10 NOTE — H&P (VIEW-ONLY)
[facility] Consult Note           Referring Provider:  Dr Wendy Oconnor Date:  9/9/2022    Admit Date:  9/9/2022    Chief Complaint:  Anemia, Heme +    Subjective:     History of Present Illness:  Patient is a 60 y.o. female with PMH including but not limited to , who is seen in consultation at the request of Dr. Shelly Angel for anemia with heme + stool. The pt has a hx of multi-substance abuse but has been clean for 18mos. She has been in shelter and has seen [facility] who started her on Epclusa about 3mos ago. In looking at her records from [address] [place] [address], it appears she has cirrhosis on previous imaging. She presented the ER tonight with decreased energy, SOB, and palpitations found to have a Hgb of 4.5 (no BUN available). The pt reports having \"blonde\" colored stools. She denies any melena, hematochezia or hematemesis. She was told several yrs ago she had anemia and \"was treated\"- unclear how. She denies every having an EGD or colonoscopy. She admits to NSAID use though its unclear how often (has sciatica). In the ER she was found to be in Afib with RVR and elevated troponins. PMH:  Past Medical History:   Diagnosis Date    Fracture of right clavicle 3/2015    History of kidney stones     Hypertension     no meds--- pt stopped on her own--- off meds x 1 yr-- per pt-- b/p stable    Liver disease dx--2011    HEP C--- not currently seeing a m.d.-- due to  no insurance per pt    Severe protein-calorie malnutrition ([facility] [address] 8/29/2020       PSH:  Past Surgical History:   Procedure Laterality Date    BREAST BIOPSY  1984    cyst    GYN      cone bx    ORTHOPEDIC SURGERY Right 3/2015 at [facility]    clavicle surg       Allergies:  No Known Allergies    Home Medications:  Prior to Admission medications    Medication Sig Start Date End Date Taking?  Authorizing Provider   lidocaine 4 % external patch Place 1 patch onto the skin every 24 hours Place 1 patch onto the skin daily 12 hours on, 12 hours off. 8/29/22 9/28/22  Jamir Vicente MD   acetaminophen (TYLENOL) 500 MG tablet Take 1 tablet by mouth every 6 hours as needed for Pain 8/29/22   Jamir Vicente MD       Hospital Medications:  Current Facility-Administered Medications   Medication Dose Route Frequency    0.9 % sodium chloride bolus  500 mL IntraVENous NOW    0.9 % sodium chloride infusion   IntraVENous PRN    magnesium sulfate 1000 mg in dextrose 5% 100 mL IVPB  1,000 mg IntraVENous Once    [START ON 9/10/2022] pantoprazole (PROTONIX) 40 mg in sodium chloride (PF) 10 mL injection  40 mg IntraVENous Q12H    0.9 % sodium chloride infusion   IntraVENous Continuous    sodium chloride flush 0.9 % injection 5-40 mL  5-40 mL IntraVENous 2 times per day    sodium chloride flush 0.9 % injection 5-40 mL  5-40 mL IntraVENous PRN    0.9 % sodium chloride infusion   IntraVENous PRN    ondansetron (ZOFRAN-ODT) disintegrating tablet 4 mg  4 mg Oral Q8H PRN    Or    ondansetron (ZOFRAN) injection 4 mg  4 mg IntraVENous Q6H PRN    polyethylene glycol (GLYCOLAX) packet 17 g  17 g Oral Daily PRN     Current Outpatient Medications   Medication Sig    lidocaine 4 % external patch Place 1 patch onto the skin every 24 hours Place 1 patch onto the skin daily 12 hours on, 12 hours off.    acetaminophen (TYLENOL) 500 MG tablet Take 1 tablet by mouth every 6 hours as needed for Pain       Social History:  Social History     Tobacco Use    Smoking status: Every Day     Packs/day: 1.00     Types: Cigarettes    Smokeless tobacco: Never   Substance Use Topics    Alcohol use: Yes           Family History:  Family History   Problem Relation Age of Onset    Osteoarthritis Mother     Lung Disease Father     Cancer Father     Kidney Disease Brother        Review of Systems:  A detailed 10 system ROS is obtained, with pertinent positives as listed above. All others are negative.     Diet:      Objective:     Physical Exam:  Vitals:  BP (!) 90/48   Pulse (!) 155   Temp 98 °F (36.7 °C) (Oral) Resp 28   Ht 5' 6\" (1.676 m)   Wt 130 lb (59 kg)   SpO2 97%   BMI 20.98 kg/m²   Gen:  Pt is alert, cooperative, no acute distress  Skin:  Extremities and face reveal no rashes. HEENT: Sclerae anicteric. The neck is supple. Cardiovascular: Regular rate and rhythm. No murmurs, gallops, or rubs. Respiratory:  Comfortable breathing with no accessory muscle use. Clear breath sounds anteriorly with no wheezes, rales, or rhonchi. GI:  Abdomen nondistended, soft, and nontender. Normal active bowel sounds. Rectal:  Deferred  Musculoskeletal:  No pitting edema of the lower legs. Neurological:  Gross memory appears intact. Patient is alert and oriented. Laboratory:    Recent Labs     09/09/22  1828   WBC 9.3   HGB 4.5*   HCT 15.9*      MCV 80.3   MG 1.6*          Assessment:     Principal Problem:    Melena  Resolved Problems:    * No resolved hospital problems. *      Plan:     60yo WF with hx of substance abuse (abstinent for 18mos), HCV (currently on treatment- Epclusa), and cirrhosis presenting with afib with RVR, found to have a hgb of 4.5 and heme + stool. She denies any melena, hematochezia or hematemesis. Heme + stool and anemia does not equal acute GI bleed. She has become anemic slowly to the point where she is now symptomatic. This could be due to slow GI blood loss or other etiologies (Epclusa can cause anemia).  She has not signs of acute GI hemorrhage.    - Would hold on urgent endoscopy at this time until other issues resolved (anemia, afib with RVR)  - Agree with transfusion now  - Start CLD  - If she improves in the next day or 2, will proceed with prepping for colonoscopy and EGD for anemia work up while she is here  - [employer] for now until her ID ([facility]) doctor is updated with current situation    AMADO Nguyen MD

## 2022-09-10 NOTE — ED NOTES
TRANSFER - OUT REPORT:    Verbal report given to RN on Kaci Cooper  being transferred to [facility] for routine progression of patient care       Report consisted of patient's Situation, Background, Assessment and   Recommendations(SBAR). Information from the following report(s) ED SBAR was reviewed with the receiving nurse. Lines:   Peripheral IV 09/09/22 Left;Ventral Hand (Active)       Peripheral IV 09/09/22 Right Antecubital (Active)        Opportunity for questions and clarification was provided.       Patient transported with:  Registered Nurse      Mimi Schultz RN  09/10/22 1551

## 2022-09-10 NOTE — PROGRESS NOTES
Hospitalist Progress Note   Admit Date:  2022  6:10 PM   Name:  Lencho Pena   Age:  60 y.o. Sex:  female  :  1960   MRN:  [id number]   Room:        Reason(s) for Admission: Melena [K92.1]  Anemia due to acute blood loss [D62]  Atrial fibrillation with RVR (HCC) [I48.91]  Gastrointestinal hemorrhage, unspecified gastrointestinal hemorrhage type [facility] Course & Interval History:   Patient is a 60 y.o. female who presented to the ED for cc weakness and SOB on and off for the past two months. Found to be in A fib RVR on arrival. Given 20mg Cardizem and now HR in 80s. Cardizem stopped due to hypotension. Hg also noted to be low. Admits to dark stools for months. Not on ASA or anticoagulation but does admit to ibuprofen use. Hx of hep C currently being treated, former heavy ETOH use now sober. Hg 4.5. Mg 1.6. EKG on arrival with atrial flutter. Subjective/24hr Events (09/10/22): Patient is seen and examined at the bedside. She received 2 units of PRBCs yesterday and her repeat hemoglobin is 6.9 in a.m. She spiked fever of 100.6 Fahrenheit. She is complaining of tiredness, dizziness, bilateral leg pain. She states she cannot take narcotics. Patient denies nausea, vomiting, hematochezia, melena, palpitations, chest pain. Assessment & Plan:   Acute GI bleed  Heme positive stools  Continue PPI twice daily  Continue clear liquid diet  No plan for urgent EGD  Plan for colonoscopy and EGD on   GI is following, appreciate the recommendations    Symptomatic anemia  Hemoglobin of 4.5 on admission  S/p  2 units PRBC given on .   Repeat hemoglobin of 6.9  Giving 1 unit of PRBC  Follow-up with repeat posttransfusion CBC  Transfuse blood if hemoglobin is less than 7 or symptomatic    Fever  Patient spiked fever of 100.6 most likely due to blood transfusion, less likely infectious  UA is negative, lactic acid 2  Chest x-ray clear  Follow-up with CT abdomen pelvis  Holding off on antibiotics    Atrial fibrillation with RVR   Currently in sinus rhythm. Troponin is 100 >179 and follow-up with repeat troponin  Follow-up with EKG  Follow-up with echo  Telemetry monitoring for now  No anticoagulation due to GI bleed  Cardiology consulted     Hypomagnesemia -resolved     Hep C - holding oral meds for now  Follow-up with (MORGAN Fields ) as outpatient for resuming Epclusa     Elevated D-dimer  Follow-up with CT chest   Follow-up with venous Doppler     DVT prophylaxis - SCDs    Diet:  ADULT DIET;  Clear Liquid  DVT PPx: SCD  Code status: Full Code    Hospital Problems             Last Modified POA    * (Principal) Melena 9/9/2022 Yes    Hepatitis C 9/9/2022 Yes    Hypomagnesemia 9/9/2022 Yes    Atrial flutter ([address] 9/9/2022 Yes    Symptomatic anemia 9/9/2022 Yes       Objective:   Patient Vitals for the past 24 hrs:   Temp Pulse Resp BP SpO2   09/10/22 1206 99.7 °F (37.6 °C) 91 20 (!) 111/55 97 %   09/10/22 0821 99.1 °F (37.3 °C) 96 20 (!) 116/56 97 %   09/10/22 0530 99.6 °F (37.6 °C) 93 18 (!) 125/59 95 %   09/10/22 0528 99.6 °F (37.6 °C) 93 18 (!) 125/59 --   09/10/22 0400 100 °F (37.8 °C) 90 18 (!) 105/56 95 %   09/10/22 0330 -- 93 -- (!) 113/56 --   09/10/22 0317 100 °F (37.8 °C) 92 18 (!) 111/55 97 %   09/10/22 0315 100 °F (37.8 °C) 92 18 (!) 111/55 97 %   09/10/22 0258 (!) 100.6 °F (38.1 °C) 90 18 (!) 114/57 95 %   09/10/22 0200 (!) 101 °F (38.3 °C) 95 18 (!) 111/54 98 %   09/10/22 0030 100.4 °F (38 °C) 99 17 (!) 111/49 --   09/10/22 0000 -- 100 20 (!) 111/47 97 %   09/09/22 2357 (!) 100.5 °F (38.1 °C) -- -- -- --   09/09/22 2352 (!) 100.5 °F (38.1 °C) 100 18 (!) 106/51 98 %   09/09/22 2345 -- 98 20 (!) 106/51 97 %   09/09/22 2330 -- 96 17 (!) 109/48 99 %   09/09/22 2329 100.3 °F (37.9 °C) -- 17 (!) 114/50 96 %   09/09/22 2318 -- 99 17 (!) 114/50 99 %   09/09/22 2307 -- 97 19 (!) 112/50 98 %   09/09/22 2301 -- 99 21 (!) 108/47 99 %   09/09/22 2256 -- 98 16 (!) 111/50 97 % 09/09/22 2252 98.8 °F (37.1 °C) 98 16 (!) 114/54 97 %   09/09/22 2148 -- 99 20 (!) 110/48 96 %   09/09/22 2141 -- 98 17 (!) 105/46 --   09/09/22 2116 -- 100 15 (!) 95/47 97 %   09/09/22 2056 -- 94 16 (!) 104/49 --   09/09/22 2046 -- 95 20 (!) 99/50 --   09/09/22 2026 -- 91 15 (!) 103/45 --   09/09/22 2016 -- 90 14 102/61 --   09/09/22 2006 -- 93 19 (!) 103/45 97 %   09/09/22 1836 -- (!) 155 -- (!) 90/48 97 %   09/09/22 1830 -- (!) 144 28 (!) 88/47 96 %   09/09/22 1817 98 °F (36.7 °C) (!) 143 20 (!) 100/45 97 %   09/09/22 1816 -- (!) 141 20 (!) 100/45 97 %       Estimated body mass index is 20.98 kg/m² as calculated from the following:    Height as of this encounter: 5' 6\" (1.676 m). Weight as of this encounter: 130 lb (59 kg). Intake/Output Summary (Last 24 hours) at 9/10/2022 1222  Last data filed at 9/10/2022 0930  Gross per 24 hour   Intake 985 ml   Output 1750 ml   Net -765 ml         Physical Exam:   Blood pressure (!) 111/55, pulse 91, temperature 99.7 °F (37.6 °C), temperature source Oral, resp. rate 20, height 5' 6\" (1.676 m), weight 130 lb (59 kg), SpO2 97 %. General:    Well nourished. No overt distress. Pale looking  Head:  Normocephalic, atraumatic  Eyes:  Sclerae appear normal. Pupils equally round. ENT:  Nares appear normal, no drainage. Moist oral mucosa  Neck:  No restricted ROM. Trachea midline. CV:   RRR. No m/r/g. No jugular venous distension. Lungs:   CTAB. No wheezing, rhonchi, or rales. Respirations even, unlabored. Abdomen: Bowel sounds present. Soft, nontender, nondistended. Extremities: No cyanosis or clubbing. No edema. Skin:     No rashes and normal coloration. Warm and dry. Neuro:  CN II-XII grossly intact. Sensation intact. A&Ox3  Psych:  Normal mood and affect.       I have reviewed ordered lab tests and independently visualized imaging below:    Recent Labs:  Recent Results (from the past 48 hour(s))   CBC with Auto Differential    Collection Time: 09/09/22 6:28 PM   Result Value Ref Range    WBC 9.3 4.3 - 11.1 K/uL    RBC 1.98 M/uL    Hemoglobin 4.5 (LL) 11.7 - 15.4 g/dL    Hematocrit 15.9 (L) 35.8 - 46.3 %    MCV 80.3 79.6 - 97.8 FL    MCH 22.7 (L) 26.1 - 32.9 PG    MCHC 28.3 (L) 31.4 - 35.0 g/dL    RDW 18.4 %    Platelets 747 K/uL    MPV 12.2 9.4 - 12.3 FL    nRBC 0.06 K/uL   Magnesium    Collection Time: 09/09/22  6:28 PM   Result Value Ref Range    Magnesium 1.6 (L) 1.8 - 2.4 mg/dL   Troponin    Collection Time: 09/09/22  6:28 PM   Result Value Ref Range    Troponin, High Sensitivity 100.0 (H) 0 - 14 pg/mL   COVID-19, Rapid    Collection Time: 09/09/22  6:28 PM    Specimen: Nasopharyngeal   Result Value Ref Range    Source NASAL      SARS-CoV-2, Rapid Not detected NOTD     D-Dimer, Quantitative    Collection Time: 09/09/22  6:28 PM   Result Value Ref Range    D-Dimer, Quant 1.75 (H) <0.56 ug/ml(FEU)   Brain Natriuretic Peptide    Collection Time: 09/09/22  6:28 PM   Result Value Ref Range    NT Pro- (H) 5 - 125 PG/ML   Transferrin Saturation    Collection Time: 09/09/22  6:28 PM   Result Value Ref Range    Iron 16 (L) 35 - 150 ug/dL    TIBC 330 250 - 450 ug/dL    TRANSFERRIN SATURATION 5 (L) >20 %   Protime-INR    Collection Time: 09/09/22  6:28 PM   Result Value Ref Range    Protime 17.3 (H) 12.6 - 14.5 sec    INR 1.4     Phosphorus    Collection Time: 09/09/22  6:28 PM   Result Value Ref Range    Phosphorus 2.4 2.3 - 3.7 MG/DL   TSH with Reflex    Collection Time: 09/09/22  6:28 PM   Result Value Ref Range    TSH w Free Thyroid if Abnormal 2.57 0.358 - 3.740 UIU/ML   Ferritin    Collection Time: 09/09/22  6:28 PM   Result Value Ref Range    Ferritin 5 (L) 8 - 388 NG/ML   Transferrin    Collection Time: 09/09/22  6:28 PM   Result Value Ref Range    Transferrin 250 202 - 364 mg/dL   TYPE AND SCREEN    Collection Time: 09/09/22  8:15 PM   Result Value Ref Range    Crossmatch expiration date 09/12/2022,0499     ABO/Rh O NEGATIVE     Antibody Screen NEG Blood Bank Comment       BLOOD READY CALLED A POD  @ 0738 ON [id number]     [facility]      Unit Number [id number]     Product Code Blood Bank RC LR     Unit Divison 00     Dispense Status Blood Bank ISSUED     Crossmatch Result Compatible     Unit Number [id number]     Product Code Blood Bank RC LR     Unit Divison 00     Dispense Status Blood Bank ISSUED     Crossmatch Result Compatible     Unit Number [id number]     Product Code Blood Bank RC LR     Unit Divison 00     Dispense Status Blood Bank ALLOCATED     Crossmatch Result Compatible    PREPARE RBC (CROSSMATCH), 2 Units    Collection Time: 09/09/22  8:15 PM   Result Value Ref Range    History Check Historical check performed    Urinalysis with Reflex to Culture    Collection Time: 09/10/22  1:17 AM    Specimen: Urine   Result Value Ref Range    Color, UA YELLOW      Appearance CLEAR      Specific Gravity, UA 1.005 1.001 - 1.023      pH, Urine 7.0 5.0 - 9.0      Protein, UA Negative NEG mg/dL    Glucose, UA Negative mg/dL    Ketones, Urine Negative NEG mg/dL    Bilirubin Urine Negative NEG      Blood, Urine Negative NEG      Urobilinogen, Urine 0.2 0.2 - 1.0 EU/dL    Nitrite, Urine Negative NEG      Leukocyte Esterase, Urine SMALL (A) NEG      WBC, UA 0-3 0 /hpf    RBC, UA 0-3 0 /hpf    BACTERIA, URINE 0 0 /hpf    Urine Culture if Indicated CULTURE NOT INDICATED BY UA RESULT      Epithelial Cells UA 0-3 0 /hpf    Casts 0-3 0 /lpf    Crystals 0 0 /LPF    Mucus, UA 0 0 /lpf   Magnesium    Collection Time: 09/10/22  6:25 AM   Result Value Ref Range    Magnesium 2.3 1.8 - 2.4 mg/dL   Comprehensive Metabolic Panel w/ Reflex to MG    Collection Time: 09/10/22  6:25 AM   Result Value Ref Range    Sodium 141 136 - 145 mmol/L    Potassium 3.8 3.5 - 5.1 mmol/L    Chloride 114 (H) 101 - 110 mmol/L    CO2 20 (L) 21 - 32 mmol/L    Anion Gap 7 4 - 13 mmol/L    Glucose 94 65 - 100 mg/dL    BUN 12 8 - 23 MG/DL    Creatinine 0.70 0.6 - 1.0 MG/DL    GFR African American >60 >60 ml/min/1.73m2    GFR Non- >60 >60 ml/min/1.73m2    Calcium 7.6 (L) 8.3 - 10.4 MG/DL    Total Bilirubin 1.4 (H) 0.2 - 1.1 MG/DL    ALT 28 12 - 65 U/L    AST 24 15 - 37 U/L    Alk Phosphatase 67 50 - 136 U/L    Total Protein 5.6 (L) 6.3 - 8.2 g/dL    Albumin 2.8 (L) 3.2 - 4.6 g/dL    Globulin 2.8 2.3 - 3.5 g/dL    Albumin/Globulin Ratio 1.0 (L) 1.2 - 3.5     Hemoglobin and Hematocrit    Collection Time: 09/10/22  6:25 AM   Result Value Ref Range    Hemoglobin 6.8 (LL) 11.7 - 15.4 g/dL    Hematocrit 21.7 (L) 35.8 - 46.3 %   CBC with Auto Differential    Collection Time: 09/10/22  8:07 AM   Result Value Ref Range    WBC 6.4 4.3 - 11.1 K/uL    RBC 2.81 (L) 4.05 - 5.2 M/uL    Hemoglobin 6.9 (LL) 11.7 - 15.4 g/dL    Hematocrit 22.2 (L) 35.8 - 46.3 %    MCV 79.0 (L) 79.6 - 97.8 FL    MCH 24.6 (L) 26.1 - 32.9 PG    MCHC 31.1 (L) 31.4 - 35.0 g/dL    RDW 17.3 (H) 11.9 - 14.6 %    Platelets 556 (L) 053 - 450 K/uL    MPV 10.3 9.4 - 12.3 FL    nRBC 0.04 0.0 - 0.2 K/uL    Differential Type AUTOMATED      Seg Neutrophils 60 43 - 78 %    Lymphocytes 28 13 - 44 %    Monocytes 8 4.0 - 12.0 %    Eosinophils % 3 0.5 - 7.8 %    Basophils 0 0.0 - 2.0 %    Immature Granulocytes 0 0.0 - 5.0 %    Segs Absolute 3.9 1.7 - 8.2 K/UL    Absolute Lymph # 1.8 0.5 - 4.6 K/UL    Absolute Mono # 0.5 0.1 - 1.3 K/UL    Absolute Eos # 0.2 0.0 - 0.8 K/UL    Basophils Absolute 0.0 0.0 - 0.2 K/UL    Absolute Immature Granulocyte 0.0 0.0 - 0.5 K/UL   Comprehensive Metabolic Panel w/ Reflex to MG    Collection Time: 09/10/22  8:07 AM   Result Value Ref Range    Sodium 140 136 - 145 mmol/L    Potassium 4.1 3.5 - 5.1 mmol/L    Chloride 113 (H) 101 - 110 mmol/L    CO2 20 (L) 21 - 32 mmol/L    Anion Gap 7 4 - 13 mmol/L    Glucose 96 65 - 100 mg/dL    BUN 12 8 - 23 MG/DL    Creatinine 0.60 0.6 - 1.0 MG/DL    GFR African American >60 >60 ml/min/1.73m2    GFR Non- >60 >60 ml/min/1.73m2    Calcium 7.7 (L) 8.3 - 10.4 W CONTRAST 9/10/2022 10:55 AM HISTORY: Dyspnea. Elevated d-dimer. History of smoking. COMPARISON: CT abdomen pelvis 8/10/2018. Multiple axial images were obtained through the chest, abdomen, and pelvis. Oral contrast was used for bowel opacification. 100 mL of Isovue 370 intravenous contrast was used for better evaluation of solid organs and vascular structures. Radiation dose reduction techniques were used for this study. All CT scans performed at this facility use one or all of the following: Automated exposure control, adjustment of the mA and/or kVp according to patient's size, iterative reconstruction. FINDINGS: LUNGS AND PLEURA: Lungs are clear with a pleural-based posterior left lower lobe lesion which is indeterminate measuring 2.2 x 1.1 cm on image 40 of series 302. Intercostal soft tissue mass remains in the differential. No prior imaging available for comparison. No pleural effusions. MEDIASTINUM/AXILLAE: Heart is normal in size. No pericardial effusion. Esophagus is unremarkable. No enlarged lymph nodes. Coronary artery calcifications are present. Calcified plaque thoracic aorta without aneurysm or dissection. HEPATOBILIARY: Nodular liver contour consistent with cirrhosis. No focal mass. Cholelithiasis. Mild gallbladder wall edema also noted which is nonspecific in the setting of mild ascites. PANCREAS: Evidence of chronic pancreatitis with diffuse pancreatic glandular calcification. No pancreatic ductal dilatation or mass. Probable pancreatic tail cyst measuring 1.2 cm on image 27 of series 303. SPLEEN: Splenomegaly. Spleen measures nearly 14 cm in craniocaudal dimension image 46 of series 603. ADRENAL GLANDS: Normal. KIDNEYS/BLADDER: Left hydronephrosis/extrarenal pelvis and mild renal cortical thinning is noted probably reflecting a left UPJ stricture. Ureters are nondilated. No renal calculi. No enhancing renal mass. Bladder is well-distended and unremarkable.  BOWEL: Colonic wall thickening noted involving the cecum and ascending colon with surrounding inflammation most consistent with an infectious or inflammatory colitis. Terminal ileum appears normal. Appendix is unremarkable. LYMPH NODES: No enlarged abdominal or pelvic lymph nodes. VASCULATURE: Calcified plaque abdominal aorta with dense calcification distal abdominal aorta concerning for severe stenosis or occlusion. This is minimally changed since prior exam. Severe stenosis bilateral common iliac artery also noted. PELVIC ORGANS: Uterus and rectum are unremarkable. Small to moderate amount of free pelvic fluid is present. MUSCULOSKELETAL: Normal.     1. Probable acute infectious or inflammatory colitis involving the cecum and ascending colon. Terminal ileum and appendix appear normal. Remainder of the bowel is within normal limits. No obstruction. 2. Cirrhotic liver morphology with splenomegaly and mild ascites. Cholelithiasis with edematous-appearing gallbladder wall of uncertain significance but likely nonspecific in the setting of ascites. These findings are new in the interval since prior exam. 3. Left hydronephrosis and dilated extrarenal pelvis likely due to chronic UPJ stricture. Follow up with urology as clinically warranted. Mild interval cortical thinning left kidney. No renal cyst or mass. No urinary tract calculi. 4. Dense calcified atherosclerotic plaque involving the distal abdominal aorta with severe stenosis/occlusion and severe stenosis of the common iliac arteries bilaterally. Little change since prior exam.     Vascular duplex lower extremity venous bilateral    Result Date: 9/10/2022  Bilateral lower extremity venous ultrasound INDICATION: Bilateral lower extremity swelling. Elevated d-dimer. Doppler ultrasound of both lower extremities was performed. FINDINGS:  There is normal flow in the greater saphenous, common femoral, superficial femoral, and popliteal veins. Normal compression and augmentation is demonstrated.  The proximal calf veins are also patent. No evidence of deep venous thrombosis in either lower extremity       Current Meds:  Current Facility-Administered Medications   Medication Dose Route Frequency    diatrizoate meglumine-sodium (GASTROGRAFIN) 66-10 % solution 15 mL  15 mL Oral ONCE PRN    0.9 % sodium chloride infusion   IntraVENous PRN    pantoprazole (PROTONIX) 40 mg in sodium chloride (PF) 10 mL injection  40 mg IntraVENous Q12H    0.9 % sodium chloride infusion   IntraVENous Continuous    sodium chloride flush 0.9 % injection 5-40 mL  5-40 mL IntraVENous 2 times per day    sodium chloride flush 0.9 % injection 5-40 mL  5-40 mL IntraVENous PRN    0.9 % sodium chloride infusion   IntraVENous PRN    ondansetron (ZOFRAN-ODT) disintegrating tablet 4 mg  4 mg Oral Q8H PRN    Or    ondansetron (ZOFRAN) injection 4 mg  4 mg IntraVENous Q6H PRN    polyethylene glycol (GLYCOLAX) packet 17 g  17 g Oral Daily PRN       Signed:  Leslie Willson MD    Part of this note may have been written by using a voice dictation software. The note has been proof read but may still contain some grammatical/other typographical errors.

## 2022-09-10 NOTE — PLAN OF CARE
Problem: Discharge Planning  Goal: Discharge to home or other facility with appropriate resources  9/10/2022 1056 by Bushra Zepeda RN  Outcome: Progressing  9/10/2022 0548 by Brian Ramos RN  Flowsheets  Taken 9/10/2022 5966  Discharge to home or other facility with appropriate resources: Identify barriers to discharge with patient and caregiver  Taken 9/10/2022 0200  Discharge to home or other facility with appropriate resources: Identify barriers to discharge with patient and caregiver     Problem: Safety - Adult  Goal: Free from fall injury  9/10/2022 1056 by Bushra Zepeda RN  Outcome: Progressing  9/10/2022 0548 by Brian Ramos RN  Flowsheets (Taken 9/10/2022 4375)  Free From Fall Injury:   Instruct family/caregiver on patient safety   Based on caregiver fall risk screen, instruct family/caregiver to ask for assistance with transferring infant if caregiver noted to have fall risk factors     Problem: ABCDS Injury Assessment  Goal: Absence of physical injury  9/10/2022 1056 by Bushra Zepeda RN  Outcome: Progressing  9/10/2022 0548 by Brian Ramos RN  Flowsheets (Taken 9/10/2022 8004)  Absence of Physical Injury: Implement safety measures based on patient assessment

## 2022-09-10 NOTE — PROGRESS NOTES
Reviewed notes for new spiritual concerns. Oriental orthodox    LOCAL    MANUELA SUBSTANCE ABUSE HAS BEEN CLEAN FOR 18 MOS  HAS LIVED IN [place]    FORMER HEAVY ETOH ABUSE - HAS BEEN SOBER RECENTLY            Will follow as needed.

## 2022-09-10 NOTE — PROGRESS NOTES
.. TRANSFER - IN REPORT:    Verbal report received from Petey on Misael Keyes  being received from ED for routine progression of patient care      Report consisted of patient's Situation, Background, Assessment and   Recommendations(SBAR). Information from the following report(s) Nurse Handoff Report, ED Encounter Summary, ED SBAR, Intake/Output, MAR, Recent Results, and Cardiac Rhythm Afib  was reviewed with the receiving nurse. Opportunity for questions and clarification was provided. Assessment completed upon patient's arrival to unit and care assumed. Dual nurse skin assessment complete, no skin breakdown noted.

## 2022-09-11 ENCOUNTER — APPOINTMENT (OUTPATIENT)
Dept: ULTRASOUND IMAGING | Age: 62
DRG: 392 | End: 2022-09-11

## 2022-09-11 LAB
ABO + RH BLD: NORMAL
ALBUMIN SERPL-MCNC: 2.9 G/DL (ref 3.2–4.6)
ALBUMIN/GLOB SERPL: 1 {RATIO} (ref 1.2–3.5)
ALP SERPL-CCNC: 67 U/L (ref 50–136)
ALT SERPL-CCNC: 31 U/L (ref 12–65)
ANION GAP SERPL CALC-SCNC: 4 MMOL/L (ref 4–13)
AST SERPL-CCNC: 28 U/L (ref 15–37)
BASOPHILS # BLD: 0 K/UL (ref 0–0.2)
BASOPHILS NFR BLD: 1 % (ref 0–2)
BILIRUB SERPL-MCNC: 0.8 MG/DL (ref 0.2–1.1)
BLD PROD TYP BPU: NORMAL
BLOOD BANK CMNT PATIENT-IMP: NORMAL
BLOOD BANK DISPENSE STATUS: NORMAL
BLOOD GROUP ANTIBODIES SERPL: NORMAL
BPU ID: NORMAL
BUN SERPL-MCNC: 8 MG/DL (ref 8–23)
CALCIUM SERPL-MCNC: 7.8 MG/DL (ref 8.3–10.4)
CHLORIDE SERPL-SCNC: 113 MMOL/L (ref 101–110)
CO2 SERPL-SCNC: 22 MMOL/L (ref 21–32)
CREAT SERPL-MCNC: 0.7 MG/DL (ref 0.6–1)
CROSSMATCH RESULT: NORMAL
DIFFERENTIAL METHOD BLD: ABNORMAL
EKG ATRIAL RATE: 95 BPM
EKG DIAGNOSIS: NORMAL
EKG P AXIS: 79 DEGREES
EKG P-R INTERVAL: 154 MS
EKG Q-T INTERVAL: 352 MS
EKG QRS DURATION: 64 MS
EKG QTC CALCULATION (BAZETT): 442 MS
EKG R AXIS: 38 DEGREES
EKG T AXIS: 5 DEGREES
EKG VENTRICULAR RATE: 95 BPM
EOSINOPHIL # BLD: 0.2 K/UL (ref 0–0.8)
EOSINOPHIL NFR BLD: 4 % (ref 0.5–7.8)
ERYTHROCYTE [DISTWIDTH] IN BLOOD BY AUTOMATED COUNT: 18.1 % (ref 11.9–14.6)
GLOBULIN SER CALC-MCNC: 3 G/DL (ref 2.3–3.5)
GLUCOSE SERPL-MCNC: 96 MG/DL (ref 65–100)
HCT VFR BLD AUTO: 25.9 % (ref 35.8–46.3)
HCT VFR BLD AUTO: 26.3 % (ref 35.8–46.3)
HCT VFR BLD AUTO: 26.5 % (ref 35.8–46.3)
HCT VFR BLD AUTO: 31 % (ref 35.8–46.3)
HGB BLD-MCNC: 8.3 G/DL (ref 11.7–15.4)
HGB BLD-MCNC: 9.8 G/DL (ref 11.7–15.4)
IMM GRANULOCYTES # BLD AUTO: 0 K/UL (ref 0–0.5)
IMM GRANULOCYTES NFR BLD AUTO: 1 % (ref 0–5)
LYMPHOCYTES # BLD: 1.3 K/UL (ref 0.5–4.6)
LYMPHOCYTES NFR BLD: 30 % (ref 13–44)
MAGNESIUM SERPL-MCNC: 2 MG/DL (ref 1.8–2.4)
MCH RBC QN AUTO: 25.7 PG (ref 26.1–32.9)
MCHC RBC AUTO-ENTMCNC: 31.3 G/DL (ref 31.4–35)
MCV RBC AUTO: 82 FL (ref 79.6–97.8)
MONOCYTES # BLD: 0.3 K/UL (ref 0.1–1.3)
MONOCYTES NFR BLD: 8 % (ref 4–12)
NEUTS SEG # BLD: 2.5 K/UL (ref 1.7–8.2)
NEUTS SEG NFR BLD: 58 % (ref 43–78)
NRBC # BLD: 0 K/UL (ref 0–0.2)
PHOSPHATE SERPL-MCNC: 2.7 MG/DL (ref 2.3–3.7)
PLATELET # BLD AUTO: 95 K/UL (ref 150–450)
PMV BLD AUTO: 11.5 FL (ref 9.4–12.3)
POTASSIUM SERPL-SCNC: 3.4 MMOL/L (ref 3.5–5.1)
PROT SERPL-MCNC: 5.9 G/DL (ref 6.3–8.2)
RBC # BLD AUTO: 3.23 M/UL (ref 4.05–5.2)
SODIUM SERPL-SCNC: 139 MMOL/L (ref 136–145)
SPECIMEN EXP DATE BLD: NORMAL
UNIT DIVISION: 0
WBC # BLD AUTO: 4.3 K/UL (ref 4.3–11.1)

## 2022-09-11 PROCEDURE — 6360000002 HC RX W HCPCS: Performed by: FAMILY MEDICINE

## 2022-09-11 PROCEDURE — 6370000000 HC RX 637 (ALT 250 FOR IP): Performed by: STUDENT IN AN ORGANIZED HEALTH CARE EDUCATION/TRAINING PROGRAM

## 2022-09-11 PROCEDURE — 1100000000 HC RM PRIVATE

## 2022-09-11 PROCEDURE — A4216 STERILE WATER/SALINE, 10 ML: HCPCS | Performed by: FAMILY MEDICINE

## 2022-09-11 PROCEDURE — 76705 ECHO EXAM OF ABDOMEN: CPT

## 2022-09-11 PROCEDURE — 99232 SBSQ HOSP IP/OBS MODERATE 35: CPT | Performed by: INTERNAL MEDICINE

## 2022-09-11 PROCEDURE — 85025 COMPLETE CBC W/AUTO DIFF WBC: CPT

## 2022-09-11 PROCEDURE — C9113 INJ PANTOPRAZOLE SODIUM, VIA: HCPCS | Performed by: FAMILY MEDICINE

## 2022-09-11 PROCEDURE — 2580000003 HC RX 258: Performed by: FAMILY MEDICINE

## 2022-09-11 PROCEDURE — 94760 N-INVAS EAR/PLS OXIMETRY 1: CPT

## 2022-09-11 PROCEDURE — 84100 ASSAY OF PHOSPHORUS: CPT

## 2022-09-11 PROCEDURE — 83735 ASSAY OF MAGNESIUM: CPT

## 2022-09-11 PROCEDURE — 6370000000 HC RX 637 (ALT 250 FOR IP): Performed by: INTERNAL MEDICINE

## 2022-09-11 PROCEDURE — 99253 IP/OBS CNSLTJ NEW/EST LOW 45: CPT | Performed by: UROLOGY

## 2022-09-11 PROCEDURE — 80053 COMPREHEN METABOLIC PANEL: CPT

## 2022-09-11 RX ORDER — ACETAMINOPHEN 325 MG/1
650 TABLET ORAL EVERY 4 HOURS PRN
Status: DISCONTINUED | OUTPATIENT
Start: 2022-09-11 | End: 2022-09-14 | Stop reason: HOSPADM

## 2022-09-11 RX ORDER — POLYETHYLENE GLYCOL 3350 17 G/17G
238 POWDER, FOR SOLUTION ORAL ONCE
Status: COMPLETED | OUTPATIENT
Start: 2022-09-11 | End: 2022-09-11

## 2022-09-11 RX ADMIN — SODIUM CHLORIDE, PRESERVATIVE FREE 40 MG: 5 INJECTION INTRAVENOUS at 10:28

## 2022-09-11 RX ADMIN — POLYETHYLENE GLYCOL 3350 238 G: 17 POWDER, FOR SOLUTION ORAL at 16:17

## 2022-09-11 RX ADMIN — ACETAMINOPHEN 650 MG: 325 TABLET, FILM COATED ORAL at 20:59

## 2022-09-11 RX ADMIN — SODIUM CHLORIDE, PRESERVATIVE FREE 40 MG: 5 INJECTION INTRAVENOUS at 21:00

## 2022-09-11 RX ADMIN — BISACODYL 20 MG: 5 TABLET, COATED ORAL at 16:13

## 2022-09-11 RX ADMIN — SODIUM CHLORIDE, PRESERVATIVE FREE 10 ML: 5 INJECTION INTRAVENOUS at 21:01

## 2022-09-11 RX ADMIN — ACETAMINOPHEN 650 MG: 325 TABLET, FILM COATED ORAL at 16:13

## 2022-09-11 RX ADMIN — ACETAMINOPHEN 650 MG: 325 TABLET, FILM COATED ORAL at 10:28

## 2022-09-11 ASSESSMENT — PAIN DESCRIPTION - LOCATION
LOCATION: GENERALIZED
LOCATION: BUTTOCKS

## 2022-09-11 ASSESSMENT — PAIN DESCRIPTION - ORIENTATION
ORIENTATION: OTHER (COMMENT)
ORIENTATION: MID

## 2022-09-11 ASSESSMENT — PAIN SCALES - GENERAL
PAINLEVEL_OUTOF10: 3
PAINLEVEL_OUTOF10: 2

## 2022-09-11 ASSESSMENT — PAIN DESCRIPTION - DESCRIPTORS
DESCRIPTORS: ACHING
DESCRIPTORS: ACHING

## 2022-09-11 ASSESSMENT — PAIN - FUNCTIONAL ASSESSMENT: PAIN_FUNCTIONAL_ASSESSMENT: PREVENTS OR INTERFERES SOME ACTIVE ACTIVITIES AND ADLS

## 2022-09-11 NOTE — PROGRESS NOTES
[facility] Progress Note         Admit Date:  9/9/2022    Today's Date:  9/11/2022    CC:  Anemia    Subjective:     Patient feeling better after transfusions. Tolerating clears. No pain. Medications:   Current Facility-Administered Medications   Medication Dose Route Frequency    acetaminophen (TYLENOL) tablet 650 mg  650 mg Oral Q4H PRN    polyethylene glycol (GLYCOLAX) powder 238 g  238 g Oral Once    bisacodyl (DULCOLAX) EC tablet 20 mg  20 mg Oral Once    diatrizoate meglumine-sodium (GASTROGRAFIN) 66-10 % solution 15 mL  15 mL Oral ONCE PRN    0.9 % sodium chloride infusion   IntraVENous PRN    pantoprazole (PROTONIX) 40 mg in sodium chloride (PF) 10 mL injection  40 mg IntraVENous Q12H    0.9 % sodium chloride infusion   IntraVENous Continuous    sodium chloride flush 0.9 % injection 5-40 mL  5-40 mL IntraVENous 2 times per day    sodium chloride flush 0.9 % injection 5-40 mL  5-40 mL IntraVENous PRN    0.9 % sodium chloride infusion   IntraVENous PRN    ondansetron (ZOFRAN-ODT) disintegrating tablet 4 mg  4 mg Oral Q8H PRN    Or    ondansetron (ZOFRAN) injection 4 mg  4 mg IntraVENous Q6H PRN    polyethylene glycol (GLYCOLAX) packet 17 g  17 g Oral Daily PRN       Review of Systems:  ROS was obtained, with pertinent positives as listed above. No chest pain or SOB. Diet:      Objective:   Vitals:  /64   Pulse 81   Temp 98.1 °F (36.7 °C) (Temporal)   Resp 18   Ht 5' 6\" (1.676 m)   Wt 130 lb (59 kg)   SpO2 98%   BMI 20.98 kg/m²   Intake/Output:  09/11 0701 - 09/11 1900  In: 480 [P.O.:480]  Out: -   09/09 1901 - 09/11 0700  In: 9247 [P.O.:720; I.V.:100]  Out: 2250 [Urine:2250]  Exam:  General appearance: alert, cooperative, no distress  Lungs: clear to auscultation bilaterally anteriorly  Heart: regular rate and rhythm  Abdomen: soft, non-tender.  Bowel sounds normal. No masses, no organomegaly  Extremities: extremities normal, atraumatic, no cyanosis or edema  Neuro:  alert and oriented    Data Review (Labs):    Recent Labs     09/09/22  1828 09/10/22  0625 09/10/22  0807 09/10/22  1159 09/10/22  1751 09/10/22  2359 09/11/22  0615   WBC 9.3  --  6.4  --   --   --  4.3   HGB 4.5* 6.8* 6.9* 7.1* 9.2* 8.3* 8.3*   HCT 15.9* 21.7* 22.2* 23.6* 30.1* 25.9* 26.5*     --  100*  --   --   --  95*   MCV 80.3  --  79.0*  --   --   --  82.0   NA  --  141 140  --   --   --  139   K  --  3.8 4.1  --   --   --  3.4*   CL  --  114* 113*  --   --   --  113*   CO2  --  20* 20*  --   --   --  22   BUN  --  12 12  --   --   --  8   MG 1.6* 2.3  --   --   --   --  2.0   AST  --  24 40*  --   --   --  28   ALT  --  28 31  --   --   --  31   INR 1.4  --   --   --   --   --   --        Assessment:     Principal Problem:    Melena  Active Problems:    Hepatitis C    Hypomagnesemia    Atrial flutter (HCC)    Gastrointestinal hemorrhage    Symptomatic anemia  Resolved Problems:    * No resolved hospital problems. *      Plan:     58yo WF with hx of substance abuse (abstinent for 18mos), HCV (currently on treatment- Epclusa), and cirrhosis presenting with afib with RVR, found to have a hgb of 4.5 and heme + stool. She denies any melena, hematochezia or hematemesis. Heme + stool and anemia does not equal acute GI bleed. She has become anemic slowly to the point where she is now symptomatic. This could be due to slow GI blood loss or other etiologies (Epclusa could cause anemia).  She has no signs of acute GI hemorrhage.      - S/p transfusion: Hgb better (8.3)  - Continue CLD  - Will plan on proceeding with colonoscopy and EGD Monday for anemia work up   - NPO after midnight and start prep this evening  - If E/C are normal, likely can be discharged afterwards  - Kevin Dee for now until her ID Eloisa Cunha) doctor is updated with current situation    Kacy Sarmiento MD

## 2022-09-11 NOTE — PROGRESS NOTES
[facility] CARDIOLOGY PROGRESS NOTE           9/11/2022 8:35 AM    Admit Date: 9/9/2022    Admit Diagnosis: Melena [K92.1]  Anemia due to acute blood loss [D62]  Atrial fibrillation with RVR (HCC) [I48.91]  Gastrointestinal hemorrhage, unspecified gastrointestinal hemorrhage type [K92.2]      Subjective:   No complaints this AM, no chest pain or shortness of breath    Interval History: (History of pertinent interval events obtained from nursing staff)    ROS:  GEN:  No fever or chills  Cardiovascular:  As noted above  Pulmonary:  As noted above  Neuro:  No new focal motor or sensory loss      Objective:     Vitals:    09/10/22 1950 09/11/22 0015 09/11/22 0400 09/11/22 0738   BP: 124/60 (!) 137/59 (!) 109/54 135/64   Pulse: 93 88 88 81   Resp: 20 18 18 18   Temp: 97.7 °F (36.5 °C) 98.9 °F (37.2 °C) 98.8 °F (37.1 °C) 98.1 °F (36.7 °C)   TempSrc: Oral Oral Oral Temporal   SpO2: 96% 98% 97% 98%   Weight:       Height:           Physical Exam:  General-Well Developed, Well Nourished, No Acute Distress, Alert & Oriented x 3, appropriate mood. Neck- supple, no JVD  CV- regular rate and rhythm no MRG  Lung- clear bilaterally  Abd- soft, nontender, nondistended  Ext- no edema bilaterally.   Skin- warm and dry    Current Facility-Administered Medications   Medication Dose Route Frequency    diatrizoate meglumine-sodium (GASTROGRAFIN) 66-10 % solution 15 mL  15 mL Oral ONCE PRN    0.9 % sodium chloride infusion   IntraVENous PRN    pantoprazole (PROTONIX) 40 mg in sodium chloride (PF) 10 mL injection  40 mg IntraVENous Q12H    0.9 % sodium chloride infusion   IntraVENous Continuous    sodium chloride flush 0.9 % injection 5-40 mL  5-40 mL IntraVENous 2 times per day    sodium chloride flush 0.9 % injection 5-40 mL  5-40 mL IntraVENous PRN    0.9 % sodium chloride infusion   IntraVENous PRN    ondansetron (ZOFRAN-ODT) disintegrating tablet 4 mg  4 mg Oral Q8H PRN    Or    ondansetron (ZOFRAN) injection 4 mg 4 mg IntraVENous Q6H PRN    polyethylene glycol (GLYCOLAX) packet 17 g  17 g Oral Daily PRN     Data Review:   Recent Results (from the past 24 hour(s))   PREPARE RBC (CROSSMATCH), 1 Units    Collection Time: 09/10/22 11:30 AM   Result Value Ref Range    History Check Historical check performed    Hemoglobin and Hematocrit    Collection Time: 09/10/22 11:59 AM   Result Value Ref Range    Hemoglobin 7.1 (L) 11.7 - 15.4 g/dL    Hematocrit 23.6 (L) 35.8 - 46.3 %   EKG 12 Lead    Collection Time: 09/10/22  1:01 PM   Result Value Ref Range    Ventricular Rate 95 BPM    Atrial Rate 95 BPM    P-R Interval 154 ms    QRS Duration 64 ms    Q-T Interval 352 ms    QTc Calculation (Bazett) 442 ms    P Axis 79 degrees    R Axis 38 degrees    T Axis 5 degrees    Diagnosis Normal sinus rhythm    Troponin    Collection Time: 09/10/22  2:50 PM   Result Value Ref Range    Troponin, High Sensitivity 161.4 (HH) 0 - 14 pg/mL   Hemoglobin and Hematocrit    Collection Time: 09/10/22  5:51 PM   Result Value Ref Range    Hemoglobin 9.2 (L) 11.7 - 15.4 g/dL    Hematocrit 30.1 (L) 35.8 - 46.3 %   Hemoglobin and Hematocrit    Collection Time: 09/10/22 11:59 PM   Result Value Ref Range    Hemoglobin 8.3 (L) 11.7 - 15.4 g/dL    Hematocrit 25.9 (L) 35.8 - 46.3 %   CBC with Auto Differential    Collection Time: 09/11/22  6:15 AM   Result Value Ref Range    WBC 4.3 4.3 - 11.1 K/uL    RBC 3.23 (L) 4.05 - 5.2 M/uL    Hemoglobin 8.3 (L) 11.7 - 15.4 g/dL    Hematocrit 26.5 (L) 35.8 - 46.3 %    MCV 82.0 79.6 - 97.8 FL    MCH 25.7 (L) 26.1 - 32.9 PG    MCHC 31.3 (L) 31.4 - 35.0 g/dL    RDW 18.1 (H) 11.9 - 14.6 %    Platelets 95 (L) 773 - 450 K/uL    MPV 11.5 9.4 - 12.3 FL    nRBC 0.00 0.0 - 0.2 K/uL    Differential Type AUTOMATED      Seg Neutrophils 58 43 - 78 %    Lymphocytes 30 13 - 44 %    Monocytes 8 4.0 - 12.0 %    Eosinophils % 4 0.5 - 7.8 %    Basophils 1 0.0 - 2.0 %    Immature Granulocytes 1 0.0 - 5.0 %    Segs Absolute 2.5 1.7 - 8.2 K/UL Absolute Lymph # 1.3 0.5 - 4.6 K/UL    Absolute Mono # 0.3 0.1 - 1.3 K/UL    Absolute Eos # 0.2 0.0 - 0.8 K/UL    Basophils Absolute 0.0 0.0 - 0.2 K/UL    Absolute Immature Granulocyte 0.0 0.0 - 0.5 K/UL   Comprehensive Metabolic Panel w/ Reflex to MG    Collection Time: 09/11/22  6:15 AM   Result Value Ref Range    Sodium 139 136 - 145 mmol/L    Potassium 3.4 (L) 3.5 - 5.1 mmol/L    Chloride 113 (H) 101 - 110 mmol/L    CO2 22 21 - 32 mmol/L    Anion Gap 4 4 - 13 mmol/L    Glucose 96 65 - 100 mg/dL    BUN 8 8 - 23 MG/DL    Creatinine 0.70 0.6 - 1.0 MG/DL    GFR African American >60 >60 ml/min/1.73m2    GFR Non- >60 >60 ml/min/1.73m2    Calcium 7.8 (L) 8.3 - 10.4 MG/DL    Total Bilirubin 0.8 0.2 - 1.1 MG/DL    ALT 31 12 - 65 U/L    AST 28 15 - 37 U/L    Alk Phosphatase 67 50 - 136 U/L    Total Protein 5.9 (L) 6.3 - 8.2 g/dL    Albumin 2.9 (L) 3.2 - 4.6 g/dL    Globulin 3.0 2.3 - 3.5 g/dL    Albumin/Globulin Ratio 1.0 (L) 1.2 - 3.5     Phosphorus    Collection Time: 09/11/22  6:15 AM   Result Value Ref Range    Phosphorus 2.7 2.3 - 3.7 MG/DL   Magnesium    Collection Time: 09/11/22  6:15 AM   Result Value Ref Range    Magnesium 2.0 1.8 - 2.4 mg/dL       EKG:  (EKG has been independently visualized by me with interpretation below)  Assessment:     Principal Problem:    Melena  Active Problems:    Hepatitis C    Hypomagnesemia    Atrial flutter (HCC)    Gastrointestinal hemorrhage    Symptomatic anemia  Resolved Problems:    * No resolved hospital problems. *    Plan:   Anemia: per primary team  Atrial fibrillation: unfortunately, still no available documentation of AF, monitor on telemetry, no AC in the setting of ongoing anemia, TTE pending. Dayna Conner MD  Cardiology/Electrophysiology

## 2022-09-11 NOTE — PROGRESS NOTES
Attempted to see patient in consultation for iliac artery stenosis (incidental finding on CT of abdomen). Patient is off the floor at this time, will see patient tomorrow AM unless any other immediate concerns.     Juan Jsimon Campos MD  Vascular Surgery

## 2022-09-11 NOTE — PROGRESS NOTES
Hospitalist Progress Note   Admit Date:  2022  6:10 PM   Name:  Misael Keyes   Age:  60 y.o. Sex:  female  :  1960   MRN:  [id number]   Room:        Reason(s) for Admission: Melena [K92.1]  Anemia due to acute blood loss [D62]  Atrial fibrillation with RVR (HCC) [I48.91]  Gastrointestinal hemorrhage, unspecified gastrointestinal hemorrhage type [facility] Course & Interval History:   Patient is a 60 y.o. female who presented to the ED for cc weakness and SOB on and off for the past two months. Found to be in A fib RVR on arrival. Given 20mg Cardizem and now HR in 80s. Cardizem stopped due to hypotension. Hg also noted to be low. Admits to dark stools for months. Not on ASA or anticoagulation but does admit to ibuprofen use. Hx of hep C currently being treated, former heavy ETOH use now sober. Hg 4.5. Mg 1.6, troponins elevated and peaked to 179. EKG on arrival with atrial flutter. She was transfused 3 units of PRBC and her repeat hemoglobin is around 8. She spiked fever of 100.6 F after blood transfusion. UA is negative, lactic acid 2. Chest x-ray clear. She was treated with PPI. Her medication Epclusa was on hold. She was started on clear liquid diet. GI was consulted and plan for colonoscopy and EGD on . Cardiology was consulted. CT chest and abdomen showed posterior left lung lobe mass of 2.2 into 1 cm, cirrhosis, cholelithiasis, left hydronephrosis, colitis, severe stenosis of bilateral SANA. Ultrasound of the abdomen showed                . Blood cultures negative. Echo showed              . Subjective/24hr Events (22): Patient is seen and examined at the bedside. She is complaining of bilateral leg pain and she states she cannot take narcotics. Patient denies nausea, vomiting, hematochezia, melena, palpitations, chest pain.        Assessment & Plan:   Acute GI bleed  Heme positive stools  Continue PPI twice daily  Continue clear liquid diet  No plan for urgent EGD  Plan for colonoscopy and EGD on 9/12  GI is following, appreciate the recommendations    Symptomatic anemia  Hemoglobin of 4.5 on admission  S/p  2 units PRBC given on 9/9 and 1 unit of PRBC on 9/10  Repeat hemoglobin of 8.3  Transfuse blood if hemoglobin is less than 7 or symptomatic    Hypokalemia  Potassium is 3.4  Replaced K  Follow-up with repeat K in a.m. Atrial fibrillation with RVR   Currently in sinus rhythm. Follow-up with echo  Telemetry monitoring for now  No anticoagulation due to GI bleed  Cardiology following     Hypomagnesemia -resolved     Hep C - holding oral meds for now  Follow-up with (MORGAN Fields ) as outpatient for resuming Epclusa     Elevated D-dimer    DVT prophylaxis - SCDs    Diet:  ADULT DIET; Clear Liquid  Diet NPO  DVT PPx: SCD due to anemia and GI bleed  Code status: Full Code    Hospital Problems             Last Modified POA    * (Principal) Melena 9/9/2022 Yes    Hepatitis C 9/9/2022 Yes    Hypomagnesemia 9/9/2022 Yes    Atrial flutter ([address] 9/9/2022 Yes    Gastrointestinal hemorrhage 9/10/2022 Yes    Symptomatic anemia 9/9/2022 Yes       Objective:   Patient Vitals for the past 24 hrs:   Temp Pulse Resp BP SpO2   09/11/22 1157 97.4 °F (36.3 °C) 77 16 131/63 98 %   09/11/22 0738 98.1 °F (36.7 °C) 81 18 135/64 98 %   09/11/22 0400 98.8 °F (37.1 °C) 88 18 (!) 109/54 97 %   09/11/22 0015 98.9 °F (37.2 °C) 88 18 (!) 137/59 98 %   09/10/22 1950 97.7 °F (36.5 °C) 93 20 124/60 96 %   09/10/22 1649 99.2 °F (37.3 °C) 92 20 (!) 131/58 100 %   09/10/22 1540 99 °F (37.2 °C) 84 20 121/60 98 %   09/10/22 1437 99.7 °F (37.6 °C) 91 20 (!) 115/58 98 %   09/10/22 1409 100 °F (37.8 °C) 93 20 (!) 104/51 98 %   09/10/22 1406 100 °F (37.8 °C) 93 20 (!) 104/51 98 %       Estimated body mass index is 20.98 kg/m² as calculated from the following:    Height as of this encounter: 5' 6\" (1.676 m). Weight as of this encounter: 130 lb (59 kg).     Intake/Output Summary (Last 24 hours) at 9/11/2022 1335  Last data filed at 9/11/2022 0923  Gross per 24 hour   Intake 1078 ml   Output 500 ml   Net 578 ml         Physical Exam:   Blood pressure 131/63, pulse 77, temperature 97.4 °F (36.3 °C), temperature source Temporal, resp. rate 16, height 5' 6\" (1.676 m), weight 130 lb (59 kg), SpO2 98 %. General:    Well nourished. No overt distress. Pale looking  Head:  Normocephalic, atraumatic  Eyes:  Sclerae appear normal. Pupils equally round. ENT:  Nares appear normal, no drainage. Moist oral mucosa  Neck:  No restricted ROM. Trachea midline. CV:   RRR. No m/r/g. No jugular venous distension. Lungs:   CTAB. No wheezing, rhonchi, or rales. Respirations even, unlabored. Abdomen: Bowel sounds present. Soft, nontender, nondistended. Extremities: No cyanosis or clubbing. No edema. Skin:     No rashes and normal coloration. Warm and dry. Neuro:  CN II-XII grossly intact. Sensation intact. A&Ox3  Psych:  Normal mood and affect.       I have reviewed ordered lab tests and independently visualized imaging below:    Recent Labs:  Recent Results (from the past 48 hour(s))   CBC with Auto Differential    Collection Time: 09/09/22  6:28 PM   Result Value Ref Range    WBC 9.3 4.3 - 11.1 K/uL    RBC 1.98 M/uL    Hemoglobin 4.5 (LL) 11.7 - 15.4 g/dL    Hematocrit 15.9 (L) 35.8 - 46.3 %    MCV 80.3 79.6 - 97.8 FL    MCH 22.7 (L) 26.1 - 32.9 PG    MCHC 28.3 (L) 31.4 - 35.0 g/dL    RDW 18.4 %    Platelets 641 K/uL    MPV 12.2 9.4 - 12.3 FL    nRBC 0.06 K/uL   Magnesium    Collection Time: 09/09/22  6:28 PM   Result Value Ref Range    Magnesium 1.6 (L) 1.8 - 2.4 mg/dL   Troponin    Collection Time: 09/09/22  6:28 PM   Result Value Ref Range    Troponin, High Sensitivity 100.0 (H) 0 - 14 pg/mL   COVID-19, Rapid    Collection Time: 09/09/22  6:28 PM    Specimen: Nasopharyngeal   Result Value Ref Range    Source NASAL      SARS-CoV-2, Rapid Not detected NOTD     D-Dimer, Quantitative    Collection Time: 1: 17 AM    Specimen: Urine   Result Value Ref Range    Color, UA YELLOW      Appearance CLEAR      Specific Gravity, UA 1.005 1.001 - 1.023      pH, Urine 7.0 5.0 - 9.0      Protein, UA Negative NEG mg/dL    Glucose, UA Negative mg/dL    Ketones, Urine Negative NEG mg/dL    Bilirubin Urine Negative NEG      Blood, Urine Negative NEG      Urobilinogen, Urine 0.2 0.2 - 1.0 EU/dL    Nitrite, Urine Negative NEG      Leukocyte Esterase, Urine SMALL (A) NEG      WBC, UA 0-3 0 /hpf    RBC, UA 0-3 0 /hpf    BACTERIA, URINE 0 0 /hpf    Urine Culture if Indicated CULTURE NOT INDICATED BY UA RESULT      Epithelial Cells UA 0-3 0 /hpf    Casts 0-3 0 /lpf    Crystals 0 0 /LPF    Mucus, UA 0 0 /lpf   Magnesium    Collection Time: 09/10/22  6:25 AM   Result Value Ref Range    Magnesium 2.3 1.8 - 2.4 mg/dL   Comprehensive Metabolic Panel w/ Reflex to MG    Collection Time: 09/10/22  6:25 AM   Result Value Ref Range    Sodium 141 136 - 145 mmol/L    Potassium 3.8 3.5 - 5.1 mmol/L    Chloride 114 (H) 101 - 110 mmol/L    CO2 20 (L) 21 - 32 mmol/L    Anion Gap 7 4 - 13 mmol/L    Glucose 94 65 - 100 mg/dL    BUN 12 8 - 23 MG/DL    Creatinine 0.70 0.6 - 1.0 MG/DL    GFR African American >60 >60 ml/min/1.73m2    GFR Non- >60 >60 ml/min/1.73m2    Calcium 7.6 (L) 8.3 - 10.4 MG/DL    Total Bilirubin 1.4 (H) 0.2 - 1.1 MG/DL    ALT 28 12 - 65 U/L    AST 24 15 - 37 U/L    Alk Phosphatase 67 50 - 136 U/L    Total Protein 5.6 (L) 6.3 - 8.2 g/dL    Albumin 2.8 (L) 3.2 - 4.6 g/dL    Globulin 2.8 2.3 - 3.5 g/dL    Albumin/Globulin Ratio 1.0 (L) 1.2 - 3.5     Hemoglobin and Hematocrit    Collection Time: 09/10/22  6:25 AM   Result Value Ref Range    Hemoglobin 6.8 (LL) 11.7 - 15.4 g/dL    Hematocrit 21.7 (L) 35.8 - 46.3 %   Culture, Blood 1    Collection Time: 09/10/22  6:25 AM    Specimen: Blood   Result Value Ref Range    Special Requests RIGHT  FOREARM        Culture NO GROWTH 1 DAY     Culture, Blood 1    Collection Time: 09/10/22 6:25 AM    Specimen: Blood   Result Value Ref Range    Special Requests LEFT  Antecubital        Culture NO GROWTH 1 DAY     CBC with Auto Differential    Collection Time: 09/10/22  8:07 AM   Result Value Ref Range    WBC 6.4 4.3 - 11.1 K/uL    RBC 2.81 (L) 4.05 - 5.2 M/uL    Hemoglobin 6.9 (LL) 11.7 - 15.4 g/dL    Hematocrit 22.2 (L) 35.8 - 46.3 %    MCV 79.0 (L) 79.6 - 97.8 FL    MCH 24.6 (L) 26.1 - 32.9 PG    MCHC 31.1 (L) 31.4 - 35.0 g/dL    RDW 17.3 (H) 11.9 - 14.6 %    Platelets 080 (L) 399 - 450 K/uL    MPV 10.3 9.4 - 12.3 FL    nRBC 0.04 0.0 - 0.2 K/uL    Differential Type AUTOMATED      Seg Neutrophils 60 43 - 78 %    Lymphocytes 28 13 - 44 %    Monocytes 8 4.0 - 12.0 %    Eosinophils % 3 0.5 - 7.8 %    Basophils 0 0.0 - 2.0 %    Immature Granulocytes 0 0.0 - 5.0 %    Segs Absolute 3.9 1.7 - 8.2 K/UL    Absolute Lymph # 1.8 0.5 - 4.6 K/UL    Absolute Mono # 0.5 0.1 - 1.3 K/UL    Absolute Eos # 0.2 0.0 - 0.8 K/UL    Basophils Absolute 0.0 0.0 - 0.2 K/UL    Absolute Immature Granulocyte 0.0 0.0 - 0.5 K/UL   Comprehensive Metabolic Panel w/ Reflex to MG    Collection Time: 09/10/22  8:07 AM   Result Value Ref Range    Sodium 140 136 - 145 mmol/L    Potassium 4.1 3.5 - 5.1 mmol/L    Chloride 113 (H) 101 - 110 mmol/L    CO2 20 (L) 21 - 32 mmol/L    Anion Gap 7 4 - 13 mmol/L    Glucose 96 65 - 100 mg/dL    BUN 12 8 - 23 MG/DL    Creatinine 0.60 0.6 - 1.0 MG/DL    GFR African American >60 >60 ml/min/1.73m2    GFR Non- >60 >60 ml/min/1.73m2    Calcium 7.7 (L) 8.3 - 10.4 MG/DL    Total Bilirubin 1.3 (H) 0.2 - 1.1 MG/DL    ALT 31 12 - 65 U/L    AST 40 (H) 15 - 37 U/L    Alk Phosphatase 65 50 - 136 U/L    Total Protein 5.7 (L) 6.3 - 8.2 g/dL    Albumin 2.9 (L) 3.2 - 4.6 g/dL    Globulin 2.8 2.3 - 3.5 g/dL    Albumin/Globulin Ratio 1.0 (L) 1.2 - 3.5     Phosphorus    Collection Time: 09/10/22  8:07 AM   Result Value Ref Range    Phosphorus 2.3 2.3 - 3.7 MG/DL   Procalcitonin    Collection Time: 09/10/22  8:07 AM   Result Value Ref Range    Procalcitonin <0.05 0.00 - 0.49 ng/mL   Troponin    Collection Time: 09/10/22  8:07 AM   Result Value Ref Range    Troponin, High Sensitivity 179.2 (HH) 0 - 14 pg/mL   PREPARE RBC (CROSSMATCH), 1 Units    Collection Time: 09/10/22 11:30 AM   Result Value Ref Range    History Check Historical check performed    Hemoglobin and Hematocrit    Collection Time: 09/10/22 11:59 AM   Result Value Ref Range    Hemoglobin 7.1 (L) 11.7 - 15.4 g/dL    Hematocrit 23.6 (L) 35.8 - 46.3 %   EKG 12 Lead    Collection Time: 09/10/22  1:01 PM   Result Value Ref Range    Ventricular Rate 95 BPM    Atrial Rate 95 BPM    P-R Interval 154 ms    QRS Duration 64 ms    Q-T Interval 352 ms    QTc Calculation (Bazett) 442 ms    P Axis 79 degrees    R Axis 38 degrees    T Axis 5 degrees    Diagnosis Normal sinus rhythm    Troponin    Collection Time: 09/10/22  2:50 PM   Result Value Ref Range    Troponin, High Sensitivity 161.4 (HH) 0 - 14 pg/mL   Hemoglobin and Hematocrit    Collection Time: 09/10/22  5:51 PM   Result Value Ref Range    Hemoglobin 9.2 (L) 11.7 - 15.4 g/dL    Hematocrit 30.1 (L) 35.8 - 46.3 %   Hemoglobin and Hematocrit    Collection Time: 09/10/22 11:59 PM   Result Value Ref Range    Hemoglobin 8.3 (L) 11.7 - 15.4 g/dL    Hematocrit 25.9 (L) 35.8 - 46.3 %   CBC with Auto Differential    Collection Time: 09/11/22  6:15 AM   Result Value Ref Range    WBC 4.3 4.3 - 11.1 K/uL    RBC 3.23 (L) 4.05 - 5.2 M/uL    Hemoglobin 8.3 (L) 11.7 - 15.4 g/dL    Hematocrit 26.5 (L) 35.8 - 46.3 %    MCV 82.0 79.6 - 97.8 FL    MCH 25.7 (L) 26.1 - 32.9 PG    MCHC 31.3 (L) 31.4 - 35.0 g/dL    RDW 18.1 (H) 11.9 - 14.6 %    Platelets 95 (L) 175 - 450 K/uL    MPV 11.5 9.4 - 12.3 FL    nRBC 0.00 0.0 - 0.2 K/uL    Differential Type AUTOMATED      Seg Neutrophils 58 43 - 78 %    Lymphocytes 30 13 - 44 %    Monocytes 8 4.0 - 12.0 %    Eosinophils % 4 0.5 - 7.8 %    Basophils 1 0.0 - 2.0 %    Immature Granulocytes 1 0.0 - 5.0 %    Segs Absolute 2.5 1.7 - 8.2 K/UL    Absolute Lymph # 1.3 0.5 - 4.6 K/UL    Absolute Mono # 0.3 0.1 - 1.3 K/UL    Absolute Eos # 0.2 0.0 - 0.8 K/UL    Basophils Absolute 0.0 0.0 - 0.2 K/UL    Absolute Immature Granulocyte 0.0 0.0 - 0.5 K/UL   Comprehensive Metabolic Panel w/ Reflex to MG    Collection Time: 09/11/22  6:15 AM   Result Value Ref Range    Sodium 139 136 - 145 mmol/L    Potassium 3.4 (L) 3.5 - 5.1 mmol/L    Chloride 113 (H) 101 - 110 mmol/L    CO2 22 21 - 32 mmol/L    Anion Gap 4 4 - 13 mmol/L    Glucose 96 65 - 100 mg/dL    BUN 8 8 - 23 MG/DL    Creatinine 0.70 0.6 - 1.0 MG/DL    GFR African American >60 >60 ml/min/1.73m2    GFR Non- >60 >60 ml/min/1.73m2    Calcium 7.8 (L) 8.3 - 10.4 MG/DL    Total Bilirubin 0.8 0.2 - 1.1 MG/DL    ALT 31 12 - 65 U/L    AST 28 15 - 37 U/L    Alk Phosphatase 67 50 - 136 U/L    Total Protein 5.9 (L) 6.3 - 8.2 g/dL    Albumin 2.9 (L) 3.2 - 4.6 g/dL    Globulin 3.0 2.3 - 3.5 g/dL    Albumin/Globulin Ratio 1.0 (L) 1.2 - 3.5     Phosphorus    Collection Time: 09/11/22  6:15 AM   Result Value Ref Range    Phosphorus 2.7 2.3 - 3.7 MG/DL   Magnesium    Collection Time: 09/11/22  6:15 AM   Result Value Ref Range    Magnesium 2.0 1.8 - 2.4 mg/dL   Hemoglobin and Hematocrit    Collection Time: 09/11/22 12:30 PM   Result Value Ref Range    Hemoglobin 8.3 (L) 11.7 - 15.4 g/dL    Hematocrit 26.3 (L) 35.8 - 46.3 %         Other Studies:  XR CHEST (2 VW)    Result Date: 9/9/2022  EXAM: CHEST X-RAY, 2 VIEWS INDICATION: Pain COMPARISON: 10/29/2020 TECHNIQUE: Frontal and lateral views of the chest were obtained. FINDINGS: Lungs: Normal. Cardiomediastinal silhouette: Normal in size. Aortic atherosclerotic calcifications present. Cardiac silhouette is normal in size. Pleura: No pneumothorax or pleural effusion. Osseous structures: Normal.     No radiographic evidence of acute cardiopulmonary disease.     XR CHEST PORTABLE    Result Date: 9/10/2022  EXAM: XR CHEST PORTABLE HISTORY: increased temperature. TECHNIQUE: Frontal chest. COMPARISON: 9/9/2022 FINDINGS: The cardiac silhouette, mediastinum, and pulmonary vasculature are within normal limits. There is no consolidation, pleural effusion, or pneumothorax. No significant osseous abnormalities are observed. No evidence of an acute intrathoracic process. CT CHEST ABDOMEN PELVIS W CONTRAST    Result Date: 9/10/2022  CT CHEST ABDOMEN PELVIS W CONTRAST 9/10/2022 10:55 AM HISTORY: Dyspnea. Elevated d-dimer. History of smoking. COMPARISON: CT abdomen pelvis 8/10/2018. Multiple axial images were obtained through the chest, abdomen, and pelvis. Oral contrast was used for bowel opacification. 100 mL of Isovue 370 intravenous contrast was used for better evaluation of solid organs and vascular structures. Radiation dose reduction techniques were used for this study. All CT scans performed at this facility use one or all of the following: Automated exposure control, adjustment of the mA and/or kVp according to patient's size, iterative reconstruction. FINDINGS: LUNGS AND PLEURA: Lungs are clear with a pleural-based posterior left lower lobe lesion which is indeterminate measuring 2.2 x 1.1 cm on image 40 of series 302. Intercostal soft tissue mass remains in the differential. No prior imaging available for comparison. No pleural effusions. MEDIASTINUM/AXILLAE: Heart is normal in size. No pericardial effusion. Esophagus is unremarkable. No enlarged lymph nodes. Coronary artery calcifications are present. Calcified plaque thoracic aorta without aneurysm or dissection. HEPATOBILIARY: Nodular liver contour consistent with cirrhosis. No focal mass. Cholelithiasis. Mild gallbladder wall edema also noted which is nonspecific in the setting of mild ascites. PANCREAS: Evidence of chronic pancreatitis with diffuse pancreatic glandular calcification. No pancreatic ductal dilatation or mass.  Probable pancreatic tail cyst measuring 1.2 cm on image 27 of series 303. SPLEEN: Splenomegaly. Spleen measures nearly 14 cm in craniocaudal dimension image 46 of series 603. ADRENAL GLANDS: Normal. KIDNEYS/BLADDER: Left hydronephrosis/extrarenal pelvis and mild renal cortical thinning is noted probably reflecting a left UPJ stricture. Ureters are nondilated. No renal calculi. No enhancing renal mass. Bladder is well-distended and unremarkable. BOWEL: Colonic wall thickening noted involving the cecum and ascending colon with surrounding inflammation most consistent with an infectious or inflammatory colitis. Terminal ileum appears normal. Appendix is unremarkable. LYMPH NODES: No enlarged abdominal or pelvic lymph nodes. VASCULATURE: Calcified plaque abdominal aorta with dense calcification distal abdominal aorta concerning for severe stenosis or occlusion. This is minimally changed since prior exam. Severe stenosis bilateral common iliac artery also noted. PELVIC ORGANS: Uterus and rectum are unremarkable. Small to moderate amount of free pelvic fluid is present. MUSCULOSKELETAL: Normal.     1. Probable acute infectious or inflammatory colitis involving the cecum and ascending colon. Terminal ileum and appendix appear normal. Remainder of the bowel is within normal limits. No obstruction. 2. Cirrhotic liver morphology with splenomegaly and mild ascites. Cholelithiasis with edematous-appearing gallbladder wall of uncertain significance but likely nonspecific in the setting of ascites. These findings are new in the interval since prior exam. 3. Left hydronephrosis and dilated extrarenal pelvis likely due to chronic UPJ stricture. Follow up with urology as clinically warranted. Mild interval cortical thinning left kidney. No renal cyst or mass. No urinary tract calculi.  4. Dense calcified atherosclerotic plaque involving the distal abdominal aorta with severe stenosis/occlusion and severe stenosis of the common iliac arteries bilaterally. Little change since prior exam.     Vascular duplex lower extremity venous bilateral    Result Date: 9/10/2022  Bilateral lower extremity venous ultrasound INDICATION: Bilateral lower extremity swelling. Elevated d-dimer. Doppler ultrasound of both lower extremities was performed. FINDINGS:  There is normal flow in the greater saphenous, common femoral, superficial femoral, and popliteal veins. Normal compression and augmentation is demonstrated. The proximal calf veins are also patent. No evidence of deep venous thrombosis in either lower extremity       Current Meds:  Current Facility-Administered Medications   Medication Dose Route Frequency    acetaminophen (TYLENOL) tablet 650 mg  650 mg Oral Q4H PRN    polyethylene glycol (GLYCOLAX) powder 238 g  238 g Oral Once    bisacodyl (DULCOLAX) EC tablet 20 mg  20 mg Oral Once    diatrizoate meglumine-sodium (GASTROGRAFIN) 66-10 % solution 15 mL  15 mL Oral ONCE PRN    0.9 % sodium chloride infusion   IntraVENous PRN    pantoprazole (PROTONIX) 40 mg in sodium chloride (PF) 10 mL injection  40 mg IntraVENous Q12H    0.9 % sodium chloride infusion   IntraVENous Continuous    sodium chloride flush 0.9 % injection 5-40 mL  5-40 mL IntraVENous 2 times per day    sodium chloride flush 0.9 % injection 5-40 mL  5-40 mL IntraVENous PRN    0.9 % sodium chloride infusion   IntraVENous PRN    ondansetron (ZOFRAN-ODT) disintegrating tablet 4 mg  4 mg Oral Q8H PRN    Or    ondansetron (ZOFRAN) injection 4 mg  4 mg IntraVENous Q6H PRN    polyethylene glycol (GLYCOLAX) packet 17 g  17 g Oral Daily PRN       Signed:  Beryle Heinz, MD    Part of this note may have been written by using a voice dictation software. The note has been proof read but may still contain some grammatical/other typographical errors.

## 2022-09-11 NOTE — CONSULTS
admitted with significant anemia, A. fib with RVR. She has been undergoing a complete GI work-up to help determine the source of her bleeding. She has received 3 units of blood during this hospitalization and her hemoglobin is now stable at 8. Urology was consulted today because on a CT scan she was incidentally found to have left hydronephrosis with a possible left UPJ obstruction. She denies any flank pain. She has not had any recurrent episodes of pyelonephritis. Her creatinine is 0.7. Importantly, she had a CT scan 3 years ago on 8/10/2018 which showed left UPJ obstruction with severe hydronephrosis. It had been present but worsened since an October 2016 CT. on my review of the images the hydro and renal parenchyma on the most recent CT scan appear very similar to her scan 3 years ago. She has a history of stone disease. Past Medical History:  Past Medical History:   Diagnosis Date    Fracture of right clavicle 3/2015    History of kidney stones     Hypertension     no meds--- pt stopped on her own--- off meds x 1 yr-- per pt-- b/p stable    Liver disease dx--2011    HEP C--- not currently seeing a m.d.-- due to  no insurance per pt    Severe protein-calorie malnutrition ([facility] [address] 8/29/2020       Past Surgical History:  Past Surgical History:   Procedure Laterality Date    BREAST BIOPSY  1984    cyst    GYN      cone bx    ORTHOPEDIC SURGERY Right 3/2015 at [facility]    clavicle surg       Medications:  No current facility-administered medications on file prior to encounter. Current Outpatient Medications on File Prior to Encounter   Medication Sig Dispense Refill    lidocaine 4 % external patch Place 1 patch onto the skin every 24 hours Place 1 patch onto the skin daily 12 hours on, 12 hours off.  30 patch 0    acetaminophen (TYLENOL) 500 MG tablet Take 1 tablet by mouth every 6 hours as needed for Pain 120 tablet 5       Allergies:  No Known Allergies    Social History:  Social History Socioeconomic History    Marital status:      Spouse name: Not on file    Number of children: Not on file    Years of education: Not on file    Highest education level: Not on file   Occupational History    Not on file   Tobacco Use    Smoking status: Every Day     Packs/day: 1.00     Types: Cigarettes    Smokeless tobacco: Never   Substance and Sexual Activity    Alcohol use: Yes    Drug use: No    Sexual activity: Not on file   Other Topics Concern    Not on file   Social History Narrative    Not on file     Social Determinants of Health     Financial Resource Strain: Not on file   Food Insecurity: Not on file   Transportation Needs: Not on file   Physical Activity: Not on file   Stress: Not on file   Social Connections: Not on file   Intimate Partner Violence: Not on file   Housing Stability: Not on file       Family History:  Family History   Problem Relation Age of Onset    Osteoarthritis Mother     Lung Disease Father     Cancer Father     Kidney Disease Brother        ROS:  Review of Systems - Negative except fatigue; no flank pain; no hematuria      Vitals:  Vitals:    09/11/22 1157   BP: 131/63   Pulse: 77   Resp: 16   Temp: 97.4 °F (36.3 °C)   SpO2: 98%       Intake/Output:    Intake/Output Summary (Last 24 hours) at 9/11/2022 1508  Last data filed at 9/11/2022 1426  Gross per 24 hour   Intake 1078 ml   Output 500 ml   Net 578 ml        Physical Exam:   /63   Pulse 77   Temp 97.4 °F (36.3 °C) (Temporal)   Resp 16   Ht 5' 6\" (1.676 m)   Wt 130 lb (59 kg)   SpO2 98%   BMI 20.98 kg/m²      GENERAL: No acute distress, Awake, Alert, Oriented X 3  HEENT: Trachea midline, EOM intact  CARDIAC: regular rate and rhythm  CHEST AND LUNG: Easy work of breathing, clear to auscultation bilaterally, no cyanosis  ABDOMEN: soft, non tender, non-distended, no palpable masses, no guarding, no rebound tenderness; no CVA tenderness  : deferred  SKIN: No rash, no erythema, no lacerations or abrasions, no ecchymosis  NEUROLOGIC: cranial nerves 2-12 grossly intact     Lab/Radiology/Other Diagnostic Tests:  Recent Labs     09/09/22  1828 09/10/22  0625 09/10/22  0807 09/10/22  1159 09/10/22  1751 09/10/22  2359 09/11/22  0615 09/11/22  1230   HGB 4.5* 6.8* 6.9* 7.1* 9.2* 8.3* 8.3* 8.3*   HCT 15.9* 21.7* 22.2* 23.6* 30.1* 25.9* 26.5* 26.3*   WBC 9.3  --  6.4  --   --   --  4.3  --    NA  --  141 140  --   --   --  139  --    K  --  3.8 4.1  --   --   --  3.4*  --    CL  --  114* 113*  --   --   --  113*  --    CO2  --  20* 20*  --   --   --  22  --    BUN  --  12 12  --   --   --  8  --    MG 1.6* 2.3  --   --   --   --  2.0  --    ALBUMIN  --  1.0* 1.0*  --   --   --  1.0*  --    AST  --  24 40*  --   --   --  28  --    ALT  --  28 31  --   --   --  31  --    ALKPHOS  --  67 65  --   --   --  67  --    INR 1.4  --   --   --   --   --   --   --    Creat 0.7      XR Results (maximum last 3):  === 09/09/22 ===    XR CHEST PORTABLE    - Narrative -  EXAM: XR CHEST PORTABLE    HISTORY: increased temperature. TECHNIQUE: Frontal chest.    COMPARISON: 9/9/2022    FINDINGS:  The cardiac silhouette, mediastinum, and pulmonary vasculature are within normal  limits. There is no consolidation, pleural effusion, or pneumothorax. No significant osseous abnormalities are observed. - Impression -  No evidence of an acute intrathoracic process. CT Results (maximum last 3):  === 09/09/22 ===    CT CHEST ABDOMEN PELVIS W CONTRAST    - Narrative -  CT CHEST ABDOMEN PELVIS W CONTRAST 9/10/2022 10:55 AM    HISTORY: Dyspnea. Elevated d-dimer. History of smoking. COMPARISON: CT abdomen pelvis 8/10/2018. Multiple axial images were obtained through the chest, abdomen, and pelvis. Oral contrast was used for bowel opacification. 100 mL of Isovue 370  intravenous contrast was used for better evaluation of solid organs and vascular  structures. Radiation dose reduction techniques were used for this study.   All  CT scans performed at this facility use one or all of the following: Automated  exposure control, adjustment of the mA and/or kVp according to patient's size,  iterative reconstruction. FINDINGS:  LUNGS AND PLEURA: Lungs are clear with a pleural-based posterior left lower lobe  lesion which is indeterminate measuring 2.2 x 1.1 cm on image 40 of series 302. Intercostal soft tissue mass remains in the differential. No prior imaging  available for comparison. No pleural effusions. MEDIASTINUM/AXILLAE: Heart is normal in size. No pericardial effusion. Esophagus  is unremarkable. No enlarged lymph nodes. Coronary artery calcifications are  present. Calcified plaque thoracic aorta without aneurysm or dissection. HEPATOBILIARY: Nodular liver contour consistent with cirrhosis. No focal mass. Cholelithiasis. Mild gallbladder wall edema also noted which is nonspecific in  the setting of mild ascites. PANCREAS: Evidence of chronic pancreatitis with diffuse pancreatic glandular  calcification. No pancreatic ductal dilatation or mass. Probable pancreatic tail  cyst measuring 1.2 cm on image 27 of series 303. SPLEEN: Splenomegaly. Spleen measures nearly 14 cm in craniocaudal dimension  image 46 of series 603. ADRENAL GLANDS: Normal.    KIDNEYS/BLADDER: Left hydronephrosis/extrarenal pelvis and mild renal cortical  thinning is noted probably reflecting a left UPJ stricture. Ureters are  nondilated. No renal calculi. No enhancing renal mass. Bladder is well-distended  and unremarkable. BOWEL: Colonic wall thickening noted involving the cecum and ascending colon  with surrounding inflammation most consistent with an infectious or inflammatory  colitis. Terminal ileum appears normal. Appendix is unremarkable. LYMPH NODES: No enlarged abdominal or pelvic lymph nodes. VASCULATURE: Calcified plaque abdominal aorta with dense calcification distal  abdominal aorta concerning for severe stenosis or occlusion.  This is minimally  changed since prior exam. Severe stenosis bilateral common iliac artery also  noted. PELVIC ORGANS: Uterus and rectum are unremarkable. Small to moderate amount of  free pelvic fluid is present. MUSCULOSKELETAL: Normal.    - Impression -  1. Probable acute infectious or inflammatory colitis involving the cecum and  ascending colon. Terminal ileum and appendix appear normal. Remainder of the  bowel is within normal limits. No obstruction. 2. Cirrhotic liver morphology with splenomegaly and mild ascites. Cholelithiasis  with edematous-appearing gallbladder wall of uncertain significance but likely  nonspecific in the setting of ascites. These findings are new in the interval  since prior exam.  3. Left hydronephrosis and dilated extrarenal pelvis likely due to chronic UPJ  stricture. Follow up with urology as clinically warranted. Mild interval  cortical thinning left kidney. No renal cyst or mass. No urinary tract calculi. 4. Dense calcified atherosclerotic plaque involving the distal abdominal aorta  with severe stenosis/occlusion and severe stenosis of the common iliac arteries  bilaterally.  Little change since prior exam.        Will Alejandro Martin MD    [address]  Urology

## 2022-09-12 ENCOUNTER — ANESTHESIA (OUTPATIENT)
Dept: ENDOSCOPY | Age: 62
DRG: 392 | End: 2022-09-12

## 2022-09-12 ENCOUNTER — APPOINTMENT (OUTPATIENT)
Dept: NON INVASIVE DIAGNOSTICS | Age: 62
DRG: 392 | End: 2022-09-12

## 2022-09-12 ENCOUNTER — ANESTHESIA EVENT (OUTPATIENT)
Dept: ENDOSCOPY | Age: 62
DRG: 392 | End: 2022-09-12

## 2022-09-12 PROBLEM — I48.91 ATRIAL FIBRILLATION WITH RVR (HCC): Status: ACTIVE | Noted: 2022-09-12

## 2022-09-12 LAB
ALBUMIN SERPL-MCNC: 2.9 G/DL (ref 3.2–4.6)
ALBUMIN/GLOB SERPL: 0.9 {RATIO} (ref 1.2–3.5)
ALP SERPL-CCNC: 66 U/L (ref 50–136)
ALT SERPL-CCNC: 29 U/L (ref 12–65)
ANION GAP SERPL CALC-SCNC: 4 MMOL/L (ref 4–13)
AST SERPL-CCNC: 26 U/L (ref 15–37)
BASOPHILS # BLD: 0 K/UL (ref 0–0.2)
BASOPHILS NFR BLD: 1 % (ref 0–2)
BILIRUB SERPL-MCNC: 0.6 MG/DL (ref 0.2–1.1)
BUN SERPL-MCNC: 5 MG/DL (ref 8–23)
CALCIUM SERPL-MCNC: 8 MG/DL (ref 8.3–10.4)
CHLORIDE SERPL-SCNC: 113 MMOL/L (ref 101–110)
CO2 SERPL-SCNC: 22 MMOL/L (ref 21–32)
CREAT SERPL-MCNC: 0.7 MG/DL (ref 0.6–1)
DIFFERENTIAL METHOD BLD: ABNORMAL
ECHO AO ASC DIAM: 3.5 CM
ECHO AO ASCENDING AORTA INDEX: 2.1 CM/M2
ECHO AO ROOT DIAM: 2.9 CM
ECHO AO ROOT INDEX: 1.74 CM/M2
ECHO AV AREA PEAK VELOCITY: 2.7 CM2
ECHO AV AREA VTI: 2.7 CM2
ECHO AV AREA/BSA PEAK VELOCITY: 1.6 CM2/M2
ECHO AV AREA/BSA VTI: 1.6 CM2/M2
ECHO AV MEAN GRADIENT: 4 MMHG
ECHO AV MEAN VELOCITY: 1 M/S
ECHO AV PEAK GRADIENT: 9 MMHG
ECHO AV PEAK VELOCITY: 1.5 M/S
ECHO AV VELOCITY RATIO: 0.87
ECHO AV VTI: 31 CM
ECHO BSA: 1.66 M2
ECHO EST RA PRESSURE: 3 MMHG
ECHO IVC PROX: 1.6 CM
ECHO LA AREA 2C: 20 CM2
ECHO LA AREA 4C: 19.2 CM2
ECHO LA DIAMETER INDEX: 1.86 CM/M2
ECHO LA DIAMETER: 3.1 CM
ECHO LA MAJOR AXIS: 5.9 CM
ECHO LA MINOR AXIS: 6 CM
ECHO LA TO AORTIC ROOT RATIO: 1.07
ECHO LA VOL BP: 53 ML (ref 22–52)
ECHO LA VOL/BSA BIPLANE: 32 ML/M2 (ref 16–34)
ECHO LV E' LATERAL VELOCITY: 10 CM/S
ECHO LV E' SEPTAL VELOCITY: 7 CM/S
ECHO LV EDV A2C: 87 ML
ECHO LV EDV A4C: 103 ML
ECHO LV EDV INDEX A4C: 62 ML/M2
ECHO LV EDV NDEX A2C: 52 ML/M2
ECHO LV EJECTION FRACTION A2C: 70 %
ECHO LV EJECTION FRACTION A4C: 65 %
ECHO LV EJECTION FRACTION BIPLANE: 69 % (ref 55–100)
ECHO LV ESV A2C: 27 ML
ECHO LV ESV A4C: 36 ML
ECHO LV ESV INDEX A2C: 16 ML/M2
ECHO LV ESV INDEX A4C: 22 ML/M2
ECHO LV FRACTIONAL SHORTENING: 37 % (ref 28–44)
ECHO LV INTERNAL DIMENSION DIASTOLE INDEX: 2.46 CM/M2
ECHO LV INTERNAL DIMENSION DIASTOLIC: 4.1 CM (ref 3.9–5.3)
ECHO LV INTERNAL DIMENSION SYSTOLIC INDEX: 1.56 CM/M2
ECHO LV INTERNAL DIMENSION SYSTOLIC: 2.6 CM
ECHO LV IVSD: 0.9 CM (ref 0.6–0.9)
ECHO LV MASS 2D: 97.3 G (ref 67–162)
ECHO LV MASS INDEX 2D: 58.3 G/M2 (ref 43–95)
ECHO LV POSTERIOR WALL DIASTOLIC: 0.7 CM (ref 0.6–0.9)
ECHO LV RELATIVE WALL THICKNESS RATIO: 0.34
ECHO LVOT AREA: 3.1 CM2
ECHO LVOT AV VTI INDEX: 0.85
ECHO LVOT DIAM: 2 CM
ECHO LVOT MEAN GRADIENT: 3 MMHG
ECHO LVOT PEAK GRADIENT: 6 MMHG
ECHO LVOT PEAK VELOCITY: 1.3 M/S
ECHO LVOT STROKE VOLUME INDEX: 49.6 ML/M2
ECHO LVOT SV: 82.9 ML
ECHO LVOT VTI: 26.4 CM
ECHO MV A VELOCITY: 0.72 M/S
ECHO MV E DECELERATION TIME (DT): 291 MS
ECHO MV E VELOCITY: 0.64 M/S
ECHO MV E/A RATIO: 0.89
ECHO MV E/E' LATERAL: 6.4
ECHO MV E/E' RATIO (AVERAGED): 7.77
ECHO MV E/E' SEPTAL: 9.14
ECHO PV ACCELERATION TIME (AT): 148 MS
ECHO PV MAX VELOCITY: 0.9 M/S
ECHO PV PEAK GRADIENT: 3 MMHG
ECHO RV BASAL DIMENSION: 3.9 CM
ECHO RV INTERNAL DIMENSION: 2.5 CM
ECHO RV TAPSE: 2.4 CM (ref 1.7–?)
EOSINOPHIL # BLD: 0.1 K/UL (ref 0–0.8)
EOSINOPHIL NFR BLD: 3 % (ref 0.5–7.8)
ERYTHROCYTE [DISTWIDTH] IN BLOOD BY AUTOMATED COUNT: 18.5 % (ref 11.9–14.6)
GLOBULIN SER CALC-MCNC: 3.1 G/DL (ref 2.3–3.5)
GLUCOSE BLD STRIP.AUTO-MCNC: 126 MG/DL (ref 65–100)
GLUCOSE SERPL-MCNC: 98 MG/DL (ref 65–100)
HCT VFR BLD AUTO: 26.3 % (ref 35.8–46.3)
HCT VFR BLD AUTO: 27.3 % (ref 35.8–46.3)
HCT VFR BLD AUTO: 32.1 % (ref 35.8–46.3)
HGB BLD-MCNC: 8.4 G/DL (ref 11.7–15.4)
HGB BLD-MCNC: 8.5 G/DL (ref 11.7–15.4)
HGB BLD-MCNC: 9.7 G/DL (ref 11.7–15.4)
IMM GRANULOCYTES # BLD AUTO: 0 K/UL (ref 0–0.5)
IMM GRANULOCYTES NFR BLD AUTO: 0 % (ref 0–5)
LYMPHOCYTES # BLD: 0.9 K/UL (ref 0.5–4.6)
LYMPHOCYTES NFR BLD: 27 % (ref 13–44)
MAGNESIUM SERPL-MCNC: 2 MG/DL (ref 1.8–2.4)
MCH RBC QN AUTO: 26.4 PG (ref 26.1–32.9)
MCHC RBC AUTO-ENTMCNC: 31.1 G/DL (ref 31.4–35)
MCV RBC AUTO: 84.8 FL (ref 79.6–97.8)
MONOCYTES # BLD: 0.2 K/UL (ref 0.1–1.3)
MONOCYTES NFR BLD: 8 % (ref 4–12)
NEUTS SEG # BLD: 2 K/UL (ref 1.7–8.2)
NEUTS SEG NFR BLD: 62 % (ref 43–78)
NRBC # BLD: 0 K/UL (ref 0–0.2)
PHOSPHATE SERPL-MCNC: 3.5 MG/DL (ref 2.3–3.7)
PLATELET # BLD AUTO: 80 K/UL (ref 150–450)
PMV BLD AUTO: 11.1 FL (ref 9.4–12.3)
POTASSIUM SERPL-SCNC: 3.3 MMOL/L (ref 3.5–5.1)
PROT SERPL-MCNC: 6 G/DL (ref 6.3–8.2)
RBC # BLD AUTO: 3.22 M/UL (ref 4.05–5.2)
SERVICE CMNT-IMP: ABNORMAL
SODIUM SERPL-SCNC: 139 MMOL/L (ref 136–145)
WBC # BLD AUTO: 3.2 K/UL (ref 4.3–11.1)

## 2022-09-12 PROCEDURE — 93306 TTE W/DOPPLER COMPLETE: CPT | Performed by: INTERNAL MEDICINE

## 2022-09-12 PROCEDURE — 2500000003 HC RX 250 WO HCPCS

## 2022-09-12 PROCEDURE — C9113 INJ PANTOPRAZOLE SODIUM, VIA: HCPCS | Performed by: FAMILY MEDICINE

## 2022-09-12 PROCEDURE — 2580000003 HC RX 258: Performed by: STUDENT IN AN ORGANIZED HEALTH CARE EDUCATION/TRAINING PROGRAM

## 2022-09-12 PROCEDURE — C8929 TTE W OR WO FOL WCON,DOPPLER: HCPCS

## 2022-09-12 PROCEDURE — 0DJD8ZZ INSPECTION OF LOWER INTESTINAL TRACT, VIA NATURAL OR ARTIFICIAL OPENING ENDOSCOPIC: ICD-10-PCS | Performed by: INTERNAL MEDICINE

## 2022-09-12 PROCEDURE — 99232 SBSQ HOSP IP/OBS MODERATE 35: CPT | Performed by: INTERNAL MEDICINE

## 2022-09-12 PROCEDURE — A4216 STERILE WATER/SALINE, 10 ML: HCPCS | Performed by: FAMILY MEDICINE

## 2022-09-12 PROCEDURE — 83735 ASSAY OF MAGNESIUM: CPT

## 2022-09-12 PROCEDURE — 85025 COMPLETE CBC W/AUTO DIFF WBC: CPT

## 2022-09-12 PROCEDURE — 6360000002 HC RX W HCPCS: Performed by: FAMILY MEDICINE

## 2022-09-12 PROCEDURE — 2580000003 HC RX 258: Performed by: FAMILY MEDICINE

## 2022-09-12 PROCEDURE — 3700000001 HC ADD 15 MINUTES (ANESTHESIA): Performed by: INTERNAL MEDICINE

## 2022-09-12 PROCEDURE — 3609017100 HC EGD: Performed by: INTERNAL MEDICINE

## 2022-09-12 PROCEDURE — 99222 1ST HOSP IP/OBS MODERATE 55: CPT | Performed by: NURSE PRACTITIONER

## 2022-09-12 PROCEDURE — 85014 HEMATOCRIT: CPT

## 2022-09-12 PROCEDURE — 3609027000 HC COLONOSCOPY: Performed by: INTERNAL MEDICINE

## 2022-09-12 PROCEDURE — 7100000011 HC PHASE II RECOVERY - ADDTL 15 MIN: Performed by: INTERNAL MEDICINE

## 2022-09-12 PROCEDURE — 80053 COMPREHEN METABOLIC PANEL: CPT

## 2022-09-12 PROCEDURE — 7100000010 HC PHASE II RECOVERY - FIRST 15 MIN: Performed by: INTERNAL MEDICINE

## 2022-09-12 PROCEDURE — 1100000003 HC PRIVATE W/ TELEMETRY

## 2022-09-12 PROCEDURE — 36415 COLL VENOUS BLD VENIPUNCTURE: CPT

## 2022-09-12 PROCEDURE — 6370000000 HC RX 637 (ALT 250 FOR IP): Performed by: STUDENT IN AN ORGANIZED HEALTH CARE EDUCATION/TRAINING PROGRAM

## 2022-09-12 PROCEDURE — 2709999900 HC NON-CHARGEABLE SUPPLY: Performed by: INTERNAL MEDICINE

## 2022-09-12 PROCEDURE — 0DJ08ZZ INSPECTION OF UPPER INTESTINAL TRACT, VIA NATURAL OR ARTIFICIAL OPENING ENDOSCOPIC: ICD-10-PCS | Performed by: INTERNAL MEDICINE

## 2022-09-12 PROCEDURE — 3700000000 HC ANESTHESIA ATTENDED CARE: Performed by: INTERNAL MEDICINE

## 2022-09-12 PROCEDURE — 82962 GLUCOSE BLOOD TEST: CPT

## 2022-09-12 PROCEDURE — 84100 ASSAY OF PHOSPHORUS: CPT

## 2022-09-12 PROCEDURE — 6360000004 HC RX CONTRAST MEDICATION: Performed by: STUDENT IN AN ORGANIZED HEALTH CARE EDUCATION/TRAINING PROGRAM

## 2022-09-12 PROCEDURE — 2580000003 HC RX 258

## 2022-09-12 PROCEDURE — 6360000002 HC RX W HCPCS

## 2022-09-12 RX ORDER — PROPOFOL 10 MG/ML
INJECTION, EMULSION INTRAVENOUS PRN
Status: DISCONTINUED | OUTPATIENT
Start: 2022-09-12 | End: 2022-09-12 | Stop reason: SDUPTHER

## 2022-09-12 RX ORDER — SODIUM CHLORIDE 9 MG/ML
INJECTION, SOLUTION INTRAVENOUS PRN
Status: CANCELLED | OUTPATIENT
Start: 2022-09-12

## 2022-09-12 RX ORDER — SODIUM CHLORIDE, SODIUM LACTATE, POTASSIUM CHLORIDE, CALCIUM CHLORIDE 600; 310; 30; 20 MG/100ML; MG/100ML; MG/100ML; MG/100ML
INJECTION, SOLUTION INTRAVENOUS CONTINUOUS PRN
Status: DISCONTINUED | OUTPATIENT
Start: 2022-09-12 | End: 2022-09-12 | Stop reason: SDUPTHER

## 2022-09-12 RX ORDER — LIDOCAINE HYDROCHLORIDE 20 MG/ML
INJECTION, SOLUTION EPIDURAL; INFILTRATION; INTRACAUDAL; PERINEURAL PRN
Status: DISCONTINUED | OUTPATIENT
Start: 2022-09-12 | End: 2022-09-12 | Stop reason: SDUPTHER

## 2022-09-12 RX ORDER — PANTOPRAZOLE SODIUM 40 MG/1
40 TABLET, DELAYED RELEASE ORAL
Status: DISCONTINUED | OUTPATIENT
Start: 2022-09-13 | End: 2022-09-14 | Stop reason: HOSPADM

## 2022-09-12 RX ORDER — SODIUM CHLORIDE 0.9 % (FLUSH) 0.9 %
5-40 SYRINGE (ML) INJECTION PRN
Status: CANCELLED | OUTPATIENT
Start: 2022-09-12

## 2022-09-12 RX ORDER — LIDOCAINE HYDROCHLORIDE 10 MG/ML
1 INJECTION, SOLUTION INFILTRATION; PERINEURAL
Status: CANCELLED | OUTPATIENT
Start: 2022-09-12 | End: 2022-09-12

## 2022-09-12 RX ORDER — GLYCOPYRROLATE 0.2 MG/ML
INJECTION INTRAMUSCULAR; INTRAVENOUS PRN
Status: DISCONTINUED | OUTPATIENT
Start: 2022-09-12 | End: 2022-09-12 | Stop reason: SDUPTHER

## 2022-09-12 RX ORDER — ONDANSETRON 2 MG/ML
4 INJECTION INTRAMUSCULAR; INTRAVENOUS
Status: CANCELLED | OUTPATIENT
Start: 2022-09-12 | End: 2022-09-12

## 2022-09-12 RX ORDER — LIDOCAINE 4 G/G
1 PATCH TOPICAL DAILY PRN
Status: DISCONTINUED | OUTPATIENT
Start: 2022-09-12 | End: 2022-09-14 | Stop reason: HOSPADM

## 2022-09-12 RX ORDER — SODIUM CHLORIDE 0.9 % (FLUSH) 0.9 %
5-40 SYRINGE (ML) INJECTION EVERY 12 HOURS SCHEDULED
Status: CANCELLED | OUTPATIENT
Start: 2022-09-12

## 2022-09-12 RX ORDER — POTASSIUM CHLORIDE 20 MEQ/1
40 TABLET, EXTENDED RELEASE ORAL ONCE
Status: COMPLETED | OUTPATIENT
Start: 2022-09-12 | End: 2022-09-12

## 2022-09-12 RX ORDER — SODIUM CHLORIDE, SODIUM LACTATE, POTASSIUM CHLORIDE, CALCIUM CHLORIDE 600; 310; 30; 20 MG/100ML; MG/100ML; MG/100ML; MG/100ML
INJECTION, SOLUTION INTRAVENOUS CONTINUOUS
Status: CANCELLED | OUTPATIENT
Start: 2022-09-12

## 2022-09-12 RX ADMIN — PROPOFOL 160 MCG/KG/MIN: 10 INJECTION, EMULSION INTRAVENOUS at 12:27

## 2022-09-12 RX ADMIN — SODIUM CHLORIDE, PRESERVATIVE FREE 40 MG: 5 INJECTION INTRAVENOUS at 08:24

## 2022-09-12 RX ADMIN — SODIUM CHLORIDE, SODIUM LACTATE, POTASSIUM CHLORIDE, AND CALCIUM CHLORIDE: 600; 310; 30; 20 INJECTION, SOLUTION INTRAVENOUS at 11:58

## 2022-09-12 RX ADMIN — PERFLUTREN 0.45 ML: 6.52 INJECTION, SUSPENSION INTRAVENOUS at 11:34

## 2022-09-12 RX ADMIN — ACETAMINOPHEN 650 MG: 325 TABLET, FILM COATED ORAL at 08:24

## 2022-09-12 RX ADMIN — POTASSIUM CHLORIDE 40 MEQ: 1500 TABLET, EXTENDED RELEASE ORAL at 09:26

## 2022-09-12 RX ADMIN — SODIUM CHLORIDE, PRESERVATIVE FREE 10 ML: 5 INJECTION INTRAVENOUS at 08:24

## 2022-09-12 RX ADMIN — PROPOFOL 30 MG: 10 INJECTION, EMULSION INTRAVENOUS at 12:28

## 2022-09-12 RX ADMIN — PROPOFOL 50 MG: 10 INJECTION, EMULSION INTRAVENOUS at 12:27

## 2022-09-12 RX ADMIN — GLYCOPYRROLATE 0.2 MG: 0.2 INJECTION, SOLUTION INTRAMUSCULAR; INTRAVENOUS at 12:23

## 2022-09-12 RX ADMIN — LIDOCAINE HYDROCHLORIDE 100 MG: 20 INJECTION, SOLUTION EPIDURAL; INFILTRATION; INTRACAUDAL; PERINEURAL at 12:27

## 2022-09-12 RX ADMIN — ACETAMINOPHEN 650 MG: 325 TABLET, FILM COATED ORAL at 18:12

## 2022-09-12 ASSESSMENT — PAIN DESCRIPTION - ORIENTATION: ORIENTATION: MID

## 2022-09-12 ASSESSMENT — PAIN SCALES - GENERAL
PAINLEVEL_OUTOF10: 2
PAINLEVEL_OUTOF10: 0
PAINLEVEL_OUTOF10: 2
PAINLEVEL_OUTOF10: 0

## 2022-09-12 ASSESSMENT — PAIN - FUNCTIONAL ASSESSMENT
PAIN_FUNCTIONAL_ASSESSMENT: ACTIVITIES ARE NOT PREVENTED
PAIN_FUNCTIONAL_ASSESSMENT: NONE - DENIES PAIN

## 2022-09-12 ASSESSMENT — PAIN DESCRIPTION - FREQUENCY: FREQUENCY: INTERMITTENT

## 2022-09-12 ASSESSMENT — PAIN DESCRIPTION - LOCATION
LOCATION: GENERALIZED
LOCATION: BUTTOCKS

## 2022-09-12 ASSESSMENT — PAIN DESCRIPTION - ONSET: ONSET: ON-GOING

## 2022-09-12 ASSESSMENT — PAIN DESCRIPTION - DESCRIPTORS: DESCRIPTORS: SORE

## 2022-09-12 ASSESSMENT — PAIN DESCRIPTION - PAIN TYPE: TYPE: ACUTE PAIN

## 2022-09-12 NOTE — PROGRESS NOTES
[facility] CARDIOLOGY PROGRESS NOTE    9/12/2022 7:07 AM    Admit Date: 9/9/2022        Subjective:   Stable overnight without angina, CHF, or palpitations. Normal sinus rhythm with no A. fib overnight. BP is mildly elevated intermittently. Vitals stable and controlled. No other complaints overnight. Lying flat comfortably. Tolerating meds well. Objective:      Vitals:    09/11/22 1648 09/11/22 1957 09/11/22 2345 09/12/22 0315   BP: (!) 153/68 (!) 147/70 (!) 146/66 (!) 124/58   Pulse: 78 75 73 76   Resp: 20 18 18 16   Temp: 97.6 °F (36.4 °C) 97 °F (36.1 °C) 97.3 °F (36.3 °C) 97.3 °F (36.3 °C)   TempSrc: Temporal Axillary Axillary Oral   SpO2: 100% 99% 100%    Weight:       Height:           Physical Exam:  Neck- supple, no JVD  CV- regular rate and rhythm no MRG  Lung- clear bilaterally, mildly decreased bibasilar but no crackles or wheezing  Abd- soft, nontender, nondistended  Ext- no edema  Skin- warm and dry    Data Review:   Recent Labs     09/09/22  1828 09/09/22  1828 09/10/22  0625 09/10/22  0807 09/10/22  1159 09/11/22  0615 09/11/22  1230 09/11/22  1815 09/12/22  0004   NA  --    < > 141 140  --  139  --   --   --    K  --    < > 3.8 4.1  --  3.4*  --   --   --    MG 1.6*  --  2.3  --   --  2.0  --   --   --    BUN  --    < > 12 12  --  8  --   --   --    WBC 9.3  --   --  6.4  --  4.3  --   --   --    HGB 4.5*  --  6.8* 6.9*   < > 8.3*   < > 9.8* 8.4*   HCT 15.9*  --  21.7* 22.2*   < > 26.5*   < > 31.0* 26.3*     --   --  100*  --  95*  --   --   --    INR 1.4  --   --   --   --   --   --   --   --     < > = values in this interval not displayed. Assessment and Plan: Active Hospital Problems    Atrial fibrillation with RVR (HCC)-normal sinus rhythm since admission. Continue to monitor telemetry and augment rate slowing meds as needed. Not a good candidate for anticoagulation given acute anemia with GI blood loss.   Continue to monitor telemetry and recheck BMP, CBC, magnesium in the morning and replete electrolytes as needed. Gastrointestinal hemorrhage-endoscopy/therapy per GI.  N.p.o. for endoscopy today      *Melena      Hepatitis C-outpatient surveillance per GI      Hypomagnesemia-recheck magnesium in the morning and replete as needed      Atrial flutter (HCC)-normal sinus rhythm on telemetry since admission. Follow telemetry, poor anticoagulation candidate as noted above      Symptomatic anemia-improved and asymptomatic lying in bed. Recheck in the morning. Keep hemoglobin greater than 8 as tolerated.        Manjula Kovacs MD

## 2022-09-12 NOTE — ANESTHESIA PRE PROCEDURE
Department of Anesthesiology  Preprocedure Note       Name:  Jeannette Benedict   Age:  60 y.o.  :  1960                                          MRN:  [id number]         Date:  2022      Surgeon: Billy Batista):  Peyton Hendrickson MD    Procedure: Procedure(s):  EGD ESOPHAGOGASTRODUODENOSCOPY  COLORECTAL CANCER SCREENING, NOT HIGH RISK Rm 2226    Medications prior to admission:   Prior to Admission medications    Medication Sig Start Date End Date Taking?  Authorizing Provider   lidocaine 4 % external patch Place 1 patch onto the skin every 24 hours Place 1 patch onto the skin daily 12 hours on, 12 hours off. 22  Janice Denney MD   acetaminophen (TYLENOL) 500 MG tablet Take 1 tablet by mouth every 6 hours as needed for Pain 22   Janice Denney MD       Current medications:    Current Facility-Administered Medications   Medication Dose Route Frequency Provider Last Rate Last Admin    acetaminophen (TYLENOL) tablet 650 mg  650 mg Oral Q4H PRN Paris Elias MD   650 mg at 22 0824    diatrizoate meglumine-sodium (GASTROGRAFIN) 66-10 % solution 15 mL  15 mL Oral ONCE PRN Farhan Vo RN   15 mL at 09/10/22 0950    0.9 % sodium chloride infusion   IntraVENous PRN Chong Victor DO        pantoprazole (PROTONIX) 40 mg in sodium chloride (PF) 10 mL injection  40 mg IntraVENous Q12H Eduardajosep Mccartney, DO   40 mg at 22 0824    0.9 % sodium chloride infusion   IntraVENous Continuous Eduardajosep Hillel, DO        sodium chloride flush 0.9 % injection 5-40 mL  5-40 mL IntraVENous 2 times per day Eduarda Chapel, DO   10 mL at 22 0824    sodium chloride flush 0.9 % injection 5-40 mL  5-40 mL IntraVENous PRN Eduarda Sergioel, DO        0.9 % sodium chloride infusion   IntraVENous PRN Eduarda Hillel, DO        ondansetron (ZOFRAN-ODT) disintegrating tablet 4 mg  4 mg Oral Q8H PRN Eduarda Mccartney, DO        Or    [facility]dansetron [facility] injection 4 mg  4 mg IntraVENous Q6H PRN Ernst Herrmann, DO        polyethylene glycol [facility]) packet 17 g  17 g Oral Daily PRN Clarance Binet, DO           Allergies:  No Known Allergies    Problem List:    Patient Active Problem List   Diagnosis Code    Severe protein-calorie malnutrition ([facility] [address] E43    Alcohol dependence ([facility] [address] F10.20    Symptomatic anemia D64.9    Elevated LFTs R79.89    Leukocytosis D72.829    Hypokalemia E87.6    Hyponatremia E87.1    CAP (community acquired pneumonia) J18.9    Melena K92.1    Hepatitis C B19.20    Hypomagnesemia E83.42    Atrial flutter (HCC) I48.92    Gastrointestinal hemorrhage K92.2    Atrial fibrillation with RVR (HCC) I48.91       Past Medical History:        Diagnosis Date    Fracture of right clavicle 3/2015    History of kidney stones     Hypertension     no meds--- pt stopped on her own--- off meds x 1 yr-- per pt-- b/p stable    Liver disease dx--2011    HEP C--- not currently seeing a m.d.-- due to  no insurance per pt    Severe protein-calorie malnutrition ([facility] [address] 8/29/2020       Past Surgical History:        Procedure Laterality Date    BREAST BIOPSY  1984    cyst    GYN      cone bx    ORTHOPEDIC SURGERY Right 3/2015 at [facility]    clavicle surg       Social History:    Social History     Tobacco Use    Smoking status: Every Day     Packs/day: 1.00     Types: Cigarettes    Smokeless tobacco: Never   Substance Use Topics    Alcohol use:  Yes                                Ready to quit: Not Answered  Counseling given: Not Answered      Vital Signs (Current):   Vitals:    09/11/22 1957 09/11/22 2345 09/12/22 0315 09/12/22 0724   BP: (!) 147/70 (!) 146/66 (!) 124/58 (!) 145/67   Pulse: 75 73 76 78   Resp: 18 18 16 20   Temp: 97 °F (36.1 °C) 97.3 °F (36.3 °C) 97.3 °F (36.3 °C) 97.7 °F (36.5 °C)   TempSrc: Axillary Axillary Oral Temporal   SpO2: 99% 100%  98%   Weight:       Height:                                                  BP Readings from Last 3 Encounters: 09/12/22 (!) 145/67   08/29/22 (!) 124/56       NPO Status:                                                                                 BMI:   Wt Readings from Last 3 Encounters:   09/09/22 130 lb (59 kg)   08/29/22 130 lb (59 kg)     Body mass index is 20.98 kg/m². CBC:   Lab Results   Component Value Date/Time    WBC 3.2 09/12/2022 07:05 AM    RBC 3.22 09/12/2022 07:05 AM    HGB 8.5 09/12/2022 07:05 AM    HCT 27.3 09/12/2022 07:05 AM    MCV 84.8 09/12/2022 07:05 AM    RDW 18.5 09/12/2022 07:05 AM    PLT 80 09/12/2022 07:05 AM       CMP:   Lab Results   Component Value Date/Time     09/12/2022 07:05 AM    K 3.3 09/12/2022 07:05 AM     09/12/2022 07:05 AM    CO2 22 09/12/2022 07:05 AM    BUN 5 09/12/2022 07:05 AM    CREATININE 0.70 09/12/2022 07:05 AM    GFRAA >60 09/12/2022 07:05 AM    AGRATIO 0.6 10/29/2020 12:01 PM    LABGLOM >60 09/12/2022 07:05 AM    GLUCOSE 98 09/12/2022 07:05 AM    PROT 6.0 09/12/2022 07:05 AM    CALCIUM 8.0 09/12/2022 07:05 AM    BILITOT 0.6 09/12/2022 07:05 AM    ALKPHOS 66 09/12/2022 07:05 AM    ALKPHOS 131 10/29/2020 12:01 PM    AST 26 09/12/2022 07:05 AM    ALT 29 09/12/2022 07:05 AM       POC Tests: No results for input(s): POCGLU, POCNA, POCK, POCCL, POCBUN, POCHEMO, POCHCT in the last 72 hours.     Coags:   Lab Results   Component Value Date/Time    PROTIME 17.3 09/09/2022 06:28 PM    INR 1.4 09/09/2022 06:28 PM    APTT 25.5 08/27/2020 08:26 PM       HCG (If Applicable): No results found for: PREGTESTUR, PREGSERUM, HCG, HCGQUANT     ABGs: No results found for: PHART, PO2ART, ZQE3GRM, EQC2RDM, BEART, J1WMVTJL     Type & Screen (If Applicable):  No results found for: LABABO, LABRH    Drug/Infectious Status (If Applicable):  No results found for: HIV, HEPCAB    COVID-19 Screening (If Applicable):   Lab Results   Component Value Date/Time    COVID19 Not detected 09/09/2022 06:28 PM           Anesthesia Evaluation  Patient summary reviewed and Nursing notes reviewed  Airway: Mallampati: II  TM distance: >3 FB   Neck ROM: full  Mouth opening: > = 3 FB   Dental:    (+) upper dentures and lower dentures      Pulmonary:Negative Pulmonary ROS breath sounds clear to auscultation                             Cardiovascular:  Exercise tolerance: good (>4 METS),   (+) hypertension:, dysrhythmias (Admitted with AF with RVR - NSR with rate control since transfusion): atrial fibrillation and atrial flutter,         Rhythm: regular  Rate: normal                    Neuro/Psych:   (+) psychiatric history:             ROS comment: H/o substance abuse GI/Hepatic/Renal:   (+) hepatitis: C, liver disease (Cirrhosis):,          ROS comment: GI bleed with anemia (Hgb 8.5 currently after transfusion). Endo/Other:                     Abdominal:             Vascular: negative vascular ROS. Other Findings:           Anesthesia Plan      TIVA     ASA 3             Anesthetic plan and risks discussed with patient.                         Vicente Peter MD   9/12/2022

## 2022-09-12 NOTE — ANESTHESIA POSTPROCEDURE EVALUATION
Department of Anesthesiology  Postprocedure Note    Patient: Grady Morse  MRN: [id number]  YOB: 1960  Date of evaluation: 9/12/2022      Procedure Summary     Date: 09/12/22 Room / Location: [facility] ENDO 04 / [facility] ENDOSCOPY    Anesthesia Start: 1222 Anesthesia Stop: 8257    Procedures:       EGD ESOPHAGOGASTRODUODENOSCOPY (Upper GI Region)      COLORECTAL CANCER SCREENING, NOT HIGH RISK Rm 2226 (Lower GI Region) Diagnosis:       Heme positive stool      Anemia, unspecified type    Surgeons: Chris Chun MD Responsible Provider: Ankit Jeffers MD    Anesthesia Type: TIVA ASA Status: 3          Anesthesia Type: TIVA    Meenakshi Phase I: Meenakshi Score: 10    Meenakshi Phase II: Meenakshi Score: 10      Anesthesia Post Evaluation    Patient location during evaluation: PACU  Patient participation: complete - patient participated  Level of consciousness: awake and alert  Airway patency: patent  Nausea & Vomiting: no nausea and no vomiting  Complications: no  Cardiovascular status: hemodynamically stable  Respiratory status: acceptable  Hydration status: euvolemic  Comments: Blood pressure (!) 101/52, pulse 99, temperature 98.6 °F (37 °C), temperature source Skin, resp. rate 16, height 5' 6\" (1.676 m), weight 130 lb (59 kg), SpO2 98 %.       Pt stable for discharge from PACU  Multimodal analgesia pain management approach

## 2022-09-12 NOTE — OP NOTE
PROCEDURE: Colonoscopy    ENDOSCOPIST: Asher Raphael M.D. PREOPERATIVE DIAGNOSIS: WARD    POSTOPERATIVE DIAGNOSIS: melanosis coli    SEDATION: MAC    INSTRUMENT: [id number]    DESCRIPTION OF PROCEDURE:  After informed consent was obtained the patient was placed in the left lateral decubitus position. Intravenous sedation was administered as above. After adequate sedation had been achieved, digital rectal examination was performed. The colonoscope was then inserted through the anus and followed the course of the rectum and colon to the cecum, which was confirmed by visualization of the ileocecal valve and appendiceal orifice. The colonoscope was withdrawn from that point, performing a careful survey of the mucosa. Retroflexion was performed in the rectum. FINDINGS: Fair prep    Colon: Mild melanosis coli, otherwise normal.    Rectum: Normal    Estimated Blood Loss:  0 cc-min    IMPRESSION: No source for WARD      PLAN: Screening colonoscopy 5 years due to fair prep.     Maya Kong MD

## 2022-09-12 NOTE — PROGRESS NOTES
Spiritual Care visit. Initial Visit, Pre Surgery Consult. Visit and prayer before patient goes to surgery.     Visit by Hayley Faulkner M.Ed., Th.B. ,Staff

## 2022-09-12 NOTE — OP NOTE
PROCEDURE: Esophagogastroduodenoscopy    ENDOSCOPIST: Magaly Zepeda M.D. PREOPERATIVE DIAGNOSIS: WARD    POSTOPERATIVE DIAGNOSIS: portal hypertensive gastropathy, gastritis    INSTRUMENTS: [id number]    SEDATION: MAC    DESCRIPTION: After informed consent was obtained, the patient was taken to the endoscopy suite and placed in the left lateral decubitus position. Intravenous sedation was administered by the Anesthesia provider. After adequate sedation had been achieved the endoscope was inserted over the tongue and through the posterior pharynx under direct visualization down the esophagus to the stomach and into the second portion of the duodenum. The endoscopic was withdrawn from that point, performing a careful survey of the mucosa. Retroflexion was performed in the gastric fundus. FINDINGS:  Esophagus: Normal.  No varices    Stomach:  Mild PHG. Two nonbleeding erosions noted in antrum    Duodenum: Normal    Estimated blood loss:  0 cc-minimal    IMPRESSION: Mild PHG. PHG can be a source for bleeding and WARD. PLAN: EGD 3 years to screen for varices.       Balta Kelley MD

## 2022-09-12 NOTE — PROGRESS NOTES
TRANSFER - IN REPORT:    Verbal report received from   JOCELINE RIVERA [facility] on [address]  being received from [phone] for ordered procedure      Report consisted of patient's Situation, Background, Assessment and   Recommendations(SBAR). Information from the following report(s) Nurse Handoff Report was reviewed with the receiving nurse. Opportunity for questions and clarification was provided. Assessment completed upon patient's arrival to unit and care assumed.

## 2022-09-12 NOTE — PROGRESS NOTES
TRANSFER - IN REPORT:    Verbal report received from [facility] GARIMA AMOS on [place]   being received from  for routine progression of patient care      Report consisted of patients Situation, Background, Assessment and   Recommendations(SBAR). Information from the following report(s) Nurse Handoff Report was reviewed with the receiving nurse. Opportunity for questions and clarification was provided.

## 2022-09-12 NOTE — PROGRESS NOTES
TRANSFER - OUT REPORT:    Verbal report given to [address] [place] [address] on [facility]  being transferred to [phone] for routine post-op       Report consisted of patient's Situation, Background, Assessment and   Recommendations(SBAR). Information from the following report(s) Surgery Report was reviewed with the receiving nurse. Lines:   Peripheral IV 09/09/22 Left;Ventral Hand (Active)   Site Assessment Clean, dry & intact 09/12/22 0803   Line Status Capped;Flushed 09/12/22 0803   Line Care Connections checked and tightened 09/12/22 0803   Phlebitis Assessment No symptoms 09/12/22 0803   Infiltration Assessment 0 09/12/22 0803   Alcohol Cap Used Yes 09/12/22 0803   Dressing Status Clean, dry & intact 09/12/22 0803   Dressing Type Transparent 09/12/22 0803       Peripheral IV 09/09/22 Right Antecubital (Active)   Site Assessment Intact; Positional 09/12/22 0803   Line Status Capped;Flushed 09/12/22 0803   Line Care Connections checked and tightened 09/12/22 0803   Phlebitis Assessment No symptoms 09/12/22 0803   Infiltration Assessment 0 09/12/22 0803   Alcohol Cap Used Yes 09/12/22 0803   Dressing Status Intact; Old drainage noted 09/12/22 0803   Dressing Type Transparent 09/12/22 0803        Opportunity for questions and clarification was provided.       Patient transported with:  [employer]

## 2022-09-12 NOTE — PROGRESS NOTES
Egd and colonoscopy complete; see notes for details. Mild portal hypertensive gastropathy may be the cause of her WARD. Since this is a chronic condition that is unlikely to resolve, I recommend iron therapy indefinitely. GI signing off. [facility] will contact the patient directly to arrange follow up. Addendum:  Her insurance is not accepted at [facility]. I have recommended that she resume Epclusa immediately and that she contact her ID specialist for referral to [facility].

## 2022-09-12 NOTE — INTERVAL H&P NOTE
Update History & Physical    The patient's History and Physical of September 9, 2022 was reviewed with the patient and I examined the patient. There was no change. The surgical site was confirmed by the patient and me. Plan: The risks, benefits, expected outcome, and alternative to the recommended procedure have been discussed with the patient. Patient understands and wants to proceed with the procedure.      Electronically signed by Tracee Serrano MD on 9/12/2022 at 12:19 PM

## 2022-09-12 NOTE — CONSULTS
History and Physical/Surgical Consult   [facility]    Admit date: 2022    MRN: [id number]     : 1960     Age: 60 y.o.          2022 12:03 PM    Subjective/HPI:   This patient is a 60 y.o. seen and evaluated for an incidental finding of iliac artery stenosis. She is admitted to the hospital with weakness, SOB and noted to be in A. Fib with RVR. She admits to dark stools for months. She c/o leg pain from her hip down to her calves. She states that she has to stop and take a break for 10-15 minutes d/t severe leg pain before she can continue to walk. She currently works in [employer] for [employer]. She occassionally takes ASA for headaches but does not take an ASA on a regular basis.     PMH: HTN, Hepatitis C, previous ETOH abuse, A. Fib, current smoker  Review of Systems  Constitutional: negative  Respiratory: positive for shortness of breath  Cardiovascular: positive for irregular heart beat  Musculoskeletal:positive for bilateral leg pain  Past Medical History:   Diagnosis Date    Fracture of right clavicle 3/2015    History of kidney stones     Hypertension     no meds--- pt stopped on her own--- off meds x 1 yr-- per pt-- b/p stable    Liver disease dx--    HEP C--- not currently seeing a m.d.-- due to  no insurance per pt    Severe protein-calorie malnutrition ([facility] [address] 2020      Past Surgical History:   Procedure Laterality Date    BREAST BIOPSY  1984    cyst    GYN      cone bx    ORTHOPEDIC SURGERY Right 3/2015 at [facility]    clavicle surg      No Known Allergies   Social History     Tobacco Use    Smoking status: Every Day     Packs/day: 1.00     Types: Cigarettes    Smokeless tobacco: Never   Substance Use Topics    Alcohol use: Yes      Social History     Social History Narrative    Not on file     Family History   Problem Relation Age of Onset    Osteoarthritis Mother     Lung Disease Father     Cancer Father     Kidney Disease Brother         Current Facility-Administered Medications   Medication Dose Route Frequency    perflutren lipid microspheres syringe  0.45 mL IntraVENous PRN    lidocaine 4 % external patch 1 patch  1 patch TransDERmal Daily PRN    acetaminophen (TYLENOL) tablet 650 mg  650 mg Oral Q4H PRN    diatrizoate meglumine-sodium (GASTROGRAFIN) 66-10 % solution 15 mL  15 mL Oral ONCE PRN    0.9 % sodium chloride infusion   IntraVENous PRN    pantoprazole (PROTONIX) 40 mg in sodium chloride (PF) 10 mL injection  40 mg IntraVENous Q12H    0.9 % sodium chloride infusion   IntraVENous Continuous    sodium chloride flush 0.9 % injection 5-40 mL  5-40 mL IntraVENous 2 times per day    sodium chloride flush 0.9 % injection 5-40 mL  5-40 mL IntraVENous PRN    0.9 % sodium chloride infusion   IntraVENous PRN    ondansetron (ZOFRAN-ODT) disintegrating tablet 4 mg  4 mg Oral Q8H PRN    Or    ondansetron (ZOFRAN) injection 4 mg  4 mg IntraVENous Q6H PRN    polyethylene glycol (GLYCOLAX) packet 17 g  17 g Oral Daily PRN     Facility-Administered Medications Ordered in Other Encounters   Medication Dose Route Frequency    lactated ringers infusion   IntraVENous Continuous PRN     Objective:     Vitals:    09/11/22 2345 09/12/22 0315 09/12/22 0724 09/12/22 1150   BP: (!) 146/66 (!) 124/58 (!) 145/67 (!) 144/66   Pulse: 73 76 78 74   Resp: 18 16 20 16   Temp: 97.3 °F (36.3 °C) 97.3 °F (36.3 °C) 97.7 °F (36.5 °C) 99.3 °F (37.4 °C)   TempSrc: Axillary Oral Temporal Oral   SpO2: 100%  98% 99%   Weight:    130 lb (59 kg)   Height:    5' 6\" (1.676 m)     No intake/output data recorded. 09/10 1901 - 09/12 0700  In: 1180 [P.O.:1180]  Out: 500 [Urine:500]      Narrative   CT CHEST ABDOMEN PELVIS W CONTRAST 9/10/2022 10:55 AM        HISTORY: Dyspnea. Elevated d-dimer. History of smoking. COMPARISON: CT abdomen pelvis 8/10/2018. Multiple axial images were obtained through the chest, abdomen, and pelvis. Oral contrast was used for bowel opacification.   100 mL of Isovue 370   intravenous contrast was used for better evaluation of solid organs and vascular   structures. Radiation dose reduction techniques were used for this study. All   CT scans performed at this facility use one or all of the following: Automated   exposure control, adjustment of the mA and/or kVp according to patient's size,   iterative reconstruction. FINDINGS:   LUNGS AND PLEURA: Lungs are clear with a pleural-based posterior left lower lobe   lesion which is indeterminate measuring 2.2 x 1.1 cm on image 40 of series 302. Intercostal soft tissue mass remains in the differential. No prior imaging   available for comparison. No pleural effusions. MEDIASTINUM/AXILLAE: Heart is normal in size. No pericardial effusion. Esophagus   is unremarkable. No enlarged lymph nodes. Coronary artery calcifications are   present. Calcified plaque thoracic aorta without aneurysm or dissection. HEPATOBILIARY: Nodular liver contour consistent with cirrhosis. No focal mass. Cholelithiasis. Mild gallbladder wall edema also noted which is nonspecific in   the setting of mild ascites. PANCREAS: Evidence of chronic pancreatitis with diffuse pancreatic glandular   calcification. No pancreatic ductal dilatation or mass. Probable pancreatic tail   cyst measuring 1.2 cm on image 27 of series 303. SPLEEN: Splenomegaly. Spleen measures nearly 14 cm in craniocaudal dimension   image 46 of series 603. ADRENAL GLANDS: Normal.       KIDNEYS/BLADDER: Left hydronephrosis/extrarenal pelvis and mild renal cortical   thinning is noted probably reflecting a left UPJ stricture. Ureters are   nondilated. No renal calculi. No enhancing renal mass. Bladder is well-distended   and unremarkable. BOWEL: Colonic wall thickening noted involving the cecum and ascending colon   with surrounding inflammation most consistent with an infectious or inflammatory   colitis.  Terminal ileum appears normal. Appendix is unremarkable. LYMPH NODES: No enlarged abdominal or pelvic lymph nodes. VASCULATURE: Calcified plaque abdominal aorta with dense calcification distal   abdominal aorta concerning for severe stenosis or occlusion. This is minimally   changed since prior exam. Severe stenosis bilateral common iliac artery also   noted. PELVIC ORGANS: Uterus and rectum are unremarkable. Small to moderate amount of   free pelvic fluid is present. MUSCULOSKELETAL: Normal.           Impression   1. Probable acute infectious or inflammatory colitis involving the cecum and   ascending colon. Terminal ileum and appendix appear normal. Remainder of the   bowel is within normal limits. No obstruction. 2. Cirrhotic liver morphology with splenomegaly and mild ascites. Cholelithiasis   with edematous-appearing gallbladder wall of uncertain significance but likely   nonspecific in the setting of ascites. These findings are new in the interval   since prior exam.   3. Left hydronephrosis and dilated extrarenal pelvis likely due to chronic UPJ   stricture. Follow up with urology as clinically warranted. Mild interval   cortical thinning left kidney. No renal cyst or mass. No urinary tract calculi. 4. Dense calcified atherosclerotic plaque involving the distal abdominal aorta   with severe stenosis/occlusion and severe stenosis of the common iliac arteries   bilaterally. Little change since prior exam.         Physical Exam:   Gen- the patient is well developed and in no acute distress  HEENT- PERRL, EOMI, no scleral icterus       nose without alar flaring or epistaxis                  oral muscosa moist without cyanosis  Neck- no JVD or retractions  Lungs- resp even/unlab   Heart- RRR   Abd- soft  Ext- warm without cyanosis. There is no lower leg edema. Non-palpable femoral pulses  Skin- no jaundice or rashes  Neuro- alert and oriented x 3. No gross sensorimotor deficits are present.      Data Review   Recent Labs 09/10/22  0807 09/10/22  1159 09/11/22  0615 09/11/22  1230 09/11/22  1815 09/12/22  0004 09/12/22  0705   WBC 6.4  --  4.3  --   --   --  3.2*   HGB 6.9*   < > 8.3*   < > 9.8* 8.4* 8.5*   HCT 22.2*   < > 26.5*   < > 31.0* 26.3* 27.3*   *  --  95*  --   --   --  80*    < > = values in this interval not displayed. Recent Labs     09/09/22  1828 09/09/22  1828 09/10/22  0625 09/10/22  0807 09/11/22  0615 09/12/22  0705   NA  --    < > 141 140 139 139   K  --    < > 3.8 4.1 3.4* 3.3*   CL  --    < > 114* 113* 113* 113*   CO2  --    < > 20* 20* 22 22   BUN  --    < > 12 12 8 5*   MG 1.6*  --  2.3  --  2.0 2.0   PHOS 2.4  --   --  2.3 2.7 3.5   INR 1.4  --   --   --   --   --     < > = values in this interval not displayed. Assessment/Plan:     Patient is a 60year old female who presents to the hospital with SOB and weakness. Incidental finding of iliac stenosis noted on the CT. Patient currently undergoing a workup and colonoscopy with findings of portal hypertensive gastropathy, gastritis. Patient has aortoiliac disease and will likely require an aortobifemoral bypass graft. This is not urgent as the patient has had pain for years. Will need to set up an outpatient appt with an arterial duplex study and see Dr. Gabriela Boone for further surgical planning. Recommend tobacco cessation and daily 81 mg ASA intake when ok with GI. Patient seen and examined with Dr. Uriel Santoro    50 minutes of time was spent on this encounter with greater than 50% of time in direct patient contact including patient education, counseling, review of medical history and imaging, and medical decision making. Amilcar Gutierres Standard, APRN - CNP    Attending Attestation:    I have seen and examined the patient and agree with the plan as detailed above.     Juan J Campos MD  Vascular Surgery  09/12/22  2:15 PM

## 2022-09-13 LAB
ALBUMIN SERPL-MCNC: 2.8 G/DL (ref 3.2–4.6)
ALBUMIN/GLOB SERPL: 0.9 {RATIO} (ref 1.2–3.5)
ALP SERPL-CCNC: 82 U/L (ref 50–136)
ALT SERPL-CCNC: 28 U/L (ref 12–65)
ANION GAP SERPL CALC-SCNC: 4 MMOL/L (ref 4–13)
AST SERPL-CCNC: 19 U/L (ref 15–37)
BASOPHILS # BLD: 0 K/UL (ref 0–0.2)
BASOPHILS NFR BLD: 1 % (ref 0–2)
BILIRUB SERPL-MCNC: 0.4 MG/DL (ref 0.2–1.1)
BUN SERPL-MCNC: 8 MG/DL (ref 8–23)
CALCIUM SERPL-MCNC: 8.1 MG/DL (ref 8.3–10.4)
CHLORIDE SERPL-SCNC: 114 MMOL/L (ref 101–110)
CO2 SERPL-SCNC: 21 MMOL/L (ref 21–32)
CREAT SERPL-MCNC: 0.6 MG/DL (ref 0.6–1)
DIFFERENTIAL METHOD BLD: ABNORMAL
EOSINOPHIL # BLD: 0.1 K/UL (ref 0–0.8)
EOSINOPHIL NFR BLD: 3 % (ref 0.5–7.8)
ERYTHROCYTE [DISTWIDTH] IN BLOOD BY AUTOMATED COUNT: 19.5 % (ref 11.9–14.6)
GLOBULIN SER CALC-MCNC: 3.1 G/DL (ref 2.3–3.5)
GLUCOSE SERPL-MCNC: 100 MG/DL (ref 65–100)
HCT VFR BLD AUTO: 27.3 % (ref 35.8–46.3)
HCT VFR BLD AUTO: 27.7 % (ref 35.8–46.3)
HGB BLD-MCNC: 8.2 G/DL (ref 11.7–15.4)
HGB BLD-MCNC: 8.3 G/DL (ref 11.7–15.4)
IMM GRANULOCYTES # BLD AUTO: 0 K/UL (ref 0–0.5)
IMM GRANULOCYTES NFR BLD AUTO: 1 % (ref 0–5)
LYMPHOCYTES # BLD: 0.9 K/UL (ref 0.5–4.6)
LYMPHOCYTES NFR BLD: 28 % (ref 13–44)
MAGNESIUM SERPL-MCNC: 2.1 MG/DL (ref 1.8–2.4)
MCH RBC QN AUTO: 25.8 PG (ref 26.1–32.9)
MCHC RBC AUTO-ENTMCNC: 30 G/DL (ref 31.4–35)
MCV RBC AUTO: 86 FL (ref 79.6–97.8)
MONOCYTES # BLD: 0.3 K/UL (ref 0.1–1.3)
MONOCYTES NFR BLD: 10 % (ref 4–12)
NEUTS SEG # BLD: 1.8 K/UL (ref 1.7–8.2)
NEUTS SEG NFR BLD: 58 % (ref 43–78)
NRBC # BLD: 0 K/UL (ref 0–0.2)
PHOSPHATE SERPL-MCNC: 3.5 MG/DL (ref 2.3–3.7)
PLATELET # BLD AUTO: 64 K/UL (ref 150–450)
PMV BLD AUTO: 11 FL (ref 9.4–12.3)
POTASSIUM SERPL-SCNC: 3.8 MMOL/L (ref 3.5–5.1)
PROT SERPL-MCNC: 5.9 G/DL (ref 6.3–8.2)
RBC # BLD AUTO: 3.22 M/UL (ref 4.05–5.2)
SODIUM SERPL-SCNC: 139 MMOL/L (ref 136–145)
WBC # BLD AUTO: 3 K/UL (ref 4.3–11.1)

## 2022-09-13 PROCEDURE — 6370000000 HC RX 637 (ALT 250 FOR IP): Performed by: INTERNAL MEDICINE

## 2022-09-13 PROCEDURE — 83735 ASSAY OF MAGNESIUM: CPT

## 2022-09-13 PROCEDURE — 2580000003 HC RX 258: Performed by: FAMILY MEDICINE

## 2022-09-13 PROCEDURE — 85025 COMPLETE CBC W/AUTO DIFF WBC: CPT

## 2022-09-13 PROCEDURE — 97162 PT EVAL MOD COMPLEX 30 MIN: CPT

## 2022-09-13 PROCEDURE — 6370000000 HC RX 637 (ALT 250 FOR IP): Performed by: FAMILY MEDICINE

## 2022-09-13 PROCEDURE — 80053 COMPREHEN METABOLIC PANEL: CPT

## 2022-09-13 PROCEDURE — 99232 SBSQ HOSP IP/OBS MODERATE 35: CPT | Performed by: SURGERY

## 2022-09-13 PROCEDURE — 84100 ASSAY OF PHOSPHORUS: CPT

## 2022-09-13 PROCEDURE — 99232 SBSQ HOSP IP/OBS MODERATE 35: CPT | Performed by: INTERNAL MEDICINE

## 2022-09-13 PROCEDURE — 36415 COLL VENOUS BLD VENIPUNCTURE: CPT

## 2022-09-13 PROCEDURE — 6370000000 HC RX 637 (ALT 250 FOR IP): Performed by: STUDENT IN AN ORGANIZED HEALTH CARE EDUCATION/TRAINING PROGRAM

## 2022-09-13 PROCEDURE — 1100000003 HC PRIVATE W/ TELEMETRY

## 2022-09-13 PROCEDURE — 97530 THERAPEUTIC ACTIVITIES: CPT

## 2022-09-13 RX ORDER — CETIRIZINE HYDROCHLORIDE 10 MG/1
10 TABLET ORAL NIGHTLY
Status: DISCONTINUED | OUTPATIENT
Start: 2022-09-13 | End: 2022-09-14 | Stop reason: HOSPADM

## 2022-09-13 RX ADMIN — CETIRIZINE HYDROCHLORIDE 10 MG: 10 TABLET ORAL at 21:56

## 2022-09-13 RX ADMIN — SODIUM CHLORIDE, PRESERVATIVE FREE 10 ML: 5 INJECTION INTRAVENOUS at 10:06

## 2022-09-13 RX ADMIN — SODIUM CHLORIDE, PRESERVATIVE FREE 10 ML: 5 INJECTION INTRAVENOUS at 20:21

## 2022-09-13 RX ADMIN — ACETAMINOPHEN 650 MG: 325 TABLET, FILM COATED ORAL at 10:05

## 2022-09-13 RX ADMIN — ACETAMINOPHEN 650 MG: 325 TABLET, FILM COATED ORAL at 05:51

## 2022-09-13 RX ADMIN — METOPROLOL TARTRATE 12.5 MG: 25 TABLET, FILM COATED ORAL at 10:05

## 2022-09-13 RX ADMIN — METOPROLOL TARTRATE 12.5 MG: 25 TABLET, FILM COATED ORAL at 12:26

## 2022-09-13 RX ADMIN — ACETAMINOPHEN 650 MG: 325 TABLET, FILM COATED ORAL at 21:56

## 2022-09-13 RX ADMIN — PANTOPRAZOLE SODIUM 40 MG: 40 TABLET, DELAYED RELEASE ORAL at 05:49

## 2022-09-13 RX ADMIN — METOPROLOL TARTRATE 12.5 MG: 25 TABLET, FILM COATED ORAL at 20:24

## 2022-09-13 RX ADMIN — METOPROLOL TARTRATE 12.5 MG: 25 TABLET, FILM COATED ORAL at 17:09

## 2022-09-13 RX ADMIN — ACETAMINOPHEN 650 MG: 325 TABLET, FILM COATED ORAL at 17:13

## 2022-09-13 ASSESSMENT — PAIN DESCRIPTION - DESCRIPTORS
DESCRIPTORS: ACHING

## 2022-09-13 ASSESSMENT — PAIN SCALES - GENERAL
PAINLEVEL_OUTOF10: 1
PAINLEVEL_OUTOF10: 0
PAINLEVEL_OUTOF10: 1
PAINLEVEL_OUTOF10: 3
PAINLEVEL_OUTOF10: 3

## 2022-09-13 ASSESSMENT — PAIN DESCRIPTION - ORIENTATION: ORIENTATION: MID

## 2022-09-13 ASSESSMENT — PAIN DESCRIPTION - LOCATION
LOCATION: HEAD

## 2022-09-13 NOTE — PROGRESS NOTES
Hospitalist Progress Note   Admit Date:  2022  6:10 PM   Name:  Iram Skaggs   Age:  60 y.o. Sex:  female  :  1960   MRN:  [id number]   Room:        Reason(s) for Admission: Melena [K92.1]  Anemia due to acute blood loss [D62]  Atrial fibrillation with RVR (HCC) [I48.91]  Gastrointestinal hemorrhage, unspecified gastrointestinal hemorrhage type [facility] Course & Interval History:   Patient is a 60 y.o. female who presented to the ED for cc weakness and SOB on and off for the past two months. Found to be in A fib RVR on arrival. Given 20mg Cardizem and now HR in 80s. Cardizem stopped due to hypotension. Hg also noted to be low. Admits to dark stools for months. Not on ASA or anticoagulation but does admit to ibuprofen use. Hx of hep C currently being treated, former heavy ETOH use now sober. Hg 4.5. Mg 1.6, troponins elevated and peaked to 179. EKG on arrival with atrial flutter. She was transfused 3 units of PRBC and her repeat hemoglobin is around 8. She spiked fever of 100.6 F after blood transfusion. UA is negative, lactic acid 2. Chest x-ray clear. She was treated with PPI. Her medication Epclusa was on hold. She was started on clear liquid diet. GI was consulted and plan for colonoscopy and EGD on . Cardiology was consulted. CT chest and abdomen showed posterior left lung lobe mass of 2.2 into 1 cm, cirrhosis, cholelithiasis, left hydronephrosis, colitis, severe stenosis of bilateral SANA. Ultrasound of the abdomen showed  Gallbladder wall thickening and hepatic steatosi. Blood cultures negative. Echo showed   normal ejection fraction. [facility] Bile Events (22): Patient is seen and examined at the bedside. She is complaining of pain due to sciatica and she states she cannot take narcotics. Patient denies nausea, vomiting, hematochezia, melena, palpitations, chest pain.        Assessment & Plan:   Acute GI bleed  Heme positive stools  Continue PPI twice daily  Advance the diet to regular  S/p  colonoscopy on 9/12 showed melanosis coli and EGD on 9/12 showed 2 nonbleeding erosions and mild PHG  GI is following, appreciate the recommendations    Symptomatic anemia  Hemoglobin of 4.5 on admission  S/p  2 units PRBC given on 9/9 and 1 unit of PRBC on 9/10  Repeat hemoglobin is stable around 8  Lifelong iron supplementation  Transfuse blood if hemoglobin is less than 7 or symptomatic    Hypokalemia  Resolved     Atrial fibrillation with RVR   Currently in sinus rhythm. Added low-dose beta-blocker   Echo with normal EF  Telemetry monitoring for now  No anticoagulation due to GI bleed  Cardiology following     Hypomagnesemia -resolved     Hep C - holding oral meds for now  Follow-up with ([facility] ) as outpatient for resuming Epclusa     Elevated D-dimer    DVT prophylaxis - SCDs  Dispo tomorrow 9/14  Diet:  ADULT DIET; Regular;  Low Sodium (2 gm)  DVT PPx: SCD due to anemia   Code status: Full Code    Hospital Problems             Last Modified POA    * (Principal) Melena 9/9/2022 Yes    Hepatitis C 9/9/2022 Yes    Hypomagnesemia 9/9/2022 Yes    Atrial flutter ([address] 9/9/2022 Yes    Gastrointestinal hemorrhage 9/10/2022 Yes    Atrial fibrillation with RVR ([address] 9/12/2022 Yes    Symptomatic anemia 9/9/2022 Yes       Objective:   Patient Vitals for the past 24 hrs:   Temp Pulse Resp BP SpO2   09/13/22 1226 -- 74 -- (!) 108/56 --   09/13/22 1107 99.1 °F (37.3 °C) 75 19 (!) 108/56 98 %   09/13/22 1005 -- 78 -- -- --   09/13/22 0710 97.7 °F (36.5 °C) 71 20 135/63 97 %   09/13/22 0306 98.8 °F (37.1 °C) 74 17 (!) 108/54 98 %   09/12/22 2321 99.3 °F (37.4 °C) 83 -- (!) 105/57 98 %   09/12/22 1930 98 °F (36.7 °C) 89 18 123/60 --   09/12/22 1621 97.8 °F (36.6 °C) 84 18 (!) 145/76 99 %   09/12/22 1336 -- 76 16 123/66 98 %   09/12/22 1326 -- 78 16 (!) 120/59 98 %       Estimated body mass index is 20.98 kg/m² as calculated from the following:    Height as of this encounter: 5' 6\" (1.676 m). Weight as of this encounter: 130 lb (59 kg). Intake/Output Summary (Last 24 hours) at 9/13/2022 1325  Last data filed at 9/13/2022 0920  Gross per 24 hour   Intake 805 ml   Output --   Net 805 ml         Physical Exam:   Blood pressure (!) 108/56, pulse 74, temperature 99.1 °F (37.3 °C), temperature source Oral, resp. rate 19, height 5' 6\" (1.676 m), weight 130 lb (59 kg), SpO2 98 %. General:    Well nourished. No overt distress. Pale looking  Head:  Normocephalic, atraumatic  Eyes:  Sclerae appear normal. Pupils equally round. ENT:  Nares appear normal, no drainage. Moist oral mucosa  Neck:  No restricted ROM. Trachea midline. CV:   RRR. No m/r/g. No jugular venous distension. Lungs:   CTAB. No wheezing, rhonchi, or rales. Respirations even, unlabored. Abdomen: Bowel sounds present. Soft, nontender, nondistended. Extremities: No cyanosis or clubbing. No edema. Skin:     No rashes and normal coloration. Warm and dry. Neuro:  CN II-XII grossly intact. Sensation intact. A&Ox3  Psych:  Normal mood and affect.       I have reviewed ordered lab tests and independently visualized imaging below:    Recent Labs:  Recent Results (from the past 48 hour(s))   Hemoglobin and Hematocrit    Collection Time: 09/11/22  6:15 PM   Result Value Ref Range    Hemoglobin 9.8 (L) 11.7 - 15.4 g/dL    Hematocrit 31.0 (L) 35.8 - 46.3 %   Hemoglobin and Hematocrit    Collection Time: 09/12/22 12:04 AM   Result Value Ref Range    Hemoglobin 8.4 (L) 11.7 - 15.4 g/dL    Hematocrit 26.3 (L) 35.8 - 46.3 %   CBC with Auto Differential    Collection Time: 09/12/22  7:05 AM   Result Value Ref Range    WBC 3.2 (L) 4.3 - 11.1 K/uL    RBC 3.22 (L) 4.05 - 5.2 M/uL    Hemoglobin 8.5 (L) 11.7 - 15.4 g/dL    Hematocrit 27.3 (L) 35.8 - 46.3 %    MCV 84.8 79.6 - 97.8 FL    MCH 26.4 26.1 - 32.9 PG    MCHC 31.1 (L) 31.4 - 35.0 g/dL    RDW 18.5 (H) 11.9 - 14.6 %    Platelets 80 (L) 513 - 450 K/uL    MPV 11.1 9.4 - 12.3 FL    nRBC 0.00 0.0 - 0.2 K/uL    Differential Type AUTOMATED      Seg Neutrophils 62 43 - 78 %    Lymphocytes 27 13 - 44 %    Monocytes 8 4.0 - 12.0 %    Eosinophils % 3 0.5 - 7.8 %    Basophils 1 0.0 - 2.0 %    Immature Granulocytes 0 0.0 - 5.0 %    Segs Absolute 2.0 1.7 - 8.2 K/UL    Absolute Lymph # 0.9 0.5 - 4.6 K/UL    Absolute Mono # 0.2 0.1 - 1.3 K/UL    Absolute Eos # 0.1 0.0 - 0.8 K/UL    Basophils Absolute 0.0 0.0 - 0.2 K/UL    Absolute Immature Granulocyte 0.0 0.0 - 0.5 K/UL   Comprehensive Metabolic Panel w/ Reflex to MG    Collection Time: 09/12/22  7:05 AM   Result Value Ref Range    Sodium 139 136 - 145 mmol/L    Potassium 3.3 (L) 3.5 - 5.1 mmol/L    Chloride 113 (H) 101 - 110 mmol/L    CO2 22 21 - 32 mmol/L    Anion Gap 4 4 - 13 mmol/L    Glucose 98 65 - 100 mg/dL    BUN 5 (L) 8 - 23 MG/DL    Creatinine 0.70 0.6 - 1.0 MG/DL    GFR African American >60 >60 ml/min/1.73m2    GFR Non- >60 >60 ml/min/1.73m2    Calcium 8.0 (L) 8.3 - 10.4 MG/DL    Total Bilirubin 0.6 0.2 - 1.1 MG/DL    ALT 29 12 - 65 U/L    AST 26 15 - 37 U/L    Alk Phosphatase 66 50 - 136 U/L    Total Protein 6.0 (L) 6.3 - 8.2 g/dL    Albumin 2.9 (L) 3.2 - 4.6 g/dL    Globulin 3.1 2.3 - 3.5 g/dL    Albumin/Globulin Ratio 0.9 (L) 1.2 - 3.5     Phosphorus    Collection Time: 09/12/22  7:05 AM   Result Value Ref Range    Phosphorus 3.5 2.3 - 3.7 MG/DL   Magnesium    Collection Time: 09/12/22  7:05 AM   Result Value Ref Range    Magnesium 2.0 1.8 - 2.4 mg/dL   Transthoracic echocardiogram (TTE) complete with contrast, bubble, strain, and 3D PRN    Collection Time: 09/12/22 11:34 AM   Result Value Ref Range    LV EDV A2C 87 mL    LV EDV A4C 103 mL    LV ESV A2C 27 mL    LV ESV A4C 36 mL    IVSd 0.9 0.6 - 0.9 cm    LVIDd 4.1 3.9 - 5.3 cm    LVIDs 2.6 cm    LVOT Diameter 2.0 cm    LVOT Mean Gradient 3 mmHg    LVOT VTI 26.4 cm    LVOT Peak Velocity 1.3 m/s    LVOT Peak Gradient 6 mmHg    LVPWd 0.7 0.6 - 0.9 cm LV E' Lateral Velocity 10 cm/s    LV E' Septal Velocity 7 cm/s    LV Ejection Fraction A2C 70 %    LV Ejection Fraction A4C 65 %    EF BP 69 55 - 100 %    LVOT Area 3.1 cm2    LVOT SV 82.9 ml    LA Minor Axis 6.0 cm    LA Major Axis 5.9 cm    LA Area 2C 20.0 cm2    LA Area 4C 19.2 cm2    LA Volume BP 53 (A) 22 - 52 mL    LA Diameter 3.1 cm    AV Mean Velocity 1.0 m/s    AV Mean Gradient 4 mmHg    AV VTI 31.0 cm    AV Peak Velocity 1.5 m/s    AV Peak Gradient 9 mmHg    AV Area by VTI 2.7 cm2    AV Area by Peak Velocity 2.7 cm2    Aortic Root 2.9 cm    Ascending Aorta 3.5 cm    IVC Proxmal 1.6 cm    MV E Wave Deceleration Time 291.0 ms    MV A Velocity 0.72 m/s    MV E Velocity 0.64 m/s    PV .0 ms    PV Max Velocity 0.9 m/s    PV Peak Gradient 3 mmHg    RVIDd 2.5 cm    RV Basal Dimension 3.9 cm    TAPSE 2.4 1.7 cm    Body Surface Area 1.66 m2    Fractional Shortening 2D 37 28 - 44 %    LV ESV Index A4C 22 mL/m2    LV EDV Index A4C 62 mL/m2    LV ESV Index A2C 16 mL/m2    LV EDV Index A2C 52 mL/m2    LVIDd Index 2.46 cm/m2    LVIDs Index 1.56 cm/m2    LV RWT Ratio 0.34     LV Mass 2D 97.3 67 - 162 g    LV Mass 2D Index 58.3 43 - 95 g/m2    MV E/A 0.89     E/E' Ratio (Averaged) 7.77     E/E' Lateral 6.40     E/E' Septal 9.14     LA Volume Index BP 32 16 - 34 ml/m2    LVOT Stroke Volume Index 49.6 mL/m2    LA Size Index 1.86 cm/m2    LA/AO Root Ratio 1.07     Ao Root Index 1.74 cm/m2    Ascending Aorta Index 2.10 cm/m2    AV Velocity Ratio 0.87     LVOT:AV VTI Index 0.85     IMAN/BSA VTI 1.6 cm2/m2    IMAN/BSA Peak Velocity 1.6 cm2/m2    Est. RA Pressure 3 mmHg   Hemoglobin and Hematocrit    Collection Time: 09/12/22  5:56 PM   Result Value Ref Range    Hemoglobin 9.7 (L) 11.7 - 15.4 g/dL    Hematocrit 32.1 (L) 35.8 - 46.3 %   POCT Glucose    Collection Time: 09/12/22  8:40 PM   Result Value Ref Range    POC Glucose 126 (H) 65 - 100 mg/dL    Performed by: Mundo    Hemoglobin and Hematocrit    Collection Time: 09/12/22 11:53 PM   Result Value Ref Range    Hemoglobin 8.2 (L) 11.7 - 15.4 g/dL    Hematocrit 27.3 (L) 35.8 - 46.3 %   CBC with Auto Differential    Collection Time: 09/13/22  5:41 AM   Result Value Ref Range    WBC 3.0 (L) 4.3 - 11.1 K/uL    RBC 3.22 (L) 4.05 - 5.2 M/uL    Hemoglobin 8.3 (L) 11.7 - 15.4 g/dL    Hematocrit 27.7 (L) 35.8 - 46.3 %    MCV 86.0 79.6 - 97.8 FL    MCH 25.8 (L) 26.1 - 32.9 PG    MCHC 30.0 (L) 31.4 - 35.0 g/dL    RDW 19.5 (H) 11.9 - 14.6 %    Platelets 64 (L) 949 - 450 K/uL    MPV 11.0 9.4 - 12.3 FL    nRBC 0.00 0.0 - 0.2 K/uL    Differential Type AUTOMATED      Seg Neutrophils 58 43 - 78 %    Lymphocytes 28 13 - 44 %    Monocytes 10 4.0 - 12.0 %    Eosinophils % 3 0.5 - 7.8 %    Basophils 1 0.0 - 2.0 %    Immature Granulocytes 1 0.0 - 5.0 %    Segs Absolute 1.8 1.7 - 8.2 K/UL    Absolute Lymph # 0.9 0.5 - 4.6 K/UL    Absolute Mono # 0.3 0.1 - 1.3 K/UL    Absolute Eos # 0.1 0.0 - 0.8 K/UL    Basophils Absolute 0.0 0.0 - 0.2 K/UL    Absolute Immature Granulocyte 0.0 0.0 - 0.5 K/UL   Comprehensive Metabolic Panel w/ Reflex to MG    Collection Time: 09/13/22  5:41 AM   Result Value Ref Range    Sodium 139 136 - 145 mmol/L    Potassium 3.8 3.5 - 5.1 mmol/L    Chloride 114 (H) 101 - 110 mmol/L    CO2 21 21 - 32 mmol/L    Anion Gap 4 4 - 13 mmol/L    Glucose 100 65 - 100 mg/dL    BUN 8 8 - 23 MG/DL    Creatinine 0.60 0.6 - 1.0 MG/DL    GFR African American >60 >60 ml/min/1.73m2    GFR Non- >60 >60 ml/min/1.73m2    Calcium 8.1 (L) 8.3 - 10.4 MG/DL    Total Bilirubin 0.4 0.2 - 1.1 MG/DL    ALT 28 12 - 65 U/L    AST 19 15 - 37 U/L    Alk Phosphatase 82 50 - 136 U/L    Total Protein 5.9 (L) 6.3 - 8.2 g/dL    Albumin 2.8 (L) 3.2 - 4.6 g/dL    Globulin 3.1 2.3 - 3.5 g/dL    Albumin/Globulin Ratio 0.9 (L) 1.2 - 3.5     Phosphorus    Collection Time: 09/13/22  5:41 AM   Result Value Ref Range    Phosphorus 3.5 2.3 - 3.7 MG/DL   Magnesium    Collection Time: 09/13/22  5:41 AM Result Value Ref Range    Magnesium 2.1 1.8 - 2.4 mg/dL         Other Studies:  XR CHEST (2 VW)    Result Date: 9/9/2022  EXAM: CHEST X-RAY, 2 VIEWS INDICATION: Pain COMPARISON: 10/29/2020 TECHNIQUE: Frontal and lateral views of the chest were obtained. FINDINGS: Lungs: Normal. Cardiomediastinal silhouette: Normal in size. Aortic atherosclerotic calcifications present. Cardiac silhouette is normal in size. Pleura: No pneumothorax or pleural effusion. Osseous structures: Normal.     No radiographic evidence of acute cardiopulmonary disease. XR CHEST PORTABLE    Result Date: 9/10/2022  EXAM: XR CHEST PORTABLE HISTORY: increased temperature. TECHNIQUE: Frontal chest. COMPARISON: 9/9/2022 FINDINGS: The cardiac silhouette, mediastinum, and pulmonary vasculature are within normal limits. There is no consolidation, pleural effusion, or pneumothorax. No significant osseous abnormalities are observed. No evidence of an acute intrathoracic process. CT CHEST ABDOMEN PELVIS W CONTRAST    Result Date: 9/10/2022  CT CHEST ABDOMEN PELVIS W CONTRAST 9/10/2022 10:55 AM HISTORY: Dyspnea. Elevated d-dimer. History of smoking. COMPARISON: CT abdomen pelvis 8/10/2018. Multiple axial images were obtained through the chest, abdomen, and pelvis. Oral contrast was used for bowel opacification. 100 mL of Isovue 370 intravenous contrast was used for better evaluation of solid organs and vascular structures. Radiation dose reduction techniques were used for this study. All CT scans performed at this facility use one or all of the following: Automated exposure control, adjustment of the mA and/or kVp according to patient's size, iterative reconstruction. FINDINGS: LUNGS AND PLEURA: Lungs are clear with a pleural-based posterior left lower lobe lesion which is indeterminate measuring 2.2 x 1.1 cm on image 40 of series 302. Intercostal soft tissue mass remains in the differential. No prior imaging available for comparison.  No Cholelithiasis with edematous-appearing gallbladder wall of uncertain significance but likely nonspecific in the setting of ascites. These findings are new in the interval since prior exam. 3. Left hydronephrosis and dilated extrarenal pelvis likely due to chronic UPJ stricture. Follow up with urology as clinically warranted. Mild interval cortical thinning left kidney. No renal cyst or mass. No urinary tract calculi. 4. Dense calcified atherosclerotic plaque involving the distal abdominal aorta with severe stenosis/occlusion and severe stenosis of the common iliac arteries bilaterally. Little change since prior exam.     Vascular duplex lower extremity venous bilateral    Result Date: 9/10/2022  Bilateral lower extremity venous ultrasound INDICATION: Bilateral lower extremity swelling. Elevated d-dimer. Doppler ultrasound of both lower extremities was performed. FINDINGS:  There is normal flow in the greater saphenous, common femoral, superficial femoral, and popliteal veins. Normal compression and augmentation is demonstrated. The proximal calf veins are also patent.      No evidence of deep venous thrombosis in either lower extremity       Current Meds:  Current Facility-Administered Medications   Medication Dose Route Frequency    metoprolol tartrate (LOPRESSOR) tablet 12.5 mg  12.5 mg Oral 4x daily    lidocaine 4 % external patch 1 patch  1 patch TransDERmal Daily PRN    pantoprazole (PROTONIX) tablet 40 mg  40 mg Oral QAM AC    acetaminophen (TYLENOL) tablet 650 mg  650 mg Oral Q4H PRN    diatrizoate meglumine-sodium (GASTROGRAFIN) 66-10 % solution 15 mL  15 mL Oral ONCE PRN    0.9 % sodium chloride infusion   IntraVENous PRN    0.9 % sodium chloride infusion   IntraVENous Continuous    sodium chloride flush 0.9 % injection 5-40 mL  5-40 mL IntraVENous 2 times per day    sodium chloride flush 0.9 % injection 5-40 mL  5-40 mL IntraVENous PRN    0.9 % sodium chloride infusion   IntraVENous PRN    ondansetron (ZOFRAN-ODT) disintegrating tablet 4 mg  4 mg Oral Q8H PRN    Or    ondansetron (ZOFRAN) injection 4 mg  4 mg IntraVENous Q6H PRN    polyethylene glycol (GLYCOLAX) packet 17 g  17 g Oral Daily PRN       Signed:  Selena Alfaro MD    Part of this note may have been written by using a voice dictation software. The note has been proof read but may still contain some grammatical/other typographical errors.

## 2022-09-13 NOTE — PROGRESS NOTES
PHYSICAL THERAPY Initial Assessment, Daily Note, and PM  (Link to Caseload Tracking: PT Visit Days : 1  Acknowledge Orders  Time In/Out  PT Charge Capture  Rehab Caseload Tracker    Maico White is a 60 y.o. female   PRIMARY DIAGNOSIS: Melena  Melena [K92.1]  Anemia due to acute blood loss [D62]  Atrial fibrillation with RVR (HCC) [I48.91]  Gastrointestinal hemorrhage, unspecified gastrointestinal hemorrhage type [K92.2]  Procedure(s) (LRB):  EGD ESOPHAGOGASTRODUODENOSCOPY (N/A)  COLORECTAL CANCER SCREENING, NOT HIGH RISK Rm 2226 (N/A)  1 Day Post-Op  Reason for Referral: Generalized Muscle Weakness (M62.81)  Other abnormalities of gait and mobility (R26.89)  Inpatient: Payor: /     ASSESSMENT:     REHAB RECOMMENDATIONS:   Recommendation to date pending progress:  Setting:  No further skilled therapy after discharge from hospital    Equipment:    To Be Determined     ASSESSMENT:   Ms. Luis Murry is a 60year old F who presents seated upright in bed, admitted to hospital with c/o weakness and SOB x2 months and admitted with A fib RVR and low Hg of 4.5 and GI hemorrhage. Today, pt is willing to work with PT and states she is feeling a lot better than she was. Pt states PLOF is independent with all ADLs and gait at BL and drives and works full time. Pt states recently she has been unable to walk due to B LE pain and dizziness. This date pt performs mobility including bed mobility with supervision, STS and gait with CGA/SBA with no AD x200ft and transferred bed>chair with CGA/SBA. Pt left upright in chair with needs in reach and RN notified. Pt presents with no significant strength or sensation deficits upon examination, however does demonstrate some unsteadiness/impulsivity with ambulation/mobility. Pt presents as functioning below her baseline, with deficits in mobility including transfers, gait, balance, and activity tolerance.  Pt will benefit from skilled therapy services to address stated deficits to promote return to highest level of function, independence, and safety. No further therapy needs anticipated at d/c. Will continue to follow.        [address] [facility] [address] [id number] [facility] 6 Clicks Basic Mobility Inpatient Short Form  AM-PAC Mobility Inpatient   How much difficulty turning over in bed?: None  How much difficulty sitting down on / standing up from a chair with arms?: None  How much difficulty moving from lying on back to sitting on side of bed?: None  How much help from another person moving to and from a bed to a chair?: None  How much help from another person needed to walk in hospital room?: None  How much help from another person for climbing 3-5 steps with a railing?: None  AM-PAC Inpatient Mobility Raw Score : 24  AM-PAC Inpatient T-Scale Score : 61.14  Mobility Inpatient CMS 0-100% Score: 0  Mobility Inpatient CMS G-Code Modifier : CH    SUBJECTIVE:   Ms. 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:     PAIN: Erroll Abbot / O2: Zannie Mantle / Marco A Hamming / Minetta Maximiliano:   Pre Treatment:          Post Treatment: 0 Vitals        Oxygen      None    RESTRICTIONS/PRECAUTIONS:                    GROSS EVALUATION: Intact Impaired (Comments):   AROM [x]     PROM []    Strength [x]     Balance [] Posture: Fair  Sitting - Static: Good  Sitting - Dynamic: Good  Standing - Static: Good, Fair, +  Standing - Dynamic: Good, Fair, +   Posture [] Rounded Shoulders  Thoracic Kyphosis   Sensation [x]     Coordination []      Tone []     Edema [] Activity Tolerance [] Patient Tolerated treatment well, Patient limited by fatigue    []      COGNITION/  PERCEPTION: Intact Impaired (Comments):   Orientation [x]     Vision [x]     Hearing [x]     Cognition  [x]       MOBILITY: I Mod I S SBA CGA Min Mod Max Total  NT x2 Comments:   Bed Mobility    Rolling [] [] [] [] [] [] [] [] [] [] []    Supine to Sit [] [] [] [] [] [] [] [] [] [] []    Scooting [] [] [x] [] [] [] [] [] [] [] []    Sit to Supine [] [] [] [] [] [] [] [] [] [] []    Transfers    Sit to Stand [] [] [] [x] [x] [] [] [] [] [] []    Bed to Chair [] [] [] [x] [x] [] [] [] [] [] []    Stand to Sit [] [] [] [x] [x] [] [] [] [] [] []     [] [] [] [] [] [] [] [] [] [] []    I=Independent, Mod I=Modified Independent, S=Supervision, SBA=Standby Assistance, CGA=Contact Guard Assistance,   Min=Minimal Assistance, Mod=Moderate Assistance, Max=Maximal Assistance, Total=Total Assistance, NT=Not Tested    GAIT: I Mod I S SBA CGA Min Mod Max Total  NT x2 Comments:   Level of Assistance [] [] [] [x] [x] [] [] [] [] [] []    Distance 200 feet    DME Gait Belt    Gait Quality Narrow base of support and Path deviations     Weightbearing Status      Stairs      I=Independent, Mod I=Modified Independent, S=Supervision, SBA=Standby Assistance, CGA=Contact Guard Assistance,   Min=Minimal Assistance, Mod=Moderate Assistance, Max=Maximal Assistance, Total=Total Assistance, NT=Not Tested    PLAN:   ACUTE PHYSICAL THERAPY GOALS:   (Developed with and agreed upon by patient and/or caregiver. )  LTG:  (1.)Ms. Stanley Gipson will move from supine to sit and sit to supine  in bed with INDEPENDENCE within 7 treatment day(s). (2.)Ms. Stanley Gipson will transfer from bed to chair and chair to bed with INDEPENDENT using the least restrictive device within 7 treatment day(s). (3.)Ms. Stanley Gipson will ambulate with INDEPENDENCE for 1000 feet with the least restrictive device within 7 treatment day(s). (4.)Ms. Stanley Gipson will perform standing static and dynamic balance activities x 23 minutes with SUPERVISION to improve safety within 7 treatment day(s). (5.)Ms. Marleny Gabriel will ambulate and/or perform functional activities for  23 consecutive minutes with stable vital signs and no rests required to improve activity tolerance within 7 treatment days. ________________________________________________________________________________________________      FREQUENCY AND DURATION: 3 times/week for duration of hospital stay or until stated goals are met, whichever comes first.    THERAPY PROGNOSIS: Good    PROBLEM LIST:   (Skilled intervention is medically necessary to address:)  Decreased ADL/Functional Activities  Decreased Activity Tolerance  Decreased Balance  Decreased Gait Ability  Decreased Safety Awareness  Decreased Transfer Abilities  Increased Pain INTERVENTIONS PLANNED:   (Benefits and precautions of physical therapy have been discussed with the patient.)  Self Care Training  Therapeutic Activity  Therapeutic Exercise/HEP  Neuromuscular Re-education  Gait Training  Education       TREATMENT:   EVALUATION: MODERATE COMPLEXITY: (Untimed Charge)    TREATMENT:   Therapeutic Activity (8 Minutes): Therapeutic activity included Scooting, Transfer Training, Ambulation on level ground, Sitting balance , and Standing balance to improve functional Activity tolerance, Balance, Coordination, Mobility, and Strength.     TREATMENT GRID:  N/A    AFTER TREATMENT PRECAUTIONS: Bed/Chair Locked, Call light within reach, Chair, Needs within reach, and RN notified    INTERDISCIPLINARY COLLABORATION:  RN/ PCT and PT/ PTA    EDUCATION: Education Given To: Patient  Education Provided: Role of Therapy;Plan of Care;Home Exercise Program  Education Method: Demonstration;Verbal  Barriers to Learning: None  Education Outcome: Verbalized understanding;Demonstrated understanding    TIME IN/OUT:  Time In: 1423  Time Out: [address]  Minutes: Kayleen Johnson 107, PT

## 2022-09-13 NOTE — PROGRESS NOTES
[address] FAX: [phone]         VASCULAR SURGERY FLOOR PROGRESS NOTE    Admit Date: 2022  POD: 1 Day Post-Op    Procedure:  Procedure(s):  EGD ESOPHAGOGASTRODUODENOSCOPY  COLORECTAL CANCER SCREENING, NOT HIGH RISK  2226    Subjective:     Patient has no new complaints. Objective:     Vitals:  Blood pressure (!) 108/54, pulse 74, temperature 98.8 °F (37.1 °C), temperature source Axillary, resp. rate 17, height 5' 6\" (1.676 m), weight 130 lb (59 kg), SpO2 98 %. Temp (24hrs), Av.5 °F (36.9 °C), Min:97.7 °F (36.5 °C), Max:99.3 °F (37.4 °C)      Intake / Output:    Intake/Output Summary (Last 24 hours) at 2022 0700  Last data filed at 2022 1841  Gross per 24 hour   Intake 1080 ml   Output 0 ml   Net 1080 ml       Physical Exam:    Constitutional: she appears well-developed. No distress. HENT:   Head: Atraumatic. Eyes: Pupils are equal, round, and reactive to light. Neck: Normal range of motion. Cardiovascular: Regular rhythm. Pulmonary/Chest: Effort normal and breath sounds normal. No respiratory distress. Abdominal: Soft. Bowel sounds are normal. she exhibits no distension. There is no tenderness. There is no guarding. No hernia. Musculoskeletal: Normal range of motion. Neurological: She is alert. CN II- XII grossly intact  Vascular: Weakly palpable femoral pulses    Labs:   Recent Labs     22  0705 22  1756 22  2353 22  0541   HGB 8.5*   < > 8.2* 8.3*   WBC 3.2*  --   --  3.0*   K 3.3*  --   --  3.8    < > = values in this interval not displayed.        Data Review     Assessment:     Patient Active Problem List    Diagnosis Date Noted    Atrial fibrillation with RVR ([address] 2022    Gastrointestinal hemorrhage 09/10/2022    Melena 2022    Hepatitis C 2022    Hypomagnesemia 2022    Atrial flutter ([facility] 2022    Severe protein-calorie malnutrition ([facility] 08/29/2020    Alcohol dependence ([facility] 08/27/2020    Symptomatic anemia 08/27/2020    Elevated LFTs 08/27/2020    Leukocytosis 08/27/2020    Hypokalemia 08/27/2020    Hyponatremia 08/27/2020    CAP (community acquired pneumonia) 08/27/2020       Plan/Recommendations/Medical Decision Making:     Patient admitted for lower GI bleed with incidentally found to have distal aortic high-grade stenosis  -Clinically patient is patient is nonischemic she has motor and sensory intact she complains of claudication for last several months  -Vascular standpoint can be discharged home follow-up in my office with a lower extremity duplex study for baseline to discuss potential options    Elements of this note have been dictated using speech recognition software. As a result, errors of speech recognition may have occurred.

## 2022-09-13 NOTE — PROGRESS NOTES
Hospitalist Progress Note   Admit Date:  2022  6:10 PM   Name:  Dane Wong   Age:  60 y.o. Sex:  female  :  1960   MRN:  [id number]   Room:        Reason(s) for Admission: Melena [K92.1]  Anemia due to acute blood loss [D62]  Atrial fibrillation with RVR (HCC) [I48.91]  Gastrointestinal hemorrhage, unspecified gastrointestinal hemorrhage type [facility] Course & Interval History:   Patient is a 60 y.o. female who presented to the ED for cc weakness and SOB on and off for the past two months. Found to be in A fib RVR on arrival. Given 20mg Cardizem and now HR in 80s. Cardizem stopped due to hypotension. Hg also noted to be low. Admits to dark stools for months. Not on ASA or anticoagulation but does admit to ibuprofen use. Hx of hep C currently being treated, former heavy ETOH use now sober. Hg 4.5. Mg 1.6, troponins elevated and peaked to 179. EKG on arrival with atrial flutter. She was transfused 3 units of PRBC and her repeat hemoglobin is around 8. She spiked fever of 100.6 F after blood transfusion. UA is negative, lactic acid 2. Chest x-ray clear. She was treated with PPI. Her medication Epclusa was on hold. She was started on clear liquid diet. GI was consulted and plan for colonoscopy and EGD on . Cardiology was consulted. CT chest and abdomen showed posterior left lung lobe mass of 2.2 into 1 cm, cirrhosis, cholelithiasis, left hydronephrosis, colitis, severe stenosis of bilateral SANA. Ultrasound of the abdomen showed                . Blood cultures negative. Echo showed              . Subjective/24hr Events (22): Patient is seen and examined at the bedside. She is complaining of bilateral leg pain and she states she cannot take narcotics. Patient denies nausea, vomiting, hematochezia, melena, palpitations, chest pain.        Assessment & Plan:   Acute GI bleed  Heme positive stools  Continue PPI twice daily  Continue clear liquid diet  No plan for urgent EGD  Plan for colonoscopy and EGD on 9/12  GI is following, appreciate the recommendations    Symptomatic anemia  Hemoglobin of 4.5 on admission  S/p  2 units PRBC given on 9/9 and 1 unit of PRBC on 9/10  Repeat hemoglobin of 8.3  Transfuse blood if hemoglobin is less than 7 or symptomatic    Hypokalemia  Resolved       Atrial fibrillation with RVR   Currently in sinus rhythm. Echo with normal EF  Telemetry monitoring for now  No anticoagulation due to GI bleed  Cardiology following     Hypomagnesemia -resolved     Hep C - holding oral meds for now  Follow-up with (MORGAN Fields ) as outpatient for resuming Epclusa     Elevated D-dimer    DVT prophylaxis - SCDs    Diet:  ADULT DIET; Regular; Low Sodium (2 gm)  DVT PPx: SCD due to anemia and GI bleed  Code status: Full Code    Hospital Problems             Last Modified POA    * (Principal) Melena 9/9/2022 Yes    Hepatitis C 9/9/2022 Yes    Hypomagnesemia 9/9/2022 Yes    Atrial flutter ([address] 9/9/2022 Yes    Gastrointestinal hemorrhage 9/10/2022 Yes    Atrial fibrillation with RVR ([address] 9/12/2022 Yes    Symptomatic anemia 9/9/2022 Yes       Objective:   Patient Vitals for the past 24 hrs:   Temp Pulse Resp BP SpO2   09/13/22 0710 97.7 °F (36.5 °C) 71 20 135/63 97 %   09/13/22 0306 98.8 °F (37.1 °C) 74 17 (!) 108/54 98 %   09/12/22 2321 99.3 °F (37.4 °C) 83 -- (!) 105/57 98 %   09/12/22 1930 98 °F (36.7 °C) 89 18 123/60 --   09/12/22 1621 97.8 °F (36.6 °C) 84 18 (!) 145/76 99 %   09/12/22 1336 -- 76 16 123/66 98 %   09/12/22 1326 -- 78 16 (!) 120/59 98 %   09/12/22 1315 -- 84 16 132/60 97 %   09/12/22 1306 -- 99 16 (!) 101/52 98 %   09/12/22 1256 98.6 °F (37 °C) 84 16 (!) 91/45 99 %   09/12/22 1150 99.3 °F (37.4 °C) 74 16 (!) 144/66 99 %       Estimated body mass index is 20.98 kg/m² as calculated from the following:    Height as of this encounter: 5' 6\" (1.676 m). Weight as of this encounter: 130 lb (59 kg).     Intake/Output Summary (Last 24 hours) at 9/13/2022 0915  Last data filed at 9/12/2022 1841  Gross per 24 hour   Intake 1080 ml   Output 0 ml   Net 1080 ml         Physical Exam:   Blood pressure 135/63, pulse 71, temperature 97.7 °F (36.5 °C), temperature source Temporal, resp. rate 20, height 5' 6\" (1.676 m), weight 130 lb (59 kg), SpO2 97 %. General:    Well nourished. No overt distress. Pale looking  Head:  Normocephalic, atraumatic  Eyes:  Sclerae appear normal. Pupils equally round. ENT:  Nares appear normal, no drainage. Moist oral mucosa  Neck:  No restricted ROM. Trachea midline. CV:   RRR. No m/r/g. No jugular venous distension. Lungs:   CTAB. No wheezing, rhonchi, or rales. Respirations even, unlabored. Abdomen: Bowel sounds present. Soft, nontender, nondistended. Extremities: No cyanosis or clubbing. No edema. Skin:     No rashes and normal coloration. Warm and dry. Neuro:  CN II-XII grossly intact. Sensation intact. A&Ox3  Psych:  Normal mood and affect.       I have reviewed ordered lab tests and independently visualized imaging below:    Recent Labs:  Recent Results (from the past 48 hour(s))   Hemoglobin and Hematocrit    Collection Time: 09/11/22 12:30 PM   Result Value Ref Range    Hemoglobin 8.3 (L) 11.7 - 15.4 g/dL    Hematocrit 26.3 (L) 35.8 - 46.3 %   Hemoglobin and Hematocrit    Collection Time: 09/11/22  6:15 PM   Result Value Ref Range    Hemoglobin 9.8 (L) 11.7 - 15.4 g/dL    Hematocrit 31.0 (L) 35.8 - 46.3 %   Hemoglobin and Hematocrit    Collection Time: 09/12/22 12:04 AM   Result Value Ref Range    Hemoglobin 8.4 (L) 11.7 - 15.4 g/dL    Hematocrit 26.3 (L) 35.8 - 46.3 %   CBC with Auto Differential    Collection Time: 09/12/22  7:05 AM   Result Value Ref Range    WBC 3.2 (L) 4.3 - 11.1 K/uL    RBC 3.22 (L) 4.05 - 5.2 M/uL    Hemoglobin 8.5 (L) 11.7 - 15.4 g/dL    Hematocrit 27.3 (L) 35.8 - 46.3 %    MCV 84.8 79.6 - 97.8 FL    MCH 26.4 26.1 - 32.9 PG    MCHC 31.1 (L) 31.4 - 35.0 g/dL    RDW 18.5 (H) 11.9 - 14.6 %    Platelets 80 (L) 102 - 450 K/uL    MPV 11.1 9.4 - 12.3 FL    nRBC 0.00 0.0 - 0.2 K/uL    Differential Type AUTOMATED      Seg Neutrophils 62 43 - 78 %    Lymphocytes 27 13 - 44 %    Monocytes 8 4.0 - 12.0 %    Eosinophils % 3 0.5 - 7.8 %    Basophils 1 0.0 - 2.0 %    Immature Granulocytes 0 0.0 - 5.0 %    Segs Absolute 2.0 1.7 - 8.2 K/UL    Absolute Lymph # 0.9 0.5 - 4.6 K/UL    Absolute Mono # 0.2 0.1 - 1.3 K/UL    Absolute Eos # 0.1 0.0 - 0.8 K/UL    Basophils Absolute 0.0 0.0 - 0.2 K/UL    Absolute Immature Granulocyte 0.0 0.0 - 0.5 K/UL   Comprehensive Metabolic Panel w/ Reflex to MG    Collection Time: 09/12/22  7:05 AM   Result Value Ref Range    Sodium 139 136 - 145 mmol/L    Potassium 3.3 (L) 3.5 - 5.1 mmol/L    Chloride 113 (H) 101 - 110 mmol/L    CO2 22 21 - 32 mmol/L    Anion Gap 4 4 - 13 mmol/L    Glucose 98 65 - 100 mg/dL    BUN 5 (L) 8 - 23 MG/DL    Creatinine 0.70 0.6 - 1.0 MG/DL    GFR African American >60 >60 ml/min/1.73m2    GFR Non- >60 >60 ml/min/1.73m2    Calcium 8.0 (L) 8.3 - 10.4 MG/DL    Total Bilirubin 0.6 0.2 - 1.1 MG/DL    ALT 29 12 - 65 U/L    AST 26 15 - 37 U/L    Alk Phosphatase 66 50 - 136 U/L    Total Protein 6.0 (L) 6.3 - 8.2 g/dL    Albumin 2.9 (L) 3.2 - 4.6 g/dL    Globulin 3.1 2.3 - 3.5 g/dL    Albumin/Globulin Ratio 0.9 (L) 1.2 - 3.5     Phosphorus    Collection Time: 09/12/22  7:05 AM   Result Value Ref Range    Phosphorus 3.5 2.3 - 3.7 MG/DL   Magnesium    Collection Time: 09/12/22  7:05 AM   Result Value Ref Range    Magnesium 2.0 1.8 - 2.4 mg/dL   Transthoracic echocardiogram (TTE) complete with contrast, bubble, strain, and 3D PRN    Collection Time: 09/12/22 11:34 AM   Result Value Ref Range    LV EDV A2C 87 mL    LV EDV A4C 103 mL    LV ESV A2C 27 mL    LV ESV A4C 36 mL    IVSd 0.9 0.6 - 0.9 cm    LVIDd 4.1 3.9 - 5.3 cm    LVIDs 2.6 cm    LVOT Diameter 2.0 cm    LVOT Mean Gradient 3 mmHg    LVOT VTI 26.4 cm    LVOT Peak Velocity 1.3 m/s    LVOT Peak Gradient 6 mmHg    LVPWd 0.7 0.6 - 0.9 cm    LV E' Lateral Velocity 10 cm/s    LV E' Septal Velocity 7 cm/s    LV Ejection Fraction A2C 70 %    LV Ejection Fraction A4C 65 %    EF BP 69 55 - 100 %    LVOT Area 3.1 cm2    LVOT SV 82.9 ml    LA Minor Axis 6.0 cm    LA Major Axis 5.9 cm    LA Area 2C 20.0 cm2    LA Area 4C 19.2 cm2    LA Volume BP 53 (A) 22 - 52 mL    LA Diameter 3.1 cm    AV Mean Velocity 1.0 m/s    AV Mean Gradient 4 mmHg    AV VTI 31.0 cm    AV Peak Velocity 1.5 m/s    AV Peak Gradient 9 mmHg    AV Area by VTI 2.7 cm2    AV Area by Peak Velocity 2.7 cm2    Aortic Root 2.9 cm    Ascending Aorta 3.5 cm    IVC Proxmal 1.6 cm    MV E Wave Deceleration Time 291.0 ms    MV A Velocity 0.72 m/s    MV E Velocity 0.64 m/s    PV .0 ms    PV Max Velocity 0.9 m/s    PV Peak Gradient 3 mmHg    RVIDd 2.5 cm    RV Basal Dimension 3.9 cm    TAPSE 2.4 1.7 cm    Body Surface Area 1.66 m2    Fractional Shortening 2D 37 28 - 44 %    LV ESV Index A4C 22 mL/m2    LV EDV Index A4C 62 mL/m2    LV ESV Index A2C 16 mL/m2    LV EDV Index A2C 52 mL/m2    LVIDd Index 2.46 cm/m2    LVIDs Index 1.56 cm/m2    LV RWT Ratio 0.34     LV Mass 2D 97.3 67 - 162 g    LV Mass 2D Index 58.3 43 - 95 g/m2    MV E/A 0.89     E/E' Ratio (Averaged) 7.77     E/E' Lateral 6.40     E/E' Septal 9.14     LA Volume Index BP 32 16 - 34 ml/m2    LVOT Stroke Volume Index 49.6 mL/m2    LA Size Index 1.86 cm/m2    LA/AO Root Ratio 1.07     Ao Root Index 1.74 cm/m2    Ascending Aorta Index 2.10 cm/m2    AV Velocity Ratio 0.87     LVOT:AV VTI Index 0.85     IMAN/BSA VTI 1.6 cm2/m2    IMAN/BSA Peak Velocity 1.6 cm2/m2    Est. RA Pressure 3 mmHg   Hemoglobin and Hematocrit    Collection Time: 09/12/22  5:56 PM   Result Value Ref Range    Hemoglobin 9.7 (L) 11.7 - 15.4 g/dL    Hematocrit 32.1 (L) 35.8 - 46.3 %   POCT Glucose    Collection Time: 09/12/22  8:40 PM   Result Value Ref Range    POC Glucose 126 (H) 65 - 100 mg/dL    Performed by: [facility]    Hemoglobin and Hematocrit    Collection Time: 09/12/22 11:53 PM   Result Value Ref Range    Hemoglobin 8.2 (L) 11.7 - 15.4 g/dL    Hematocrit 27.3 (L) 35.8 - 46.3 %   CBC with Auto Differential    Collection Time: 09/13/22  5:41 AM   Result Value Ref Range    WBC 3.0 (L) 4.3 - 11.1 K/uL    RBC 3.22 (L) 4.05 - 5.2 M/uL    Hemoglobin 8.3 (L) 11.7 - 15.4 g/dL    Hematocrit 27.7 (L) 35.8 - 46.3 %    MCV 86.0 79.6 - 97.8 FL    MCH 25.8 (L) 26.1 - 32.9 PG    MCHC 30.0 (L) 31.4 - 35.0 g/dL    RDW 19.5 (H) 11.9 - 14.6 %    Platelets 64 (L) 571 - 450 K/uL    MPV 11.0 9.4 - 12.3 FL    nRBC 0.00 0.0 - 0.2 K/uL    Differential Type AUTOMATED      Seg Neutrophils 58 43 - 78 %    Lymphocytes 28 13 - 44 %    Monocytes 10 4.0 - 12.0 %    Eosinophils % 3 0.5 - 7.8 %    Basophils 1 0.0 - 2.0 %    Immature Granulocytes 1 0.0 - 5.0 %    Segs Absolute 1.8 1.7 - 8.2 K/UL    Absolute Lymph # 0.9 0.5 - 4.6 K/UL    Absolute Mono # 0.3 0.1 - 1.3 K/UL    Absolute Eos # 0.1 0.0 - 0.8 K/UL    Basophils Absolute 0.0 0.0 - 0.2 K/UL    Absolute Immature Granulocyte 0.0 0.0 - 0.5 K/UL   Comprehensive Metabolic Panel w/ Reflex to MG    Collection Time: 09/13/22  5:41 AM   Result Value Ref Range    Sodium 139 136 - 145 mmol/L    Potassium 3.8 3.5 - 5.1 mmol/L    Chloride 114 (H) 101 - 110 mmol/L    CO2 21 21 - 32 mmol/L    Anion Gap 4 4 - 13 mmol/L    Glucose 100 65 - 100 mg/dL    BUN 8 8 - 23 MG/DL    Creatinine 0.60 0.6 - 1.0 MG/DL    GFR African American >60 >60 ml/min/1.73m2    GFR Non- >60 >60 ml/min/1.73m2    Calcium 8.1 (L) 8.3 - 10.4 MG/DL    Total Bilirubin 0.4 0.2 - 1.1 MG/DL    ALT 28 12 - 65 U/L    AST 19 15 - 37 U/L    Alk Phosphatase 82 50 - 136 U/L    Total Protein 5.9 (L) 6.3 - 8.2 g/dL    Albumin 2.8 (L) 3.2 - 4.6 g/dL    Globulin 3.1 2.3 - 3.5 g/dL    Albumin/Globulin Ratio 0.9 (L) 1.2 - 3.5     Phosphorus    Collection Time: 09/13/22  5:41 AM   Result Value Ref Range    Phosphorus 3.5 2.3 - 3.7 MG/DL   Magnesium    Collection Time: 09/13/22  5:41 AM   Result Value Ref Range    Magnesium 2.1 1.8 - 2.4 mg/dL         Other Studies:  XR CHEST (2 VW)    Result Date: 9/9/2022  EXAM: CHEST X-RAY, 2 VIEWS INDICATION: Pain COMPARISON: 10/29/2020 TECHNIQUE: Frontal and lateral views of the chest were obtained. FINDINGS: Lungs: Normal. Cardiomediastinal silhouette: Normal in size. Aortic atherosclerotic calcifications present. Cardiac silhouette is normal in size. Pleura: No pneumothorax or pleural effusion. Osseous structures: Normal.     No radiographic evidence of acute cardiopulmonary disease. XR CHEST PORTABLE    Result Date: 9/10/2022  EXAM: XR CHEST PORTABLE HISTORY: increased temperature. TECHNIQUE: Frontal chest. COMPARISON: 9/9/2022 FINDINGS: The cardiac silhouette, mediastinum, and pulmonary vasculature are within normal limits. There is no consolidation, pleural effusion, or pneumothorax. No significant osseous abnormalities are observed. No evidence of an acute intrathoracic process. CT CHEST ABDOMEN PELVIS W CONTRAST    Result Date: 9/10/2022  CT CHEST ABDOMEN PELVIS W CONTRAST 9/10/2022 10:55 AM HISTORY: Dyspnea. Elevated d-dimer. History of smoking. COMPARISON: CT abdomen pelvis 8/10/2018. Multiple axial images were obtained through the chest, abdomen, and pelvis. Oral contrast was used for bowel opacification. 100 mL of Isovue 370 intravenous contrast was used for better evaluation of solid organs and vascular structures. Radiation dose reduction techniques were used for this study. All CT scans performed at this facility use one or all of the following: Automated exposure control, adjustment of the mA and/or kVp according to patient's size, iterative reconstruction. FINDINGS: LUNGS AND PLEURA: Lungs are clear with a pleural-based posterior left lower lobe lesion which is indeterminate measuring 2.2 x 1.1 cm on image 40 of series 302.  Intercostal soft tissue mass remains in the differential. No prior imaging available for comparison. No pleural effusions. MEDIASTINUM/AXILLAE: Heart is normal in size. No pericardial effusion. Esophagus is unremarkable. No enlarged lymph nodes. Coronary artery calcifications are present. Calcified plaque thoracic aorta without aneurysm or dissection. HEPATOBILIARY: Nodular liver contour consistent with cirrhosis. No focal mass. Cholelithiasis. Mild gallbladder wall edema also noted which is nonspecific in the setting of mild ascites. PANCREAS: Evidence of chronic pancreatitis with diffuse pancreatic glandular calcification. No pancreatic ductal dilatation or mass. Probable pancreatic tail cyst measuring 1.2 cm on image 27 of series 303. SPLEEN: Splenomegaly. Spleen measures nearly 14 cm in craniocaudal dimension image 46 of series 603. ADRENAL GLANDS: Normal. KIDNEYS/BLADDER: Left hydronephrosis/extrarenal pelvis and mild renal cortical thinning is noted probably reflecting a left UPJ stricture. Ureters are nondilated. No renal calculi. No enhancing renal mass. Bladder is well-distended and unremarkable. BOWEL: Colonic wall thickening noted involving the cecum and ascending colon with surrounding inflammation most consistent with an infectious or inflammatory colitis. Terminal ileum appears normal. Appendix is unremarkable. LYMPH NODES: No enlarged abdominal or pelvic lymph nodes. VASCULATURE: Calcified plaque abdominal aorta with dense calcification distal abdominal aorta concerning for severe stenosis or occlusion. This is minimally changed since prior exam. Severe stenosis bilateral common iliac artery also noted. PELVIC ORGANS: Uterus and rectum are unremarkable. Small to moderate amount of free pelvic fluid is present. MUSCULOSKELETAL: Normal.     1. Probable acute infectious or inflammatory colitis involving the cecum and ascending colon. Terminal ileum and appendix appear normal. Remainder of the bowel is within normal limits. No obstruction.  2. Cirrhotic liver morphology with splenomegaly and mild ascites. Cholelithiasis with edematous-appearing gallbladder wall of uncertain significance but likely nonspecific in the setting of ascites. These findings are new in the interval since prior exam. 3. Left hydronephrosis and dilated extrarenal pelvis likely due to chronic UPJ stricture. Follow up with urology as clinically warranted. Mild interval cortical thinning left kidney. No renal cyst or mass. No urinary tract calculi. 4. Dense calcified atherosclerotic plaque involving the distal abdominal aorta with severe stenosis/occlusion and severe stenosis of the common iliac arteries bilaterally. Little change since prior exam.     Vascular duplex lower extremity venous bilateral    Result Date: 9/10/2022  Bilateral lower extremity venous ultrasound INDICATION: Bilateral lower extremity swelling. Elevated d-dimer. Doppler ultrasound of both lower extremities was performed. FINDINGS:  There is normal flow in the greater saphenous, common femoral, superficial femoral, and popliteal veins. Normal compression and augmentation is demonstrated. The proximal calf veins are also patent.      No evidence of deep venous thrombosis in either lower extremity       Current Meds:  Current Facility-Administered Medications   Medication Dose Route Frequency    metoprolol tartrate (LOPRESSOR) tablet 12.5 mg  12.5 mg Oral 4x daily    lidocaine 4 % external patch 1 patch  1 patch TransDERmal Daily PRN    pantoprazole (PROTONIX) tablet 40 mg  40 mg Oral QAM AC    acetaminophen (TYLENOL) tablet 650 mg  650 mg Oral Q4H PRN    diatrizoate meglumine-sodium (GASTROGRAFIN) 66-10 % solution 15 mL  15 mL Oral ONCE PRN    0.9 % sodium chloride infusion   IntraVENous PRN    0.9 % sodium chloride infusion   IntraVENous Continuous    sodium chloride flush 0.9 % injection 5-40 mL  5-40 mL IntraVENous 2 times per day    sodium chloride flush 0.9 % injection 5-40 mL  5-40 mL IntraVENous PRN    0.9 %

## 2022-09-13 NOTE — PROGRESS NOTES
[facility] CARDIOLOGY PROGRESS NOTE    9/13/2022 7:27 AM    Admit Date: 9/9/2022        Subjective:   Stable overnight without angina, CHF, or palpitations. Vitals stable and controlled. Sinus on telemetry. No other complaints overnight. Tolerating meds well. Objective:      Vitals:    09/12/22 1930 09/12/22 2321 09/13/22 0306 09/13/22 0710   BP: 123/60 (!) 105/57 (!) 108/54 135/63   Pulse: 89 83 74 71   Resp: 18  17 20   Temp: 98 °F (36.7 °C) 99.3 °F (37.4 °C) 98.8 °F (37.1 °C) 97.7 °F (36.5 °C)   TempSrc: Temporal Oral Axillary Temporal   SpO2:  98% 98% 97%   Weight:       Height:           Physical Exam:  Neck- supple, no JVD  CV- regular rate and rhythm no MRG  Lung- clear bilaterally  Abd- soft, nontender, nondistended  Ext- no edema  Skin- warm and dry    Data Review:   Recent Labs     09/12/22  0705 09/12/22  1756 09/12/22  2353 09/13/22  0541     --   --  139   K 3.3*  --   --  3.8   MG 2.0  --   --  2.1   BUN 5*  --   --  8   WBC 3.2*  --   --  3.0*   HGB 8.5*   < > 8.2* 8.3*   HCT 27.3*   < > 27.3* 27.7*   PLT 80*  --   --  64*    < > = values in this interval not displayed. Assessment and Plan: Active Hospital Problems    Atrial fibrillation with RVR (HCC)-normal sinus rhythm since admission. Continue to monitor telemetry and augment rate slowing meds as needed. Not a good candidate for anticoagulation given acute anemia with GI blood loss, lifelong iron recommended by GI (see their notes and endoscopy results from yesterday). Continue to monitor telemetry and recheck BMP, CBC, magnesium in the morning and replete electrolytes as needed. Heart rate in the 88W and systolics 975 to 377. Add low-dose beta-blockers today as tolerated. Follow telemetry.        Gastrointestinal hemorrhage-endoscopy/therapy per GI.  N.p.o. for endoscopy today       *Melena       Hepatitis C-outpatient surveillance per GI       Hypomagnesemia-recheck magnesium in the morning and replete as needed Atrial flutter (HCC)-normal sinus rhythm on telemetry since admission. Follow telemetry, poor anticoagulation candidate as noted above       Symptomatic anemia-improved and asymptomatic lying in bed. Recheck in the morning. Keep hemoglobin greater than 8 as tolerated.         Andrea Ferro MD

## 2022-09-14 VITALS
HEIGHT: 66 IN | WEIGHT: 130 LBS | TEMPERATURE: 97.2 F | DIASTOLIC BLOOD PRESSURE: 68 MMHG | HEART RATE: 76 BPM | OXYGEN SATURATION: 99 % | BODY MASS INDEX: 20.89 KG/M2 | SYSTOLIC BLOOD PRESSURE: 145 MMHG | RESPIRATION RATE: 18 BRPM

## 2022-09-14 LAB
ANION GAP SERPL CALC-SCNC: 6 MMOL/L (ref 4–13)
BASOPHILS # BLD: 0 K/UL (ref 0–0.2)
BASOPHILS NFR BLD: 1 % (ref 0–2)
BUN SERPL-MCNC: 11 MG/DL (ref 8–23)
CALCIUM SERPL-MCNC: 8.7 MG/DL (ref 8.3–10.4)
CHLORIDE SERPL-SCNC: 108 MMOL/L (ref 101–110)
CO2 SERPL-SCNC: 25 MMOL/L (ref 21–32)
CREAT SERPL-MCNC: 0.7 MG/DL (ref 0.6–1)
DIFFERENTIAL METHOD BLD: ABNORMAL
EOSINOPHIL # BLD: 0.1 K/UL (ref 0–0.8)
EOSINOPHIL NFR BLD: 3 % (ref 0.5–7.8)
ERYTHROCYTE [DISTWIDTH] IN BLOOD BY AUTOMATED COUNT: 20.4 % (ref 11.9–14.6)
GLUCOSE SERPL-MCNC: 105 MG/DL (ref 65–100)
HCT VFR BLD AUTO: 29.7 % (ref 35.8–46.3)
HGB BLD-MCNC: 9.1 G/DL (ref 11.7–15.4)
IMM GRANULOCYTES # BLD AUTO: 0 K/UL (ref 0–0.5)
IMM GRANULOCYTES NFR BLD AUTO: 0 % (ref 0–5)
LYMPHOCYTES # BLD: 1 K/UL (ref 0.5–4.6)
LYMPHOCYTES NFR BLD: 30 % (ref 13–44)
MCH RBC QN AUTO: 26.1 PG (ref 26.1–32.9)
MCHC RBC AUTO-ENTMCNC: 30.6 G/DL (ref 31.4–35)
MCV RBC AUTO: 85.1 FL (ref 79.6–97.8)
MONOCYTES # BLD: 0.3 K/UL (ref 0.1–1.3)
MONOCYTES NFR BLD: 10 % (ref 4–12)
NEUTS SEG # BLD: 1.8 K/UL (ref 1.7–8.2)
NEUTS SEG NFR BLD: 56 % (ref 43–78)
NRBC # BLD: 0 K/UL (ref 0–0.2)
PHOSPHATE SERPL-MCNC: 4 MG/DL (ref 2.3–3.7)
PLATELET # BLD AUTO: 59 K/UL (ref 150–450)
PMV BLD AUTO: 10.6 FL (ref 9.4–12.3)
POTASSIUM SERPL-SCNC: 3.8 MMOL/L (ref 3.5–5.1)
RBC # BLD AUTO: 3.49 M/UL (ref 4.05–5.2)
SODIUM SERPL-SCNC: 139 MMOL/L (ref 136–145)
WBC # BLD AUTO: 3.2 K/UL (ref 4.3–11.1)

## 2022-09-14 PROCEDURE — 97165 OT EVAL LOW COMPLEX 30 MIN: CPT

## 2022-09-14 PROCEDURE — 85025 COMPLETE CBC W/AUTO DIFF WBC: CPT

## 2022-09-14 PROCEDURE — 84100 ASSAY OF PHOSPHORUS: CPT

## 2022-09-14 PROCEDURE — 6370000000 HC RX 637 (ALT 250 FOR IP): Performed by: INTERNAL MEDICINE

## 2022-09-14 PROCEDURE — 99232 SBSQ HOSP IP/OBS MODERATE 35: CPT | Performed by: INTERNAL MEDICINE

## 2022-09-14 PROCEDURE — 36415 COLL VENOUS BLD VENIPUNCTURE: CPT

## 2022-09-14 PROCEDURE — 6370000000 HC RX 637 (ALT 250 FOR IP): Performed by: STUDENT IN AN ORGANIZED HEALTH CARE EDUCATION/TRAINING PROGRAM

## 2022-09-14 PROCEDURE — 80048 BASIC METABOLIC PNL TOTAL CA: CPT

## 2022-09-14 RX ORDER — DOCUSATE SODIUM 100 MG/1
100 CAPSULE, LIQUID FILLED ORAL 2 TIMES DAILY
Qty: 60 CAPSULE | Refills: 0 | Status: SHIPPED | OUTPATIENT
Start: 2022-09-14 | End: 2022-10-14

## 2022-09-14 RX ORDER — METOPROLOL SUCCINATE 50 MG/1
50 TABLET, EXTENDED RELEASE ORAL DAILY
Status: DISCONTINUED | OUTPATIENT
Start: 2022-09-14 | End: 2022-09-14 | Stop reason: HOSPADM

## 2022-09-14 RX ORDER — CETIRIZINE HYDROCHLORIDE 10 MG/1
10 TABLET ORAL NIGHTLY
Qty: 30 TABLET | Refills: 0 | Status: SHIPPED | OUTPATIENT
Start: 2022-09-14 | End: 2022-10-14

## 2022-09-14 RX ORDER — METOPROLOL SUCCINATE 50 MG/1
50 TABLET, EXTENDED RELEASE ORAL DAILY
Qty: 30 TABLET | Refills: 3 | Status: SHIPPED | OUTPATIENT
Start: 2022-09-15

## 2022-09-14 RX ORDER — PANTOPRAZOLE SODIUM 40 MG/1
40 TABLET, DELAYED RELEASE ORAL
Qty: 30 TABLET | Refills: 3 | Status: SHIPPED | OUTPATIENT
Start: 2022-09-15

## 2022-09-14 RX ORDER — FERROUS SULFATE 325(65) MG
325 TABLET ORAL 2 TIMES DAILY
Qty: 180 TABLET | Refills: 1 | Status: SHIPPED | OUTPATIENT
Start: 2022-09-14 | End: 2022-10-14

## 2022-09-14 RX ADMIN — METOPROLOL SUCCINATE 50 MG: 50 TABLET, FILM COATED, EXTENDED RELEASE ORAL at 10:01

## 2022-09-14 RX ADMIN — ACETAMINOPHEN 650 MG: 325 TABLET, FILM COATED ORAL at 07:53

## 2022-09-14 RX ADMIN — PANTOPRAZOLE SODIUM 40 MG: 40 TABLET, DELAYED RELEASE ORAL at 06:01

## 2022-09-14 ASSESSMENT — PAIN DESCRIPTION - PAIN TYPE: TYPE: CHRONIC PAIN

## 2022-09-14 ASSESSMENT — PAIN DESCRIPTION - DESCRIPTORS: DESCRIPTORS: ACHING

## 2022-09-14 ASSESSMENT — PAIN DESCRIPTION - LOCATION: LOCATION: BACK;HIP

## 2022-09-14 ASSESSMENT — PAIN - FUNCTIONAL ASSESSMENT: PAIN_FUNCTIONAL_ASSESSMENT: ACTIVITIES ARE NOT PREVENTED

## 2022-09-14 ASSESSMENT — PAIN SCALES - GENERAL
PAINLEVEL_OUTOF10: 0
PAINLEVEL_OUTOF10: 3
PAINLEVEL_OUTOF10: 0

## 2022-09-14 ASSESSMENT — PAIN DESCRIPTION - ORIENTATION: ORIENTATION: LOWER

## 2022-09-14 ASSESSMENT — PAIN DESCRIPTION - FREQUENCY: FREQUENCY: INTERMITTENT

## 2022-09-14 ASSESSMENT — PAIN DESCRIPTION - ONSET: ONSET: ON-GOING

## 2022-09-14 NOTE — PROGRESS NOTES
Layout: One level  Home Access: Stairs to enter with rails  Entrance Stairs - Number of Steps: 2  Bathroom Shower/Tub: Walk-in shower  Bathroom Toilet: Standard  Bathroom Equipment: Built-in shower seat  ADL Assistance: Independent  Homemaking Assistance: Independent  Ambulation Assistance: Independent  Transfer Assistance: Independent  Active : Yes  Occupation: Part time employment  Type of Occupation: works at "[employer] [place]:     Rachna Soto / Perla Checoeber / Salbadorcelina Jerod: Telemetry     RESTRICTIONS/PRECAUTIONS:       PAIN: Gaylyn Nam / O2:   Pain Assessment:  (legs are aching not quanitified)           Vitals          Oxygen            GROSS EVALUATION: INTACT IMPAIRED   (See Comments)   UE AROM [x] []   UE PROM [] []   Strength [x]       Posture / Balance [] Sitting - Static: Good   Sensation []     Coordination []       Tone []       Edema [x]    Activity Tolerance []       Hand Dominance R [] L []      COGNITION/  PERCEPTION: INTACT IMPAIRED   (See Comments)   Orientation [x]     Vision [x]     Hearing [x]     Cognition  [x]     Perception []       MOBILITY: I Mod I S SBA CGA Min Mod Max Total  NT x2 Comments:   Bed Mobility    Rolling [] [] [] [] [] [] [] [] [] [] []    Supine to Sit [] [] [] [] [] [] [] [] [] [] []    Scooting [] [] [] [] [] [] [] [] [] [] []    Sit to Supine [] [] [] [] [] [] [] [] [] [] []    Transfers    Sit to Stand [] [] [] [] [] [] [] [] [] [] []    Bed to Chair [] [] [] [] [] [] [] [] [] [] []    Stand to Sit [] [] [] [] [] [] [] [] [] [] []    Tub/Shower [] [] [] [] [] [] [] [] [] [] []     Toilet [] [] [] [] [] [] [] [] [] [] []      [] [] [] [] [] [] [] [] [] [] []    I=Independent, Mod I=Modified Independent, S=Supervision/Setup, SBA=Standby Assistance, CGA=Contact Guard Assistance, Min=Minimal Assistance, Mod=Moderate Assistance, Max=Maximal Assistance, Total=Total Assistance, NT=Not Tested    ACTIVITIES OF DAILY LIVING: I Mod I S SBA CGA Min Mod Max Total NT Comments   BASIC ADLs:              Upper Body Bathing  [x] [] [] [] [] [] [] [] [] []    Lower Body Bathing [x] [] [] [] [] [] [] [] [] []    Toileting [] [] [] [] [] [] [] [] [] []    Upper Body Dressing [] [] [] [] [] [] [] [] [] []    Lower Body Dressing [] [] [] [] [] [] [] [] [] []    Feeding [] [] [] [] [] [] [] [] [] []    Grooming [] [] [] [] [] [] [] [] [] []    Personal Device Care [] [] [] [] [] [] [] [] [] []    Functional Mobility [] [] [] [] [] [] [] [] [] []    I=Independent, Mod I=Modified Independent, S=Supervision/Setup, SBA=Standby Assistance, CGA=Contact Guard Assistance, Min=Minimal Assistance, Mod=Moderate Assistance, Max=Maximal Assistance, Total=Total Assistance, NT=Not Tested    PLAN:     FREQUENCY/DURATION   OT Plan of Care:  (EVAL AND DC) for duration of hospital stay or until stated goals are met, whichever comes first.    ACUTE OCCUPATIONAL THERAPY GOALS:   (Developed with and agreed upon by patient and/or caregiver.)  EVAL AND DC      PROBLEM LIST:   (Skilled intervention is medically necessary to address:)  Increased Pain   INTERVENTIONS PLANNED:  (Benefits and precautions of occupational therapy have been discussed with the patient.)  none         TREATMENT:     EVALUATION: LOW COMPLEXITY: (Untimed Charge)    TREATMENT:   none    TREATMENT GRID:  N/A    AFTER TREATMENT PRECAUTIONS: Bed, Bed/Chair Locked, Call light within reach, Needs within reach, RN notified, and Side rails x3    INTERDISCIPLINARY COLLABORATION:  RN/ PCT and OT/ LEI    EDUCATION:  Education Given To: Patient  Education Provided: Role of Therapy;Plan of Care  Education Outcome: Verbalized understanding    TOTAL TREATMENT DURATION AND TIME:  Time In: 1766  Time Out: 0915  Minutes: [address], OT

## 2022-09-14 NOTE — PROGRESS NOTES
Discharge instructions reviewed with patient. Prescriptions given for meds and med info sheets provided for all new medications. Opportunity for questions provided. patient voiced understanding of all discharge instructions.

## 2022-09-14 NOTE — PLAN OF CARE
Problem: Discharge Planning  Goal: Discharge to home or other facility with appropriate resources  9/14/2022 1101 by Claudene Haws, RN  Outcome: Completed  Flowsheets (Taken 9/14/2022 0753)  Discharge to home or other facility with appropriate resources: Identify barriers to discharge with patient and caregiver  9/14/2022 1101 by Claudene Haws, RN  Outcome: Completed  Flowsheets (Taken 9/14/2022 0753)  Discharge to home or other facility with appropriate resources: Identify barriers to discharge with patient and caregiver     Problem: Safety - Adult  Goal: Free from fall injury  Outcome: Completed  Flowsheets (Taken 9/14/2022 0800)  Free From Fall Injury: Instruct family/caregiver on patient safety     Problem: ABCDS Injury Assessment  Goal: Absence of physical injury  Outcome: Completed  Flowsheets (Taken 9/14/2022 0800)  Absence of Physical Injury: Implement safety measures based on patient assessment     Problem: Pain  Goal: Verbalizes/displays adequate comfort level or baseline comfort level  Outcome: Completed  Flowsheets  Taken 9/14/2022 0840  Verbalizes/displays adequate comfort level or baseline comfort level: Encourage patient to monitor pain and request assistance  Taken 9/14/2022 0753  Verbalizes/displays adequate comfort level or baseline comfort level: Encourage patient to monitor pain and request assistance     Problem: Safety - Adult  Goal: Free from fall injury  Outcome: Completed  Flowsheets (Taken 9/14/2022 0800)  Free From Fall Injury: Instruct family/caregiver on patient safety     Problem: ABCDS Injury Assessment  Goal: Absence of physical injury  Outcome: Completed  Flowsheets (Taken 9/14/2022 0800)  Absence of Physical Injury: Implement safety measures based on patient assessment

## 2022-09-14 NOTE — PROGRESS NOTES
[facility] CARDIOLOGY PROGRESS NOTE    9/14/2022 6:59 AM    Admit Date: 9/9/2022        Subjective:   Stable overnight without angina, CHF, or palpitations. Vitals stable and controlled. Sinus on telemetry. No other complaints overnight. Tolerating meds well. Objective:      Vitals:    09/13/22 1545 09/13/22 1927 09/13/22 2331 09/14/22 0341   BP: (!) 146/64 (!) 114/57 120/63 97/79   Pulse: 78 72 75 69   Resp:  18 17 17   Temp: 98.5 °F (36.9 °C) 99.8 °F (37.7 °C) 99.7 °F (37.6 °C) 98.2 °F (36.8 °C)   TempSrc: Oral Oral Oral Oral   SpO2: 100% 99% 98% 98%   Weight:       Height:           Physical Exam:  Neck- supple, no JVD  CV- regular rate and rhythm no MRG  Lung- clear bilaterally  Abd- soft, nontender, nondistended  Ext- no edema  Skin- warm and dry    Data Review:   Recent Labs     09/12/22  0705 09/12/22  1756 09/12/22  2353 09/13/22  0541     --   --  139   K 3.3*  --   --  3.8   MG 2.0  --   --  2.1   BUN 5*  --   --  8   WBC 3.2*  --   --  3.0*   HGB 8.5*   < > 8.2* 8.3*   HCT 27.3*   < > 27.3* 27.7*   PLT 80*  --   --  64*    < > = values in this interval not displayed. Assessment and Plan: Active Hospital Problems    Atrial fibrillation with RVR (HCC)-normal sinus rhythm since admission. Continue to monitor telemetry and augment rate slowing meds as needed. Not a good candidate for anticoagulation given acute anemia with GI blood loss, lifelong iron recommended by GI (see their notes and endoscopy results from yesterday). Continue to monitor telemetry and recheck BMP, CBC, magnesium in the morning and replete electrolytes as needed. Heart rate in the 38D and systolics 660 to 256. Convert to long-acting Toprol-XL today and continue as outpatient. Follow telemetry while in hospital.       Gastrointestinal hemorrhage-endoscopy/therapy per GI.       Hepatitis C-outpatient surveillance per GI       Hypomagnesemia-recheck magnesium in the morning and replete as needed       Atrial

## 2022-09-14 NOTE — CARE COORDINATION
CM following patient's chart. PT/OT evaluated patient stating no further therapy needed after discharge. CM will continue to follow however, no needs anticipated. DCP is for patient to return home when medically stable.

## 2022-09-14 NOTE — PROGRESS NOTES
[address]   [address] [facility] FAX: [phone]         VASCULAR SURGERY FLOOR PROGRESS NOTE    Admit Date: 2022  POD: 2 Days Post-Op    Procedure:  Procedure(s):  EGD ESOPHAGOGASTRODUODENOSCOPY  COLORECTAL CANCER SCREENING, NOT HIGH RISK  2226    Subjective:     Patient has no new complaints. Objective:     Vitals:  Blood pressure 97/79, pulse 69, temperature 98.2 °F (36.8 °C), temperature source Oral, resp. rate 17, height 5' 6\" (1.676 m), weight 130 lb (59 kg), SpO2 98 %. Temp (24hrs), Av.1 °F (37.3 °C), Min:98.2 °F (36.8 °C), Max:99.8 °F (37.7 °C)      Intake / Output:    Intake/Output Summary (Last 24 hours) at 2022 0714  Last data filed at 2022 1837  Gross per 24 hour   Intake 850 ml   Output --   Net 850 ml       Physical Exam:    Constitutional: she appears well-developed. No distress. HENT:   Head: Atraumatic. Eyes: Pupils are equal, round, and reactive to light. Neck: Normal range of motion. Cardiovascular: Regular rhythm. Pulmonary/Chest: Effort normal and breath sounds normal. No respiratory distress. Abdominal: Soft. Bowel sounds are normal. she exhibits no distension. There is no tenderness. There is no guarding. No hernia. Musculoskeletal: Normal range of motion. Neurological: She is alert. CN II- XII grossly intact  Vascular: Femoral pulses, no pedal pulses both feet are warm    Labs:   Recent Labs     22  0705 22  1756 22  2353 22  0541   HGB 8.5*   < > 8.2* 8.3*   WBC 3.2*  --   --  3.0*   K 3.3*  --   --  3.8    < > = values in this interval not displayed.        Data Review     Assessment:     Patient Active Problem List    Diagnosis Date Noted    Atrial fibrillation with RVR ([facility] [address] 2022    Gastrointestinal hemorrhage 09/10/2022    Melena 2022    Hepatitis C 2022    Hypomagnesemia 2022    Atrial flutter ([facility] 2022    Severe protein-calorie malnutrition ([facility]

## 2022-09-14 NOTE — DISCHARGE SUMMARY
Hospitalist Discharge Summary   Admit Date:  2022  6:10 PM   DC Note date: 2022  Name:  Swati Santana   Age:  60 y.o. Sex:  female  :  1960   MRN:  [id number]   Room:    PCP:  JENN Keita NP    Presenting Complaint: Palpitations     Initial Admission Diagnosis: Melena [K92.1]  Anemia due to acute blood loss [D62]  Atrial fibrillation with RVR ([address] [I48.91]  Gastrointestinal hemorrhage, unspecified gastrointestinal hemorrhage type [K92.2]     Problem List for this Hospitalization (present on admission):    Principal Problem:    Melena  Active Problems:    Hepatitis C    Hypomagnesemia    Atrial flutter (HCC)    Gastrointestinal hemorrhage    Atrial fibrillation with RVR (HCC)    Symptomatic anemia  Resolved Problems:    * No resolved hospital problems. [facility] Course:  Patient is a 60 y.o. female who presented to the ED for cc weakness and SOB on and off for the past two months. Found to be in A fib RVR on arrival. Given 20mg Cardizem and now HR in 80s. Cardizem stopped due to hypotension. Hg also noted to be low. Admits to dark stools for months. Not on ASA or anticoagulation but does admit to ibuprofen use. Hx of hep C currently being treated, former heavy ETOH use now sober. Hg 4.5. Mg 1.6, troponins elevated and peaked to 179. EKG on arrival with atrial flutter. She was transfused 3 units of PRBC and her repeat hemoglobin is around 8. She spiked fever of 100.6 F after blood transfusion. UA is negative, lactic acid 2. Chest x-ray clear. She was treated with PPI. Her medication Epclusa was on hold. She was started on clear liquid diet. GI was consulted and she had colonoscopy which showed melanosis coli and EGD on  showed 2 nonbleeding erosions and mild PHG. She was started on clear liquid diet. She was monitored on telemetry and no cardiac events. Cardiology was consulted and she was treated with metoprolol.  CT chest and abdomen showed posterior left lung lobe mass of 2.2 into 1 cm, cirrhosis, cholelithiasis, left hydronephrosis, colitis, severe stenosis of bilateral SANA. Ultrasound of the abdomen showed Gallbladder wall thickening and hepatic steatosis. Blood cultures negative. Echo showed  normal ejection fraction. Patient is hemodynamically stable for discharge. Left hydronephrosis  Follow-up as outpatient with urology    Cholelithiasis  Follow-up with ultrasound as outpatient    Left lung lobe mass of 2.2 cm  Follow-up with repeat CAT scan in 6 months    Hypokalemia  Resolved     severe stenosis of bilateral SANA   Vascular was consulted  and follow-up as outpatient. Atrial fibrillation with RVR   Currently in sinus rhythm. Started on low-dose beta-blocker   Echo with normal EF  No anticoagulation due to GI bleed  Cardiology following     Hypomagnesemia -resolved     Hep C - holding oral meds for now  Follow-up with (ID Diamond ) as outpatient for resuming Epclusa       Disposition:  Home   Diet: ADULT DIET; Regular; Low Sodium (2 gm)  Code Status: Full Code    Follow Ups:   Follow-up Information     Ely Simpson MD Follow up. Specialty: Gastroenterology  Why: they will call you to make follow up appoinment  Contact information:  [address] [facility] [address] [facility]  Kiki Dowling MD Follow up on 10/4/2022. Specialty: Vascular Surgery  Why: arterial duplex and see Dr. Garth Wilson at 8:30  Contact information:  [address] [address]  [address] [facility] Follow up. Specialty: Cardiology  Contact information:  Aleksandra Huggins [address] [facility]  [phone]                     Time spent in patient discharge and coordination 35 minutes. Follow up labs/diagnostics (ultimately defer to outpatient provider):   Follow-up with PCP in 3 to 5 days  Follow-up with gastroenterology in 7 to 10 days  Follow-up with vascular in 1 month      Plan was discussed with patient. All questions answered. Patient was stable at time of discharge. Instructions given to call a physician or return if any concerns.     Discharge Medication List as of 9/14/2022  1:46 PM        START taking these medications    Details   cetirizine (ZYRTEC) 10 MG tablet Take 1 tablet by mouth at bedtime, Disp-30 tablet, R-0Print      metoprolol succinate (TOPROL XL) 50 MG extended release tablet Take 1 tablet by mouth daily, Disp-30 tablet, R-3Print      pantoprazole (PROTONIX) 40 MG tablet Take 1 tablet by mouth every morning (before breakfast), Disp-30 tablet, R-3Print      ferrous sulfate (IRON 325) 325 (65 Fe) MG tablet Take 1 tablet by mouth 2 times daily, Disp-180 tablet, R-1Print      docusate sodium (COLACE) 100 MG capsule Take 1 capsule by mouth 2 times daily, Disp-60 capsule, R-0Print           CONTINUE these medications which have NOT CHANGED    Details   lidocaine 4 % external patch Place 1 patch onto the skin every 24 hours Place 1 patch onto the skin daily 12 hours on, 12 hours off., TransDERmal, EVERY 24 HOURS Starting Mon 8/29/2022, Until Wed 9/28/2022, For 30 days, Disp-30 patch, R-0, Print      acetaminophen (TYLENOL) 500 MG tablet Take 1 tablet by mouth every 6 hours as needed for Pain, Disp-120 tablet, R-5Print             Procedures done this admission:  Procedure(s):  EGD ESOPHAGOGASTRODUODENOSCOPY  COLORECTAL CANCER SCREENING, NOT HIGH RISK Rm 2226    Consults this admission:  IP CONSULT TO CARDIOLOGY  IP CONSULT TO UROLOGY  IP CONSULT TO VASCULAR SURGERY  IP CONSULT TO PHYSICAL THERAPY  IP CONSULT TO OCCUPATIONAL THERAPY  IP CONSULT TO PHYSICAL THERAPY    Echocardiogram results:  09/09/22    TRANSTHORACIC ECHOCARDIOGRAM (TTE) COMPLETE (CONTRAST/BUBBLE/3D PRN) 09/12/2022 11:46 AM (Final)    Interpretation Summary  Formatting of this result is different from the original.      Left Ventricle: Normal left ventricular systolic function with a visually estimated EF of 60 - 65%. Left ventricle size is normal. Normal wall thickness. Normal wall motion. Normal diastolic function. Technical qualifiers: Procedure performed with the patient in a supine position, color flow Doppler was performed and pulse wave and/or continuous wave Doppler was performed. Contrast used: Definity. Signed by: Jimena Bingham MD on 9/12/2022 11:46 AM      Diagnostic Imaging/Tests:   XR CHEST (2 VW)    Result Date: 9/9/2022  No radiographic evidence of acute cardiopulmonary disease. XR CHEST PORTABLE    Result Date: 9/10/2022  No evidence of an acute intrathoracic process. US ABDOMEN LIMITED    Result Date: 9/11/2022  1. Nonspecific mild gallbladder wall thickening. No additional evidence to suggest acute cholecystitis. 2.  Parents stone or cholesterol deposit in the gallbladder wall. 3.  Echogenic liver compatible with steatosis and/or fibrosis. CT CHEST ABDOMEN PELVIS W CONTRAST    Result Date: 9/10/2022  1. Probable acute infectious or inflammatory colitis involving the cecum and ascending colon. Terminal ileum and appendix appear normal. Remainder of the bowel is within normal limits. No obstruction. 2. Cirrhotic liver morphology with splenomegaly and mild ascites. Cholelithiasis with edematous-appearing gallbladder wall of uncertain significance but likely nonspecific in the setting of ascites. These findings are new in the interval since prior exam. 3. Left hydronephrosis and dilated extrarenal pelvis likely due to chronic UPJ stricture. Follow up with urology as clinically warranted. Mild interval cortical thinning left kidney. No renal cyst or mass. No urinary tract calculi. 4. Dense calcified atherosclerotic plaque involving the distal abdominal aorta with severe stenosis/occlusion and severe stenosis of the common iliac arteries bilaterally.  Little change since prior exam.     Vascular duplex lower extremity venous bilateral    Result Date: for: CHOL, LDLCALC, LABVLDL, HDL, CHOLHDLRATIO, TRIG   Thyroid  Lab Results   Component Value Date/Time    TSHELE 2.57 09/09/2022 06:28 PM        Most Recent UA Lab Results   Component Value Date/Time    COLORU YELLOW 09/10/2022 01:17 AM    APPEARANCE CLEAR 09/10/2022 01:17 AM    SPECGRAV 1.005 09/10/2022 01:17 AM    LABPH 7.0 09/10/2022 01:17 AM    PROTEINU Negative 09/10/2022 01:17 AM    GLUCOSEU Negative 09/10/2022 01:17 AM    KETUA Negative 09/10/2022 01:17 AM    BILIRUBINUR Negative 09/10/2022 01:17 AM    BLOODU Negative 09/10/2022 01:17 AM    UROBILINOGEN 0.2 09/10/2022 01:17 AM    NITRU Negative 09/10/2022 01:17 AM    LEUKOCYTESUR SMALL 09/10/2022 01:17 AM    WBCUA 0-3 09/10/2022 01:17 AM    RBCUA 0-3 09/10/2022 01:17 AM    EPITHUA 0-3 09/10/2022 01:17 AM    BACTERIA 0 09/10/2022 01:17 AM    LABCAST 0-3 09/10/2022 01:17 AM    MUCUS 0 09/10/2022 01:17 AM        Recent Labs     09/10/22  0625   CULTURE NO GROWTH 3 DAYS  NO GROWTH 3 DAYS       All Labs from Last 24 Hrs:  Recent Results (from the past 24 hour(s))   CBC with Auto Differential    Collection Time: 09/14/22  7:06 AM   Result Value Ref Range    WBC 3.2 (L) 4.3 - 11.1 K/uL    RBC 3.49 (L) 4.05 - 5.2 M/uL    Hemoglobin 9.1 (L) 11.7 - 15.4 g/dL    Hematocrit 29.7 (L) 35.8 - 46.3 %    MCV 85.1 79.6 - 97.8 FL    MCH 26.1 26.1 - 32.9 PG    MCHC 30.6 (L) 31.4 - 35.0 g/dL    RDW 20.4 (H) 11.9 - 14.6 %    Platelets 59 (L) 711 - 450 K/uL    MPV 10.6 9.4 - 12.3 FL    nRBC 0.00 0.0 - 0.2 K/uL    Differential Type AUTOMATED      Seg Neutrophils 56 43 - 78 %    Lymphocytes 30 13 - 44 %    Monocytes 10 4.0 - 12.0 %    Eosinophils % 3 0.5 - 7.8 %    Basophils 1 0.0 - 2.0 %    Immature Granulocytes 0 0.0 - 5.0 %    Segs Absolute 1.8 1.7 - 8.2 K/UL    Absolute Lymph # 1.0 0.5 - 4.6 K/UL    Absolute Mono # 0.3 0.1 - 1.3 K/UL    Absolute Eos # 0.1 0.0 - 0.8 K/UL    Basophils Absolute 0.0 0.0 - 0.2 K/UL    Absolute Immature Granulocyte 0.0 0.0 - 0.5 K/UL   Basic Metabolic Panel w/ Reflex to MG    Collection Time: 09/14/22  7:06 AM   Result Value Ref Range    Sodium 139 136 - 145 mmol/L    Potassium 3.8 3.5 - 5.1 mmol/L    Chloride 108 101 - 110 mmol/L    CO2 25 21 - 32 mmol/L    Anion Gap 6 4 - 13 mmol/L    Glucose 105 (H) 65 - 100 mg/dL    BUN 11 8 - 23 MG/DL    Creatinine 0.70 0.6 - 1.0 MG/DL    GFR African American >60 >60 ml/min/1.73m2    GFR Non- >60 >60 ml/min/1.73m2    Calcium 8.7 8.3 - 10.4 MG/DL   Phosphorus    Collection Time: 09/14/22  7:06 AM   Result Value Ref Range    Phosphorus 4.0 (H) 2.3 - 3.7 MG/DL       No Known Allergies  Immunization History   Administered Date(s) Administered    COVID-19, PFIZER PURPLE top, DILUTE for use, (age 15 y+), 30mcg/0.3mL 03/01/2022    PPD Test 08/28/2020    Tdap (Boostrix, Adacel) 05/03/2012       Recent Vital Data:  Patient Vitals for the past 24 hrs:   Temp Pulse Resp BP SpO2   09/14/22 1209 97.2 °F (36.2 °C) 76 18 (!) 145/68 99 %   09/14/22 0742 97.6 °F (36.4 °C) 67 20 (!) 123/58 99 %   09/14/22 0341 98.2 °F (36.8 °C) 69 17 97/79 98 %   09/13/22 2331 99.7 °F (37.6 °C) 75 17 120/63 98 %   09/13/22 1927 99.8 °F (37.7 °C) 72 18 (!) 114/57 99 %   09/13/22 1545 98.5 °F (36.9 °C) 78 -- (!) 146/64 100 %       Oxygen Therapy  SpO2: 99 %  Pulse via Oximetry: 76 beats per minute  Pulse Oximeter Device Mode: Continuous  O2 Device: None (Room air)    Estimated body mass index is 20.98 kg/m² as calculated from the following:    Height as of this encounter: 5' 6\" (1.676 m). Weight as of this encounter: 130 lb (59 kg). Intake/Output Summary (Last 24 hours) at 9/14/2022 1510  Last data filed at 9/13/2022 1837  Gross per 24 hour   Intake 200 ml   Output --   Net 200 ml         Physical Exam:    General:    Well nourished. No overt distress  Head:  Normocephalic, atraumatic  Eyes:  Sclerae appear normal.  Pupils equally round. HENT:  Nares appear normal, no drainage.   Moist mucous membranes  Neck:  No restricted ROM.  Trachea midline  CV:   RRR. No m/r/g. No JVD  Lungs:   CTAB. No wheezing, rhonchi, or rales. Respirations even, unlabored  Abdomen:   Soft, nontender, nondistended. Extremities: Warm and dry. No cyanosis or clubbing. No edema. Skin:     No rashes. Normal coloration  Neuro:  CN II-XII grossly intact. Psych:  Normal mood and affect. Signed:  Yola López MD    Part of this note may have been written by using a voice dictation software. The note has been proof read but may still contain some grammatical/other typographical errors.

## 2022-09-28 ENCOUNTER — OFFICE VISIT (OUTPATIENT)
Dept: CARDIOLOGY CLINIC | Age: 62
End: 2022-09-28

## 2022-09-28 VITALS
DIASTOLIC BLOOD PRESSURE: 70 MMHG | WEIGHT: 128.2 LBS | SYSTOLIC BLOOD PRESSURE: 118 MMHG | HEIGHT: 66 IN | HEART RATE: 61 BPM | BODY MASS INDEX: 20.6 KG/M2

## 2022-09-28 DIAGNOSIS — I48.91 ATRIAL FIBRILLATION WITH RVR (HCC): Primary | ICD-10-CM

## 2022-09-28 DIAGNOSIS — D64.9 SYMPTOMATIC ANEMIA: ICD-10-CM

## 2022-09-28 PROCEDURE — 99214 OFFICE O/P EST MOD 30 MIN: CPT | Performed by: INTERNAL MEDICINE

## 2022-09-28 PROCEDURE — 93000 ELECTROCARDIOGRAM COMPLETE: CPT | Performed by: INTERNAL MEDICINE

## 2022-09-28 RX ORDER — OLMESARTAN MEDOXOMIL 20 MG/1
TABLET ORAL
COMMUNITY

## 2022-09-28 RX ORDER — FLUTICASONE PROPIONATE 50 MCG
SPRAY, SUSPENSION (ML) NASAL
COMMUNITY

## 2022-09-28 RX ORDER — LANOLIN ALCOHOL/MO/W.PET/CERES
CREAM (GRAM) TOPICAL
COMMUNITY

## 2022-09-28 NOTE — PROGRESS NOTES
[address] [place], [place]  [address], [address] [place], [place] [address]  [address]  PHONE: [phone]  1960    SUBJECTIVE:   Dane Wong is a 60 y.o. female seen for a follow up visit regarding the following:     Chief Complaint   Patient presents with    Follow-Up from [address] [place] [address] Patient    Atrial Fibrillation       HPI:    Dane Wong is a very pleasant 60 y.o. female with a past medical and cardiac history significant for substance abuse (abstinent for 18 months), HCV currently on Epclusa and cirrhosis who presented to [facility] ED via EMS on 9/9 with complaint of palpitations, dizziness, exertional dyspnea and malaise and was reportedly in afib with RVR, however, currently no strips or EKG to review rhythm and has been in NSR since admission. She presents back today to follow up in office. She denies new chest pain, SOB, palpitations. PT reports follow up with GI to work up anemia. Cardiac PMH: (Old records have been reviewed and summarized below)  TTE (9/9/22): EF 60-65%    Reviewed D/C summary Dr. Jo Bowling    Past Medical History, Past Surgical History, Family history, Social History, and Medications were all reviewed with the patient today and updated as necessary. Current Outpatient Medications   Medication Sig Dispense Refill    fluticasone (FLONASE) 50 MCG/ACT nasal spray Flonase Allergy Relief 50 mcg/actuation nasal spray,suspension   Spray 1 spray every day by intranasal route. olmesartan (BENICAR) 20 MG tablet olmesartan 20 mg tablet   Take 1 tablet every day by oral route. thiamine 100 MG tablet thiamine HCl (vitamin B1) 100 mg tablet   Take 1 tablet every day by oral route.       cetirizine (ZYRTEC) 10 MG tablet Take 1 tablet by mouth at bedtime 30 tablet 0    metoprolol succinate (TOPROL XL) 50 MG extended release tablet Take 1 tablet by mouth daily 30 tablet 3    pantoprazole (PROTONIX) 40 MG tablet Take 1 tablet by mouth every morning (before breakfast) 30 tablet 3 ferrous sulfate (IRON 325) 325 (65 Fe) MG tablet Take 1 tablet by mouth 2 times daily 180 tablet 1    docusate sodium (COLACE) 100 MG capsule Take 1 capsule by mouth 2 times daily 60 capsule 0    acetaminophen (TYLENOL) 500 MG tablet Take 1 tablet by mouth every 6 hours as needed for Pain 120 tablet 5     No current facility-administered medications for this visit.      No Known Allergies  [unfilled]  Past Medical History:   Diagnosis Date    Fracture of right clavicle 3/2015    History of kidney stones     Hypertension     no meds--- pt stopped on her own--- off meds x 1 yr-- per pt-- b/p stable    Liver disease dx--    HEP C--- not currently seeing a m.d.-- due to  no insurance per pt    Severe protein-calorie malnutrition ([facility] [address] 2020     Past Surgical History:   Procedure Laterality Date    BREAST BIOPSY  1984    cyst    COLONOSCOPY N/A 2022    COLORECTAL CANCER SCREENING, NOT HIGH RISK Rm 2226 performed by Tasia Bearden MD at [facility] ENDOSCOPY    GYN      cone bx    ORTHOPEDIC SURGERY Right 3/2015 at [facility]    clavicle surg    UPPER GASTROINTESTINAL ENDOSCOPY N/A 2022    EGD ESOPHAGOGASTRODUODENOSCOPY performed by Tasia Bearden MD at [facility] ENDOSCOPY     Family History   Problem Relation Age of Onset    Osteoarthritis Mother     Lung Disease Father     Cancer Father     Kidney Disease Brother      Social History     Tobacco Use    Smoking status: Former     Packs/day: 1.00     Types: Cigarettes     Quit date: 2005     Years since quittin.2    Smokeless tobacco: Never   Substance Use Topics    Alcohol use: Yes       PHYSICAL EXAM:    /70   Pulse 61   Ht 5' 6\" (1.676 m)   Wt 128 lb 3.2 oz (58.2 kg)   BMI 20.69 kg/m²      Wt Readings from Last 5 Encounters:   22 128 lb 3.2 oz (58.2 kg)   22 130 lb (59 kg)   22 130 lb (59 kg)       BP Readings from Last 5 Encounters:   22 118/70   22 (!) 145/68   22 (!) 124/56       General appearance: Alert, well appearing, and in no distress   CV: RRR, no M/R/G, no JVD, normal distal pulses, no lower extremity edema  Pulm: Clear to auscultation, no wheezes, no crackles, no accessory muscle use  GI: Soft, nontender, nondistended  Neuro: Alert and oriented    Medical problems and test results were reviewed with the patient today. Lab Results   Component Value Date/Time    K 3.8 09/14/2022 07:06 AM     Creatinine   Date Value Ref Range Status   09/14/2022 0.70 0.6 - 1.0 MG/DL Final     Lab Results   Component Value Date/Time    HGB 9.1 09/14/2022 07:06 AM     Lab Results   Component Value Date/Time    INR 1.4 09/09/2022 06:28 PM    INR 1.5 08/27/2020 08:26 PM       Lab Results   Component Value Date    WBC 3.2 (L) 09/14/2022    HGB 9.1 (L) 09/14/2022    HCT 29.7 (L) 09/14/2022    MCV 85.1 09/14/2022    PLT 59 (L) 09/14/2022      Lab Results   Component Value Date/Time     09/14/2022 07:06 AM    K 3.8 09/14/2022 07:06 AM     09/14/2022 07:06 AM    CO2 25 09/14/2022 07:06 AM    BUN 11 09/14/2022 07:06 AM    CREATININE 0.70 09/14/2022 07:06 AM    GLUCOSE 105 09/14/2022 07:06 AM    CALCIUM 8.7 09/14/2022 07:06 AM         EKG: (EKG has been independently visualized by me with interpretation below)  Sinus  Rhythm, rate 61, normal axis  Leon Fabian was seen today for follow-up from hospital, new patient and atrial fibrillation. Diagnoses and all orders for this visit:    Atrial fibrillation with RVR (HCC)  -     EKG 12 lead  -     Extended cardiac holter monitor (48 hrs - 15 days); Future    Symptomatic anemia      ASSESSMENT and PLAN  1. pAF: check 14 day holter, currently NSR, no AF during admission, cont metoprolol 50mg    2. Anemia: uncontrolled, needs ongoing GI work up    3.  CVA protection: currently not candidate for [facility], follow up after monitor and anemia work up complete to reassess    Patient has been instructed and agrees to call our office with any issues or other concerns related to their cardiac condition(s) and/or complaint(s). Return in about 3 months (around 12/28/2022). Carmela Erickson MD, MS  Cardiology/Electrophysiology  9/28/2022  3:28 PM

## 2022-10-18 ENCOUNTER — OFFICE VISIT (OUTPATIENT)
Dept: VASCULAR SURGERY | Age: 62
End: 2022-10-18

## 2022-10-18 VITALS
BODY MASS INDEX: 20.99 KG/M2 | OXYGEN SATURATION: 99 % | HEART RATE: 65 BPM | WEIGHT: 130.6 LBS | SYSTOLIC BLOOD PRESSURE: 150 MMHG | DIASTOLIC BLOOD PRESSURE: 85 MMHG | TEMPERATURE: 96.8 F | HEIGHT: 66 IN

## 2022-10-18 DIAGNOSIS — I73.9 PVD (PERIPHERAL VASCULAR DISEASE) WITH CLAUDICATION (HCC): Primary | ICD-10-CM

## 2022-10-18 PROCEDURE — 99213 OFFICE O/P EST LOW 20 MIN: CPT | Performed by: SURGERY

## 2022-10-18 ASSESSMENT — PATIENT HEALTH QUESTIONNAIRE - PHQ9
SUM OF ALL RESPONSES TO PHQ QUESTIONS 1-9: 0
1. LITTLE INTEREST OR PLEASURE IN DOING THINGS: 0
SUM OF ALL RESPONSES TO PHQ9 QUESTIONS 1 & 2: 0
SUM OF ALL RESPONSES TO PHQ QUESTIONS 1-9: 0
2. FEELING DOWN, DEPRESSED OR HOPELESS: 0

## 2022-10-18 NOTE — PROGRESS NOTES
[address]   [address] FAX: [phone] [facility]  : 1960    Chief Complaint:     History of Present Illness:   Patient follows up today for follow-up from hospital consultation which she was found to have an noncontrast CT scan and distal aortic stenosis. She was admitted for lower GI bleed. She does describe claudication symptoms bilaterally. CURRENT MEDICATIONS:  Current Outpatient Medications   Medication Sig Dispense Refill    olmesartan (BENICAR) 20 MG tablet olmesartan 20 mg tablet   Take 1 tablet every day by oral route. thiamine 100 MG tablet thiamine HCl (vitamin B1) 100 mg tablet   Take 1 tablet every day by oral route. metoprolol succinate (TOPROL XL) 50 MG extended release tablet Take 1 tablet by mouth daily 30 tablet 3    pantoprazole (PROTONIX) 40 MG tablet Take 1 tablet by mouth every morning (before breakfast) 30 tablet 3    acetaminophen (TYLENOL) 500 MG tablet Take 1 tablet by mouth every 6 hours as needed for Pain 120 tablet 5    fluticasone (FLONASE) 50 MCG/ACT nasal spray Flonase Allergy Relief 50 mcg/actuation nasal spray,suspension   Spray 1 spray every day by intranasal route. (Patient not taking: Reported on 10/18/2022)      ferrous sulfate (IRON 325) 325 (65 Fe) MG tablet Take 1 tablet by mouth 2 times daily 180 tablet 1     No current facility-administered medications for this visit. Past Medical History:   Diagnosis Date    Fracture of right clavicle 3/2015    History of kidney stones     Hypertension     no meds--- pt stopped on her own--- off meds x 1 yr-- per pt-- b/p stable    Liver disease dx--    HEP C--- not currently seeing a m.d.-- due to  no insurance per pt    Severe protein-calorie malnutrition ([facility] [address] 2020       Physical Examination:   Height: 5' 6\" (1.676 m), Weight: 130 lb 9.6 oz (59.2 kg), BP: (!) 150/85    Constitutional: she appears well-developed. No distress.    HENT: Head: Atraumatic. Eyes: Pupils are equal, round, and reactive to light. Neck: Normal range of motion. Cardiovascular: Regular rhythm. Pulmonary/Chest: Effort normal and breath sounds normal. No respiratory distress. Abdominal: Soft. Bowel sounds are normal. she exhibits no distension. There is no tenderness. There is no guarding. No hernia. Musculoskeletal: Normal range of motion. Neurological: She is alert. CN II- XII grossly intact   Vascular: Weakly palpable femoral pulses nonpalpable pedal pulses    Imaging:  Duplex showed ABIs of 0.8 with toe pressures of 102  Left side SURAJ 0.9 with toe pressures of 131 with heavily diseased distal aortic stenosis  Ultrasound also showed a large cystic area of the distal aorta        Recommendations/Plans:   Ms. Mariposa Mcintosh is a 60y.o. year old female with debilitating distal aortic stenosis with a cystic lesion near the left kidney. I will order a dedicated CTA abdomen pelvis to better evaluate her aorta and kidney. Patient may benefit from endovascular pair. Edgar Nova MD    Elements of this note have been dictated using speech recognition software. As a result, errors of speech recognition may have occurred.     30 minutes of time was spent on this encounter including chart review, assessment, evaluation and coordination of patient care

## 2022-11-02 ENCOUNTER — TELEPHONE (OUTPATIENT)
Dept: CARDIOLOGY CLINIC | Age: 62
End: 2022-11-02

## 2022-11-02 NOTE — TELEPHONE ENCOUNTER
I called pt and left a message, full name on vm letting pt know monitor was normal and no afib seen.

## 2022-11-02 NOTE — TELEPHONE ENCOUNTER
----- Message from Sudarshan Palacio MD sent at 11/2/2022 12:30 PM EDT -----  Normal monitor, no atrial fibrillation

## 2022-11-07 ENCOUNTER — TELEPHONE (OUTPATIENT)
Dept: VASCULAR SURGERY | Age: 62
End: 2022-11-07

## 2022-11-07 NOTE — TELEPHONE ENCOUNTER
Left message on voicemail asking patient to return call to schedule follow up appointment with Dr Vinicius Johnson to review 11/9/22 CTA results. Previously left message on 10/25/22 to do same, but CTA appointment was 11/2/22 at the time.

## 2022-11-09 ENCOUNTER — HOSPITAL ENCOUNTER (OUTPATIENT)
Dept: CT IMAGING | Age: 62
Discharge: HOME | End: 2022-11-12
Payer: SELF-PAY

## 2022-11-09 DIAGNOSIS — I73.9 PVD (PERIPHERAL VASCULAR DISEASE) WITH CLAUDICATION (HCC): ICD-10-CM

## 2022-11-09 LAB — CREAT BLD-MCNC: 0.8 MG/DL (ref 0.8–1.5)

## 2022-11-09 PROCEDURE — 2580000003 HC RX 258: Performed by: SURGERY

## 2022-11-09 PROCEDURE — 82565 ASSAY OF CREATININE: CPT

## 2022-11-09 PROCEDURE — 75635 CT ANGIO ABDOMINAL ARTERIES: CPT

## 2022-11-09 PROCEDURE — 6360000004 HC RX CONTRAST MEDICATION: Performed by: SURGERY

## 2022-11-09 RX ORDER — SODIUM CHLORIDE 0.9 % (FLUSH) 0.9 %
10 SYRINGE (ML) INJECTION
Status: COMPLETED | OUTPATIENT
Start: 2022-11-09 | End: 2022-11-09

## 2022-11-09 RX ORDER — 0.9 % SODIUM CHLORIDE 0.9 %
100 INTRAVENOUS SOLUTION INTRAVENOUS
Status: COMPLETED | OUTPATIENT
Start: 2022-11-09 | End: 2022-11-09

## 2022-11-09 RX ADMIN — IOPAMIDOL 100 ML: 755 INJECTION, SOLUTION INTRAVENOUS at 13:21

## 2022-11-09 RX ADMIN — SODIUM CHLORIDE, PRESERVATIVE FREE 10 ML: 5 INJECTION INTRAVENOUS at 13:20

## 2022-11-09 RX ADMIN — SODIUM CHLORIDE 100 ML: 9 INJECTION, SOLUTION INTRAVENOUS at 13:21

## 2022-11-09 NOTE — TELEPHONE ENCOUNTER
LMVM asking patient to return call to make appointment with Dr Jakub Schofield to go over 11/9/22 test results.

## 2022-11-15 ENCOUNTER — OFFICE VISIT (OUTPATIENT)
Dept: VASCULAR SURGERY | Age: 62
End: 2022-11-15
Payer: SELF-PAY

## 2022-11-15 VITALS
BODY MASS INDEX: 21.05 KG/M2 | WEIGHT: 131 LBS | DIASTOLIC BLOOD PRESSURE: 81 MMHG | SYSTOLIC BLOOD PRESSURE: 126 MMHG | HEART RATE: 65 BPM | OXYGEN SATURATION: 99 % | HEIGHT: 66 IN

## 2022-11-15 DIAGNOSIS — I73.9 PVD (PERIPHERAL VASCULAR DISEASE) WITH CLAUDICATION (HCC): Primary | ICD-10-CM

## 2022-11-15 PROCEDURE — 99213 OFFICE O/P EST LOW 20 MIN: CPT | Performed by: SURGERY

## 2022-11-15 ASSESSMENT — PATIENT HEALTH QUESTIONNAIRE - PHQ9
SUM OF ALL RESPONSES TO PHQ QUESTIONS 1-9: 0
SUM OF ALL RESPONSES TO PHQ9 QUESTIONS 1 & 2: 0
SUM OF ALL RESPONSES TO PHQ QUESTIONS 1-9: 0
1. LITTLE INTEREST OR PLEASURE IN DOING THINGS: 0
2. FEELING DOWN, DEPRESSED OR HOPELESS: 0

## 2022-11-15 NOTE — PROGRESS NOTES
[address]   [address] [facility]. [address] FAX: [phone] [facility]  : 1960    Chief Complaint:     History of Present Illness:   Patient follows up today for follow-up CTA as patient was found have a distal aortic stenosis. She denies any claudication or rest pain symptoms. CURRENT MEDICATIONS:  Current Outpatient Medications   Medication Sig Dispense Refill    fluticasone (FLONASE) 50 MCG/ACT nasal spray       olmesartan (BENICAR) 20 MG tablet olmesartan 20 mg tablet   Take 1 tablet every day by oral route. thiamine 100 MG tablet thiamine HCl (vitamin B1) 100 mg tablet   Take 1 tablet every day by oral route. metoprolol succinate (TOPROL XL) 50 MG extended release tablet Take 1 tablet by mouth daily 30 tablet 3    pantoprazole (PROTONIX) 40 MG tablet Take 1 tablet by mouth every morning (before breakfast) 30 tablet 3    acetaminophen (TYLENOL) 500 MG tablet Take 1 tablet by mouth every 6 hours as needed for Pain 120 tablet 5    ferrous sulfate (IRON 325) 325 (65 Fe) MG tablet Take 1 tablet by mouth 2 times daily 180 tablet 1     No current facility-administered medications for this visit. Past Medical History:   Diagnosis Date    Fracture of right clavicle 3/2015    History of kidney stones     Hypertension     no meds--- pt stopped on her own--- off meds x 1 yr-- per pt-- b/p stable    Liver disease dx--    HEP C--- not currently seeing a m.d.-- due to  no insurance per pt    [facility] 2020       Physical Examination:   Height: 5' 6\" (1.676 m), Weight: 131 lb (59.4 kg), BP: 126/81    Constitutional: she appears well-developed. No distress. HENT:   Head: Atraumatic. Eyes: Pupils are equal, round, and reactive to light. Neck: Normal range of motion. Cardiovascular: Regular rhythm. Pulmonary/Chest: Effort normal and breath sounds normal. No respiratory distress. Abdominal: Soft.  Bowel sounds are normal. she exhibits no distension. There is no tenderness. There is no guarding. No hernia. Musculoskeletal: Normal range of motion. Neurological: She is alert. CN II- XII grossly intact   Vascular: Palpable femoral pulses nonpalpable pedal pulses    Imaging:  CTA showed heavily diseased calcified plaque the distal aorta with bilateral iliac artery stenosis        Recommendations/Plans:   Ms. Ventura Zapata is a 60y.o. year old female with aortic iliac disease asymptomatic. Her duplex studies show she had toe pressures greater 90 mmHg. She is compensated and well perfused. Discussed with patient that if she develops any ulcers or her flow would decrease her only surgical option will be aortobifem reconstruction. I will see her back in 6 months with a duplex study she will continue take her aspirin cholesterol medication. Meño Deras MD    Elements of this note have been dictated using speech recognition software. As a result, errors of speech recognition may have occurred.     20 minutes of time was spent on this encounter including chart review, assessment, evaluation and coordination of patient care

## 2023-01-23 ENCOUNTER — HOSPITAL ENCOUNTER (EMERGENCY)
Age: 63
Discharge: HOME | End: 2023-01-23
Attending: EMERGENCY MEDICINE

## 2023-01-23 VITALS
OXYGEN SATURATION: 96 % | HEART RATE: 78 BPM | DIASTOLIC BLOOD PRESSURE: 71 MMHG | SYSTOLIC BLOOD PRESSURE: 148 MMHG | BODY MASS INDEX: 21.69 KG/M2 | TEMPERATURE: 98.2 F | RESPIRATION RATE: 16 BRPM | WEIGHT: 135 LBS | HEIGHT: 66 IN

## 2023-01-23 DIAGNOSIS — J06.9 UPPER RESPIRATORY TRACT INFECTION, UNSPECIFIED TYPE: Primary | ICD-10-CM

## 2023-01-23 LAB
FLUAV RNA SPEC QL NAA+PROBE: NOT DETECTED
FLUBV RNA SPEC QL NAA+PROBE: NOT DETECTED
SARS-COV-2 RDRP RESP QL NAA+PROBE: NOT DETECTED
SOURCE: NORMAL

## 2023-01-23 PROCEDURE — 99283 EMERGENCY DEPT VISIT LOW MDM: CPT

## 2023-01-23 PROCEDURE — 87635 SARS-COV-2 COVID-19 AMP PRB: CPT

## 2023-01-23 PROCEDURE — 87502 INFLUENZA DNA AMP PROBE: CPT

## 2023-01-23 RX ORDER — BENZONATATE 100 MG/1
100 CAPSULE ORAL 2 TIMES DAILY PRN
Qty: 10 CAPSULE | Refills: 0 | Status: SHIPPED | OUTPATIENT
Start: 2023-01-23 | End: 2023-01-28

## 2023-01-23 ASSESSMENT — PAIN SCALES - GENERAL: PAINLEVEL_OUTOF10: 7

## 2023-01-23 ASSESSMENT — PAIN - FUNCTIONAL ASSESSMENT: PAIN_FUNCTIONAL_ASSESSMENT: 0-10

## 2023-01-23 ASSESSMENT — PAIN DESCRIPTION - LOCATION: LOCATION: THROAT

## 2023-01-23 NOTE — ED PROVIDER NOTES
Emergency Department Provider Note                   PCP:                Pcp No               Age: 58 y.o. Sex: female       ICD-10-CM    1. Upper respiratory tract infection, unspecified type  J06.9           DISPOSITION Decision To Discharge 01/23/2023 01:08:37 PM       Medical Decision Making  Risk  Prescription drug management. HPI as above. Pt neck is supple, no meningeal signs. No rash, sore throat, nausea/vomiting or abdominal pain. No oropharyngeal edema/erythema/exudates. Uvula midline, no visualized PTA, no throat pain with range of motion of neck, no tender or enlarged lymph nodes, lungs are clear to auscultation, no diminished sounds. Abdomen is soft and not tender. Denies urinary complaint. Low suspicion of deep space infection, RPA/PTA, strep pharyngitis, influenza, encephalitis, meningitis, bacteremia, pneumonia, CA, myocarditis, PE/DVT, ACS, COPD exacerbation, other emergent process. Symptoms likely related to viral URI, with concern given for COVID-19 infection. COVID and flu testing performed while in the ED lobby and these had negative test results. Advised on therapeutic measures, given very strict return to ED for care instructions, self-quarantine per CDC guidelines. Strict return to ED for care instruction provided. Advised to follow-up with PCP in 2-3 days. Return to ED immediately for any new, worsening, concerning symptoms. Patient is very well-hydrated appearing, no distress, pleasant and conversational.  Nontoxic-appearing, tolerating oral intake. Patient is hemodynamically stable and in no acute distress. Patient is not ill-appearing. All findings and plans were discussed with the patient. Patient verbalizes desire to be discharged home at this time. All questions answered. Discussed with the patient that an unremarkable evaluation in the ED does not preclude the development or presence of a serious or life threatening condition.  Patient was instructed to return immediately for any worsening or change in current symptoms, or if symptoms do not continue to improve. I instructed them to follow up with their primary care provider, own specialist, or medical provider that I am recommending for him within the next 2-3 days     the patient acknowledged understanding plan of care and affirmed approval. Patient is discharged home, with no further complaint. The patients assessment required an independent historian: I spoke with a parent. Considerations: Shared decision making was utilized in the care of this patient. We discussed care recommended by provider that patient declined (tests, disposition, etc). Patient was discharged risks and benefits of hospitalization were discussed. Orders Placed This Encounter   Procedures    COVID-19, Rapid    Influenza A/B, Molecular        Medications - No data to display    New Prescriptions    No medications on file        Festus Knapp is a 58 y.o. female who presents to the Emergency Department with chief complaint of    Chief Complaint   Patient presents with    Cough    Nasal Congestion    Pharyngitis      HPI  Pleasant 78-year-old female presents to the emergency department with complaint of productive cough with clear expectorate, rhinorrhea, nasal congestion, bilateral ear pain, generalized body aches x2 days. Denies to be sore throat, difficulty breathing, difficult swallowing, chest pain, dizziness or lightheadedness, hemoptysis. Presents to ED with complaint of nasal congestion and cough. Reports clear expectorate, clear rhinorrhea. Denies fever/chills, change in appetite, weight loss, decreased oral intake, blurred vision, headaches, neck pain/stiffness, sore throat, hoarseness, drooling, difficulty swallowing, ear pain, chest pain or tightness, difficulty breathing, FRIEDMAN, N/V/D, abdominal pain, generalized body aches, rash, urinary complaint, change in bowel habits.   States that she has not attempted any therapeutic measures prior to arrival.      Alert & oriented x 3, afebrile, hemodynamically stable, non-toxic appearing, appears in no distress. Medical/surgical/social history reviewed with the patient. Review of Systems  Constitutional: Negative for fever. Negative for appetite change, chills, diaphoresis and unexpected weight change. HENT: As in HPI     Eyes: Negative. Respiratory: As in HPI   Cardiovascular: Negative. GI/: As in HPI      Musculoskeletal: As in HPI   Skin: Negative. Allergic/Immunologic: Negative. Neurological: Negative. Past Medical History:   Diagnosis Date    Fracture of right clavicle 3/2015    History of kidney stones     Hypertension     no meds--- pt stopped on her own--- off meds x 1 yr-- per pt-- b/p stable    Liver disease dx--    HEP C--- not currently seeing a m.d.-- due to  no insurance per pt    Severe protein-calorie malnutrition ([facility] [address] 2020        Past Surgical History:   Procedure Laterality Date    BREAST BIOPSY  1984    cyst    COLONOSCOPY N/A 2022    COLORECTAL CANCER SCREENING, NOT HIGH RISK Rm 2226 performed by Anam Donato MD at [facility] ENDOSCOPY    GYN      cone bx    ORTHOPEDIC SURGERY Right 3/2015 at [facility]    clavicle surg    UPPER GASTROINTESTINAL ENDOSCOPY N/A 2022    EGD ESOPHAGOGASTRODUODENOSCOPY performed by Anam Donato MD at [facility] ENDOSCOPY        Family History   Problem Relation Age of Onset    Osteoarthritis Mother     Lung Disease Father     Cancer Father     Kidney Disease Brother         Social History     Socioeconomic History    Marital status:    Tobacco Use    Smoking status: Former     Packs/day: 1.00     Types: Cigarettes     Quit date: 2005     Years since quittin.5    Smokeless tobacco: Never   Substance and Sexual Activity    Alcohol use: Yes    Drug use: No        Allergies: Patient has no known allergies.     Previous Medications    ACETAMINOPHEN (TYLENOL) 500 MG TABLET    Take 1 tablet by mouth every 6 hours as needed for Pain    FERROUS SULFATE (IRON 325) 325 (65 FE) MG TABLET    Take 1 tablet by mouth 2 times daily    FLUTICASONE (FLONASE) 50 MCG/ACT NASAL SPRAY        METOPROLOL SUCCINATE (TOPROL XL) 50 MG EXTENDED RELEASE TABLET    Take 1 tablet by mouth daily    OLMESARTAN (BENICAR) 20 MG TABLET    olmesartan 20 mg tablet   Take 1 tablet every day by oral route. PANTOPRAZOLE (PROTONIX) 40 MG TABLET    Take 1 tablet by mouth every morning (before breakfast)    THIAMINE 100 MG TABLET    thiamine HCl (vitamin B1) 100 mg tablet   Take 1 tablet every day by oral route. Vitals signs and nursing note reviewed. Patient Vitals for the past 4 hrs:   Temp Pulse Resp BP SpO2   01/23/23 1227 98.2 °F (36.8 °C) 78 16 (!) 148/71 96 %          Physical Exam   Constitutional: Oriented to person, place, and time. Appears well-developed and well-nourished. No distress. HENT:    Head: Normocephalic and atraumatic. No tenderness/facial tenderness. Right Ear: External ear normal. Non tender, no erythema/exudates. Left Ear: External ear normal.  Non tender, no erythema/exudates. Nose: Nose normal. Pink/moist mucosa. Clear rhinorrhea. Mouth/Throat: Mouth normal. Uvula midline, no erythema/exudates/edema. No drooling, no voice change, no tender/enlarged nodes, no swelling. No pain with ROM of neck. Eyes: Conjunctivae are normal. Pupils are equal, round, and reactive to light. Neck: Supple. No tracheal deviation. Not tender, not rigid, no menningeal signs. Cardiovascular: Normal rate, intact distal pulses. Brisk capillary refill intact, less than 2 seconds. Regular rhythm present. No murmer, no friction rub heard. Pulmonary/Chest: Lungs are clear & equal bilaterally. No diminished sounds, no adventitious sounds. No respiratory distress. Abdominal: Soft. Normoactive bowel sounds. There is no tenderness/distension/rigidity.  No flank/CVA tenderness. Musculoskeletal: No obvious deformity, erythema, edema. Neurological: Alert and oriented to person, place, and time. No numbness/tingling. No loss of sensation. Positive PMS ×4. GCS= 15. Skin: Skin is warm and dry. Capillary refill takes less than 2 seconds. No abrasion, no lesion, no petechiae and no rash noted. Not diaphoretic. No cyanosis, erythema, or pallor. Psychiatric: Normal mood and affect. Behavior is normal.    Nursing note and vitals reviewed. Procedures    Is this patient to be included in the SEP-1 core measure due to severe sepsis or septic shock? No Exclusion criteria - the patient is NOT to be included for SEP-1 Core Measure due to: Viral etiology found or highly suspected (including COVID-19) without concomitant bacterial infection     Results for orders placed or performed during the hospital encounter of 01/23/23   COVID-19, Rapid    Specimen: Nasopharyngeal   Result Value Ref Range    Source Nasopharyngeal      SARS-CoV-2, Rapid Not detected NOTD     Influenza A/B, Molecular    Specimen: Not Specified   Result Value Ref Range    Influenza A, ESTIVEN Not detected NOTD      Influenza B, ESTIVEN Not detected NOTD          No orders to display                         Voice dictation software was used during the making of this note. This software is not perfect and grammatical and other typographical errors may be present. This note has not been completely proofread for errors.      Gurney Denver, APRN - CNP  01/23/23 [id number]

## 2023-01-23 NOTE — ED NOTES
I have reviewed discharge instructions with the patient. The patient verbalized understanding. Patient left ED via Discharge Method: ambulatory to Home with self. Opportunity for questions and clarification provided. Patient given 1 script. To continue your aftercare when you leave the hospital, you may receive an automated call from our care team to check in on how you are doing. This is a free service and part of our promise to provide the best care and service to meet your aftercare needs.  If you have questions, or wish to unsubscribe from this service please call [phone]. Thank you for Choosing our [employer] Emergency Department. Linda Barreto, [facility]  01/23/23 [id number]

## 2023-05-30 ENCOUNTER — OFFICE VISIT (OUTPATIENT)
Dept: VASCULAR SURGERY | Age: 63
End: 2023-05-30

## 2023-05-30 VITALS
WEIGHT: 138 LBS | HEIGHT: 66 IN | DIASTOLIC BLOOD PRESSURE: 78 MMHG | BODY MASS INDEX: 22.18 KG/M2 | OXYGEN SATURATION: 96 % | SYSTOLIC BLOOD PRESSURE: 120 MMHG | HEART RATE: 68 BPM

## 2023-05-30 DIAGNOSIS — I73.9 PVD (PERIPHERAL VASCULAR DISEASE) WITH CLAUDICATION (HCC): Primary | ICD-10-CM

## 2023-05-30 PROCEDURE — 99213 OFFICE O/P EST LOW 20 MIN: CPT | Performed by: SURGERY

## 2023-05-30 NOTE — PROGRESS NOTES
[address]   [address] [facility] FAX: [id number] [facility]  : 1960    Chief Complaint:     History of Present Illness:   Patient follows up today for follow-up duplex study. Patient is known mid aortic high-grade stenosis. Denies any claudication or rest pain symptoms. He stopped smoking 2 years ago. CURRENT MEDICATIONS:  Current Outpatient Medications   Medication Sig Dispense Refill    fluticasone (FLONASE) 50 MCG/ACT nasal spray       olmesartan (BENICAR) 20 MG tablet olmesartan 20 mg tablet   Take 1 tablet every day by oral route. thiamine 100 MG tablet thiamine HCl (vitamin B1) 100 mg tablet   Take 1 tablet every day by oral route. metoprolol succinate (TOPROL XL) 50 MG extended release tablet Take 1 tablet by mouth daily 30 tablet 3    pantoprazole (PROTONIX) 40 MG tablet Take 1 tablet by mouth every morning (before breakfast) 30 tablet 3    acetaminophen (TYLENOL) 500 MG tablet Take 1 tablet by mouth every 6 hours as needed for Pain 120 tablet 5    ferrous sulfate (IRON 325) 325 (65 Fe) MG tablet Take 1 tablet by mouth 2 times daily 180 tablet 1     No current facility-administered medications for this visit. Past Medical History:   Diagnosis Date    Fracture of right clavicle 3/2015    History of kidney stones     Hypertension     no meds--- pt stopped on her own--- off meds x 1 yr-- per pt-- b/p stable    Liver disease dx--    HEP C--- not currently seeing a m.d.-- due to  no insurance per pt    [facility]       Physical Examination:   Height: 5' 6\" (1.676 m), Weight - Scale: 138 lb (62.6 kg), BP: 120/78    Constitutional: she appears well-developed. No distress. HENT:   Head: Atraumatic. Eyes: Pupils are equal, round, and reactive to light. Neck: Normal range of motion. Cardiovascular: Regular rhythm.     Pulmonary/Chest: Effort normal and breath sounds normal. No

## 2023-07-24 ENCOUNTER — HOSPITAL ENCOUNTER (INPATIENT)
Age: 63
LOS: 2 days | Discharge: HOME | DRG: 312 | End: 2023-07-27
Attending: EMERGENCY MEDICINE | Admitting: INTERNAL MEDICINE
Payer: MEDICAID

## 2023-07-24 DIAGNOSIS — E86.0 DEHYDRATION: ICD-10-CM

## 2023-07-24 DIAGNOSIS — N39.0 URINARY TRACT INFECTION WITHOUT HEMATURIA, SITE UNSPECIFIED: ICD-10-CM

## 2023-07-24 DIAGNOSIS — R42 DIZZINESS: Primary | ICD-10-CM

## 2023-07-24 PROBLEM — F10.20 ALCOHOL DEPENDENCE (HCC): Status: ACTIVE | Noted: 2020-08-27

## 2023-07-24 LAB
ALBUMIN SERPL-MCNC: 3.8 G/DL (ref 3.2–4.6)
ALBUMIN/GLOB SERPL: 1.1 (ref 0.4–1.6)
ALP SERPL-CCNC: 67 U/L (ref 50–136)
ALT SERPL-CCNC: 24 U/L (ref 12–65)
ANION GAP SERPL CALC-SCNC: 9 MMOL/L (ref 2–11)
APPEARANCE UR: CLEAR
AST SERPL-CCNC: 28 U/L (ref 15–37)
BACTERIA URNS QL MICRO: NEGATIVE /HPF
BASOPHILS # BLD: 0.1 K/UL (ref 0–0.2)
BASOPHILS NFR BLD: 1 % (ref 0–2)
BILIRUB SERPL-MCNC: 0.3 MG/DL (ref 0.2–1.1)
BILIRUB UR QL: NEGATIVE
BUN SERPL-MCNC: 32 MG/DL (ref 8–23)
CALCIUM SERPL-MCNC: 9.2 MG/DL (ref 8.3–10.4)
CASTS URNS QL MICRO: ABNORMAL /LPF
CHLORIDE SERPL-SCNC: 108 MMOL/L (ref 101–110)
CO2 SERPL-SCNC: 21 MMOL/L (ref 21–32)
COLOR UR: ABNORMAL
CREAT SERPL-MCNC: 1 MG/DL (ref 0.6–1)
DIFFERENTIAL METHOD BLD: ABNORMAL
EOSINOPHIL # BLD: 0 K/UL (ref 0–0.8)
EOSINOPHIL NFR BLD: 0 % (ref 0.5–7.8)
EPI CELLS #/AREA URNS HPF: ABNORMAL /HPF
ERYTHROCYTE [DISTWIDTH] IN BLOOD BY AUTOMATED COUNT: 17.2 % (ref 11.9–14.6)
GLOBULIN SER CALC-MCNC: 3.5 G/DL (ref 2.8–4.5)
GLUCOSE SERPL-MCNC: 121 MG/DL (ref 65–100)
GLUCOSE UR STRIP.AUTO-MCNC: NEGATIVE MG/DL
HCT VFR BLD AUTO: 23.9 % (ref 35.8–46.3)
HGB BLD-MCNC: 7.2 G/DL (ref 11.7–15.4)
HGB UR QL STRIP: NEGATIVE
IMM GRANULOCYTES # BLD AUTO: 0.1 K/UL (ref 0–0.5)
IMM GRANULOCYTES NFR BLD AUTO: 1 % (ref 0–5)
KETONES UR QL STRIP.AUTO: NEGATIVE MG/DL
LEUKOCYTE ESTERASE UR QL STRIP.AUTO: ABNORMAL
LYMPHOCYTES # BLD: 1.9 K/UL (ref 0.5–4.6)
LYMPHOCYTES NFR BLD: 13 % (ref 13–44)
MAGNESIUM SERPL-MCNC: 1.9 MG/DL (ref 1.8–2.4)
MCH RBC QN AUTO: 27 PG (ref 26.1–32.9)
MCHC RBC AUTO-ENTMCNC: 30.1 G/DL (ref 31.4–35)
MCV RBC AUTO: 89.5 FL (ref 82–102)
MONOCYTES # BLD: 1 K/UL (ref 0.1–1.3)
MONOCYTES NFR BLD: 7 % (ref 4–12)
NEUTS SEG # BLD: 11.8 K/UL (ref 1.7–8.2)
NEUTS SEG NFR BLD: 78 % (ref 43–78)
NITRITE UR QL STRIP.AUTO: NEGATIVE
NRBC # BLD: 0.05 K/UL (ref 0–0.2)
PH UR STRIP: 5 (ref 5–9)
PLATELET # BLD AUTO: 231 K/UL (ref 150–450)
PMV BLD AUTO: 12 FL (ref 9.4–12.3)
POTASSIUM SERPL-SCNC: 3.7 MMOL/L (ref 3.5–5.1)
PROT SERPL-MCNC: 7.3 G/DL (ref 6.3–8.2)
PROT UR STRIP-MCNC: NEGATIVE MG/DL
RBC # BLD AUTO: 2.67 M/UL (ref 4.05–5.2)
RBC #/AREA URNS HPF: ABNORMAL /HPF
SODIUM SERPL-SCNC: 138 MMOL/L (ref 133–143)
SP GR UR REFRACTOMETRY: 1.02 (ref 1–1.02)
UROBILINOGEN UR QL STRIP.AUTO: 0.2 EU/DL (ref 0.2–1)
WBC # BLD AUTO: 14.9 K/UL (ref 4.3–11.1)
WBC URNS QL MICRO: ABNORMAL /HPF

## 2023-07-24 PROCEDURE — 96365 THER/PROPH/DIAG IV INF INIT: CPT

## 2023-07-24 PROCEDURE — 2580000003 HC RX 258: Performed by: EMERGENCY MEDICINE

## 2023-07-24 PROCEDURE — 86901 BLOOD TYPING SEROLOGIC RH(D): CPT

## 2023-07-24 PROCEDURE — 85025 COMPLETE CBC W/AUTO DIFF WBC: CPT

## 2023-07-24 PROCEDURE — 99285 EMERGENCY DEPT VISIT HI MDM: CPT

## 2023-07-24 PROCEDURE — 86900 BLOOD TYPING SEROLOGIC ABO: CPT

## 2023-07-24 PROCEDURE — 82607 VITAMIN B-12: CPT

## 2023-07-24 PROCEDURE — 84443 ASSAY THYROID STIM HORMONE: CPT

## 2023-07-24 PROCEDURE — 6360000002 HC RX W HCPCS: Performed by: EMERGENCY MEDICINE

## 2023-07-24 PROCEDURE — 87086 URINE CULTURE/COLONY COUNT: CPT

## 2023-07-24 PROCEDURE — 96361 HYDRATE IV INFUSION ADD-ON: CPT

## 2023-07-24 PROCEDURE — 86850 RBC ANTIBODY SCREEN: CPT

## 2023-07-24 PROCEDURE — 82746 ASSAY OF FOLIC ACID SERUM: CPT

## 2023-07-24 PROCEDURE — 86923 COMPATIBILITY TEST ELECTRIC: CPT

## 2023-07-24 PROCEDURE — 81001 URINALYSIS AUTO W/SCOPE: CPT

## 2023-07-24 PROCEDURE — 80053 COMPREHEN METABOLIC PANEL: CPT

## 2023-07-24 PROCEDURE — G0378 HOSPITAL OBSERVATION PER HR: HCPCS

## 2023-07-24 PROCEDURE — 83735 ASSAY OF MAGNESIUM: CPT

## 2023-07-24 RX ORDER — LANOLIN ALCOHOL/MO/W.PET/CERES
100 CREAM (GRAM) TOPICAL DAILY
Status: DISCONTINUED | OUTPATIENT
Start: 2023-07-25 | End: 2023-07-27 | Stop reason: HOSPADM

## 2023-07-24 RX ORDER — SODIUM CHLORIDE 0.9 % (FLUSH) 0.9 %
5-40 SYRINGE (ML) INJECTION EVERY 12 HOURS SCHEDULED
Status: DISCONTINUED | OUTPATIENT
Start: 2023-07-24 | End: 2023-07-27 | Stop reason: HOSPADM

## 2023-07-24 RX ORDER — PANTOPRAZOLE SODIUM 40 MG/1
40 TABLET, DELAYED RELEASE ORAL
Status: DISCONTINUED | OUTPATIENT
Start: 2023-07-25 | End: 2023-07-27 | Stop reason: HOSPADM

## 2023-07-24 RX ORDER — 0.9 % SODIUM CHLORIDE 0.9 %
1000 INTRAVENOUS SOLUTION INTRAVENOUS ONCE
Status: COMPLETED | OUTPATIENT
Start: 2023-07-24 | End: 2023-07-24

## 2023-07-24 RX ORDER — METOPROLOL SUCCINATE 50 MG/1
50 TABLET, EXTENDED RELEASE ORAL DAILY
Status: DISCONTINUED | OUTPATIENT
Start: 2023-07-25 | End: 2023-07-27 | Stop reason: HOSPADM

## 2023-07-24 RX ORDER — ONDANSETRON 2 MG/ML
4 INJECTION INTRAMUSCULAR; INTRAVENOUS EVERY 6 HOURS PRN
Status: DISCONTINUED | OUTPATIENT
Start: 2023-07-24 | End: 2023-07-27 | Stop reason: HOSPADM

## 2023-07-24 RX ORDER — POLYETHYLENE GLYCOL 3350 17 G/17G
17 POWDER, FOR SOLUTION ORAL DAILY PRN
Status: DISCONTINUED | OUTPATIENT
Start: 2023-07-24 | End: 2023-07-27 | Stop reason: HOSPADM

## 2023-07-24 RX ORDER — ASPIRIN 81 MG/1
81 TABLET, CHEWABLE ORAL DAILY
Status: DISCONTINUED | OUTPATIENT
Start: 2023-07-25 | End: 2023-07-27 | Stop reason: HOSPADM

## 2023-07-24 RX ORDER — ACETAMINOPHEN 650 MG/1
650 SUPPOSITORY RECTAL EVERY 6 HOURS PRN
Status: DISCONTINUED | OUTPATIENT
Start: 2023-07-24 | End: 2023-07-27 | Stop reason: HOSPADM

## 2023-07-24 RX ORDER — KETOROLAC TROMETHAMINE 30 MG/ML
15 INJECTION, SOLUTION INTRAMUSCULAR; INTRAVENOUS EVERY 6 HOURS PRN
Status: DISCONTINUED | OUTPATIENT
Start: 2023-07-24 | End: 2023-07-27 | Stop reason: HOSPADM

## 2023-07-24 RX ORDER — SODIUM CHLORIDE 9 MG/ML
INJECTION, SOLUTION INTRAVENOUS CONTINUOUS
Status: DISCONTINUED | OUTPATIENT
Start: 2023-07-24 | End: 2023-07-27 | Stop reason: HOSPADM

## 2023-07-24 RX ORDER — ENOXAPARIN SODIUM 100 MG/ML
40 INJECTION SUBCUTANEOUS DAILY
Status: DISCONTINUED | OUTPATIENT
Start: 2023-07-25 | End: 2023-07-26

## 2023-07-24 RX ORDER — ACETAMINOPHEN 325 MG/1
650 TABLET ORAL EVERY 6 HOURS PRN
Status: DISCONTINUED | OUTPATIENT
Start: 2023-07-24 | End: 2023-07-27 | Stop reason: HOSPADM

## 2023-07-24 RX ORDER — SODIUM CHLORIDE 9 MG/ML
INJECTION, SOLUTION INTRAVENOUS PRN
Status: DISCONTINUED | OUTPATIENT
Start: 2023-07-24 | End: 2023-07-27 | Stop reason: HOSPADM

## 2023-07-24 RX ORDER — SODIUM CHLORIDE 0.9 % (FLUSH) 0.9 %
5-40 SYRINGE (ML) INJECTION PRN
Status: DISCONTINUED | OUTPATIENT
Start: 2023-07-24 | End: 2023-07-27 | Stop reason: HOSPADM

## 2023-07-24 RX ORDER — ONDANSETRON 4 MG/1
4 TABLET, ORALLY DISINTEGRATING ORAL EVERY 8 HOURS PRN
Status: DISCONTINUED | OUTPATIENT
Start: 2023-07-24 | End: 2023-07-27 | Stop reason: HOSPADM

## 2023-07-24 RX ORDER — ATORVASTATIN CALCIUM 80 MG/1
80 TABLET, FILM COATED ORAL NIGHTLY
Status: DISCONTINUED | OUTPATIENT
Start: 2023-07-24 | End: 2023-07-27 | Stop reason: HOSPADM

## 2023-07-24 RX ORDER — LOSARTAN POTASSIUM 50 MG/1
50 TABLET ORAL DAILY
Status: DISCONTINUED | OUTPATIENT
Start: 2023-07-25 | End: 2023-07-27 | Stop reason: HOSPADM

## 2023-07-24 RX ADMIN — SODIUM CHLORIDE 1000 ML: 9 INJECTION, SOLUTION INTRAVENOUS at 21:49

## 2023-07-24 RX ADMIN — CEFTRIAXONE 1000 MG: 1 INJECTION, POWDER, FOR SOLUTION INTRAMUSCULAR; INTRAVENOUS at 21:51

## 2023-07-24 RX ADMIN — SODIUM CHLORIDE 1000 ML: 9 INJECTION, SOLUTION INTRAVENOUS at 17:51

## 2023-07-24 ASSESSMENT — LIFESTYLE VARIABLES
HOW OFTEN DO YOU HAVE A DRINK CONTAINING ALCOHOL: NEVER
HOW MANY STANDARD DRINKS CONTAINING ALCOHOL DO YOU HAVE ON A TYPICAL DAY: PATIENT DOES NOT DRINK

## 2023-07-24 ASSESSMENT — PAIN SCALES - GENERAL: PAINLEVEL_OUTOF10: 7

## 2023-07-24 ASSESSMENT — PAIN - FUNCTIONAL ASSESSMENT: PAIN_FUNCTIONAL_ASSESSMENT: 0-10

## 2023-07-24 ASSESSMENT — PAIN DESCRIPTION - LOCATION: LOCATION: BUTTOCKS

## 2023-07-24 NOTE — ED TRIAGE NOTES
Patient arrives via EMS from home with c/o near syncopal episode. States she was previously hospitalized for low hgb. C/O numbness to butt and BLE, which she has experienced in the past. VSS and 12 lead unremarkable per EMS.

## 2023-07-24 NOTE — ED NOTES
Bedside report received from [facility] ,RN for continuation of patient care upon shift change. Patient care transferred at this time.      Miguel Lopez RN  07/24/23 0109

## 2023-07-25 PROBLEM — D64.9 SEVERE ANEMIA: Status: ACTIVE | Noted: 2023-07-25

## 2023-07-25 LAB
ALBUMIN SERPL-MCNC: 3 G/DL (ref 3.2–4.6)
ALBUMIN/GLOB SERPL: 1.1 (ref 0.4–1.6)
ALP SERPL-CCNC: 48 U/L (ref 50–136)
ALT SERPL-CCNC: 20 U/L (ref 12–65)
ANION GAP SERPL CALC-SCNC: 5 MMOL/L (ref 2–11)
APPEARANCE UR: CLEAR
AST SERPL-CCNC: 21 U/L (ref 15–37)
BASOPHILS # BLD: 0.1 K/UL (ref 0–0.2)
BASOPHILS NFR BLD: 1 % (ref 0–2)
BILIRUB SERPL-MCNC: 0.3 MG/DL (ref 0.2–1.1)
BILIRUB UR QL: NEGATIVE
BUN SERPL-MCNC: 23 MG/DL (ref 8–23)
CALCIUM SERPL-MCNC: 7.5 MG/DL (ref 8.3–10.4)
CHLORIDE SERPL-SCNC: 115 MMOL/L (ref 101–110)
CHOLEST SERPL-MCNC: 87 MG/DL
CO2 SERPL-SCNC: 23 MMOL/L (ref 21–32)
COLOR UR: NORMAL
CREAT SERPL-MCNC: 0.8 MG/DL (ref 0.6–1)
DIFFERENTIAL METHOD BLD: ABNORMAL
EKG ATRIAL RATE: 163 BPM
EKG DIAGNOSIS: NORMAL
EKG Q-T INTERVAL: 320 MS
EKG QRS DURATION: 64 MS
EKG QTC CALCULATION (BAZETT): 481 MS
EKG R AXIS: 54 DEGREES
EKG T AXIS: -58 DEGREES
EKG VENTRICULAR RATE: 136 BPM
EOSINOPHIL # BLD: 0.2 K/UL (ref 0–0.8)
EOSINOPHIL NFR BLD: 3 % (ref 0.5–7.8)
ERYTHROCYTE [DISTWIDTH] IN BLOOD BY AUTOMATED COUNT: 17.5 % (ref 11.9–14.6)
FOLATE SERPL-MCNC: 29.6 NG/ML (ref 3.1–17.5)
GLOBULIN SER CALC-MCNC: 2.7 G/DL (ref 2.8–4.5)
GLUCOSE SERPL-MCNC: 128 MG/DL (ref 65–100)
GLUCOSE UR STRIP.AUTO-MCNC: NEGATIVE MG/DL
HCT VFR BLD AUTO: 16.3 % (ref 35.8–46.3)
HCT VFR BLD AUTO: 18 % (ref 35.8–46.3)
HDLC SERPL-MCNC: 37 MG/DL (ref 40–60)
HDLC SERPL: 2.4
HEMOCCULT STL QL: POSITIVE
HGB BLD-MCNC: 4.8 G/DL (ref 11.7–15.4)
HGB BLD-MCNC: 5.7 G/DL (ref 11.7–15.4)
HGB UR QL STRIP: NEGATIVE
HISTORY CHECK: NORMAL
HISTORY CHECK: NORMAL
HIV 1+2 AB+HIV1 P24 AG SERPL QL IA: NONREACTIVE
HIV 1/2 RESULT COMMENT: NORMAL
IMM GRANULOCYTES # BLD AUTO: 0 K/UL (ref 0–0.5)
IMM GRANULOCYTES NFR BLD AUTO: 1 % (ref 0–5)
KETONES UR QL STRIP.AUTO: NEGATIVE MG/DL
LDLC SERPL CALC-MCNC: 27.6 MG/DL
LEUKOCYTE ESTERASE UR QL STRIP.AUTO: NEGATIVE
LYMPHOCYTES # BLD: 1.4 K/UL (ref 0.5–4.6)
LYMPHOCYTES NFR BLD: 20 % (ref 13–44)
MAGNESIUM SERPL-MCNC: 1.7 MG/DL (ref 1.8–2.4)
MCH RBC QN AUTO: 27.3 PG (ref 26.1–32.9)
MCHC RBC AUTO-ENTMCNC: 29.4 G/DL (ref 31.4–35)
MCV RBC AUTO: 92.6 FL (ref 82–102)
MONOCYTES # BLD: 0.5 K/UL (ref 0.1–1.3)
MONOCYTES NFR BLD: 7 % (ref 4–12)
NEUTS SEG # BLD: 4.8 K/UL (ref 1.7–8.2)
NEUTS SEG NFR BLD: 69 % (ref 43–78)
NITRITE UR QL STRIP.AUTO: NEGATIVE
NRBC # BLD: 0.04 K/UL (ref 0–0.2)
PH UR STRIP: 6 (ref 5–9)
PLATELET # BLD AUTO: 130 K/UL (ref 150–450)
PMV BLD AUTO: 10.8 FL (ref 9.4–12.3)
POTASSIUM SERPL-SCNC: 3.3 MMOL/L (ref 3.5–5.1)
PROT SERPL-MCNC: 5.7 G/DL (ref 6.3–8.2)
PROT UR STRIP-MCNC: NEGATIVE MG/DL
RBC # BLD AUTO: 1.76 M/UL (ref 4.05–5.2)
SODIUM SERPL-SCNC: 143 MMOL/L (ref 133–143)
SP GR UR REFRACTOMETRY: 1.01 (ref 1–1.02)
TRIGL SERPL-MCNC: 112 MG/DL (ref 35–150)
TROPONIN I SERPL HS-MCNC: 41 PG/ML (ref 0–14)
TSH W FREE THYROID IF ABNORMAL: 2.59 UIU/ML (ref 0.36–3.74)
UROBILINOGEN UR QL STRIP.AUTO: 0.2 EU/DL (ref 0.2–1)
VIT B12 SERPL-MCNC: 990 PG/ML (ref 193–986)
VLDLC SERPL CALC-MCNC: 22.4 MG/DL (ref 6–23)
WBC # BLD AUTO: 7 K/UL (ref 4.3–11.1)

## 2023-07-25 PROCEDURE — 93010 ELECTROCARDIOGRAM REPORT: CPT | Performed by: INTERNAL MEDICINE

## 2023-07-25 PROCEDURE — 96376 TX/PRO/DX INJ SAME DRUG ADON: CPT

## 2023-07-25 PROCEDURE — 2580000003 HC RX 258: Performed by: INTERNAL MEDICINE

## 2023-07-25 PROCEDURE — 96375 TX/PRO/DX INJ NEW DRUG ADDON: CPT

## 2023-07-25 PROCEDURE — 80061 LIPID PANEL: CPT

## 2023-07-25 PROCEDURE — 85025 COMPLETE CBC W/AUTO DIFF WBC: CPT

## 2023-07-25 PROCEDURE — 80053 COMPREHEN METABOLIC PANEL: CPT

## 2023-07-25 PROCEDURE — 97116 GAIT TRAINING THERAPY: CPT

## 2023-07-25 PROCEDURE — 87389 HIV-1 AG W/HIV-1&-2 AB AG IA: CPT

## 2023-07-25 PROCEDURE — 85014 HEMATOCRIT: CPT

## 2023-07-25 PROCEDURE — 81003 URINALYSIS AUTO W/O SCOPE: CPT

## 2023-07-25 PROCEDURE — 85018 HEMOGLOBIN: CPT

## 2023-07-25 PROCEDURE — 6370000000 HC RX 637 (ALT 250 FOR IP): Performed by: INTERNAL MEDICINE

## 2023-07-25 PROCEDURE — 96361 HYDRATE IV INFUSION ADD-ON: CPT

## 2023-07-25 PROCEDURE — 36430 TRANSFUSION BLD/BLD COMPNT: CPT

## 2023-07-25 PROCEDURE — 86803 HEPATITIS C AB TEST: CPT

## 2023-07-25 PROCEDURE — 96372 THER/PROPH/DIAG INJ SC/IM: CPT

## 2023-07-25 PROCEDURE — 84425 ASSAY OF VITAMIN B-1: CPT

## 2023-07-25 PROCEDURE — 6360000002 HC RX W HCPCS: Performed by: INTERNAL MEDICINE

## 2023-07-25 PROCEDURE — 97530 THERAPEUTIC ACTIVITIES: CPT

## 2023-07-25 PROCEDURE — P9016 RBC LEUKOCYTES REDUCED: HCPCS

## 2023-07-25 PROCEDURE — 36415 COLL VENOUS BLD VENIPUNCTURE: CPT

## 2023-07-25 PROCEDURE — 93005 ELECTROCARDIOGRAM TRACING: CPT | Performed by: HOSPITALIST

## 2023-07-25 PROCEDURE — 97165 OT EVAL LOW COMPLEX 30 MIN: CPT

## 2023-07-25 PROCEDURE — 87522 HEPATITIS C REVRS TRNSCRPJ: CPT

## 2023-07-25 PROCEDURE — 83735 ASSAY OF MAGNESIUM: CPT

## 2023-07-25 PROCEDURE — 97161 PT EVAL LOW COMPLEX 20 MIN: CPT

## 2023-07-25 PROCEDURE — 82272 OCCULT BLD FECES 1-3 TESTS: CPT

## 2023-07-25 PROCEDURE — 1100000003 HC PRIVATE W/ TELEMETRY

## 2023-07-25 PROCEDURE — 2500000003 HC RX 250 WO HCPCS: Performed by: HOSPITALIST

## 2023-07-25 PROCEDURE — 86078 PHYS BLOOD BANK SERV REACTJ: CPT

## 2023-07-25 PROCEDURE — 82306 VITAMIN D 25 HYDROXY: CPT

## 2023-07-25 PROCEDURE — 84484 ASSAY OF TROPONIN QUANT: CPT

## 2023-07-25 RX ORDER — METOPROLOL TARTRATE 5 MG/5ML
5 INJECTION INTRAVENOUS ONCE
Status: COMPLETED | OUTPATIENT
Start: 2023-07-25 | End: 2023-07-25

## 2023-07-25 RX ORDER — DIPHENHYDRAMINE HCL 25 MG
25 CAPSULE ORAL ONCE
Status: COMPLETED | OUTPATIENT
Start: 2023-07-25 | End: 2023-07-25

## 2023-07-25 RX ORDER — DOCUSATE SODIUM 100 MG/1
100 CAPSULE, LIQUID FILLED ORAL DAILY
COMMUNITY
Start: 2023-03-06

## 2023-07-25 RX ORDER — SODIUM CHLORIDE 9 MG/ML
INJECTION, SOLUTION INTRAVENOUS PRN
Status: DISCONTINUED | OUTPATIENT
Start: 2023-07-25 | End: 2023-07-27 | Stop reason: HOSPADM

## 2023-07-25 RX ORDER — FERROUS SULFATE 325(65) MG
325 TABLET ORAL 2 TIMES DAILY
Status: DISCONTINUED | OUTPATIENT
Start: 2023-07-25 | End: 2023-07-27 | Stop reason: HOSPADM

## 2023-07-25 RX ORDER — CETIRIZINE HYDROCHLORIDE 10 MG/1
10 TABLET ORAL DAILY
COMMUNITY
Start: 2022-02-21

## 2023-07-25 RX ORDER — POTASSIUM CHLORIDE 20 MEQ/1
1 TABLET, EXTENDED RELEASE ORAL DAILY
COMMUNITY

## 2023-07-25 RX ADMIN — PANTOPRAZOLE SODIUM 40 MG: 40 TABLET, DELAYED RELEASE ORAL at 05:31

## 2023-07-25 RX ADMIN — KETOROLAC TROMETHAMINE 15 MG: 30 INJECTION, SOLUTION INTRAMUSCULAR; INTRAVENOUS at 00:08

## 2023-07-25 RX ADMIN — Medication 100 MG: at 08:49

## 2023-07-25 RX ADMIN — METOPROLOL SUCCINATE 50 MG: 50 TABLET, EXTENDED RELEASE ORAL at 08:49

## 2023-07-25 RX ADMIN — KETOROLAC TROMETHAMINE 15 MG: 30 INJECTION, SOLUTION INTRAMUSCULAR; INTRAVENOUS at 06:53

## 2023-07-25 RX ADMIN — KETOROLAC TROMETHAMINE 15 MG: 30 INJECTION, SOLUTION INTRAMUSCULAR; INTRAVENOUS at 13:36

## 2023-07-25 RX ADMIN — SODIUM CHLORIDE: 9 INJECTION, SOLUTION INTRAVENOUS at 08:54

## 2023-07-25 RX ADMIN — SODIUM CHLORIDE, PRESERVATIVE FREE 10 ML: 5 INJECTION INTRAVENOUS at 19:58

## 2023-07-25 RX ADMIN — SODIUM CHLORIDE, PRESERVATIVE FREE 10 ML: 5 INJECTION INTRAVENOUS at 08:49

## 2023-07-25 RX ADMIN — CEFTRIAXONE 1000 MG: 1 INJECTION, POWDER, FOR SOLUTION INTRAMUSCULAR; INTRAVENOUS at 21:21

## 2023-07-25 RX ADMIN — METOPROLOL TARTRATE 5 MG: 5 INJECTION INTRAVENOUS at 07:37

## 2023-07-25 RX ADMIN — SODIUM CHLORIDE: 9 INJECTION, SOLUTION INTRAVENOUS at 00:08

## 2023-07-25 RX ADMIN — DIPHENHYDRAMINE HYDROCHLORIDE 25 MG: 25 CAPSULE ORAL at 21:29

## 2023-07-25 RX ADMIN — ENOXAPARIN SODIUM 40 MG: 100 INJECTION SUBCUTANEOUS at 08:48

## 2023-07-25 RX ADMIN — ATORVASTATIN CALCIUM 80 MG: 80 TABLET, FILM COATED ORAL at 00:16

## 2023-07-25 RX ADMIN — ACETAMINOPHEN 650 MG: 325 TABLET ORAL at 18:35

## 2023-07-25 RX ADMIN — KETOROLAC TROMETHAMINE 15 MG: 30 INJECTION, SOLUTION INTRAMUSCULAR; INTRAVENOUS at 21:30

## 2023-07-25 RX ADMIN — SODIUM CHLORIDE, PRESERVATIVE FREE 10 ML: 5 INJECTION INTRAVENOUS at 00:09

## 2023-07-25 RX ADMIN — ATORVASTATIN CALCIUM 80 MG: 80 TABLET, FILM COATED ORAL at 21:21

## 2023-07-25 RX ADMIN — FERROUS SULFATE TAB 325 MG (65 MG ELEMENTAL FE) 325 MG: 325 (65 FE) TAB at 13:30

## 2023-07-25 RX ADMIN — FERROUS SULFATE TAB 325 MG (65 MG ELEMENTAL FE) 325 MG: 325 (65 FE) TAB at 21:21

## 2023-07-25 RX ADMIN — ASPIRIN 81 MG 81 MG: 81 TABLET ORAL at 08:49

## 2023-07-25 ASSESSMENT — PAIN SCALES - GENERAL
PAINLEVEL_OUTOF10: 0
PAINLEVEL_OUTOF10: 6
PAINLEVEL_OUTOF10: 8
PAINLEVEL_OUTOF10: 8
PAINLEVEL_OUTOF10: 7
PAINLEVEL_OUTOF10: 2
PAINLEVEL_OUTOF10: 10
PAINLEVEL_OUTOF10: 5

## 2023-07-25 ASSESSMENT — PAIN DESCRIPTION - LOCATION
LOCATION: CHEST
LOCATION: CHEST
LOCATION: COCCYX;SACRUM;LEG
LOCATION: BUTTOCKS

## 2023-07-25 ASSESSMENT — PAIN DESCRIPTION - DESCRIPTORS
DESCRIPTORS: POUNDING
DESCRIPTORS: POUNDING
DESCRIPTORS: DISCOMFORT
DESCRIPTORS: PRESSURE

## 2023-07-25 ASSESSMENT — PAIN DESCRIPTION - ORIENTATION
ORIENTATION: MID
ORIENTATION: MID
ORIENTATION: MID;LOWER
ORIENTATION: MID;ANTERIOR

## 2023-07-25 ASSESSMENT — PAIN - FUNCTIONAL ASSESSMENT
PAIN_FUNCTIONAL_ASSESSMENT: ACTIVITIES ARE NOT PREVENTED
PAIN_FUNCTIONAL_ASSESSMENT: PREVENTS OR INTERFERES SOME ACTIVE ACTIVITIES AND ADLS

## 2023-07-25 ASSESSMENT — PAIN DESCRIPTION - ONSET
ONSET: SUDDEN
ONSET: ON-GOING
ONSET: SUDDEN

## 2023-07-25 ASSESSMENT — PAIN DESCRIPTION - FREQUENCY
FREQUENCY: INTERMITTENT

## 2023-07-25 ASSESSMENT — PAIN DESCRIPTION - PAIN TYPE
TYPE: ACUTE PAIN

## 2023-07-25 NOTE — FLOWSHEET NOTE
07/25/23 0645   Vital Signs   Pulse (!) 133   BP (!) 166/65   MAP (Calculated) 99   BP Location Left upper arm   BP Method Automatic   Patient Position Supine       Pt complaining of chest pain 10/10, describes it as \"pounding. \" Hospitalist notified. Orders received for EKG and troponin. 0068: Updated nightshift Hospitalist of EKG results. No new orders received. 5223: Notified day shift Hospitalist of EKG results. No new orders received.

## 2023-07-25 NOTE — ED NOTES
Spoke with Delphine Clancy in the lab.  Urine culture to be added on to specimen already in lab     Salvador Mcgregor RN  07/24/23 8688

## 2023-07-25 NOTE — PROGRESS NOTES
Hospitalist Progress Note   Admit Date:  2023  3:08 PM   Name:  Kristin Aguero   Age:  58 y.o. Sex:  female  :  1960   MRN:  [id number]   Room:  [facility]/    Presenting/Chief Complaint: Dizziness     Reason(s) for Admission: Dehydration [E86.0]  Dizziness [R42]  UTI (urinary tract infection) [N39.0]  Urinary tract infection without hematuria, site unspecified [N39.0]     Hospital Course:   Kristin Aguero is a 58 y.o. female with medical history of   Alcohol dependence. She states she quit about 2 years ago. Aortic stenosis. Patient follows with Dr. Jayden Portillo of vascular surgery  She used to be a cigarette smoker. Patient quit many years ago. who presented with feeling like she was about to pass out. Patient reported no change in her bowel habits recently. No blood per rectum and no blood per urine. In the emergency room, patient was found to have orthostatic hypotension. Her BUN is elevated. She had mild leukocytosis. She has anemia, although she has baseline anemia already. Urine finding is consistent with urinary tract infection. Patient is admitted due to orthostatic hypotension, volume depletion and UTI. Subjective & 24hr Events:     23   Patient is alert and oriented. Patient reports feeling better. She is eating breakfast now. Her appetite is fair. Patient denies nausea, and vomiting. Assessment & Plan:     Principal Problem:    UTI (urinary tract infection)  Plan:   Patient is on ceftriaxone 1 g IV every 24 hours. I will continue this   Follow-up on the culture result. Active Problems:    Hepatitis C  Plan: This adds to complexity of care. No signs of hepatitis. Atrial flutter ([address])  Plan:   Patient had history of atrial fibrillation before. She is not on anticoagulant. We will work on heart rate control. Patient has been on metoprolol XL 50 mg p.o. once a day. I will continue this. Monitor closely. Urine Negative NEG      Blood, Urine Negative NEG      Urobilinogen, Urine 0.2 0.2 - 1.0 EU/dL    Nitrite, Urine Negative NEG      Leukocyte Esterase, Urine MODERATE (A) NEG      WBC, UA 5-10 (A) U4 /hpf    RBC, UA 0-5 U5 /hpf    Epithelial Cells UA 0-5 U5 /hpf    BACTERIA, URINE Negative NEG /hpf    Casts 2-5 (A) U2 /lpf   Lipid Panel    Collection Time: 07/25/23  5:55 AM   Result Value Ref Range    Cholesterol, Total 87 <200 MG/DL    Triglycerides 112 35 - 150 MG/DL    HDL 37 (L) 40 - 60 MG/DL    LDL Calculated 27.6 <100 MG/DL    VLDL Cholesterol Calculated 22.4 6.0 - 23.0 MG/DL    Chol/HDL Ratio 2.4     Comprehensive Metabolic Panel w/ Reflex to MG    Collection Time: 07/25/23  5:55 AM   Result Value Ref Range    Sodium 143 133 - 143 mmol/L    Potassium 3.3 (L) 3.5 - 5.1 mmol/L    Chloride 115 (H) 101 - 110 mmol/L    CO2 23 21 - 32 mmol/L    Anion Gap 5 2 - 11 mmol/L    Glucose 128 (H) 65 - 100 mg/dL    BUN 23 8 - 23 MG/DL    Creatinine 0.80 0.6 - 1.0 MG/DL    Est, Glom Filt Rate >60 >60 ml/min/1.73m2    Calcium 7.5 (L) 8.3 - 10.4 MG/DL    Total Bilirubin 0.3 0.2 - 1.1 MG/DL    ALT 20 12 - 65 U/L    AST 21 15 - 37 U/L    Alk Phosphatase 48 (L) 50 - 136 U/L    Total Protein 5.7 (L) 6.3 - 8.2 g/dL    Albumin 3.0 (L) 3.2 - 4.6 g/dL    Globulin 2.7 (L) 2.8 - 4.5 g/dL    Albumin/Globulin Ratio 1.1 0.4 - 1.6     EKG 12 Lead    Collection Time: 07/25/23  6:51 AM   Result Value Ref Range    Ventricular Rate 136 BPM    Atrial Rate 163 BPM    QRS Duration 64 ms    Q-T Interval 320 ms    QTc Calculation (Bazett) 481 ms    R Axis 54 degrees    T Axis -58 degrees    Diagnosis       Atrial fibrillation with rapid ventricular response  Septal infarct , age undetermined  ST & T wave abnormality, consider inferolateral ischemia  Abnormal ECG  When compared with ECG of 10-SEP-2022 13:01,  Atrial fibrillation has replaced Sinus rhythm  Septal infarct is now Present  Inverted T waves have replaced nonspecific T wave abnormality in

## 2023-07-25 NOTE — PROGRESS NOTES
ACUTE PHYSICAL THERAPY GOALS:   (Developed with and agreed upon by patient and/or caregiver. )  LTG:  (1.)Ms. Annamarie Gomez will move from supine to sit and sit to supine , scoot up and down, and roll side to side in bed with INDEPENDENT within 1 treatment day(s). GOAL MET 7/25/2023  (2.)Ms. Annamarie Gomez will ambulate with STAND BY ASSIST for 100 feet with the least restrictive device within 1 treatment day(s). GOAL MET 7/25/2023  ________________________________________________________________________________________________     PHYSICAL THERAPY Initial Assessment, Discharge, and AM  (Link to Caseload Tracking: PT Visit Days : 1  Acknowledge Orders  Time In/Out  PT Charge Capture  Rehab Caseload Tracker    Lata White is a 58 y.o. female   PRIMARY DIAGNOSIS: UTI (urinary tract infection)  Dehydration [E86.0]  Dizziness [R42]  UTI (urinary tract infection) [N39.0]  Urinary tract infection without hematuria, site unspecified [N39.0]       Reason for Referral: Difficulty in walking, Not elsewhere classified (R26.2)  Observation: Payor: /     ASSESSMENT:     REHAB RECOMMENDATIONS:   Recommendation to date pending progress:  Setting:  No further skilled physical therapy after discharge from hospital    Equipment:    None     ASSESSMENT:  Ms. Annamarie Gomez presents to hospital on 7/24/23 with dizziness and near syncopal episode, reports improved this am. Pt A & Ox  4 this date, states neuropathy, sciatic nerve pain for past 2 years B LE. Pt states unable to take medication for neuropathy due to in [facility] program for substance abuse. Pt independent with bed mobility, transfers. Pt demonstrates good static standing balance with handwashing, independent with toileting. Pt SBA ambulation in hallway, reports only ambulates short distances at baseline. Pt slightly unsteady initially, holding wall railing for support but reports normal john and gait per pt. Pt strength B LE 5/5.  Pt verbalized understanding for sciatic

## 2023-07-25 NOTE — PROGRESS NOTES
TRANSFER - IN REPORT:    Verbal report received from [address], RN on Ji [place]  being received from ED for routine progression of patient care      Report consisted of patient's Situation, Background, Assessment and   Recommendations(SBAR). Information from the following report(s) Nurse Handoff Report, Intake/Output, MAR, and Recent Results was reviewed with the receiving nurse. Opportunity for questions and clarification was provided. Assessment to be completed upon patient's arrival to unit and care assumed.

## 2023-07-25 NOTE — ED NOTES
TRANSFER - OUT REPORT:    Verbal report given to Anjelica Huber RN on Rolanda Bustos  being transferred to [facility] [phone] for routine progression of patient care       Report consisted of patient's Situation, Background, Assessment and   Recommendations(SBAR). Information from the following report(s) ED Encounter Summary, ED SBAR, Adult Overview, MAR, and Recent Results was reviewed with the receiving nurse. Stuart Fall Assessment:    Presents to emergency department  because of falls (Syncope, seizure, or loss of consciousness): No  Age > 70: No  Altered Mental Status, Intoxication with alcohol or substance confusion (Disorientation, impaired judgment, poor safety awaremess, or inability to follow instructions): No  Impaired Mobility: Ambulates or transfers with assistive devices or assistance; Unable to ambulate or transer.: Yes  Nursing Judgement: Yes          Lines:   Peripheral IV Right Wrist (Active)        Opportunity for questions and clarification was provided.       Patient transported with:  Kristian Valenzuela RN  07/25/23 8017

## 2023-07-25 NOTE — THERAPY EVALUATION
Tub/Shower [] [] [] [] [] [] [] [] [] [x] []     Toilet [] [x] [] [] [] [] [] [] [] [] []      [] [] [] [] [] [] [] [] [] [] []    I=Independent, Mod I=Modified Independent, S=Supervision/Setup, SBA=Standby Assistance, CGA=Contact Guard Assistance, Min=Minimal Assistance, Mod=Moderate Assistance, Max=Maximal Assistance, Total=Total Assistance, NT=Not Tested    ACTIVITIES OF DAILY LIVING: I Mod I S SBA CGA Min Mod Max Total NT Comments   BASIC ADLs:              Upper Body Bathing  [] [] [] [] [] [] [] [] [] [x]    Lower Body Bathing [] [] [] [] [] [] [] [] [] [x]    Toileting [] [x] [] [] [] [] [] [] [] [] Includes toileting, hygiene, lower body clothing management   Upper Body Dressing [] [] [] [] [] [] [] [] [] [x]    Lower Body Dressing [] [x] [] [] [] [] [] [] [] [] Doff/don socks seated in chair   Feeding [] [] [] [] [] [] [] [] [] [x]    Grooming [] [x] [] [] [] [] [] [] [] [] Hand hygiene standing at sink   Personal [address] [] [] [] [] [] [] [] [] [] [x]    Functional Mobility [] [x] [] [] [] [] [] [] [] [] No AD, ambulation in room, bathroom and in hallway   I=Independent, Mod I=Modified Independent, S=Supervision/Setup, SBA=Standby Assistance, CGA=Contact Guard Assistance, Min=Minimal Assistance, Mod=Moderate Assistance, Max=Maximal Assistance, Total=Total Assistance, NT=Not Tested    PLAN:   FREQUENCY/DURATION     for duration of hospital stay or until stated goals are met, whichever comes first.    PROBLEM LIST:   (Skilled intervention is medically necessary to address:)  Decreased ADL/Functional Activities  Decreased Strength  Decreased Transfer Abilities  Increased Pain   INTERVENTIONS PLANNED:  (Benefits and precautions of occupational therapy have been discussed with the patient.)  Self Care Training  Therapeutic Activity  Therapeutic Exercise/HEP  Neuromuscular Re-education  Manual Therapy  Education         TREATMENT:     EVALUATION: LOW COMPLEXITY: (Untimed Charge)    TREATMENT:   Therapeutic

## 2023-07-25 NOTE — CONSENT
Informed Consent for Blood Component Transfusion Note    I have discussed with the patient the rationale for blood component transfusion; its benefits in treating or preventing fatigue, organ damage, or death; and its risk which includes mild transfusion reactions, rare risk of blood borne infection, or more serious but rare reactions. I have discussed the alternatives to transfusion, including the risk and consequences of not receiving transfusion. The patient had an opportunity to ask questions and had agreed to proceed with transfusion of blood components.     Electronically signed by Kassie Chapin MD on 7/25/23 at 12:36 PM EDT

## 2023-07-26 LAB
25(OH)D3 SERPL-MCNC: 57.1 NG/ML (ref 30–100)
ANION GAP SERPL CALC-SCNC: 8 MMOL/L (ref 2–11)
BUN SERPL-MCNC: 18 MG/DL (ref 8–23)
CALCIUM SERPL-MCNC: 8.2 MG/DL (ref 8.3–10.4)
CHLORIDE SERPL-SCNC: 113 MMOL/L (ref 101–110)
CO2 SERPL-SCNC: 21 MMOL/L (ref 21–32)
CREAT SERPL-MCNC: 0.9 MG/DL (ref 0.6–1)
GLUCOSE SERPL-MCNC: 171 MG/DL (ref 65–100)
HCT VFR BLD AUTO: 26 % (ref 35.8–46.3)
HGB BLD-MCNC: 8.3 G/DL (ref 11.7–15.4)
POTASSIUM SERPL-SCNC: 3.4 MMOL/L (ref 3.5–5.1)
SODIUM SERPL-SCNC: 142 MMOL/L (ref 133–143)

## 2023-07-26 PROCEDURE — 85018 HEMOGLOBIN: CPT

## 2023-07-26 PROCEDURE — 6360000002 HC RX W HCPCS: Performed by: HOSPITALIST

## 2023-07-26 PROCEDURE — 2700000000 HC OXYGEN THERAPY PER DAY

## 2023-07-26 PROCEDURE — 99223 1ST HOSP IP/OBS HIGH 75: CPT | Performed by: INTERNAL MEDICINE

## 2023-07-26 PROCEDURE — 6360000002 HC RX W HCPCS: Performed by: INTERNAL MEDICINE

## 2023-07-26 PROCEDURE — 6370000000 HC RX 637 (ALT 250 FOR IP): Performed by: INTERNAL MEDICINE

## 2023-07-26 PROCEDURE — 36415 COLL VENOUS BLD VENIPUNCTURE: CPT

## 2023-07-26 PROCEDURE — P9016 RBC LEUKOCYTES REDUCED: HCPCS

## 2023-07-26 PROCEDURE — 85014 HEMATOCRIT: CPT

## 2023-07-26 PROCEDURE — 2580000003 HC RX 258: Performed by: INTERNAL MEDICINE

## 2023-07-26 PROCEDURE — 30233N1 TRANSFUSION OF NONAUTOLOGOUS RED BLOOD CELLS INTO PERIPHERAL VEIN, PERCUTANEOUS APPROACH: ICD-10-PCS | Performed by: INTERNAL MEDICINE

## 2023-07-26 PROCEDURE — 1100000003 HC PRIVATE W/ TELEMETRY

## 2023-07-26 PROCEDURE — 6370000000 HC RX 637 (ALT 250 FOR IP): Performed by: HOSPITALIST

## 2023-07-26 PROCEDURE — 36430 TRANSFUSION BLD/BLD COMPNT: CPT

## 2023-07-26 PROCEDURE — 80048 BASIC METABOLIC PNL TOTAL CA: CPT

## 2023-07-26 RX ORDER — DIPHENHYDRAMINE HCL 25 MG
25 CAPSULE ORAL ONCE
Status: COMPLETED | OUTPATIENT
Start: 2023-07-26 | End: 2023-07-26

## 2023-07-26 RX ORDER — DEXAMETHASONE 4 MG/1
10 TABLET ORAL ONCE
Status: COMPLETED | OUTPATIENT
Start: 2023-07-26 | End: 2023-07-26

## 2023-07-26 RX ORDER — ACETAMINOPHEN 325 MG/1
650 TABLET ORAL ONCE
Status: COMPLETED | OUTPATIENT
Start: 2023-07-26 | End: 2023-07-26

## 2023-07-26 RX ADMIN — POTASSIUM BICARBONATE 40 MEQ: 782 TABLET, EFFERVESCENT ORAL at 21:30

## 2023-07-26 RX ADMIN — CEFTRIAXONE 1000 MG: 1 INJECTION, POWDER, FOR SOLUTION INTRAMUSCULAR; INTRAVENOUS at 21:30

## 2023-07-26 RX ADMIN — SODIUM CHLORIDE, PRESERVATIVE FREE 5 ML: 5 INJECTION INTRAVENOUS at 21:30

## 2023-07-26 RX ADMIN — METOPROLOL SUCCINATE 50 MG: 50 TABLET, EXTENDED RELEASE ORAL at 08:29

## 2023-07-26 RX ADMIN — ATORVASTATIN CALCIUM 80 MG: 80 TABLET, FILM COATED ORAL at 21:29

## 2023-07-26 RX ADMIN — DIPHENHYDRAMINE HYDROCHLORIDE 25 MG: 25 CAPSULE ORAL at 01:19

## 2023-07-26 RX ADMIN — POTASSIUM BICARBONATE 40 MEQ: 782 TABLET, EFFERVESCENT ORAL at 11:38

## 2023-07-26 RX ADMIN — FERROUS SULFATE TAB 325 MG (65 MG ELEMENTAL FE) 325 MG: 325 (65 FE) TAB at 21:29

## 2023-07-26 RX ADMIN — PANTOPRAZOLE SODIUM 40 MG: 40 TABLET, DELAYED RELEASE ORAL at 05:32

## 2023-07-26 RX ADMIN — DIPHENHYDRAMINE HYDROCHLORIDE 25 MG: 25 CAPSULE ORAL at 14:37

## 2023-07-26 RX ADMIN — ACETAMINOPHEN 650 MG: 325 TABLET ORAL at 01:19

## 2023-07-26 RX ADMIN — FERROUS SULFATE TAB 325 MG (65 MG ELEMENTAL FE) 325 MG: 325 (65 FE) TAB at 08:29

## 2023-07-26 RX ADMIN — SODIUM CHLORIDE 250 MG: 9 INJECTION, SOLUTION INTRAVENOUS at 11:40

## 2023-07-26 RX ADMIN — Medication 100 MG: at 08:29

## 2023-07-26 RX ADMIN — DEXAMETHASONE 10 MG: 4 TABLET ORAL at 01:19

## 2023-07-26 RX ADMIN — KETOROLAC TROMETHAMINE 15 MG: 30 INJECTION, SOLUTION INTRAMUSCULAR; INTRAVENOUS at 21:28

## 2023-07-26 RX ADMIN — SODIUM CHLORIDE, PRESERVATIVE FREE 10 ML: 5 INJECTION INTRAVENOUS at 08:30

## 2023-07-26 ASSESSMENT — PAIN - FUNCTIONAL ASSESSMENT: PAIN_FUNCTIONAL_ASSESSMENT: ACTIVITIES ARE NOT PREVENTED

## 2023-07-26 ASSESSMENT — PAIN DESCRIPTION - ORIENTATION: ORIENTATION: LEFT;RIGHT

## 2023-07-26 ASSESSMENT — PAIN DESCRIPTION - ONSET: ONSET: ON-GOING

## 2023-07-26 ASSESSMENT — PAIN DESCRIPTION - FREQUENCY: FREQUENCY: INTERMITTENT

## 2023-07-26 ASSESSMENT — PAIN SCALES - GENERAL
PAINLEVEL_OUTOF10: 0
PAINLEVEL_OUTOF10: 7
PAINLEVEL_OUTOF10: 0

## 2023-07-26 ASSESSMENT — PAIN DESCRIPTION - PAIN TYPE: TYPE: CHRONIC PAIN

## 2023-07-26 ASSESSMENT — PAIN DESCRIPTION - DESCRIPTORS: DESCRIPTORS: BURNING

## 2023-07-26 ASSESSMENT — PAIN DESCRIPTION - LOCATION: LOCATION: LEG

## 2023-07-26 NOTE — PLAN OF CARE
Problem: Discharge Planning  Goal: Discharge to home or other facility with appropriate resources  7/25/2023 2328 by Mane Emanuel RN  Outcome: Progressing  Flowsheets (Taken 7/25/2023 1953)  Discharge to home or other facility with appropriate resources: Identify barriers to discharge with patient and caregiver  7/25/2023 1236 by Marie Harrison RN  Outcome: Progressing     Problem: Pain  Goal: Verbalizes/displays adequate comfort level or baseline comfort level  7/25/2023 2328 by Mane Emanuel RN  Outcome: Progressing  7/25/2023 1236 by Marie Harrison RN  Outcome: Progressing     Problem: Safety - Adult  Goal: Free from fall injury  7/25/2023 2328 by Mane Emanuel RN  Outcome: Progressing  Flowsheets (Taken 7/25/2023 1953)  Free From Fall Injury: Instruct family/caregiver on patient safety  7/25/2023 1236 by Marie Harrison RN  Outcome: Progressing

## 2023-07-26 NOTE — CONSULTS
Tamiko Garcia (:  1960) is a 58 y.o. female, new patient here for evaluation of the following chief complaint(s):  Dizziness         ASSESSMENT/PLAN:  Iron deficiency anemia without overt GI bleeding -patient had recent upper and lower endoscopy in 2022. She did have erosive gastritis and portal evidence of gastropathy which may be contributing to iron deficiency anemia. I recommend IV iron replacement, close follow-up with PCP and to check H&H, follow-up with [facility] outpatient for cirrhosis management and for video capsule endoscopy. The patient agrees to the plan and does not want any endoscopies at this point. Continue p.o. iron on discharge. Continue pantoprazole 40 mg p.o. daily on discharge. GI team will sign off, please call us back if you have any further questions. Subjective   SUBJECTIVE/OBJECTIVE  78-year-old female who is known to have history of hepatitis C status posttreatment, alcohol-related cirrhosis, and iron deficiency anemia presenting with dizziness and consulted for anemia without overt bleeding. The patient's hemoglobin at the time of presentation was 7.2 and next day was 4.8, she did receive 3 units PRBC and hemoglobin went up to 8.3. The patient denies any blood in stool, no hematochezia, no nausea or vomiting, no hematemesis. Denies abdominal pain. Does not take any NSAIDs. Last alcohol drink was 2 years ago. She does follow with [facility] outpatient for cirrhosis management, she did undergo upper and lower endoscopy in 2021 findings as below. Does not take any oral anticoagulation, takes baby aspirin. Never had video capsule endoscopy in the past.        EGD 2022  FINDINGS:  Esophagus: Normal.  No varices  Stomach:  Mild PHG. Two nonbleeding erosions noted in antrum  Duodenum: Normal  IMPRESSION: Mild PHG. PHG can be a source for bleeding and WARD. PLAN: EGD 3 years to screen for varices.       Colonoscopy 2022  FINDINGS: [facility]

## 2023-07-26 NOTE — PROGRESS NOTES
Monitor room called and said pt had 5 beat run of vtach. Pt asymptomatic. Hospitalist notified. No new orders received.

## 2023-07-26 NOTE — PROGRESS NOTES
replaced Sinus rhythm  Septal infarct is now Present  Inverted T waves have replaced nonspecific T wave abnormality in Inferior   leads  T wave inversion now evident in Anterolateral leads  Confirmed by MD JAVED, Enrike Underwood ([id number]) on 7/25/2023 8:08:59 AM     CBC with Auto Differential    Collection Time: 07/25/23 11:11 AM   Result Value Ref Range    WBC 7.0 4.3 - 11.1 K/uL    RBC 1.76 (L) 4.05 - 5.2 M/uL    Hemoglobin 4.8 (LL) 11.7 - 15.4 g/dL    Hematocrit 16.3 (L) 35.8 - 46.3 %    MCV 92.6 82 - 102 FL    MCH 27.3 26.1 - 32.9 PG    MCHC 29.4 (L) 31.4 - 35.0 g/dL    RDW 17.5 (H) 11.9 - 14.6 %    Platelets 474 (L) 243 - 450 K/uL    MPV 10.8 9.4 - 12.3 FL    nRBC 0.04 0.0 - 0.2 K/uL    Differential Type AUTOMATED      Neutrophils % 69 43 - 78 %    Lymphocytes % 20 13 - 44 %    Monocytes % 7 4.0 - 12.0 %    Eosinophils % 3 0.5 - 7.8 %    Basophils % 1 0.0 - 2.0 %    Immature Granulocytes 1 0.0 - 5.0 %    Neutrophils Absolute 4.8 1.7 - 8.2 K/UL    Lymphocytes Absolute 1.4 0.5 - 4.6 K/UL    Monocytes Absolute 0.5 0.1 - 1.3 K/UL    Eosinophils Absolute 0.2 0.0 - 0.8 K/UL    Basophils Absolute 0.1 0.0 - 0.2 K/UL    Absolute Immature Granulocyte 0.0 0.0 - 0.5 K/UL   PREPARE RBC (CROSSMATCH), 2 Units    Collection Time: 07/25/23 12:45 PM   Result Value Ref Range    History Check Historical check performed    Occult Blood, Fecal    Collection Time: 07/25/23  6:13 PM   Result Value Ref Range    POC Occult Blood, Fecal Positive (A) NEG     Hemoglobin and Hematocrit    Collection Time: 07/25/23  7:47 PM   Result Value Ref Range    Hemoglobin 5.7 (LL) 11.7 - 15.4 g/dL    Hematocrit 18.0 (L) 35.8 - 46.3 %   TRANSFUSION REACTION EVALUATION    Collection Time: 07/25/23  8:09 PM   Result Value Ref Range    Unit Number  [id number]     Clerical Errors None detected     Hemolysis, Pre Transfusion NONE     Hemolysis, Post Transfusion NONE     Blood Bag Hemolysis NONE     TAMELA Pre-transfusion Not required     TAMELA Post-transfusion NEG 100 mg  100 mg Oral Daily    pantoprazole (PROTONIX) tablet 40 mg  40 mg Oral QAM AC    0.9 % sodium chloride infusion   IntraVENous Continuous    cefTRIAXone (ROCEPHIN) 1,000 mg in sodium chloride 0.9 % 50 mL IVPB (mini-bag)  1,000 mg IntraVENous Q24H    [Held by provider] aspirin chewable tablet 81 mg  81 mg Oral Daily    atorvastatin (LIPITOR) tablet 80 mg  80 mg Oral Nightly    sodium chloride flush 0.9 % injection 5-40 mL  5-40 mL IntraVENous 2 times per day    sodium chloride flush 0.9 % injection 5-40 mL  5-40 mL IntraVENous PRN    0.9 % sodium chloride infusion   IntraVENous PRN    ondansetron (ZOFRAN-ODT) disintegrating tablet 4 mg  4 mg Oral Q8H PRN    Or    ondansetron (ZOFRAN) injection 4 mg  4 mg IntraVENous Q6H PRN    polyethylene glycol (GLYCOLAX) packet 17 g  17 g Oral Daily PRN    acetaminophen (TYLENOL) tablet 650 mg  650 mg Oral Q6H PRN    Or    acetaminophen (TYLENOL) suppository 650 mg  650 mg Rectal Q6H PRN    ketorolac (TORADOL) injection 15 mg  15 mg IntraVENous Q6H PRN       Signed:  Brunilda Mak MD    Part of this note may have been written by using a voice dictation software. The note has been proof read but may still contain some grammatical/other typographical errors.

## 2023-07-27 VITALS
SYSTOLIC BLOOD PRESSURE: 137 MMHG | RESPIRATION RATE: 18 BRPM | HEIGHT: 66 IN | TEMPERATURE: 97.2 F | BODY MASS INDEX: 22.34 KG/M2 | DIASTOLIC BLOOD PRESSURE: 70 MMHG | OXYGEN SATURATION: 99 % | WEIGHT: 139 LBS | HEART RATE: 78 BPM

## 2023-07-27 LAB
ABO + RH BLD: NORMAL
ANION GAP SERPL CALC-SCNC: 6 MMOL/L (ref 2–11)
BACTERIA SPEC CULT: NORMAL
BLD PROD TYP BPU: NORMAL
BLOOD BANK DISPENSE STATUS: NORMAL
BLOOD GROUP ANTIBODIES SERPL: NORMAL
BPU ID: NORMAL
BUN SERPL-MCNC: 12 MG/DL (ref 8–23)
CALCIUM SERPL-MCNC: 8.3 MG/DL (ref 8.3–10.4)
CHLORIDE SERPL-SCNC: 113 MMOL/L (ref 101–110)
CO2 SERPL-SCNC: 22 MMOL/L (ref 21–32)
CREAT SERPL-MCNC: 0.6 MG/DL (ref 0.6–1)
CROSSMATCH RESULT: NORMAL
GLUCOSE SERPL-MCNC: 127 MG/DL (ref 65–100)
POTASSIUM SERPL-SCNC: 4 MMOL/L (ref 3.5–5.1)
SERVICE CMNT-IMP: NORMAL
SODIUM SERPL-SCNC: 141 MMOL/L (ref 133–143)
SPECIMEN EXP DATE BLD: NORMAL
UNIT DIVISION: 0

## 2023-07-27 PROCEDURE — 6360000002 HC RX W HCPCS: Performed by: INTERNAL MEDICINE

## 2023-07-27 PROCEDURE — 36415 COLL VENOUS BLD VENIPUNCTURE: CPT

## 2023-07-27 PROCEDURE — 2580000003 HC RX 258: Performed by: INTERNAL MEDICINE

## 2023-07-27 PROCEDURE — 80048 BASIC METABOLIC PNL TOTAL CA: CPT

## 2023-07-27 PROCEDURE — 6370000000 HC RX 637 (ALT 250 FOR IP): Performed by: INTERNAL MEDICINE

## 2023-07-27 RX ORDER — ATORVASTATIN CALCIUM 80 MG/1
80 TABLET, FILM COATED ORAL NIGHTLY
Qty: 30 TABLET | Refills: 0 | Status: SHIPPED | OUTPATIENT
Start: 2023-07-27 | End: 2023-08-26

## 2023-07-27 RX ORDER — FERROUS SULFATE 325(65) MG
325 TABLET ORAL 2 TIMES DAILY
Qty: 60 TABLET | Refills: 0 | Status: SHIPPED | OUTPATIENT
Start: 2023-07-27 | End: 2023-08-26

## 2023-07-27 RX ORDER — ASPIRIN 81 MG/1
81 TABLET, CHEWABLE ORAL DAILY
Qty: 30 TABLET | Refills: 0 | Status: SHIPPED | OUTPATIENT
Start: 2023-07-27 | End: 2023-08-26

## 2023-07-27 RX ORDER — LANOLIN ALCOHOL/MO/W.PET/CERES
100 CREAM (GRAM) TOPICAL DAILY
Qty: 30 TABLET | Refills: 0 | Status: SHIPPED | OUTPATIENT
Start: 2023-07-28 | End: 2023-08-27

## 2023-07-27 RX ADMIN — SODIUM CHLORIDE, PRESERVATIVE FREE 10 ML: 5 INJECTION INTRAVENOUS at 08:40

## 2023-07-27 RX ADMIN — FERROUS SULFATE TAB 325 MG (65 MG ELEMENTAL FE) 325 MG: 325 (65 FE) TAB at 08:40

## 2023-07-27 RX ADMIN — Medication 100 MG: at 08:40

## 2023-07-27 RX ADMIN — PANTOPRAZOLE SODIUM 40 MG: 40 TABLET, DELAYED RELEASE ORAL at 05:51

## 2023-07-27 RX ADMIN — METOPROLOL SUCCINATE 50 MG: 50 TABLET, EXTENDED RELEASE ORAL at 08:40

## 2023-07-27 RX ADMIN — KETOROLAC TROMETHAMINE 15 MG: 30 INJECTION, SOLUTION INTRAMUSCULAR; INTRAVENOUS at 08:43

## 2023-07-27 ASSESSMENT — PAIN DESCRIPTION - ONSET: ONSET: ON-GOING

## 2023-07-27 ASSESSMENT — PAIN DESCRIPTION - DESCRIPTORS: DESCRIPTORS: ACHING

## 2023-07-27 ASSESSMENT — PAIN DESCRIPTION - ORIENTATION: ORIENTATION: LEFT;RIGHT

## 2023-07-27 ASSESSMENT — PAIN SCALES - GENERAL: PAINLEVEL_OUTOF10: 8

## 2023-07-27 ASSESSMENT — PAIN DESCRIPTION - PAIN TYPE: TYPE: CHRONIC PAIN

## 2023-07-27 ASSESSMENT — PAIN DESCRIPTION - LOCATION: LOCATION: LEG

## 2023-07-27 ASSESSMENT — PAIN DESCRIPTION - FREQUENCY: FREQUENCY: INTERMITTENT

## 2023-07-27 ASSESSMENT — PAIN - FUNCTIONAL ASSESSMENT: PAIN_FUNCTIONAL_ASSESSMENT: ACTIVITIES ARE NOT PREVENTED

## 2023-07-27 NOTE — CARE COORDINATION
CM screened chart for potential discharge needs. Patient admitted through ED via EMS after syncopal episode at home. Upon review of chart, patient resides in group home through the [address] [place] [address] are attending with USHA michael who have signed off as of today. Patient is established at [facility] for primary care. Patient is listed as self-pay and may need assistance with any new prescription medications. Patient will not be able to discharge home with any narcotic medications. CM will plan to follow to assist with final discharge needs and will obtain patient's consent to speak with [facility] . 07/26/23 5062   Service Assessment   Patient Orientation Alert and Oriented   Cognition Alert   History Provided By Medical Record   Primary Caregiver Self   Accompanied By/Relationship N/A   Support Systems /; Therapist;Other (Comment)  ([facility])   [address] is: Legal Next of [address] [place] [address]   PCP Verified by CM Yes   Last Visit to PCP Within last 6 months  (3/2023)   Prior Functional Level Independent in ADLs/IADLs   Current Functional Level Independent in ADLs/IADLs   Can patient return to prior living arrangement Yes   Ability to make needs known: Good   Family able to assist with home care needs: Other (comment)  ([facility]   Would you like for me to discuss the discharge plan with any other family members/significant others, and if so, who? No  (CM will discuss with [facility] admissions team if necessary and patient alows)   [facility]; Other (Comment)  ([facility])   CM/SW Referral Other (see comment)  (No CM consult)   Social/Functional History   Lives With Other (comment)  ([facility])   Type of Home House   Discharge Planning   Type of [facility]

## 2023-07-28 LAB
ABO + RH BLD: NORMAL
ABO/RH(D) PRE-TRANSFUSION: NORMAL
BLD PROD TYP BPU: NORMAL
BLOOD BAG HEMOLYSIS: NORMAL
CLERICAL ERRORS: NORMAL
DAT P TRANSF RBC QL: NORMAL
DAT RBC QL: NORMAL
HCV IGG SERPL QL IA: REACTIVE S/CO RATIO
HCV RNA SERPL NAA+PROBE-ACNC: NORMAL IU/ML
HEMOLYSIS, POST TRANSFUSION: NORMAL
HEMOLYSIS, PRE TRANSFUSION: NORMAL
INTERPRETATION, OPI7M: NORMAL
PATH REV BLD -IMP: NORMAL
REF LAB TEST REF RANGE: NORMAL
UNIT NUMBER: NORMAL

## 2023-07-29 LAB — VIT B1 BLD-SCNC: 63.6 NMOL/L (ref 66.5–200)

## 2023-08-04 ENCOUNTER — HOSPITAL ENCOUNTER (EMERGENCY)
Age: 63
Discharge: HOME | End: 2023-08-04
Attending: STUDENT IN AN ORGANIZED HEALTH CARE EDUCATION/TRAINING PROGRAM
Payer: MEDICAID

## 2023-08-04 ENCOUNTER — APPOINTMENT (OUTPATIENT)
Dept: MRI IMAGING | Age: 63
End: 2023-08-04
Payer: MEDICAID

## 2023-08-04 VITALS
HEIGHT: 66 IN | RESPIRATION RATE: 17 BRPM | SYSTOLIC BLOOD PRESSURE: 117 MMHG | DIASTOLIC BLOOD PRESSURE: 65 MMHG | OXYGEN SATURATION: 99 % | BODY MASS INDEX: 22.34 KG/M2 | TEMPERATURE: 98.1 F | HEART RATE: 105 BPM | WEIGHT: 139 LBS

## 2023-08-04 DIAGNOSIS — M54.42 CHRONIC MIDLINE LOW BACK PAIN WITH BILATERAL SCIATICA: Primary | ICD-10-CM

## 2023-08-04 DIAGNOSIS — M54.41 CHRONIC MIDLINE LOW BACK PAIN WITH BILATERAL SCIATICA: Primary | ICD-10-CM

## 2023-08-04 DIAGNOSIS — G89.29 CHRONIC MIDLINE LOW BACK PAIN WITH BILATERAL SCIATICA: Primary | ICD-10-CM

## 2023-08-04 LAB
ALBUMIN SERPL-MCNC: 4 G/DL (ref 3.2–4.6)
ALBUMIN/GLOB SERPL: 1.3 (ref 0.4–1.6)
ALP SERPL-CCNC: 85 U/L (ref 50–130)
ALT SERPL-CCNC: 39 U/L (ref 12–65)
ANION GAP SERPL CALC-SCNC: 9 MMOL/L (ref 2–11)
APPEARANCE UR: CLEAR
AST SERPL-CCNC: 36 U/L (ref 15–37)
BASOPHILS # BLD: 0 K/UL (ref 0–0.2)
BASOPHILS NFR BLD: 1 % (ref 0–2)
BILIRUB SERPL-MCNC: 0.3 MG/DL (ref 0.2–1.1)
BILIRUB UR QL: NEGATIVE
BUN SERPL-MCNC: 10 MG/DL (ref 8–23)
CALCIUM SERPL-MCNC: 8.7 MG/DL (ref 8.3–10.4)
CHLORIDE SERPL-SCNC: 104 MMOL/L (ref 101–110)
CO2 SERPL-SCNC: 24 MMOL/L (ref 21–32)
COLOR UR: NORMAL
CREAT SERPL-MCNC: 0.9 MG/DL (ref 0.6–1)
DIFFERENTIAL METHOD BLD: ABNORMAL
EOSINOPHIL # BLD: 0.1 K/UL (ref 0–0.8)
EOSINOPHIL NFR BLD: 1 % (ref 0.5–7.8)
ERYTHROCYTE [DISTWIDTH] IN BLOOD BY AUTOMATED COUNT: 22.3 % (ref 11.9–14.6)
GLOBULIN SER CALC-MCNC: 3.2 G/DL (ref 2.8–4.5)
GLUCOSE SERPL-MCNC: 162 MG/DL (ref 65–100)
GLUCOSE UR STRIP.AUTO-MCNC: NEGATIVE MG/DL
HCT VFR BLD AUTO: 30.2 % (ref 35.8–46.3)
HGB BLD-MCNC: 9.5 G/DL (ref 11.7–15.4)
HGB UR QL STRIP: NEGATIVE
IMM GRANULOCYTES # BLD AUTO: 0 K/UL (ref 0–0.5)
IMM GRANULOCYTES NFR BLD AUTO: 0 % (ref 0–5)
KETONES UR QL STRIP.AUTO: NEGATIVE MG/DL
LEUKOCYTE ESTERASE UR QL STRIP.AUTO: NEGATIVE
LYMPHOCYTES # BLD: 0.7 K/UL (ref 0.5–4.6)
LYMPHOCYTES NFR BLD: 14 % (ref 13–44)
MCH RBC QN AUTO: 30.8 PG (ref 26.1–32.9)
MCHC RBC AUTO-ENTMCNC: 31.5 G/DL (ref 31.4–35)
MCV RBC AUTO: 98.1 FL (ref 82–102)
MONOCYTES # BLD: 0.2 K/UL (ref 0.1–1.3)
MONOCYTES NFR BLD: 4 % (ref 4–12)
NEUTS SEG # BLD: 4 K/UL (ref 1.7–8.2)
NEUTS SEG NFR BLD: 80 % (ref 43–78)
NITRITE UR QL STRIP.AUTO: NEGATIVE
NRBC # BLD: 0 K/UL (ref 0–0.2)
PH UR STRIP: 7.5 (ref 5–9)
PLATELET # BLD AUTO: 97 K/UL (ref 150–450)
PMV BLD AUTO: 12 FL (ref 9.4–12.3)
POTASSIUM SERPL-SCNC: 3.5 MMOL/L (ref 3.5–5.1)
PROT SERPL-MCNC: 7.2 G/DL (ref 6.3–8.2)
PROT UR STRIP-MCNC: NEGATIVE MG/DL
RBC # BLD AUTO: 3.08 M/UL (ref 4.05–5.2)
SODIUM SERPL-SCNC: 137 MMOL/L (ref 133–143)
SP GR UR REFRACTOMETRY: 1.01 (ref 1–1.02)
UROBILINOGEN UR QL STRIP.AUTO: 0.2 EU/DL (ref 0.2–1)
WBC # BLD AUTO: 5.1 K/UL (ref 4.3–11.1)

## 2023-08-04 PROCEDURE — 80053 COMPREHEN METABOLIC PANEL: CPT

## 2023-08-04 PROCEDURE — 51798 US URINE CAPACITY MEASURE: CPT

## 2023-08-04 PROCEDURE — 96374 THER/PROPH/DIAG INJ IV PUSH: CPT

## 2023-08-04 PROCEDURE — 99284 EMERGENCY DEPT VISIT MOD MDM: CPT

## 2023-08-04 PROCEDURE — 6360000002 HC RX W HCPCS: Performed by: STUDENT IN AN ORGANIZED HEALTH CARE EDUCATION/TRAINING PROGRAM

## 2023-08-04 PROCEDURE — 72148 MRI LUMBAR SPINE W/O DYE: CPT

## 2023-08-04 PROCEDURE — 85025 COMPLETE CBC W/AUTO DIFF WBC: CPT

## 2023-08-04 PROCEDURE — 72146 MRI CHEST SPINE W/O DYE: CPT

## 2023-08-04 PROCEDURE — 81003 URINALYSIS AUTO W/O SCOPE: CPT

## 2023-08-04 PROCEDURE — 6370000000 HC RX 637 (ALT 250 FOR IP): Performed by: STUDENT IN AN ORGANIZED HEALTH CARE EDUCATION/TRAINING PROGRAM

## 2023-08-04 RX ORDER — IBUPROFEN 800 MG/1
800 TABLET ORAL EVERY 6 HOURS PRN
Qty: 20 TABLET | Refills: 0 | Status: SHIPPED | OUTPATIENT
Start: 2023-08-04

## 2023-08-04 RX ORDER — IBUPROFEN 800 MG/1
800 TABLET ORAL
Status: COMPLETED | OUTPATIENT
Start: 2023-08-04 | End: 2023-08-04

## 2023-08-04 RX ORDER — CYCLOBENZAPRINE HCL 10 MG
10 TABLET ORAL 3 TIMES DAILY PRN
Qty: 20 TABLET | Refills: 2 | Status: SHIPPED | OUTPATIENT
Start: 2023-08-04 | End: 2023-08-14

## 2023-08-04 RX ORDER — DEXAMETHASONE SODIUM PHOSPHATE 10 MG/ML
10 INJECTION INTRAMUSCULAR; INTRAVENOUS ONCE
Status: COMPLETED | OUTPATIENT
Start: 2023-08-04 | End: 2023-08-04

## 2023-08-04 RX ORDER — PREDNISONE 20 MG/1
40 TABLET ORAL DAILY
Qty: 10 TABLET | Refills: 0 | Status: SHIPPED | OUTPATIENT
Start: 2023-08-04 | End: 2023-08-09

## 2023-08-04 RX ADMIN — IBUPROFEN 800 MG: 800 TABLET, FILM COATED ORAL at 21:44

## 2023-08-04 RX ADMIN — DEXAMETHASONE SODIUM PHOSPHATE 10 MG: 10 INJECTION INTRAMUSCULAR; INTRAVENOUS at 21:44

## 2023-08-04 ASSESSMENT — PAIN SCALES - GENERAL
PAINLEVEL_OUTOF10: 9
PAINLEVEL_OUTOF10: 7

## 2023-08-04 ASSESSMENT — ENCOUNTER SYMPTOMS: BACK PAIN: 1

## 2023-08-04 ASSESSMENT — PAIN - FUNCTIONAL ASSESSMENT: PAIN_FUNCTIONAL_ASSESSMENT: 0-10

## 2023-08-04 NOTE — ED TRIAGE NOTES
Patient ambulatory to triage. Patient c\o L lower back numbness and pain. Patient states this has been occurring for 2 weeks. Patient states she has been incontinent. Patient states she was hospitalized last week for lowe hemoglobin and a UTI.

## 2023-08-05 NOTE — DISCHARGE INSTRUCTIONS
Your MRI imaging appears stable. Take the medication prescribed as directed. Arrange follow-up with the spinal specialist listed. Return for worsening symptoms, concerns or questions.

## 2023-08-08 ASSESSMENT — ENCOUNTER SYMPTOMS
SHORTNESS OF BREATH: 1
EYE DISCHARGE: 0
FACIAL SWELLING: 0
RHINORRHEA: 0
ABDOMINAL PAIN: 0
NAUSEA: 0
COLOR CHANGE: 0
COUGH: 0
BACK PAIN: 0
EYE REDNESS: 0
VOMITING: 0

## 2023-08-08 NOTE — ED PROVIDER NOTES
40 MG tablet Take 1 tablet by mouth every morning (before breakfast), Disp-30 tablet, R-3Print              Results for orders placed or performed during the hospital encounter of 07/24/23   Culture, Urine    Specimen: Urine, clean catch   Result Value Ref Range    Special Requests NO SPECIAL REQUESTS      Culture        10,000 to 50,000 COLONIES/mL MIXED SKIN WILL ISOLATED   CBC with Auto Differential   Result Value Ref Range    WBC 14.9 (H) 4.3 - 11.1 K/uL    RBC 2.67 (L) 4.05 - 5.2 M/uL    Hemoglobin 7.2 (L) 11.7 - 15.4 g/dL    Hematocrit 23.9 (L) 35.8 - 46.3 %    MCV 89.5 82 - 102 FL    MCH 27.0 26.1 - 32.9 PG    MCHC 30.1 (L) 31.4 - 35.0 g/dL    RDW 17.2 (H) 11.9 - 14.6 %    Platelets 472 435 - 538 K/uL    MPV 12.0 9.4 - 12.3 FL    nRBC 0.05 0.0 - 0.2 K/uL    Neutrophils % 78 43 - 78 %    Lymphocytes % 13 13 - 44 %    Monocytes % 7 4.0 - 12.0 %    Eosinophils % 0 (L) 0.5 - 7.8 %    Basophils % 1 0.0 - 2.0 %    Immature Granulocytes 1 0.0 - 5.0 %    Neutrophils Absolute 11.8 (H) 1.7 - 8.2 K/UL    Lymphocytes Absolute 1.9 0.5 - 4.6 K/UL    Monocytes Absolute 1.0 0.1 - 1.3 K/UL    Eosinophils Absolute 0.0 0.0 - 0.8 K/UL    Basophils Absolute 0.1 0.0 - 0.2 K/UL    Absolute Immature Granulocyte 0.1 0.0 - 0.5 K/UL    Differential Type AUTOMATED     Comprehensive Metabolic Panel   Result Value Ref Range    Sodium 138 133 - 143 mmol/L    Potassium 3.7 3.5 - 5.1 mmol/L    Chloride 108 101 - 110 mmol/L    CO2 21 21 - 32 mmol/L    Anion Gap 9 2 - 11 mmol/L    Glucose 121 (H) 65 - 100 mg/dL    BUN 32 (H) 8 - 23 MG/DL    Creatinine 1.00 0.6 - 1.0 MG/DL    Est, Glom Filt Rate >60 >60 ml/min/1.73m2    Calcium 9.2 8.3 - 10.4 MG/DL    Total Bilirubin 0.3 0.2 - 1.1 MG/DL    ALT 24 12 - 65 U/L    AST 28 15 - 37 U/L    Alk Phosphatase 67 50 - 136 U/L    Total Protein 7.3 6.3 - 8.2 g/dL    Albumin 3.8 3.2 - 4.6 g/dL    Globulin 3.5 2.8 - 4.5 g/dL    Albumin/Globulin Ratio 1.1 0.4 - 1.6     Magnesium   Result Value Ref Range

## 2023-08-11 ENCOUNTER — OFFICE VISIT (OUTPATIENT)
Dept: ORTHOPEDIC SURGERY | Age: 63
End: 2023-08-11

## 2023-08-11 VITALS — WEIGHT: 136 LBS | BODY MASS INDEX: 21.86 KG/M2 | HEIGHT: 66 IN

## 2023-08-11 DIAGNOSIS — M54.50 LOW BACK PAIN, UNSPECIFIED BACK PAIN LATERALITY, UNSPECIFIED CHRONICITY, UNSPECIFIED WHETHER SCIATICA PRESENT: Primary | ICD-10-CM

## 2023-08-11 DIAGNOSIS — M79.604 RIGHT LEG PAIN: ICD-10-CM

## 2023-08-11 DIAGNOSIS — M79.605 LEFT LEG PAIN: ICD-10-CM

## 2023-08-11 DIAGNOSIS — M43.16 SPONDYLOLISTHESIS OF LUMBAR REGION: ICD-10-CM

## 2023-08-11 RX ORDER — PREDNISONE 10 MG/1
TABLET ORAL
Qty: 48 TABLET | Refills: 0 | Status: SHIPPED | OUTPATIENT
Start: 2023-08-11

## 2023-08-11 NOTE — PROGRESS NOTES
[facility]  Consultation Note    Patient ID:  Name: Courtney Nickerson  MRN: [id number]  AGE: 58 y.o.  : 1960    Date of Consultation:  2023    CC:   Chief Complaint   Patient presents with    New Patient    Lower Back Pain         HPI: This is a new patient visit on Courtney Nickerson, she is a 59-year-old white female who is complaining of low back and left greater than right leg pain for over a years duration without any injury and has been worsening over time pain seems to go down the the back and side of the legs down to the feet with numbness and tingling and she says at this point she is got constant numbness in her buttocks and constant pain in her back and is worsened by standing walking is better by sitting she is treated this symptomatically with Tylenol and ibuprofen and she had p.o. steroids which recently have really helped her. In addition her family doctor put her on an OM exercise program since she could not afford physical therapy and she has done that regularly for the last 6 weeks. She says on a scale of 1-10 her bad pain gets to a 9 out of 10 where is her average pain is around a 5 out of 10. Past Medical History Includes: She is not a diabetic and does not have heart or lung disease but she does take an 81 mg aspirin as her only blood thinner      Family History:   Family History   Problem Relation Age of Onset    Osteoarthritis Mother     Lung Disease Father     Cancer Father     Kidney Disease Brother       Social History:   Social History     Tobacco Use    Smoking status: Former     Packs/day: 1.00     Types: Cigarettes     Quit date: 2005     Years since quittin.1    Smokeless tobacco: Never   Substance Use Topics    Alcohol use:  Yes       ALLERGIES:   Allergies   Allergen Reactions    Pcn [Penicillins] Other (See Comments)        Patient Medications    Current Outpatient Medications   Medication Sig    ibuprofen (ADVIL;MOTRIN) 800

## 2023-08-23 PROBLEM — N39.0 UTI (URINARY TRACT INFECTION): Status: RESOLVED | Noted: 2023-07-24 | Resolved: 2023-08-23

## 2023-09-05 ENCOUNTER — TELEPHONE (OUTPATIENT)
Dept: ORTHOPEDIC SURGERY | Age: 63
End: 2023-09-05

## 2023-09-05 NOTE — TELEPHONE ENCOUNTER
Schedule shots at pain clinic or see Dr Carrington Gonzalez first since fixed ins issues and wants to talk to you

## 2023-09-06 ENCOUNTER — TELEPHONE (OUTPATIENT)
Dept: ORTHOPEDIC SURGERY | Age: 63
End: 2023-09-06

## 2023-09-06 DIAGNOSIS — M54.50 LOW BACK PAIN, UNSPECIFIED BACK PAIN LATERALITY, UNSPECIFIED CHRONICITY, UNSPECIFIED WHETHER SCIATICA PRESENT: ICD-10-CM

## 2023-09-06 DIAGNOSIS — M43.16 SPONDYLOLISTHESIS OF LUMBAR REGION: Primary | ICD-10-CM

## 2023-09-06 NOTE — TELEPHONE ENCOUNTER
Pt  now  has  1st  choice  next PPO  [facility]  [place]  a [place]  and  now  can  she  come  here to  [facility]  for  her  injections    ID  [id number]  Group  [id number]  Member ID  [id number]    Call  pt

## 2023-09-08 ENCOUNTER — HOSPITAL ENCOUNTER (INPATIENT)
Age: 63
LOS: 2 days | Discharge: HOME | DRG: 812 | End: 2023-09-10
Attending: STUDENT IN AN ORGANIZED HEALTH CARE EDUCATION/TRAINING PROGRAM | Admitting: INTERNAL MEDICINE
Payer: MEDICAID

## 2023-09-08 ENCOUNTER — APPOINTMENT (OUTPATIENT)
Dept: GENERAL RADIOLOGY | Age: 63
DRG: 812 | End: 2023-09-08
Payer: MEDICAID

## 2023-09-08 DIAGNOSIS — R79.89 ELEVATED LACTIC ACID LEVEL: ICD-10-CM

## 2023-09-08 DIAGNOSIS — D50.0 IRON DEFICIENCY ANEMIA DUE TO CHRONIC BLOOD LOSS: ICD-10-CM

## 2023-09-08 DIAGNOSIS — K92.2 UPPER GI BLEED: Primary | ICD-10-CM

## 2023-09-08 DIAGNOSIS — D50.0 ANEMIA DUE TO BLOOD LOSS: ICD-10-CM

## 2023-09-08 PROBLEM — D64.9 ANEMIA REQUIRING TRANSFUSIONS: Status: ACTIVE | Noted: 2023-09-08

## 2023-09-08 PROBLEM — I48.91 ATRIAL FIBRILLATION WITH RVR (HCC): Chronic | Status: ACTIVE | Noted: 2022-09-12

## 2023-09-08 PROBLEM — G62.9 NEUROPATHY: Status: ACTIVE | Noted: 2023-09-08

## 2023-09-08 PROBLEM — I48.0 PAF (PAROXYSMAL ATRIAL FIBRILLATION) (HCC): Chronic | Status: ACTIVE | Noted: 2022-09-12

## 2023-09-08 PROBLEM — I48.92 ATRIAL FLUTTER (HCC): Chronic | Status: ACTIVE | Noted: 2022-09-09

## 2023-09-08 PROBLEM — G62.9 NEUROPATHY: Chronic | Status: ACTIVE | Noted: 2023-09-08

## 2023-09-08 PROBLEM — E77.8 HYPOPROTEINEMIA (HCC): Status: ACTIVE | Noted: 2023-09-08

## 2023-09-08 PROBLEM — B19.20 HEPATITIS C: Chronic | Status: ACTIVE | Noted: 2022-09-09

## 2023-09-08 LAB
ALBUMIN SERPL-MCNC: 3 G/DL (ref 3.2–4.6)
ALBUMIN/GLOB SERPL: 1.1 (ref 0.4–1.6)
ALP SERPL-CCNC: 74 U/L (ref 50–130)
ALT SERPL-CCNC: 30 U/L (ref 12–65)
ANION GAP SERPL CALC-SCNC: 12 MMOL/L (ref 2–11)
AST SERPL-CCNC: 34 U/L (ref 15–37)
BASOPHILS # BLD: 0 K/UL (ref 0–0.2)
BASOPHILS NFR BLD: 1 % (ref 0–2)
BILIRUB SERPL-MCNC: 0.4 MG/DL (ref 0.2–1.1)
BUN SERPL-MCNC: 24 MG/DL (ref 8–23)
CALCIUM SERPL-MCNC: 8.5 MG/DL (ref 8.3–10.4)
CHLORIDE SERPL-SCNC: 108 MMOL/L (ref 101–110)
CO2 SERPL-SCNC: 20 MMOL/L (ref 21–32)
CREAT SERPL-MCNC: 0.75 MG/DL (ref 0.6–1)
DIFFERENTIAL METHOD BLD: ABNORMAL
EKG ATRIAL RATE: 111 BPM
EKG DIAGNOSIS: NORMAL
EKG P AXIS: 85 DEGREES
EKG P-R INTERVAL: 156 MS
EKG Q-T INTERVAL: 310 MS
EKG QRS DURATION: 60 MS
EKG QTC CALCULATION (BAZETT): 421 MS
EKG R AXIS: 34 DEGREES
EKG T AXIS: 69 DEGREES
EKG VENTRICULAR RATE: 111 BPM
EOSINOPHIL # BLD: 0.1 K/UL (ref 0–0.8)
EOSINOPHIL NFR BLD: 1 % (ref 0.5–7.8)
ERYTHROCYTE [DISTWIDTH] IN BLOOD BY AUTOMATED COUNT: 18.4 % (ref 11.9–14.6)
FERRITIN SERPL-MCNC: 51 NG/ML (ref 8–388)
GLOBULIN SER CALC-MCNC: 2.7 G/DL (ref 2.8–4.5)
GLUCOSE SERPL-MCNC: 126 MG/DL (ref 65–100)
HCT VFR BLD AUTO: 19 % (ref 35.8–46.3)
HCT VFR BLD AUTO: 20.3 % (ref 35.8–46.3)
HGB BLD-MCNC: 5.5 G/DL (ref 11.7–15.4)
HGB BLD-MCNC: 6.2 G/DL (ref 11.7–15.4)
HISTORY CHECK: NORMAL
HISTORY CHECK: NORMAL
IMM GRANULOCYTES # BLD AUTO: 0.4 K/UL (ref 0–0.5)
IMM GRANULOCYTES NFR BLD AUTO: 4 % (ref 0–5)
IRON SATN MFR SERPL: 44 %
IRON SERPL-MCNC: 106 UG/DL (ref 35–150)
LACTATE SERPL-SCNC: 1 MMOL/L (ref 0.4–2)
LACTATE SERPL-SCNC: 5.7 MMOL/L (ref 0.4–2)
LYMPHOCYTES # BLD: 1.5 K/UL (ref 0.5–4.6)
LYMPHOCYTES NFR BLD: 19 % (ref 13–44)
MAGNESIUM SERPL-MCNC: 1.5 MG/DL (ref 1.8–2.4)
MCH RBC QN AUTO: 28.2 PG (ref 26.1–32.9)
MCHC RBC AUTO-ENTMCNC: 28.9 G/DL (ref 31.4–35)
MCV RBC AUTO: 97.4 FL (ref 82–102)
MONOCYTES # BLD: 0.7 K/UL (ref 0.1–1.3)
MONOCYTES NFR BLD: 9 % (ref 4–12)
NEUTS SEG # BLD: 5.4 K/UL (ref 1.7–8.2)
NEUTS SEG NFR BLD: 66 % (ref 43–78)
NRBC # BLD: 0.32 K/UL (ref 0–0.2)
PLATELET # BLD AUTO: 219 K/UL (ref 150–450)
PMV BLD AUTO: 12.2 FL (ref 9.4–12.3)
POTASSIUM SERPL-SCNC: 3.8 MMOL/L (ref 3.5–5.1)
PROCALCITONIN SERPL-MCNC: 0.23 NG/ML (ref 0–0.49)
PROT SERPL-MCNC: 5.7 G/DL (ref 6.3–8.2)
RBC # BLD AUTO: 1.95 M/UL (ref 4.05–5.2)
SARS-COV-2 RDRP RESP QL NAA+PROBE: NOT DETECTED
SODIUM SERPL-SCNC: 140 MMOL/L (ref 133–143)
SOURCE: NORMAL
TIBC SERPL-MCNC: 240 UG/DL (ref 250–450)
WBC # BLD AUTO: 8.2 K/UL (ref 4.3–11.1)

## 2023-09-08 PROCEDURE — 6370000000 HC RX 637 (ALT 250 FOR IP): Performed by: INTERNAL MEDICINE

## 2023-09-08 PROCEDURE — 87635 SARS-COV-2 COVID-19 AMP PRB: CPT

## 2023-09-08 PROCEDURE — C9113 INJ PANTOPRAZOLE SODIUM, VIA: HCPCS

## 2023-09-08 PROCEDURE — 83605 ASSAY OF LACTIC ACID: CPT

## 2023-09-08 PROCEDURE — 86850 RBC ANTIBODY SCREEN: CPT

## 2023-09-08 PROCEDURE — 84145 PROCALCITONIN (PCT): CPT

## 2023-09-08 PROCEDURE — 83735 ASSAY OF MAGNESIUM: CPT

## 2023-09-08 PROCEDURE — 30233N1 TRANSFUSION OF NONAUTOLOGOUS RED BLOOD CELLS INTO PERIPHERAL VEIN, PERCUTANEOUS APPROACH: ICD-10-PCS | Performed by: INTERNAL MEDICINE

## 2023-09-08 PROCEDURE — 85025 COMPLETE CBC W/AUTO DIFF WBC: CPT

## 2023-09-08 PROCEDURE — 6360000002 HC RX W HCPCS: Performed by: INTERNAL MEDICINE

## 2023-09-08 PROCEDURE — 82728 ASSAY OF FERRITIN: CPT

## 2023-09-08 PROCEDURE — 2580000003 HC RX 258: Performed by: INTERNAL MEDICINE

## 2023-09-08 PROCEDURE — 2580000003 HC RX 258

## 2023-09-08 PROCEDURE — 1100000000 HC RM PRIVATE

## 2023-09-08 PROCEDURE — 36430 TRANSFUSION BLD/BLD COMPNT: CPT

## 2023-09-08 PROCEDURE — 85014 HEMATOCRIT: CPT

## 2023-09-08 PROCEDURE — 80053 COMPREHEN METABOLIC PANEL: CPT

## 2023-09-08 PROCEDURE — A4216 STERILE WATER/SALINE, 10 ML: HCPCS

## 2023-09-08 PROCEDURE — 86900 BLOOD TYPING SEROLOGIC ABO: CPT

## 2023-09-08 PROCEDURE — 71045 X-RAY EXAM CHEST 1 VIEW: CPT

## 2023-09-08 PROCEDURE — 86923 COMPATIBILITY TEST ELECTRIC: CPT

## 2023-09-08 PROCEDURE — 36415 COLL VENOUS BLD VENIPUNCTURE: CPT

## 2023-09-08 PROCEDURE — 6360000002 HC RX W HCPCS

## 2023-09-08 PROCEDURE — 83550 IRON BINDING TEST: CPT

## 2023-09-08 PROCEDURE — 83540 ASSAY OF IRON: CPT

## 2023-09-08 PROCEDURE — 99285 EMERGENCY DEPT VISIT HI MDM: CPT

## 2023-09-08 PROCEDURE — 85018 HEMOGLOBIN: CPT

## 2023-09-08 PROCEDURE — P9016 RBC LEUKOCYTES REDUCED: HCPCS

## 2023-09-08 PROCEDURE — 86901 BLOOD TYPING SEROLOGIC RH(D): CPT

## 2023-09-08 RX ORDER — FERROUS SULFATE 325(65) MG
325 TABLET ORAL
Status: DISCONTINUED | OUTPATIENT
Start: 2023-09-09 | End: 2023-09-10 | Stop reason: HOSPADM

## 2023-09-08 RX ORDER — LANOLIN ALCOHOL/MO/W.PET/CERES
3 CREAM (GRAM) TOPICAL NIGHTLY PRN
Status: DISCONTINUED | OUTPATIENT
Start: 2023-09-08 | End: 2023-09-10 | Stop reason: HOSPADM

## 2023-09-08 RX ORDER — MAGNESIUM SULFATE IN WATER 40 MG/ML
2000 INJECTION, SOLUTION INTRAVENOUS ONCE
Status: COMPLETED | OUTPATIENT
Start: 2023-09-08 | End: 2023-09-08

## 2023-09-08 RX ORDER — METOPROLOL SUCCINATE 50 MG/1
50 TABLET, EXTENDED RELEASE ORAL DAILY
Status: DISCONTINUED | OUTPATIENT
Start: 2023-09-09 | End: 2023-09-10

## 2023-09-08 RX ORDER — ACETAMINOPHEN 325 MG/1
650 TABLET ORAL EVERY 6 HOURS PRN
Status: DISCONTINUED | OUTPATIENT
Start: 2023-09-08 | End: 2023-09-10 | Stop reason: HOSPADM

## 2023-09-08 RX ORDER — CLONIDINE HYDROCHLORIDE 0.1 MG/1
0.1 TABLET ORAL EVERY 4 HOURS PRN
Status: DISCONTINUED | OUTPATIENT
Start: 2023-09-08 | End: 2023-09-10 | Stop reason: HOSPADM

## 2023-09-08 RX ORDER — ONDANSETRON 4 MG/1
4 TABLET, ORALLY DISINTEGRATING ORAL EVERY 8 HOURS PRN
Status: DISCONTINUED | OUTPATIENT
Start: 2023-09-08 | End: 2023-09-10 | Stop reason: HOSPADM

## 2023-09-08 RX ORDER — HYDRALAZINE HYDROCHLORIDE 20 MG/ML
20 INJECTION INTRAMUSCULAR; INTRAVENOUS EVERY 4 HOURS PRN
Status: DISCONTINUED | OUTPATIENT
Start: 2023-09-08 | End: 2023-09-10 | Stop reason: HOSPADM

## 2023-09-08 RX ORDER — OXYCODONE HYDROCHLORIDE 5 MG/1
5 TABLET ORAL EVERY 4 HOURS PRN
Status: DISCONTINUED | OUTPATIENT
Start: 2023-09-08 | End: 2023-09-10

## 2023-09-08 RX ORDER — 0.9 % SODIUM CHLORIDE 0.9 %
1000 INTRAVENOUS SOLUTION INTRAVENOUS
Status: COMPLETED | OUTPATIENT
Start: 2023-09-08 | End: 2023-09-08

## 2023-09-08 RX ORDER — SODIUM CHLORIDE, SODIUM LACTATE, POTASSIUM CHLORIDE, CALCIUM CHLORIDE 600; 310; 30; 20 MG/100ML; MG/100ML; MG/100ML; MG/100ML
INJECTION, SOLUTION INTRAVENOUS CONTINUOUS
Status: DISCONTINUED | OUTPATIENT
Start: 2023-09-08 | End: 2023-09-09

## 2023-09-08 RX ORDER — SODIUM CHLORIDE 0.9 % (FLUSH) 0.9 %
5-40 SYRINGE (ML) INJECTION PRN
Status: DISCONTINUED | OUTPATIENT
Start: 2023-09-08 | End: 2023-09-10 | Stop reason: HOSPADM

## 2023-09-08 RX ORDER — ATORVASTATIN CALCIUM 40 MG/1
80 TABLET, FILM COATED ORAL NIGHTLY
Status: DISCONTINUED | OUTPATIENT
Start: 2023-09-08 | End: 2023-09-10 | Stop reason: HOSPADM

## 2023-09-08 RX ORDER — SODIUM CHLORIDE 0.9 % (FLUSH) 0.9 %
5-40 SYRINGE (ML) INJECTION EVERY 12 HOURS SCHEDULED
Status: DISCONTINUED | OUTPATIENT
Start: 2023-09-08 | End: 2023-09-10 | Stop reason: HOSPADM

## 2023-09-08 RX ORDER — ENEMA 19; 7 G/133ML; G/133ML
1 ENEMA RECTAL AS NEEDED
Status: DISCONTINUED | OUTPATIENT
Start: 2023-09-08 | End: 2023-09-10 | Stop reason: HOSPADM

## 2023-09-08 RX ORDER — SODIUM CHLORIDE 9 MG/ML
INJECTION, SOLUTION INTRAVENOUS PRN
Status: DISCONTINUED | OUTPATIENT
Start: 2023-09-08 | End: 2023-09-09

## 2023-09-08 RX ORDER — ONDANSETRON 2 MG/ML
4 INJECTION INTRAMUSCULAR; INTRAVENOUS EVERY 6 HOURS PRN
Status: DISCONTINUED | OUTPATIENT
Start: 2023-09-08 | End: 2023-09-10 | Stop reason: HOSPADM

## 2023-09-08 RX ORDER — SODIUM CHLORIDE 9 MG/ML
INJECTION, SOLUTION INTRAVENOUS PRN
Status: DISCONTINUED | OUTPATIENT
Start: 2023-09-08 | End: 2023-09-10 | Stop reason: HOSPADM

## 2023-09-08 RX ORDER — LOSARTAN POTASSIUM 50 MG/1
50 TABLET ORAL DAILY
Status: DISCONTINUED | OUTPATIENT
Start: 2023-09-09 | End: 2023-09-10 | Stop reason: HOSPADM

## 2023-09-08 RX ORDER — GUAIFENESIN/DEXTROMETHORPHAN 100-10MG/5
10 SYRUP ORAL EVERY 4 HOURS PRN
Status: DISCONTINUED | OUTPATIENT
Start: 2023-09-08 | End: 2023-09-10 | Stop reason: HOSPADM

## 2023-09-08 RX ORDER — ACETAMINOPHEN 650 MG/1
650 SUPPOSITORY RECTAL EVERY 6 HOURS PRN
Status: DISCONTINUED | OUTPATIENT
Start: 2023-09-08 | End: 2023-09-10 | Stop reason: HOSPADM

## 2023-09-08 RX ADMIN — SODIUM CHLORIDE 1000 ML: 9 INJECTION, SOLUTION INTRAVENOUS at 16:51

## 2023-09-08 RX ADMIN — SODIUM CHLORIDE, POTASSIUM CHLORIDE, SODIUM LACTATE AND CALCIUM CHLORIDE: 600; 310; 30; 20 INJECTION, SOLUTION INTRAVENOUS at 18:30

## 2023-09-08 RX ADMIN — SODIUM CHLORIDE 40 MG: 9 INJECTION INTRAMUSCULAR; INTRAVENOUS; SUBCUTANEOUS at 17:44

## 2023-09-08 RX ADMIN — MAGNESIUM SULFATE HEPTAHYDRATE 2000 MG: 40 INJECTION, SOLUTION INTRAVENOUS at 18:07

## 2023-09-08 RX ADMIN — ACETAMINOPHEN 650 MG: 325 TABLET, FILM COATED ORAL at 20:47

## 2023-09-08 ASSESSMENT — PAIN SCALES - GENERAL
PAINLEVEL_OUTOF10: 3
PAINLEVEL_OUTOF10: 9

## 2023-09-08 ASSESSMENT — PAIN DESCRIPTION - LOCATION: LOCATION: LEG

## 2023-09-08 ASSESSMENT — PAIN - FUNCTIONAL ASSESSMENT: PAIN_FUNCTIONAL_ASSESSMENT: 0-10

## 2023-09-08 NOTE — ED NOTES
TRANSFER - OUT REPORT:    Verbal report given to [facility] on Levy Garcia  being transferred to  [phone] for routine progression of patient care       Report consisted of patient's Situation, Background, Assessment and   Recommendations(SBAR). Information from the following report(s) ED SBAR was reviewed with the receiving nurse. Lines:   Peripheral IV 09/08/23 Right Antecubital (Active)   Site Assessment Clean, dry & intact 09/08/23 1640   Line Status Blood return noted;Specimen collected; Flushed;Normal saline locked 09/08/23 1640   Phlebitis Assessment No symptoms 09/08/23 1640   Infiltration Assessment 0 09/08/23 1640       Peripheral IV 09/08/23 Left Forearm (Active)        Opportunity for questions and clarification was provided.       Patient transported with:  Registered Nurse      Guido Monaco RN  09/08/23 [id number] [facility], RN  09/08/23 [id number]

## 2023-09-08 NOTE — ED PROVIDER NOTES
Emergency Department Provider Note                   PCP:                Carrie Franklin, APRN               Age: 58 y.o. Sex: female     Yomi Mccarty Admitted 09/08/2023 05:26:42 PM       ICD-10-CM    1. Upper GI bleed  K92.2       2. Elevated lactic acid level  R79.89       3. Anemia due to blood loss  D50.0           MEDICAL DECISION MAKING  Complexity of Problems Addressed:  Complexity of Problem: 1 acute complicated illness or injury. Data Reviewed and Analyzed:  Category 1:   I reviewed external records: provider visit note from PCP. I reviewed external records: provider visit note from outside specialist.  I reviewed external records: previous lab results from outside ED. I ordered each unique test.  I reviewed the results of each unique test.      Category 2:   I interpreted the X-rays. And agree there is no pneumothorax    Category 3: Discussion of management or test interpretation. Patient is a 69-year-old female with past medical history of atrial fib, anemia, chronic back pain with bilateral sciatica, distal aortic stenosis, and history of alcohol dependence (quit 2 years ago) presents complaining of palpitations, dizziness, and bilateral leg pain. Patient had a similar episode on 7/24/2023 in which she was admitted to our facility for orthostatic hypotension, volume depletion, and a UTI. Will obtain basic labs, lactic and Pro-Doe given her tachycardia and similar symptoms with UTI in the past, urine, chest x-ray, EKG, and orthostatics for further evaluation. 1655: Patient's hemoglobin is 5.5, will order type and screen and blood. Patient agreed to transfusion. Hemoglobin was positive. Will administer Protonix. She does have a history of previous GI bleeds and alcohol dependence, she did quit 2 years ago. EKG reveals sinus tachycardia at 111 bpm.  No evidence of A-fib or a flutter.   1910: Discussed this patient's case with my attending, Dr. Yuki Arce as she does need admission for

## 2023-09-08 NOTE — CONSENT
Informed Consent for Blood Component Transfusion Note    I have discussed with the patient the rationale for blood component transfusion; its benefits in treating or preventing fatigue, organ damage, or death; and its risk which includes mild transfusion reactions, rare risk of blood borne infection, or more serious but rare reactions. I have discussed the alternatives to transfusion, including the risk and consequences of not receiving transfusion. The patient had an opportunity to ask questions and had agreed to proceed with transfusion of blood components.     Electronically signed by Abelardo Sanchez PA-C on 9/8/23 at [address] [place] [address] EDT

## 2023-09-08 NOTE — ED TRIAGE NOTES
Pt c/o dizziness starting this morning with pain in both of her legs. Pt states recent MRI for sciatica.

## 2023-09-09 ENCOUNTER — ANESTHESIA (OUTPATIENT)
Dept: ENDOSCOPY | Age: 63
End: 2023-09-09
Payer: MEDICAID

## 2023-09-09 ENCOUNTER — ANESTHESIA EVENT (OUTPATIENT)
Dept: ENDOSCOPY | Age: 63
End: 2023-09-09
Payer: MEDICAID

## 2023-09-09 VITALS
HEIGHT: 66 IN | HEART RATE: 90 BPM | RESPIRATION RATE: 16 BRPM | BODY MASS INDEX: 21.38 KG/M2 | DIASTOLIC BLOOD PRESSURE: 72 MMHG | TEMPERATURE: 98.8 F | WEIGHT: 133 LBS | OXYGEN SATURATION: 96 % | SYSTOLIC BLOOD PRESSURE: 144 MMHG

## 2023-09-09 LAB
ABO + RH BLD: NORMAL
ANION GAP SERPL CALC-SCNC: 5 MMOL/L (ref 2–11)
BLD PROD TYP BPU: NORMAL
BLD PROD TYP BPU: NORMAL
BLOOD BANK CMNT PATIENT-IMP: NORMAL
BLOOD BANK DISPENSE STATUS: NORMAL
BLOOD BANK DISPENSE STATUS: NORMAL
BLOOD GROUP ANTIBODIES SERPL: NORMAL
BPU ID: NORMAL
BPU ID: NORMAL
BUN SERPL-MCNC: 16 MG/DL (ref 8–23)
CALCIUM SERPL-MCNC: 7.7 MG/DL (ref 8.3–10.4)
CHLORIDE SERPL-SCNC: 112 MMOL/L (ref 101–110)
CO2 SERPL-SCNC: 23 MMOL/L (ref 21–32)
CREAT SERPL-MCNC: 0.61 MG/DL (ref 0.6–1)
CROSSMATCH RESULT: NORMAL
CROSSMATCH RESULT: NORMAL
ERYTHROCYTE [DISTWIDTH] IN BLOOD BY AUTOMATED COUNT: 16.3 % (ref 11.9–14.6)
GLUCOSE SERPL-MCNC: 107 MG/DL (ref 65–100)
HCT VFR BLD AUTO: 24.4 % (ref 35.8–46.3)
HCT VFR BLD AUTO: 26.7 % (ref 35.8–46.3)
HCT VFR BLD AUTO: 28.5 % (ref 35.8–46.3)
HGB BLD-MCNC: 7.6 G/DL (ref 11.7–15.4)
HGB BLD-MCNC: 8.3 G/DL (ref 11.7–15.4)
HGB BLD-MCNC: 8.8 G/DL (ref 11.7–15.4)
MAGNESIUM SERPL-MCNC: 1.9 MG/DL (ref 1.8–2.4)
MCH RBC QN AUTO: 28.5 PG (ref 26.1–32.9)
MCHC RBC AUTO-ENTMCNC: 31.1 G/DL (ref 31.4–35)
MCV RBC AUTO: 91.4 FL (ref 82–102)
NRBC # BLD: 0.16 K/UL (ref 0–0.2)
PLATELET # BLD AUTO: 135 K/UL (ref 150–450)
PMV BLD AUTO: 11.8 FL (ref 9.4–12.3)
POTASSIUM SERPL-SCNC: 3.5 MMOL/L (ref 3.5–5.1)
RBC # BLD AUTO: 2.67 M/UL (ref 4.05–5.2)
SODIUM SERPL-SCNC: 140 MMOL/L (ref 133–143)
SPECIMEN EXP DATE BLD: NORMAL
UNIT DIVISION: 0
UNIT DIVISION: 0
WBC # BLD AUTO: 4.7 K/UL (ref 4.3–11.1)

## 2023-09-09 PROCEDURE — 36415 COLL VENOUS BLD VENIPUNCTURE: CPT

## 2023-09-09 PROCEDURE — 7100000011 HC PHASE II RECOVERY - ADDTL 15 MIN: Performed by: INTERNAL MEDICINE

## 2023-09-09 PROCEDURE — 83735 ASSAY OF MAGNESIUM: CPT

## 2023-09-09 PROCEDURE — 6370000000 HC RX 637 (ALT 250 FOR IP): Performed by: INTERNAL MEDICINE

## 2023-09-09 PROCEDURE — 2709999900 HC NON-CHARGEABLE SUPPLY: Performed by: INTERNAL MEDICINE

## 2023-09-09 PROCEDURE — 88312 SPECIAL STAINS GROUP 1: CPT

## 2023-09-09 PROCEDURE — 3609012400 HC EGD TRANSORAL BIOPSY SINGLE/MULTIPLE: Performed by: INTERNAL MEDICINE

## 2023-09-09 PROCEDURE — 2580000003 HC RX 258: Performed by: NURSE ANESTHETIST, CERTIFIED REGISTERED

## 2023-09-09 PROCEDURE — 3700000001 HC ADD 15 MINUTES (ANESTHESIA): Performed by: INTERNAL MEDICINE

## 2023-09-09 PROCEDURE — C9113 INJ PANTOPRAZOLE SODIUM, VIA: HCPCS | Performed by: INTERNAL MEDICINE

## 2023-09-09 PROCEDURE — 85014 HEMATOCRIT: CPT

## 2023-09-09 PROCEDURE — 80048 BASIC METABOLIC PNL TOTAL CA: CPT

## 2023-09-09 PROCEDURE — 7100000010 HC PHASE II RECOVERY - FIRST 15 MIN: Performed by: INTERNAL MEDICINE

## 2023-09-09 PROCEDURE — 88305 TISSUE EXAM BY PATHOLOGIST: CPT

## 2023-09-09 PROCEDURE — A4216 STERILE WATER/SALINE, 10 ML: HCPCS | Performed by: INTERNAL MEDICINE

## 2023-09-09 PROCEDURE — 6360000002 HC RX W HCPCS: Performed by: NURSE ANESTHETIST, CERTIFIED REGISTERED

## 2023-09-09 PROCEDURE — 85018 HEMOGLOBIN: CPT

## 2023-09-09 PROCEDURE — 6360000002 HC RX W HCPCS: Performed by: INTERNAL MEDICINE

## 2023-09-09 PROCEDURE — 0DJ08ZZ INSPECTION OF UPPER INTESTINAL TRACT, VIA NATURAL OR ARTIFICIAL OPENING ENDOSCOPIC: ICD-10-PCS | Performed by: INTERNAL MEDICINE

## 2023-09-09 PROCEDURE — 85027 COMPLETE CBC AUTOMATED: CPT

## 2023-09-09 PROCEDURE — 2580000003 HC RX 258: Performed by: INTERNAL MEDICINE

## 2023-09-09 PROCEDURE — 3700000000 HC ANESTHESIA ATTENDED CARE: Performed by: INTERNAL MEDICINE

## 2023-09-09 PROCEDURE — 1100000000 HC RM PRIVATE

## 2023-09-09 RX ORDER — PROPOFOL 10 MG/ML
INJECTION, EMULSION INTRAVENOUS PRN
Status: DISCONTINUED | OUTPATIENT
Start: 2023-09-09 | End: 2023-09-09 | Stop reason: SDUPTHER

## 2023-09-09 RX ORDER — DULOXETIN HYDROCHLORIDE 20 MG/1
20 CAPSULE, DELAYED RELEASE ORAL DAILY
Status: DISCONTINUED | OUTPATIENT
Start: 2023-09-09 | End: 2023-09-10 | Stop reason: HOSPADM

## 2023-09-09 RX ORDER — SODIUM CHLORIDE, SODIUM LACTATE, POTASSIUM CHLORIDE, CALCIUM CHLORIDE 600; 310; 30; 20 MG/100ML; MG/100ML; MG/100ML; MG/100ML
INJECTION, SOLUTION INTRAVENOUS CONTINUOUS
Status: DISCONTINUED | OUTPATIENT
Start: 2023-09-09 | End: 2023-09-10

## 2023-09-09 RX ORDER — SODIUM CHLORIDE, SODIUM LACTATE, POTASSIUM CHLORIDE, CALCIUM CHLORIDE 600; 310; 30; 20 MG/100ML; MG/100ML; MG/100ML; MG/100ML
INJECTION, SOLUTION INTRAVENOUS CONTINUOUS PRN
Status: DISCONTINUED | OUTPATIENT
Start: 2023-09-09 | End: 2023-09-09 | Stop reason: SDUPTHER

## 2023-09-09 RX ADMIN — ACETAMINOPHEN 650 MG: 325 TABLET, FILM COATED ORAL at 08:13

## 2023-09-09 RX ADMIN — SODIUM CHLORIDE 40 MG: 9 INJECTION INTRAMUSCULAR; INTRAVENOUS; SUBCUTANEOUS at 18:01

## 2023-09-09 RX ADMIN — DULOXETINE 20 MG: 20 CAPSULE, DELAYED RELEASE ORAL at 09:25

## 2023-09-09 RX ADMIN — SODIUM CHLORIDE, SODIUM LACTATE, POTASSIUM CHLORIDE, AND CALCIUM CHLORIDE: 600; 310; 30; 20 INJECTION, SOLUTION INTRAVENOUS at 14:31

## 2023-09-09 RX ADMIN — PROPOFOL 100 MG: 10 INJECTION, EMULSION INTRAVENOUS at 14:47

## 2023-09-09 RX ADMIN — SODIUM CHLORIDE, POTASSIUM CHLORIDE, SODIUM LACTATE AND CALCIUM CHLORIDE: 600; 310; 30; 20 INJECTION, SOLUTION INTRAVENOUS at 08:11

## 2023-09-09 RX ADMIN — SODIUM CHLORIDE, PRESERVATIVE FREE 10 ML: 5 INJECTION INTRAVENOUS at 08:14

## 2023-09-09 RX ADMIN — SODIUM CHLORIDE, POTASSIUM CHLORIDE, SODIUM LACTATE AND CALCIUM CHLORIDE: 600; 310; 30; 20 INJECTION, SOLUTION INTRAVENOUS at 21:20

## 2023-09-09 RX ADMIN — SODIUM CHLORIDE, POTASSIUM CHLORIDE, SODIUM LACTATE AND CALCIUM CHLORIDE: 600; 310; 30; 20 INJECTION, SOLUTION INTRAVENOUS at 15:43

## 2023-09-09 RX ADMIN — SODIUM CHLORIDE, PRESERVATIVE FREE 10 ML: 5 INJECTION INTRAVENOUS at 21:02

## 2023-09-09 RX ADMIN — FERROUS SULFATE TAB 325 MG (65 MG ELEMENTAL FE) 325 MG: 325 (65 FE) TAB at 08:13

## 2023-09-09 RX ADMIN — SODIUM CHLORIDE, POTASSIUM CHLORIDE, SODIUM LACTATE AND CALCIUM CHLORIDE: 600; 310; 30; 20 INJECTION, SOLUTION INTRAVENOUS at 09:25

## 2023-09-09 RX ADMIN — SODIUM CHLORIDE 40 MG: 9 INJECTION INTRAMUSCULAR; INTRAVENOUS; SUBCUTANEOUS at 06:23

## 2023-09-09 RX ADMIN — METOPROLOL SUCCINATE 50 MG: 50 TABLET, EXTENDED RELEASE ORAL at 08:13

## 2023-09-09 ASSESSMENT — PAIN SCALES - GENERAL
PAINLEVEL_OUTOF10: 7
PAINLEVEL_OUTOF10: 0
PAINLEVEL_OUTOF10: 0

## 2023-09-09 ASSESSMENT — PAIN DESCRIPTION - LOCATION: LOCATION: HEAD

## 2023-09-09 ASSESSMENT — PAIN DESCRIPTION - ORIENTATION: ORIENTATION: MID

## 2023-09-09 ASSESSMENT — PAIN DESCRIPTION - DESCRIPTORS: DESCRIPTORS: ACHING;DULL

## 2023-09-09 NOTE — PERIOP NOTE
MD Sherri Arthur at bedside with patient. Pt VSS stable. Pain and Nausea controlled at this time. Verbal sign out per MD when pacu care is completed. Plan of care continues.

## 2023-09-09 NOTE — OP NOTE
floor for ongoing care  -Resume normal medications  -Avoid NSAIDs and anticoagulation  -Await pathology.  -Clear liquid diet and advance as tolerated to GERD diet: avoid fried and fatty foods. peppermint, chocolate, alcohol, coffee, citrus fruits and juices, tomoato products; avoid lying down for 2 to 3 hours after eating.  -Consider discharge later today today or in am if no bleeding, stable hgb and tolerating diet.   -Close follow up with gastroenterologist for outpatient capsule endoscopy. (Had otherwise negative colonoscopy in 09/2022)  -Will need repeat endoscopy in 2-3 years for surveillance of varices.   -We will sign off. Please call back with any further questions/concerns.        Rakel Karimi MD  9/9/2023  3:00 PM

## 2023-09-09 NOTE — CARE COORDINATION
SW met with pt. Pt verified demographics, insurance and pcp. Pt lives in transitional housing with two roommates. Pt drives, iADL and uses no dme. Pt states the girls from [facility] are her local support. CM will follow pt plan of care and assist with supportive care referrals pending pt clinical progress. Please consult case management if specific needs arise. 09/09/23 1026   Service Assessment   Patient Orientation Alert and Oriented   Cognition Alert   History Provided By Patient   Primary Caregiver Self   Support Systems Friends/Neighbors; Other (Comment)  ([facility] girls)   [address] is: Legal Next of Kin   PCP Verified by CM Yes   Last Visit to PCP Within last 3 months   Prior Functional Level Independent in ADLs/IADLs   Current Functional Level Independent in ADLs/IADLs   Can patient return to prior living arrangement Yes   Ability to make needs known: Good   Family able to assist with home care needs: No   Would you like for me to discuss the discharge plan with any other family members/significant others, and if so, who? No   Financial Resources Options Away  (pending)   Community Resources Other (Comment)  ([address])   Social/Functional History   Lives With Other (comment)  (roommates)   Active  Yes   Mode of Transportation Car   Services At/After Discharge   Mode of Transport at Discharge Self   Condition of Participation: Discharge Planning   The Plan for Transition of Care is related to the following treatment goals: Pt is to return home at discharge.

## 2023-09-09 NOTE — CARE COORDINATION
Pt chart reviewed for discharge planning. SW met with pt at bedside, verified demographic information/ health insurance. Pt lives in transitional living at [place]. PCP was confirmed, last seen 3 weeks ago. Pt reports having [facility] as local support. Pt states she drives, has no dme,  iADL and has no outpatient services. CM will follow pt plan of care and assist with supportive care referrals pending pt clinical progress. Please consult case management if specific needs arise. 09/09/23 1026   Service Assessment   Patient Orientation Alert and Oriented   Cognition Alert   History Provided By Patient   Primary Caregiver Self   Support Systems Friends/Neighbors; Other (Comment)  ([employer])   [address] is: Legal Next of Kin   PCP Verified by CM Yes   Last Visit to PCP Within last 3 months   Prior Functional Level Independent in ADLs/IADLs   Current Functional Level Independent in ADLs/IADLs   Can patient return to prior living arrangement Yes   Ability to make needs known: Good   Family able to assist with home care needs: No   Would you like for me to discuss the discharge plan with any other family members/significant others, and if so, who? No   Financial Resources [employer]  (pending)   Community Resources Other (Comment)  ([address])   Social/Functional History   Lives With Other (comment)  (roommates)   Active  Yes   Mode of Transportation Car   Services At/After Discharge   Mode of Transport at Discharge Self   Condition of Participation: Discharge Planning   The Plan for Transition of Care is related to the following treatment goals: Pt is to return home at discharge.

## 2023-09-09 NOTE — PERIOP NOTE
TRANSFER - OUT REPORT:    Verbal report given to Ovidio Landon on Adrianne Lópezs  being transferred to [phone] for routine post-op       Report consisted of patient's Situation, Background, Assessment and   Recommendations(SBAR). Information from the following report(s) Nurse Handoff Report was reviewed with the receiving nurse. Lines:   Peripheral IV 09/08/23 Right Antecubital (Active)   Site Assessment Clean, dry & intact 09/09/23 1507   Line Status Capped 09/09/23 1507   Line Care Connections checked and tightened 09/09/23 1507   Phlebitis Assessment No symptoms 09/09/23 1507   Infiltration Assessment 0 09/09/23 1507   Alcohol Cap Used No 09/09/23 1507   Dressing Status Clean, dry & intact 09/09/23 1507   Dressing Type Transparent 09/09/23 1507       Peripheral IV 09/08/23 Left Forearm (Active)   Site Assessment Clean, dry & intact 09/09/23 1507   Line Status Infusing 09/09/23 [id number] [place] Connections checked and tightened 09/09/23 1507   Phlebitis Assessment No symptoms 09/09/23 1507   Infiltration Assessment 0 09/09/23 1507   Alcohol Cap Used No 09/09/23 1507   Dressing Status Clean, dry & intact 09/09/23 1507   Dressing Type Transparent 09/09/23 1507        Opportunity for questions and clarification was provided.       Patient transported with:  O2 @ room air lpm

## 2023-09-10 PROBLEM — D50.0 IRON DEFICIENCY ANEMIA DUE TO CHRONIC BLOOD LOSS: Status: ACTIVE | Noted: 2023-09-10

## 2023-09-10 PROBLEM — I85.10 SECONDARY ESOPHAGEAL VARICES WITHOUT BLEEDING (HCC): Status: ACTIVE | Noted: 2023-09-10

## 2023-09-10 PROBLEM — K31.89 PORTAL HYPERTENSIVE GASTROPATHY (HCC): Status: ACTIVE | Noted: 2023-09-10

## 2023-09-10 PROBLEM — K76.6 PORTAL HYPERTENSIVE GASTROPATHY (HCC): Status: ACTIVE | Noted: 2023-09-10

## 2023-09-10 LAB
ANION GAP SERPL CALC-SCNC: 7 MMOL/L (ref 2–11)
BUN SERPL-MCNC: 10 MG/DL (ref 8–23)
CALCIUM SERPL-MCNC: 7.7 MG/DL (ref 8.3–10.4)
CHLORIDE SERPL-SCNC: 111 MMOL/L (ref 101–110)
CO2 SERPL-SCNC: 22 MMOL/L (ref 21–32)
CREAT SERPL-MCNC: 0.6 MG/DL (ref 0.6–1)
ERYTHROCYTE [DISTWIDTH] IN BLOOD BY AUTOMATED COUNT: 17.8 % (ref 11.9–14.6)
GLUCOSE SERPL-MCNC: 100 MG/DL (ref 65–100)
HCT VFR BLD AUTO: 26.2 % (ref 35.8–46.3)
HGB BLD-MCNC: 8 G/DL (ref 11.7–15.4)
MAGNESIUM SERPL-MCNC: 1.5 MG/DL (ref 1.8–2.4)
MCH RBC QN AUTO: 28.6 PG (ref 26.1–32.9)
MCHC RBC AUTO-ENTMCNC: 30.5 G/DL (ref 31.4–35)
MCV RBC AUTO: 93.6 FL (ref 82–102)
NRBC # BLD: 0.06 K/UL (ref 0–0.2)
PLATELET # BLD AUTO: 117 K/UL (ref 150–450)
PMV BLD AUTO: 10.6 FL (ref 9.4–12.3)
POTASSIUM SERPL-SCNC: 3.5 MMOL/L (ref 3.5–5.1)
RBC # BLD AUTO: 2.8 M/UL (ref 4.05–5.2)
SODIUM SERPL-SCNC: 140 MMOL/L (ref 133–143)
WBC # BLD AUTO: 3.3 K/UL (ref 4.3–11.1)

## 2023-09-10 PROCEDURE — 6360000002 HC RX W HCPCS: Performed by: INTERNAL MEDICINE

## 2023-09-10 PROCEDURE — 80048 BASIC METABOLIC PNL TOTAL CA: CPT

## 2023-09-10 PROCEDURE — 36415 COLL VENOUS BLD VENIPUNCTURE: CPT

## 2023-09-10 PROCEDURE — 2580000003 HC RX 258: Performed by: INTERNAL MEDICINE

## 2023-09-10 PROCEDURE — 6370000000 HC RX 637 (ALT 250 FOR IP): Performed by: INTERNAL MEDICINE

## 2023-09-10 PROCEDURE — 83735 ASSAY OF MAGNESIUM: CPT

## 2023-09-10 PROCEDURE — C9113 INJ PANTOPRAZOLE SODIUM, VIA: HCPCS | Performed by: INTERNAL MEDICINE

## 2023-09-10 PROCEDURE — 85027 COMPLETE CBC AUTOMATED: CPT

## 2023-09-10 PROCEDURE — A4216 STERILE WATER/SALINE, 10 ML: HCPCS | Performed by: INTERNAL MEDICINE

## 2023-09-10 RX ORDER — DULOXETIN HYDROCHLORIDE 20 MG/1
20 CAPSULE, DELAYED RELEASE ORAL DAILY
Qty: 30 CAPSULE | Refills: 3 | Status: SHIPPED | OUTPATIENT
Start: 2023-09-11

## 2023-09-10 RX ORDER — MAGNESIUM SULFATE IN WATER 40 MG/ML
2000 INJECTION, SOLUTION INTRAVENOUS ONCE
Status: COMPLETED | OUTPATIENT
Start: 2023-09-10 | End: 2023-09-10

## 2023-09-10 RX ORDER — PANTOPRAZOLE SODIUM 40 MG/1
40 TABLET, DELAYED RELEASE ORAL
Status: DISCONTINUED | OUTPATIENT
Start: 2023-09-10 | End: 2023-09-10 | Stop reason: HOSPADM

## 2023-09-10 RX ORDER — PROPRANOLOL HCL 60 MG
60 CAPSULE, EXTENDED RELEASE 24HR ORAL DAILY
Qty: 30 CAPSULE | Refills: 3 | Status: SHIPPED | OUTPATIENT
Start: 2023-09-10

## 2023-09-10 RX ORDER — PROPRANOLOL HCL 60 MG
60 CAPSULE, EXTENDED RELEASE 24HR ORAL DAILY
Status: DISCONTINUED | OUTPATIENT
Start: 2023-09-11 | End: 2023-09-10 | Stop reason: HOSPADM

## 2023-09-10 RX ORDER — FERROUS SULFATE 325(65) MG
325 TABLET ORAL
Qty: 30 TABLET | Refills: 3 | Status: SHIPPED
Start: 2023-09-10 | End: 2024-01-08

## 2023-09-10 RX ADMIN — FERROUS SULFATE TAB 325 MG (65 MG ELEMENTAL FE) 325 MG: 325 (65 FE) TAB at 08:57

## 2023-09-10 RX ADMIN — SODIUM CHLORIDE, PRESERVATIVE FREE 10 ML: 5 INJECTION INTRAVENOUS at 08:58

## 2023-09-10 RX ADMIN — SODIUM CHLORIDE 40 MG: 9 INJECTION INTRAMUSCULAR; INTRAVENOUS; SUBCUTANEOUS at 07:18

## 2023-09-10 RX ADMIN — ACETAMINOPHEN 650 MG: 325 TABLET, FILM COATED ORAL at 07:22

## 2023-09-10 RX ADMIN — DULOXETINE 20 MG: 20 CAPSULE, DELAYED RELEASE ORAL at 08:57

## 2023-09-10 RX ADMIN — MAGNESIUM SULFATE HEPTAHYDRATE 2000 MG: 40 INJECTION, SOLUTION INTRAVENOUS at 09:50

## 2023-09-10 RX ADMIN — METOPROLOL SUCCINATE 50 MG: 50 TABLET, EXTENDED RELEASE ORAL at 08:57

## 2023-09-10 ASSESSMENT — PAIN SCALES - GENERAL: PAINLEVEL_OUTOF10: 2

## 2023-09-10 ASSESSMENT — PAIN DESCRIPTION - DESCRIPTORS: DESCRIPTORS: ACHING

## 2023-09-10 ASSESSMENT — PAIN DESCRIPTION - LOCATION: LOCATION: HEAD

## 2023-09-10 ASSESSMENT — PAIN DESCRIPTION - ORIENTATION: ORIENTATION: ANTERIOR

## 2023-09-10 NOTE — DISCHARGE SUMMARY
Hospitalist Discharge Summary   Admit Date:  2023  4:32 PM   DC Note date: 9/10/2023  Name:  Jennyfer Hernandez   Age:  58 y.o. Sex:  female  :  1960   MRN:  [id number]   Room:  [facility]  PCP:  JENN Mcgovern    Presenting Complaint: No chief complaint on file. Initial Admission Diagnosis: Upper GI bleed [K92.2]  Anemia due to blood loss [D50.0]  Elevated lactic acid level [R79.89]  Anemia requiring transfusions [D64.9]     Problem List for this Hospitalization (present on admission):    Principal Problem:    Iron deficiency anemia due to chronic blood loss  Active Problems:    Hypomagnesemia    PAF (paroxysmal atrial fibrillation) (HCC)    Hypoproteinemia (HCC)    Anemia requiring transfusions    Neuropathy    Portal hypertensive gastropathy (HCC)    Secondary esophageal varices without bleeding ([address])  Resolved Problems:    * No resolved hospital problems. *      Hospital Course:  Jennyfer Hernandez is a 58 y.o. female with medical history of PAF not on AC, etoh abuse (in remission 2 years), Hep C, chronic back pain with sciatica, descending aortic stenosis, who presented with fatigue and dizziness. She had episode of dizziness this morning but has been dealing with intermittent dizziness, weakness, and fatigue for a long time. She has history of bleeding but has been taking iron supplements and avoiding etoh use. In the ER her hb was 5.5.  says she has had recurrent issues with anemia but is compliant with iron. Has not noted and changes in her stools which she says is chronically dark from iron. No BRBPR. No epigastric or abd pain, vomiting, or other complaints. EGD 2022 revealed portal hypertensive gastropathy with no esophageal varices. Liver steatosis and/or/fibrosis per ultrasound from 2022. The patient indicated that she had a colonoscopy also but GI could not find records of the colonoscopy from last year. Admitted for anemia.   Received 2u RBC with

## 2023-09-18 ENCOUNTER — TELEPHONE (OUTPATIENT)
Dept: GASTROENTEROLOGY | Age: 63
End: 2023-09-18

## 2023-09-28 ENCOUNTER — OFFICE VISIT (OUTPATIENT)
Dept: ORTHOPEDIC SURGERY | Age: 63
End: 2023-09-28

## 2023-09-28 DIAGNOSIS — M43.16 SPONDYLOLISTHESIS OF LUMBAR REGION: Primary | ICD-10-CM

## 2023-09-28 DIAGNOSIS — M54.50 LOW BACK PAIN, UNSPECIFIED BACK PAIN LATERALITY, UNSPECIFIED CHRONICITY, UNSPECIFIED WHETHER SCIATICA PRESENT: ICD-10-CM

## 2023-09-28 PROCEDURE — 64483 NJX AA&/STRD TFRM EPI L/S 1: CPT | Performed by: PHYSICAL MEDICINE & REHABILITATION

## 2023-09-28 RX ORDER — TRIAMCINOLONE ACETONIDE 40 MG/ML
40 INJECTION, SUSPENSION INTRA-ARTICULAR; INTRAMUSCULAR ONCE
Status: COMPLETED | OUTPATIENT
Start: 2023-09-28 | End: 2023-09-28

## 2023-09-28 RX ADMIN — TRIAMCINOLONE ACETONIDE 40 MG: 40 INJECTION, SUSPENSION INTRA-ARTICULAR; INTRAMUSCULAR at 16:39

## 2023-09-28 NOTE — PROGRESS NOTES
Name: Kathleen Crabtree  YOB: 1960  Gender: female  MRN: [id number]        Transforaminal AIDAN Procedure Note    Procedure: Left  L5-S1 transforaminal epidural steroid injections     Precautions: Kathleen Crabtree is unsure of prior sensitivity to steroid, local anesthetic, iodine, or shellfish. No omnipaque was used today. Consent:  Consent was obtained prior to the procedure. The procedure was discussed at length with Kathleen Crabtree. She was given the opportunity to ask questions regarding the procedure and its associated risks. In addition to the potential risks associated with the procedure itself, the patient was informed both verbally and in writing of potential side effects of the use glucocorticoids. The patient appeared to comprehend the informed consent and desired to have the procedure performed, and informed consent was signed. She was placed in a prone position on the fluoroscopy table and the skin was prepped and draped in a routine sterile fashion. The areas to be injected were each anesthetized with 1 ml of 1% Lidocaine. A 22 gauge 3.5 inch spinal needle was carefully advanced under fluoroscopic guidance to the left L5-S1 transforaminal space  No Contrast Medium was used Once proper placement was confirmed, 0.5ml of 0.25 marcaine and 40 mg kenalog were injected through the spinal needle. Fluoroscopic guidance was used intermittently over a 10-minute period to insure proper needle placement and her safety. A hard copy of the fluoroscopic image has been placed in her chart and is saved on the C-arm hard drive. She was monitored for 30 minutes after the procedure and discharged home in a stable fashion with a routine follow up. Procedural Diagnosis:     ICD-10-CM    1. Spondylolisthesis of lumbar region  M43.16 FL NERVE BLOCK LUMBOSACRAL 1ST     triamcinolone acetonide (KENALOG-40) injection 40 mg     Injection Authorization - Spine      2.  Low back pain,

## 2023-10-30 ENCOUNTER — OFFICE VISIT (OUTPATIENT)
Dept: ORTHOPEDIC SURGERY | Age: 63
End: 2023-10-30
Payer: COMMERCIAL

## 2023-10-30 DIAGNOSIS — M54.50 LOW BACK PAIN, UNSPECIFIED BACK PAIN LATERALITY, UNSPECIFIED CHRONICITY, UNSPECIFIED WHETHER SCIATICA PRESENT: ICD-10-CM

## 2023-10-30 DIAGNOSIS — M43.16 SPONDYLOLISTHESIS OF LUMBAR REGION: Primary | ICD-10-CM

## 2023-10-30 DIAGNOSIS — M79.604 RIGHT LEG PAIN: ICD-10-CM

## 2023-10-30 PROCEDURE — 64483 NJX AA&/STRD TFRM EPI L/S 1: CPT | Performed by: PHYSICAL MEDICINE & REHABILITATION

## 2023-10-30 RX ORDER — TRIAMCINOLONE ACETONIDE 40 MG/ML
40 INJECTION, SUSPENSION INTRA-ARTICULAR; INTRAMUSCULAR ONCE
Status: COMPLETED | OUTPATIENT
Start: 2023-10-30 | End: 2023-10-30

## 2023-10-30 RX ADMIN — TRIAMCINOLONE ACETONIDE 40 MG: 40 INJECTION, SUSPENSION INTRA-ARTICULAR; INTRAMUSCULAR at 15:45

## 2023-10-30 NOTE — PROGRESS NOTES
Name: You Braun  YOB: 1960  Gender: female  MRN: [id number]        Transforaminal AIDAN Procedure Note    Procedure: Left  L5-S1 transforaminal epidural steroid injections     Precautions: You Braun is unsure if she has prior sensitivity to steroid, local anesthetic, iodine, or shellfish. No omnipaque was used today. Consent:  Consent was obtained prior to the procedure. The procedure was discussed at length with You Braun. She was given the opportunity to ask questions regarding the procedure and its associated risks. In addition to the potential risks associated with the procedure itself, the patient was informed both verbally and in writing of potential side effects of the use glucocorticoids. The patient appeared to comprehend the informed consent and desired to have the procedure performed, and informed consent was signed. She was placed in a prone position on the fluoroscopy table and the skin was prepped and draped in a routine sterile fashion. The areas to be injected were each anesthetized with 1 ml of 1% Lidocaine. A 22 gauge 3.5 inch spinal needle was carefully advanced under fluoroscopic guidance to the left L5-S1 transforaminal space  No Contrast Medium was used Once proper placement was confirmed, 0.5ml of 0.25 marcaine and 40 mg kenalog were injected through the spinal needle. Fluoroscopic guidance was used intermittently over a 10-minute period to insure proper needle placement and her safety. A hard copy of the fluoroscopic image has been placed in her chart and is saved on the C-arm hard drive. She was monitored for 30 minutes after the procedure and discharged home in a stable fashion with a routine follow up. Procedural Diagnosis:     ICD-10-CM    1. Spondylolisthesis of lumbar region  M43.16 FL NERVE BLOCK LUMBOSACRAL 1ST     triamcinolone acetonide (KENALOG-40) injection 40 mg      2.  Low back pain, unspecified back pain

## 2023-11-15 ENCOUNTER — OFFICE VISIT (OUTPATIENT)
Dept: GASTROENTEROLOGY | Age: 63
End: 2023-11-15
Payer: COMMERCIAL

## 2023-11-15 VITALS
SYSTOLIC BLOOD PRESSURE: 143 MMHG | HEIGHT: 66 IN | BODY MASS INDEX: 20.57 KG/M2 | HEART RATE: 71 BPM | TEMPERATURE: 96.8 F | RESPIRATION RATE: 18 BRPM | OXYGEN SATURATION: 97 % | WEIGHT: 128 LBS | DIASTOLIC BLOOD PRESSURE: 73 MMHG

## 2023-11-15 DIAGNOSIS — D64.9 SYMPTOMATIC ANEMIA: ICD-10-CM

## 2023-11-15 DIAGNOSIS — K70.30 ALCOHOLIC CIRRHOSIS OF LIVER WITHOUT ASCITES (HCC): ICD-10-CM

## 2023-11-15 DIAGNOSIS — D64.9 SYMPTOMATIC ANEMIA: Primary | ICD-10-CM

## 2023-11-15 DIAGNOSIS — K31.89 PORTAL HYPERTENSIVE GASTROPATHY (HCC): ICD-10-CM

## 2023-11-15 DIAGNOSIS — K76.6 PORTAL HYPERTENSIVE GASTROPATHY (HCC): ICD-10-CM

## 2023-11-15 DIAGNOSIS — I85.10 SECONDARY ESOPHAGEAL VARICES WITHOUT BLEEDING (HCC): ICD-10-CM

## 2023-11-15 LAB
BASOPHILS # BLD: 0.1 K/UL (ref 0–0.2)
BASOPHILS NFR BLD: 1 % (ref 0–2)
DIFFERENTIAL METHOD BLD: ABNORMAL
EOSINOPHIL # BLD: 0.2 K/UL (ref 0–0.8)
EOSINOPHIL NFR BLD: 3 % (ref 0.5–7.8)
ERYTHROCYTE [DISTWIDTH] IN BLOOD BY AUTOMATED COUNT: 14.8 % (ref 11.9–14.6)
FERRITIN SERPL-MCNC: 41 NG/ML (ref 8–388)
HCT VFR BLD AUTO: 44.6 % (ref 35.8–46.3)
HGB BLD-MCNC: 14.7 G/DL (ref 11.7–15.4)
IMM GRANULOCYTES # BLD AUTO: 0 K/UL (ref 0–0.5)
IMM GRANULOCYTES NFR BLD AUTO: 0 % (ref 0–5)
IRON SATN MFR SERPL: 46 %
IRON SERPL-MCNC: 125 UG/DL (ref 35–150)
LYMPHOCYTES # BLD: 1.2 K/UL (ref 0.5–4.6)
LYMPHOCYTES NFR BLD: 19 % (ref 13–44)
MCH RBC QN AUTO: 30.1 PG (ref 26.1–32.9)
MCHC RBC AUTO-ENTMCNC: 33 G/DL (ref 31.4–35)
MCV RBC AUTO: 91.4 FL (ref 82–102)
MONOCYTES # BLD: 0.4 K/UL (ref 0.1–1.3)
MONOCYTES NFR BLD: 7 % (ref 4–12)
NEUTS SEG # BLD: 4.5 K/UL (ref 1.7–8.2)
NEUTS SEG NFR BLD: 70 % (ref 43–78)
NRBC # BLD: 0 K/UL (ref 0–0.2)
PLATELET # BLD AUTO: 117 K/UL (ref 150–450)
PMV BLD AUTO: 11.6 FL (ref 9.4–12.3)
RBC # BLD AUTO: 4.88 M/UL (ref 4.05–5.2)
TIBC SERPL-MCNC: 274 UG/DL (ref 250–450)
WBC # BLD AUTO: 6.4 K/UL (ref 4.3–11.1)

## 2023-11-15 PROCEDURE — 99204 OFFICE O/P NEW MOD 45 MIN: CPT | Performed by: PHYSICIAN ASSISTANT

## 2023-11-15 NOTE — PROGRESS NOTES
Kentrell Jordan (:  1960) is a 58 y.o. female new patient referred to our office for evaluation of the following chief complaint(s):  New Patient and Anemia (EGD 2023)             ASSESSMENT/PLAN:  1. Symptomatic anemia  -     CBC with Auto Differential; Future  -     Iron; Future  -     Ferritin; Future  -     Transferrin Saturation; Future  -     Miscellaneous Sendout; Future  -     [facility]  2. Alcoholic cirrhosis of liver without ascites (HCC)  -     AFP Tumor Marker; Future  -     US ABDOMEN LIMITED; Future  -     Protime-INR; Future  3. Secondary esophageal varices without bleeding ([address])  4. Portal hypertensive gastropathy ([address])      Lengthy discussion with patient regarding diagnosis of cirrhosis. Labs in September show slight decrease in albumin (3.0) with otherwise normal LFTs. Platelet count has been intermittently decreased. Check labs, FIT test, refer to hematology for evaluation. No overt bleeding. Case discussed with Dr. Cole Ann; anemia likely due to [address], [address]. Follow-up 3 months to discuss results. Follow-up 1 year with ultrasound, AFP, LFTs. Will need repeat EGD for varices surveillance 3 years. Subjective   SUBJECTIVE/OBJECTIVE  Kentrell Jordan is a 58y.o. year old female with PMH notable for HTN, WARD, pAfib (no OAC), PAD, alcohol abuse, hepatitis C, esophageal varices, portal hypertensive gastropathy, severe protein calorie malnutrition, chronic back pain, descending aortic stenosis. Denies abdominal surgical history. Patient was recently admitted 23 to 9/10/23 for symptomatic anemia with Hgb 5.5 on presentation despite p.o. iron supplementation. Received 2 U pRBC with Hgb 8.0 on discharge. No overt signs of bleeding. GI consulted with pertinent work-up noted below. She was discharged with Rx for Protonix 40 mg QD, Propranolol 60 mg QD, thiamine 100 mg QD. Patient recommended to follow-up outpatient for capsule endoscopy.  Will need repeat

## 2023-11-16 DIAGNOSIS — K70.30 ALCOHOLIC CIRRHOSIS OF LIVER WITHOUT ASCITES (HCC): ICD-10-CM

## 2023-11-16 DIAGNOSIS — D64.9 SYMPTOMATIC ANEMIA: ICD-10-CM

## 2023-11-16 LAB
AFP-TM SERPL-MCNC: 4.5 NG/ML
INR PPP: 1
PROTHROMBIN TIME: 13.8 SEC (ref 12.6–14.3)

## 2023-11-18 LAB — HEMOCCULT STL QL IA: NEGATIVE

## 2023-11-30 ENCOUNTER — HOSPITAL ENCOUNTER (OUTPATIENT)
Dept: ULTRASOUND IMAGING | Age: 63
Discharge: HOME | End: 2023-11-30
Payer: COMMERCIAL

## 2023-11-30 DIAGNOSIS — K70.30 ALCOHOLIC CIRRHOSIS OF LIVER WITHOUT ASCITES (HCC): ICD-10-CM

## 2023-11-30 PROCEDURE — 76705 ECHO EXAM OF ABDOMEN: CPT

## 2023-12-13 ENCOUNTER — OFFICE VISIT (OUTPATIENT)
Dept: ORTHOPEDIC SURGERY | Age: 63
End: 2023-12-13
Payer: COMMERCIAL

## 2023-12-13 DIAGNOSIS — M48.062 LUMBAR STENOSIS WITH NEUROGENIC CLAUDICATION: ICD-10-CM

## 2023-12-13 DIAGNOSIS — M43.16 SPONDYLOLISTHESIS OF LUMBAR REGION: Primary | ICD-10-CM

## 2023-12-13 DIAGNOSIS — M47.816 FACET ARTHROPATHY, LUMBAR: ICD-10-CM

## 2023-12-13 DIAGNOSIS — M51.36 DDD (DEGENERATIVE DISC DISEASE), LUMBAR: ICD-10-CM

## 2023-12-13 PROCEDURE — 99214 OFFICE O/P EST MOD 30 MIN: CPT | Performed by: PHYSICIAN ASSISTANT

## 2023-12-13 NOTE — PROGRESS NOTES
types were placed in this encounter. No orders of the defined types were placed in this encounter. 4 This is a chronic illness/condition with exacerbation and progression      Return for referral to pain management. Robi Bhat PA-C  12/13/23      Elements of this note were created using speech recognition software. As such, errors of speech recognition may be present.

## 2023-12-13 NOTE — PATIENT INSTRUCTIONS
Adult Spondylolisthesis in the Low Back    In spondylolisthesis, one of the bones in your spine -- called a vertebra -- slips forward and out of place. This may occur anywhere along the spine, but is most common in the lower back (lumbar spine). In some people, this causes no symptoms at all. Others may have back and leg pain that ranges from mild to severe. Understanding how your spine works can help you better understand spondylolisthesis. Types of Spondylolisthesis  Many types of spondylolisthesis can affect adults. The two most common types are degenerative and spondylolytic. There are other less common types of spondylolisthesis, such as slippage caused by a recent, severe fracture or a tumor. Degenerative Spondylolisthesis  As we age, general wear and tear causes changes in the spine. Intervertebral disks begin to dry out and weaken. They lose height, become stiff, and begin to bulge. This disk degeneration is the start to both arthritis and degenerative spondylolisthesis (DS). Degenerative spondylolisthesis  As arthritis develops, it weakens the joints and ligaments that hold your vertebrae in the proper position. The ligament along the back of your spine (ligamentum flavum) may begin to buckle. One of the vertebrae on either side of a worn, flattened disk can loosen and move forward over the vertebra below it. This slippage can narrow the spinal canal and put pressure on the spinal cord. This narrowing of the spinal canal is called spinal stenosis and is a common problem in patients with DS. Women are more likely than men to have DS, and it is more common in patients who are older than 50. A higher incidence has been noted in the -American population. In this x-ray taken from the side, vertebrae in the low back have slipped out of place due to degenerative spondylolisthesis.       Spondylolytic Spondylolisthesis  One of the bones in your lower back can break and this can cause a vertebra to

## 2024-01-02 DIAGNOSIS — M48.062 LUMBAR STENOSIS WITH NEUROGENIC CLAUDICATION: ICD-10-CM

## 2024-01-02 DIAGNOSIS — M47.816 FACET ARTHROPATHY, LUMBAR: ICD-10-CM

## 2024-01-02 DIAGNOSIS — M43.16 SPONDYLOLISTHESIS OF LUMBAR REGION: Primary | ICD-10-CM

## 2024-01-02 DIAGNOSIS — M51.36 DDD (DEGENERATIVE DISC DISEASE), LUMBAR: ICD-10-CM

## 2024-02-14 ENCOUNTER — OFFICE VISIT (OUTPATIENT)
Dept: GASTROENTEROLOGY | Age: 64
End: 2024-02-14
Payer: COMMERCIAL

## 2024-02-14 VITALS
WEIGHT: 129.4 LBS | BODY MASS INDEX: 20.79 KG/M2 | OXYGEN SATURATION: 96 % | DIASTOLIC BLOOD PRESSURE: 81 MMHG | TEMPERATURE: 97.9 F | HEIGHT: 66 IN | SYSTOLIC BLOOD PRESSURE: 149 MMHG | HEART RATE: 77 BPM

## 2024-02-14 DIAGNOSIS — D64.9 SYMPTOMATIC ANEMIA: Primary | ICD-10-CM

## 2024-02-14 DIAGNOSIS — K74.60 CIRRHOSIS OF LIVER WITHOUT ASCITES, UNSPECIFIED HEPATIC CIRRHOSIS TYPE (HCC): ICD-10-CM

## 2024-02-14 PROBLEM — B18.2 CHRONIC HEPATITIS C WITHOUT HEPATIC COMA (HCC): Status: ACTIVE | Noted: 2022-09-09

## 2024-02-14 PROCEDURE — 99213 OFFICE O/P EST LOW 20 MIN: CPT | Performed by: PHYSICIAN ASSISTANT

## 2024-02-14 PROCEDURE — 1123F ACP DISCUSS/DSCN MKR DOCD: CPT | Performed by: PHYSICIAN ASSISTANT

## 2024-02-14 NOTE — PATIENT INSTRUCTIONS
For reflux:   You can utilize OTC Pepcid as needed.     Eat smaller and more frequent meals. Avoid lying down for 3 hours after eating. Elevate the head of the bed by 6 inches. Avoid wearing tight-fitting clothing. Stop smoking and avoid alcohol. Avoid NSAID medications (Aspirin, Excedrin, Aleve, Ibuprofen, Goody Powder, Toradol, Mobic, Voltaren, etc). Consider weight loss to get to a healthy weight, which is often critical to eliminating symptoms of reflux. Avoid foods and acid-containing beverages that can exacerbate the symptoms of reflux. This includes:     -Caffeine  -Chocolate  -Peppermint  -Alcohol (especially red wine)  -Carbonated beverages  -Citrus fruits  -Tomato-based products  -Vinegar  -Spicy and greasy foods

## 2024-02-14 NOTE — PROGRESS NOTES
Natasha Elkins (:  1960) is a 64 y.o. female new patient referred to our office for evaluation of the following chief complaint(s):  Follow-up           ASSESSMENT/PLAN:  1. Symptomatic anemia  2. Cirrhosis of liver without ascites, unspecified hepatic cirrhosis type (HCC)    Repeat labs 11/15/23 showed significant improvement of iron studies and normalization of Hgb to 14.7.     HCC screening completed. AFP and ultrasound WNL. Follow-up in 1 year for repeat LFTs, AFP, ultrasound. Hx alcohol abuse in remission as well as hepatitis C s/p treatment through [facility] completed in .    Will place recall for EGD in 3 years for varices screening.    Subjective   SUBJECTIVE/OBJECTIVE  Natasha Elkins is a 64 y.o. year old female with PMH notable for HTN, WARD, pAfib (no OAC), PAD, alcohol abuse, hepatitis C s/p treatment, esophageal varices, portal hypertensive gastropathy, severe protein calorie malnutrition, chronic back pain, descending aortic stenosis. Denies abdominal surgical history.     She was initially evaluated in my office 11/15/23 as a new patient following hospital admission -9/10/23 for symptomatic anemia with Hgb 5.5 on presentation despite p.o. iron supplementation. This was attributed to portal hypertensive gastropathy related to cirrhosis noted on EGD performed while inpatient. Labs ordered including repeat CBC, iron studies, FIT test, AFP, INR, and abdominal ultrasound ordered. Pertinent GI evaluation to date includes:    -CT abdomen/pelvis 20: Nodular liver consistent with cirrhosis, gallbladder wall thickening suspected related to underlying liver disease, pancreatic head calcifications,, left hydronephrosis and dilated left renal pelvis, severe aortobiiliac atherosclerotic disease  -CTA abdomen/pelvis 22: severe atherosclerotic disease obscuring the entire lumen of the distal abdominal aorta, large plaques of the bilateral common femoral arteries, chronic

## 2024-02-21 ENCOUNTER — TELEPHONE (OUTPATIENT)
Dept: ORTHOPEDIC SURGERY | Age: 64
End: 2024-02-21

## 2024-02-21 NOTE — TELEPHONE ENCOUNTER
Pt called and said she never got a call to schedule with pain management. Please fax referral again.

## 2024-08-05 DIAGNOSIS — D72.829 LEUKOCYTOSIS, UNSPECIFIED TYPE: ICD-10-CM

## 2024-08-05 DIAGNOSIS — D50.0 IRON DEFICIENCY ANEMIA DUE TO CHRONIC BLOOD LOSS: ICD-10-CM

## 2024-08-05 DIAGNOSIS — R79.89 ELEVATED LFTS: Primary | ICD-10-CM

## 2024-08-06 ENCOUNTER — OFFICE VISIT (OUTPATIENT)
Dept: ONCOLOGY | Age: 64
End: 2024-08-06
Payer: COMMERCIAL

## 2024-08-06 ENCOUNTER — HOSPITAL ENCOUNTER (OUTPATIENT)
Dept: LAB | Age: 64
Discharge: HOME | End: 2024-08-09
Payer: COMMERCIAL

## 2024-08-06 VITALS
TEMPERATURE: 97.7 F | WEIGHT: 125.3 LBS | HEART RATE: 80 BPM | BODY MASS INDEX: 20.88 KG/M2 | OXYGEN SATURATION: 98 % | SYSTOLIC BLOOD PRESSURE: 159 MMHG | HEIGHT: 65 IN | DIASTOLIC BLOOD PRESSURE: 79 MMHG | RESPIRATION RATE: 16 BRPM

## 2024-08-06 DIAGNOSIS — E61.1 IRON DEFICIENCY: ICD-10-CM

## 2024-08-06 DIAGNOSIS — R79.89 ELEVATED LFTS: ICD-10-CM

## 2024-08-06 DIAGNOSIS — D50.0 IRON DEFICIENCY ANEMIA DUE TO CHRONIC BLOOD LOSS: ICD-10-CM

## 2024-08-06 DIAGNOSIS — D72.829 LEUKOCYTOSIS, UNSPECIFIED TYPE: ICD-10-CM

## 2024-08-06 DIAGNOSIS — Z87.19 HISTORY OF GI BLEED: ICD-10-CM

## 2024-08-06 DIAGNOSIS — D64.9 ANEMIA, UNSPECIFIED TYPE: Primary | ICD-10-CM

## 2024-08-06 DIAGNOSIS — K76.6 PORTAL HYPERTENSION (HCC): ICD-10-CM

## 2024-08-06 DIAGNOSIS — K70.30 ALCOHOLIC CIRRHOSIS OF LIVER WITHOUT ASCITES (HCC): ICD-10-CM

## 2024-08-06 LAB
ALBUMIN SERPL-MCNC: 4.3 G/DL (ref 3.2–4.6)
ALBUMIN/GLOB SERPL: 1.5 (ref 1–1.9)
ALP SERPL-CCNC: 100 U/L (ref 35–104)
ALT SERPL-CCNC: 28 U/L (ref 12–65)
ANION GAP SERPL CALC-SCNC: 13 MMOL/L (ref 9–18)
AST SERPL-CCNC: 34 U/L (ref 15–37)
BASOPHILS # BLD: 0 K/UL (ref 0–0.2)
BASOPHILS NFR BLD: 1 % (ref 0–2)
BILIRUB SERPL-MCNC: 0.3 MG/DL (ref 0–1.2)
BUN SERPL-MCNC: 11 MG/DL (ref 8–23)
CALCIUM SERPL-MCNC: 9.6 MG/DL (ref 8.8–10.2)
CHLORIDE SERPL-SCNC: 104 MMOL/L (ref 98–107)
CO2 SERPL-SCNC: 26 MMOL/L (ref 20–28)
CREAT SERPL-MCNC: 0.8 MG/DL (ref 0.6–1.1)
DIFFERENTIAL METHOD BLD: ABNORMAL
EOSINOPHIL # BLD: 0.1 K/UL (ref 0–0.8)
EOSINOPHIL NFR BLD: 4 % (ref 0.5–7.8)
ERYTHROCYTE [DISTWIDTH] IN BLOOD BY AUTOMATED COUNT: 13.7 % (ref 11.9–14.6)
FERRITIN SERPL-MCNC: 19 NG/ML (ref 8–388)
FOLATE SERPL-MCNC: 25.6 NG/ML (ref 3.1–17.5)
GLOBULIN SER CALC-MCNC: 2.9 G/DL (ref 2.3–3.5)
GLUCOSE SERPL-MCNC: 154 MG/DL (ref 70–99)
HCT VFR BLD AUTO: 36.9 % (ref 35.8–46.3)
HGB BLD-MCNC: 11.6 G/DL (ref 11.7–15.4)
HGB RETIC QN AUTO: 30 PG (ref 29–35)
IMM GRANULOCYTES # BLD AUTO: 0 K/UL (ref 0–0.5)
IMM GRANULOCYTES NFR BLD AUTO: 0 % (ref 0–5)
IMM RETICS NFR: 12.9 % (ref 3–15.9)
IRON SATN MFR SERPL: 15 % (ref 20–50)
IRON SERPL-MCNC: 44 UG/DL (ref 35–100)
LYMPHOCYTES # BLD: 0.6 K/UL (ref 0.5–4.6)
LYMPHOCYTES NFR BLD: 20 % (ref 13–44)
MCH RBC QN AUTO: 28.9 PG (ref 26.1–32.9)
MCHC RBC AUTO-ENTMCNC: 31.4 G/DL (ref 31.4–35)
MCV RBC AUTO: 92 FL (ref 82–102)
MONOCYTES # BLD: 0.2 K/UL (ref 0.1–1.3)
MONOCYTES NFR BLD: 6 % (ref 4–12)
NEUTS SEG # BLD: 2.2 K/UL (ref 1.7–8.2)
NEUTS SEG NFR BLD: 69 % (ref 43–78)
NRBC # BLD: 0 K/UL (ref 0–0.2)
PLATELET # BLD AUTO: 89 K/UL (ref 150–450)
PMV BLD AUTO: 11.9 FL (ref 9.4–12.3)
POTASSIUM SERPL-SCNC: 3.6 MMOL/L (ref 3.5–5.1)
PROT SERPL-MCNC: 7.2 G/DL (ref 6.3–8.2)
RBC # BLD AUTO: 4.01 M/UL (ref 4.05–5.2)
RETICS # AUTO: 0.06 M/UL (ref 0.03–0.1)
RETICS/RBC NFR AUTO: 1.5 % (ref 0.3–2)
SODIUM SERPL-SCNC: 143 MMOL/L (ref 136–145)
TIBC SERPL-MCNC: 293 UG/DL (ref 240–450)
UIBC SERPL-MCNC: 249 UG/DL (ref 112–347)
VIT B12 SERPL-MCNC: 1015 PG/ML (ref 193–986)
WBC # BLD AUTO: 3.2 K/UL (ref 4.3–11.1)

## 2024-08-06 PROCEDURE — 82607 VITAMIN B-12: CPT

## 2024-08-06 PROCEDURE — 36415 COLL VENOUS BLD VENIPUNCTURE: CPT

## 2024-08-06 PROCEDURE — 85046 RETICYTE/HGB CONCENTRATE: CPT

## 2024-08-06 PROCEDURE — 83540 ASSAY OF IRON: CPT

## 2024-08-06 PROCEDURE — 80053 COMPREHEN METABOLIC PANEL: CPT

## 2024-08-06 PROCEDURE — 82728 ASSAY OF FERRITIN: CPT

## 2024-08-06 PROCEDURE — 1123F ACP DISCUSS/DSCN MKR DOCD: CPT | Performed by: INTERNAL MEDICINE

## 2024-08-06 PROCEDURE — 99205 OFFICE O/P NEW HI 60 MIN: CPT | Performed by: INTERNAL MEDICINE

## 2024-08-06 PROCEDURE — 83550 IRON BINDING TEST: CPT

## 2024-08-06 PROCEDURE — 85025 COMPLETE CBC W/AUTO DIFF WBC: CPT

## 2024-08-06 PROCEDURE — 82746 ASSAY OF FOLIC ACID SERUM: CPT

## 2024-08-06 RX ORDER — EPINEPHRINE 1 MG/ML
0.3 INJECTION, SOLUTION, CONCENTRATE INTRAVENOUS PRN
Status: CANCELLED | OUTPATIENT
Start: 2024-08-12

## 2024-08-06 RX ORDER — ONDANSETRON 2 MG/ML
8 INJECTION INTRAMUSCULAR; INTRAVENOUS
Status: CANCELLED | OUTPATIENT
Start: 2024-08-12

## 2024-08-06 RX ORDER — FAMOTIDINE 10 MG/ML
20 INJECTION, SOLUTION INTRAVENOUS
Status: CANCELLED | OUTPATIENT
Start: 2024-08-12

## 2024-08-06 RX ORDER — SODIUM CHLORIDE 9 MG/ML
INJECTION, SOLUTION INTRAVENOUS CONTINUOUS
Status: CANCELLED | OUTPATIENT
Start: 2024-08-12

## 2024-08-06 RX ORDER — ACETAMINOPHEN 325 MG/1
650 TABLET ORAL
Status: CANCELLED | OUTPATIENT
Start: 2024-08-12

## 2024-08-06 RX ORDER — DIPHENHYDRAMINE HYDROCHLORIDE 50 MG/ML
50 INJECTION INTRAMUSCULAR; INTRAVENOUS
Status: CANCELLED | OUTPATIENT
Start: 2024-08-12

## 2024-08-06 RX ORDER — SODIUM CHLORIDE 9 MG/ML
5-250 INJECTION, SOLUTION INTRAVENOUS PRN
Status: CANCELLED | OUTPATIENT
Start: 2024-08-12

## 2024-08-06 RX ORDER — HEPARIN SODIUM (PORCINE) LOCK FLUSH IV SOLN 100 UNIT/ML 100 UNIT/ML
500 SOLUTION INTRAVENOUS PRN
Status: CANCELLED | OUTPATIENT
Start: 2024-08-12

## 2024-08-06 RX ORDER — SODIUM CHLORIDE 0.9 % (FLUSH) 0.9 %
5-40 SYRINGE (ML) INJECTION PRN
Status: CANCELLED | OUTPATIENT
Start: 2024-08-12

## 2024-08-06 RX ORDER — ALBUTEROL SULFATE 90 UG/1
4 AEROSOL, METERED RESPIRATORY (INHALATION) PRN
Status: CANCELLED | OUTPATIENT
Start: 2024-08-12

## 2024-08-06 ASSESSMENT — PATIENT HEALTH QUESTIONNAIRE - PHQ9
SUM OF ALL RESPONSES TO PHQ QUESTIONS 1-9: 0
SUM OF ALL RESPONSES TO PHQ9 QUESTIONS 1 & 2: 0
2. FEELING DOWN, DEPRESSED OR HOPELESS: NOT AT ALL
SUM OF ALL RESPONSES TO PHQ QUESTIONS 1-9: 0
SUM OF ALL RESPONSES TO PHQ QUESTIONS 1-9: 0
1. LITTLE INTEREST OR PLEASURE IN DOING THINGS: NOT AT ALL
SUM OF ALL RESPONSES TO PHQ QUESTIONS 1-9: 0

## 2024-08-06 NOTE — PROGRESS NOTES
NEW PATIENT INTAKE    Referral Diagnosis: Anemia with iron overload    Referring Provider: Tashia De Leon APRN - NP    Primary Care Provider: Tashia De Leon APRN - NP    Presenting Symptoms: No relevant physical symptoms at present    Social/ Medical/ Surgical History: Updated in Epic    Family History of Cancer/ Hematology Disorders: Father with unspecified type of cancer     Chronological History of Pertinent Events: Ms. Elkins is a 64-year-old white female referred for anemia with iron overload. PMH is significant for cirrhosis of the liver, alcohol abuse (drinking daily over a 40-year period with some episodes of sobriety), and illicit drug use (methamphetamine, cocaine, inclusive of IVDA). She has been clean for 3 years from all alcohol, tobacco, and drug use. She remains in a sober living program.    09/12/22: EGD with findings of mild PHG.  PHG can be a source for bleeding and WARD.    09/12/22: COLONOSCOPY with no source for WARD found    7/24/23 - 7/27/23: Admitted with UTI. HGB on admission was 7.2 but dropped to 4.8 after hydration. PT was transfused with 3 units PRBCs. GI was consulted. Pt was treated with IV Ferrlecit. HGB was 8.3 prior to discharge.     9/8/23 - 9/10/23: Admitted for symptomatic anemia with Hgb 5.5 on presentation despite p.o. iron supplementation. No overt signs of bleeding. EGD on 9/8/23 showed very small esophageal varices without evidence of bleeding, small sliding hiatal hernia, portal hypertensive gastropathy without gastric varices, normal duodenum; gastric biopsies showed minimal chronic inactive gastritis, negative H. pylori, duodenal biopsies unremarkable. Received 2 U pRBC with Hgb 8.0 on discharge. Discharged on 325 mg ferrous sulfate daily.     11/15/23: HFU with GI   -Provider noted, 'Lengthy discussion with patient regarding diagnosis of cirrhosis. Labs in September show slight decrease in albumin (3.0) with otherwise normal LFTs. Platelet count has

## 2024-08-06 NOTE — PROGRESS NOTES
[facility]: Office Visit New Patient H/P    Chief Complaint:    Anemia    History of Present Illness:  Referral Diagnosis: Anemia with iron overload     Referring Provider: Tashia De Leon APRN - NP     Primary Care Provider: Tashia De Leon APRN - NP     Presenting Symptoms: No relevant physical symptoms at present     Social/ Medical/ Surgical History: Updated in Epic     Family History of Cancer/ Hematology Disorders: Father with unspecified type of cancer      Ms. Elkins is a 64 y.o. white female referred for anemia with iron overload. PMH is significant for cirrhosis of the liver, alcohol abuse (drinking daily over a 40-year period with some episodes of sobriety), and illicit drug use (methamphetamine, cocaine, inclusive of IVDA). She has been clean for 3 years from all alcohol, tobacco, and drug use. She remains in a sober living program.     09/12/22: EGD with findings of mild PHG.  PHG can be a source for bleeding and WARD.     09/12/22: COLONOSCOPY with no source for WARD found     7/24/23 - 7/27/23: Admitted with UTI. HGB on admission was 7.2 but dropped to 4.8 after hydration. PT was transfused with 3 units PRBCs. GI was consulted. Pt was treated with IV Ferrlecit. HGB was 8.3 prior to discharge.      9/8/23 - 9/10/23: Admitted for symptomatic anemia with Hgb 5.5 on presentation despite p.o. iron supplementation. No overt signs of bleeding. EGD on 9/8/23 showed very small esophageal varices without evidence of bleeding, small sliding hiatal hernia, portal hypertensive gastropathy without gastric varices, normal duodenum; gastric biopsies showed minimal chronic inactive gastritis, negative H. pylori, duodenal biopsies unremarkable. Received 2 U pRBC with Hgb 8.0 on discharge. Discharged on 325 mg ferrous sulfate daily.      11/15/23: HFU with GI   -Provider noted, 'Lengthy discussion with patient regarding diagnosis of cirrhosis. Labs in September show slight decrease

## 2024-08-06 NOTE — PATIENT INSTRUCTIONS
Patient Information from Today's Visit    The members of your [facility] are listed below:    Physician Provider: [facility] Medical Oncologist  Advanced Practice Clinician: Kasie Nicolas NP  Registered Nurse: Noni ROBLEDO   Navigator: N/A  Medical Assistant: Georgie LEVIN MA  : Jessy OLIVAS   Supportive Care Services: Mariana BEDOYA LMSW    Diagnosis: anemia      Follow Up Instructions:    We will order IV iron and then you should be referred back to us on an as needed basis.      Treatment Summary has been discussed and given to patient:No      Current Labs:   Hospital Outpatient Visit on 08/06/2024   Component Date Value Ref Range Status    Reticulocyte Count,Automated 08/06/2024 1.5  0.3 - 2.0 % Final    Absolute Retic # 08/06/2024 0.0614  0.026 - 0.095 M/ul Final    Immature Retic Fraction 08/06/2024 12.9  3.0 - 15.9 % Final    Retic Hemoglobin conc. 08/06/2024 30  29 - 35 pg Final    WBC 08/06/2024 3.2 (L)  4.3 - 11.1 K/uL Final    RBC 08/06/2024 4.01 (L)  4.05 - 5.2 M/uL Final    Hemoglobin 08/06/2024 11.6 (L)  11.7 - 15.4 g/dL Final    Hematocrit 08/06/2024 36.9  35.8 - 46.3 % Final    MCV 08/06/2024 92.0  82.0 - 102.0 FL Final    MCH 08/06/2024 28.9  26.1 - 32.9 PG Final    MCHC 08/06/2024 31.4  31.4 - 35.0 g/dL Final    RDW 08/06/2024 13.7  11.9 - 14.6 % Final    Platelets 08/06/2024 89 (L)  150 - 450 K/uL Final    MPV 08/06/2024 11.9  9.4 - 12.3 FL Final    nRBC 08/06/2024 0.00  0.0 - 0.2 K/uL Final    **Note: Absolute NRBC parameter is now reported with Hemogram**    Neutrophils % 08/06/2024 69  43 - 78 % Final    Lymphocytes % 08/06/2024 20  13 - 44 % Final    Monocytes % 08/06/2024 6  4.0 - 12.0 % Final    Eosinophils % 08/06/2024 4  0.5 - 7.8 % Final    Basophils % 08/06/2024 1  0.0 - 2.0 % Final    Immature Granulocytes % 08/06/2024 0  0.0 - 5.0 % Final    Neutrophils Absolute 08/06/2024 2.2  1.7 - 8.2 K/UL Final    Lymphocytes Absolute 08/06/2024 0.6  0.5 - 4.6 K/UL Final    Monocytes

## 2024-08-15 ENCOUNTER — HOSPITAL ENCOUNTER (OUTPATIENT)
Dept: INFUSION THERAPY | Age: 64
Setting detail: INFUSION SERIES
Discharge: HOME | End: 2024-08-15
Payer: COMMERCIAL

## 2024-08-15 VITALS
TEMPERATURE: 98.2 F | WEIGHT: 126.4 LBS | DIASTOLIC BLOOD PRESSURE: 73 MMHG | SYSTOLIC BLOOD PRESSURE: 153 MMHG | BODY MASS INDEX: 21.36 KG/M2 | OXYGEN SATURATION: 100 % | HEART RATE: 68 BPM | RESPIRATION RATE: 18 BRPM

## 2024-08-15 DIAGNOSIS — D50.0 IRON DEFICIENCY ANEMIA DUE TO CHRONIC BLOOD LOSS: Primary | ICD-10-CM

## 2024-08-15 PROCEDURE — 6370000000 HC RX 637 (ALT 250 FOR IP): Performed by: NURSE PRACTITIONER

## 2024-08-15 PROCEDURE — 96365 THER/PROPH/DIAG IV INF INIT: CPT

## 2024-08-15 PROCEDURE — 6360000002 HC RX W HCPCS: Performed by: NURSE PRACTITIONER

## 2024-08-15 PROCEDURE — 96366 THER/PROPH/DIAG IV INF ADDON: CPT

## 2024-08-15 PROCEDURE — 96376 TX/PRO/DX INJ SAME DRUG ADON: CPT

## 2024-08-15 PROCEDURE — 2580000003 HC RX 258: Performed by: NURSE PRACTITIONER

## 2024-08-15 PROCEDURE — 96375 TX/PRO/DX INJ NEW DRUG ADDON: CPT

## 2024-08-15 RX ORDER — SODIUM CHLORIDE 9 MG/ML
5-250 INJECTION, SOLUTION INTRAVENOUS PRN
OUTPATIENT
Start: 2024-08-15

## 2024-08-15 RX ORDER — SODIUM CHLORIDE 0.9 % (FLUSH) 0.9 %
5-40 SYRINGE (ML) INJECTION PRN
OUTPATIENT
Start: 2024-08-15

## 2024-08-15 RX ORDER — ONDANSETRON 2 MG/ML
8 INJECTION INTRAMUSCULAR; INTRAVENOUS
OUTPATIENT
Start: 2024-08-15

## 2024-08-15 RX ORDER — HEPARIN 100 UNIT/ML
500 SYRINGE INTRAVENOUS PRN
OUTPATIENT
Start: 2024-08-15

## 2024-08-15 RX ORDER — SODIUM CHLORIDE 9 MG/ML
5-250 INJECTION, SOLUTION INTRAVENOUS PRN
Status: CANCELLED | OUTPATIENT
Start: 2024-08-15

## 2024-08-15 RX ORDER — ALBUTEROL SULFATE 90 UG/1
4 AEROSOL, METERED RESPIRATORY (INHALATION) PRN
OUTPATIENT
Start: 2024-08-15

## 2024-08-15 RX ORDER — EPINEPHRINE 1 MG/ML
0.3 INJECTION, SOLUTION, CONCENTRATE INTRAVENOUS PRN
OUTPATIENT
Start: 2024-08-15

## 2024-08-15 RX ORDER — DIPHENHYDRAMINE HYDROCHLORIDE 50 MG/ML
50 INJECTION INTRAMUSCULAR; INTRAVENOUS
OUTPATIENT
Start: 2024-08-15

## 2024-08-15 RX ORDER — ACETAMINOPHEN 325 MG/1
650 TABLET ORAL ONCE
Status: COMPLETED | OUTPATIENT
Start: 2024-08-15 | End: 2024-08-15

## 2024-08-15 RX ORDER — ACETAMINOPHEN 325 MG/1
650 TABLET ORAL
Status: DISCONTINUED | OUTPATIENT
Start: 2024-08-15 | End: 2024-08-16 | Stop reason: HOSPADM

## 2024-08-15 RX ORDER — MEPERIDINE HYDROCHLORIDE 25 MG/ML
12.5 INJECTION INTRAMUSCULAR; INTRAVENOUS; SUBCUTANEOUS PRN
OUTPATIENT
Start: 2024-08-15

## 2024-08-15 RX ORDER — SODIUM CHLORIDE 9 MG/ML
INJECTION, SOLUTION INTRAVENOUS CONTINUOUS
Status: DISCONTINUED | OUTPATIENT
Start: 2024-08-15 | End: 2024-08-16 | Stop reason: HOSPADM

## 2024-08-15 RX ORDER — ALBUTEROL SULFATE 90 UG/1
4 AEROSOL, METERED RESPIRATORY (INHALATION) PRN
Status: DISCONTINUED | OUTPATIENT
Start: 2024-08-15 | End: 2024-08-16 | Stop reason: HOSPADM

## 2024-08-15 RX ORDER — SODIUM CHLORIDE 0.9 % (FLUSH) 0.9 %
5-40 SYRINGE (ML) INJECTION PRN
Status: DISCONTINUED | OUTPATIENT
Start: 2024-08-15 | End: 2024-08-16 | Stop reason: HOSPADM

## 2024-08-15 RX ORDER — DEXAMETHASONE SODIUM PHOSPHATE 10 MG/ML
10 INJECTION INTRAMUSCULAR; INTRAVENOUS ONCE
Status: CANCELLED | OUTPATIENT
Start: 2024-08-15 | End: 2024-08-15

## 2024-08-15 RX ORDER — SODIUM CHLORIDE 9 MG/ML
5-250 INJECTION, SOLUTION INTRAVENOUS PRN
Status: DISCONTINUED | OUTPATIENT
Start: 2024-08-15 | End: 2024-08-16 | Stop reason: HOSPADM

## 2024-08-15 RX ORDER — DEXAMETHASONE SODIUM PHOSPHATE 10 MG/ML
10 INJECTION INTRAMUSCULAR; INTRAVENOUS ONCE
Status: COMPLETED | OUTPATIENT
Start: 2024-08-15 | End: 2024-08-15

## 2024-08-15 RX ORDER — MEPERIDINE HYDROCHLORIDE 25 MG/ML
12.5 INJECTION INTRAMUSCULAR; INTRAVENOUS; SUBCUTANEOUS PRN
Status: DISCONTINUED | OUTPATIENT
Start: 2024-08-15 | End: 2024-08-16 | Stop reason: HOSPADM

## 2024-08-15 RX ORDER — ACETAMINOPHEN 325 MG/1
650 TABLET ORAL
OUTPATIENT
Start: 2024-08-15

## 2024-08-15 RX ORDER — ACETAMINOPHEN 325 MG/1
650 TABLET ORAL ONCE
Status: CANCELLED | OUTPATIENT
Start: 2024-08-15 | End: 2024-08-15

## 2024-08-15 RX ORDER — DIPHENHYDRAMINE HYDROCHLORIDE 50 MG/ML
50 INJECTION INTRAMUSCULAR; INTRAVENOUS
Status: DISCONTINUED | OUTPATIENT
Start: 2024-08-15 | End: 2024-08-16 | Stop reason: HOSPADM

## 2024-08-15 RX ORDER — HEPARIN 100 UNIT/ML
500 SYRINGE INTRAVENOUS PRN
Status: DISCONTINUED | OUTPATIENT
Start: 2024-08-15 | End: 2024-08-16 | Stop reason: HOSPADM

## 2024-08-15 RX ORDER — SODIUM CHLORIDE 9 MG/ML
INJECTION, SOLUTION INTRAVENOUS CONTINUOUS
OUTPATIENT
Start: 2024-08-15

## 2024-08-15 RX ORDER — EPINEPHRINE 1 MG/ML
0.3 INJECTION, SOLUTION, CONCENTRATE INTRAVENOUS PRN
Status: DISCONTINUED | OUTPATIENT
Start: 2024-08-15 | End: 2024-08-16 | Stop reason: HOSPADM

## 2024-08-15 RX ORDER — ONDANSETRON 2 MG/ML
8 INJECTION INTRAMUSCULAR; INTRAVENOUS
Status: DISCONTINUED | OUTPATIENT
Start: 2024-08-15 | End: 2024-08-16 | Stop reason: HOSPADM

## 2024-08-15 RX ADMIN — SODIUM CHLORIDE, PRESERVATIVE FREE 10 ML: 5 INJECTION INTRAVENOUS at 07:50

## 2024-08-15 RX ADMIN — ACETAMINOPHEN 650 MG: 325 TABLET ORAL at 09:37

## 2024-08-15 RX ADMIN — SODIUM CHLORIDE 975 MG: 9 INJECTION, SOLUTION INTRAVENOUS at 10:11

## 2024-08-15 RX ADMIN — SODIUM CHLORIDE 25 MG: 9 INJECTION, SOLUTION INTRAVENOUS at 08:12

## 2024-08-15 RX ADMIN — SODIUM CHLORIDE, PRESERVATIVE FREE 10 ML: 5 INJECTION INTRAVENOUS at 09:39

## 2024-08-15 RX ADMIN — SODIUM CHLORIDE 100 ML/HR: 9 INJECTION, SOLUTION INTRAVENOUS at 07:55

## 2024-08-15 RX ADMIN — DEXAMETHASONE SODIUM PHOSPHATE 10 MG: 10 INJECTION INTRAMUSCULAR; INTRAVENOUS at 09:39

## 2024-08-15 NOTE — PROGRESS NOTES
's initial visit attempted to convey care and concern and to explore any spiritual/emotional needs. Patient appeared asleep this morning in the recliner and I did not interrupt.     Reverend Bill Bledsoe MDiv  Board Certified

## 2024-08-15 NOTE — PROGRESS NOTES
Arrived to the [facility].  Infed completed. Patient tolerated without difficulty.   Any issues or concerns during appointment: None.  Patient aware of next lab and [facility] office visit on 09/11 (date) at 1030 (time).  Patient instructed to call provider with temperature of 100.4 or greater or nausea/vomiting/ diarrhea or pain not controlled by medications  Discharged ambulatory.

## 2025-02-13 NOTE — ED PROVIDER NOTES
Anticoagulant Therapeutic Duplication    Duplicate orders identified: same medication but different dose, form, frequency or route    The duplicate anticoagulant order(s) has been discontinued    Active anticoagulant: rivaroxaban (Xarelto)    Plan made per [facility] anticoagulation protocol.    Gail Gomez RN  2/13/2025               Lymphocytes Absolute 0.7 0.5 - 4.6 K/UL    Monocytes Absolute 0.2 0.1 - 1.3 K/UL    Eosinophils Absolute 0.1 0.0 - 0.8 K/UL    Basophils Absolute 0.0 0.0 - 0.2 K/UL    Absolute Immature Granulocyte 0.0 0.0 - 0.5 K/UL   Comprehensive Metabolic Panel   Result Value Ref Range    Sodium 137 133 - 143 mmol/L    Potassium 3.5 3.5 - 5.1 mmol/L    Chloride 104 101 - 110 mmol/L    CO2 24 21 - 32 mmol/L    Anion Gap 9 2 - 11 mmol/L    Glucose 162 (H) 65 - 100 mg/dL    BUN 10 8 - 23 MG/DL    Creatinine 0.90 0.6 - 1.0 MG/DL    Est, Glom Filt Rate >60 >60 ml/min/1.73m2    Calcium 8.7 8.3 - 10.4 MG/DL    Total Bilirubin 0.3 0.2 - 1.1 MG/DL    ALT 39 12 - 65 U/L    AST 36 15 - 37 U/L    Alk Phosphatase 85 50 - 130 U/L    Total Protein 7.2 6.3 - 8.2 g/dL    Albumin 4.0 3.2 - 4.6 g/dL    Globulin 3.2 2.8 - 4.5 g/dL    Albumin/Globulin Ratio 1.3 0.4 - 1.6          MRI THORACIC SPINE WO CONTRAST   Final Result      MRI of the thoracic spine:        1. No acute findings in the thoracic spine. Minimal degenerative changes. 2.  No cord compression or cord signal abnormality. 3.  Chronic left hydronephrosis, partially imaged. MRI of the lumbar spine:        1. No acute findings in the lumbar spine. No compression of the cauda equina. 2.  Mild degenerative changes described in detail by level. Yayo Varghese M.D.    8/4/2023 9:13:00 PM      MRI LUMBAR SPINE WO CONTRAST   Final Result      MRI of the thoracic spine:        1. No acute findings in the thoracic spine. Minimal degenerative changes. 2.  No cord compression or cord signal abnormality. 3.  Chronic left hydronephrosis, partially imaged. MRI of the lumbar spine:        1. No acute findings in the lumbar spine. No compression of the cauda equina. 2.  Mild degenerative changes described in detail by level.         Yayo Varghese M.D.    8/4/2023 9:13:00 PM                        Voice dictation software was used during the making of this note. This software is not perfect and grammatical and other typographical errors may be present. This note has not been completely proofread for errors.       [id number] NIKI Figueroa DO  08/05/23 0010

## 2025-04-01 ENCOUNTER — OFFICE VISIT (OUTPATIENT)
Dept: VASCULAR SURGERY | Age: 65
End: 2025-04-01
Payer: COMMERCIAL

## 2025-04-01 VITALS
DIASTOLIC BLOOD PRESSURE: 61 MMHG | WEIGHT: 136 LBS | SYSTOLIC BLOOD PRESSURE: 153 MMHG | HEART RATE: 101 BPM | BODY MASS INDEX: 22.66 KG/M2 | OXYGEN SATURATION: 97 % | HEIGHT: 65 IN

## 2025-04-01 DIAGNOSIS — I73.9 PVD (PERIPHERAL VASCULAR DISEASE) WITH CLAUDICATION: Primary | ICD-10-CM

## 2025-04-01 PROCEDURE — G2211 COMPLEX E/M VISIT ADD ON: HCPCS | Performed by: SURGERY

## 2025-04-01 PROCEDURE — 99213 OFFICE O/P EST LOW 20 MIN: CPT | Performed by: SURGERY

## 2025-04-01 PROCEDURE — 1123F ACP DISCUSS/DSCN MKR DOCD: CPT | Performed by: SURGERY

## 2025-04-01 RX ORDER — GABAPENTIN 300 MG/1
300 CAPSULE ORAL 3 TIMES DAILY
COMMUNITY

## 2025-04-01 NOTE — PROGRESS NOTES
[address] [place] [address]  [phone] FAX: [phone]    Natasha Elkins  : 1960    Chief Complaint:     History of Present Illness:   Patient follows up today for follow-up duplex study.  Patient well-known to me with history of aortic iliac occlusive disease.  She had worsening pain mostly in the right for the last several weeks.    CURRENT MEDICATIONS:  Current Outpatient Medications   Medication Sig Dispense Refill    gabapentin (NEURONTIN) 300 MG capsule Take 1 capsule by mouth 3 times daily.      DULoxetine (CYMBALTA) 20 MG extended release capsule Take 1 capsule by mouth daily For neuropathy 30 capsule 3    cetirizine (ZYRTEC) 10 MG tablet Take 1 tablet by mouth daily      docusate sodium (COLACE) 100 MG capsule Take 1 capsule by mouth daily      potassium chloride (KLOR-CON M) 20 MEQ extended release tablet Take 1 tablet by mouth daily      olmesartan (BENICAR) 20 MG tablet       pantoprazole (PROTONIX) 40 MG tablet Take 1 tablet by mouth every morning (before breakfast) 30 tablet 3    thiamine 100 MG tablet Take 1 tablet by mouth daily (Patient not taking: Reported on 2024) 30 tablet 0     No current facility-administered medications for this visit.       Past Medical History:   Diagnosis Date    Anemia requiring transfusions 2023    Chronic hepatitis C (HCC)     Cirrhosis of liver (HCC)     Fracture of right clavicle 3/2015    History of kidney stones     Hypertension     no meds--- pt stopped on her own--- off meds x 1 yr-- per pt-- b/p stable    Liver disease dx--    HEP C--- not currently seeing a m.d.-- due to  no insurance per pt    Mixed hyperlipidemia     Neuropathy 2023    PVD (peripheral vascular disease)     Sciatica     Severe anemia 2023    Severe protein-calorie malnutrition 2020    Symptomatic anemia 2020       Physical Examination:   Height: 1.638 m (5' 4.5\"), Weight - Scale: 61.7 kg (136 lb), BP: (!)

## 2025-04-09 ENCOUNTER — HOSPITAL ENCOUNTER (OUTPATIENT)
Dept: CT IMAGING | Age: 65
Discharge: HOME | End: 2025-04-12
Attending: SURGERY
Payer: COMMERCIAL

## 2025-04-09 DIAGNOSIS — I73.9 PVD (PERIPHERAL VASCULAR DISEASE) WITH CLAUDICATION: ICD-10-CM

## 2025-04-09 LAB — CREAT BLD-MCNC: 0.66 MG/DL (ref 0.8–1.5)

## 2025-04-09 PROCEDURE — 6360000004 HC RX CONTRAST MEDICATION: Performed by: SURGERY

## 2025-04-09 PROCEDURE — 82565 ASSAY OF CREATININE: CPT

## 2025-04-09 PROCEDURE — 75635 CT ANGIO ABDOMINAL ARTERIES: CPT

## 2025-04-09 PROCEDURE — 75635 CT ANGIO ABDOMINAL ARTERIES: CPT | Performed by: RADIOLOGY

## 2025-04-09 RX ORDER — IOPAMIDOL 755 MG/ML
100 INJECTION, SOLUTION INTRAVASCULAR
Status: COMPLETED | OUTPATIENT
Start: 2025-04-09 | End: 2025-04-09

## 2025-04-09 RX ADMIN — IOPAMIDOL 100 ML: 755 INJECTION, SOLUTION INTRAVENOUS at 09:31

## 2025-04-15 ENCOUNTER — TELEPHONE (OUTPATIENT)
Dept: VASCULAR SURGERY | Age: 65
End: 2025-04-15

## 2025-04-15 ENCOUNTER — OFFICE VISIT (OUTPATIENT)
Dept: VASCULAR SURGERY | Age: 65
End: 2025-04-15
Payer: COMMERCIAL

## 2025-04-15 ENCOUNTER — PREP FOR PROCEDURE (OUTPATIENT)
Dept: VASCULAR SURGERY | Age: 65
End: 2025-04-15

## 2025-04-15 VITALS
HEIGHT: 65 IN | SYSTOLIC BLOOD PRESSURE: 153 MMHG | BODY MASS INDEX: 22.49 KG/M2 | WEIGHT: 135 LBS | HEART RATE: 96 BPM | DIASTOLIC BLOOD PRESSURE: 71 MMHG | OXYGEN SATURATION: 98 %

## 2025-04-15 DIAGNOSIS — I73.9 PERIPHERAL VASCULAR DISEASE, UNSPECIFIED: ICD-10-CM

## 2025-04-15 DIAGNOSIS — I73.9 PVD (PERIPHERAL VASCULAR DISEASE) WITH CLAUDICATION: Primary | ICD-10-CM

## 2025-04-15 PROCEDURE — 99213 OFFICE O/P EST LOW 20 MIN: CPT | Performed by: SURGERY

## 2025-04-15 PROCEDURE — G2211 COMPLEX E/M VISIT ADD ON: HCPCS | Performed by: SURGERY

## 2025-04-15 PROCEDURE — 1159F MED LIST DOCD IN RCRD: CPT | Performed by: SURGERY

## 2025-04-15 PROCEDURE — 1123F ACP DISCUSS/DSCN MKR DOCD: CPT | Performed by: SURGERY

## 2025-04-15 NOTE — PROGRESS NOTES
[address] [place] [address]  [phone] FAX: [phone]    Natasha Elkins  : 1960    Chief Complaint:     History of Present Illness:   Patient follows up today for follow-up CTA.  Patient well-known aortic iliac stenosis.  She has worsening claudication and rest pain symptoms.  Her ABIs are less than 0.2.    CURRENT MEDICATIONS:  Current Outpatient Medications   Medication Sig Dispense Refill    gabapentin (NEURONTIN) 300 MG capsule Take 1 capsule by mouth 3 times daily.      DULoxetine (CYMBALTA) 20 MG extended release capsule Take 1 capsule by mouth daily For neuropathy 30 capsule 3    cetirizine (ZYRTEC) 10 MG tablet Take 1 tablet by mouth daily      docusate sodium (COLACE) 100 MG capsule Take 1 capsule by mouth daily      potassium chloride (KLOR-CON M) 20 MEQ extended release tablet Take 1 tablet by mouth daily      olmesartan (BENICAR) 20 MG tablet       pantoprazole (PROTONIX) 40 MG tablet Take 1 tablet by mouth every morning (before breakfast) 30 tablet 3    thiamine 100 MG tablet Take 1 tablet by mouth daily (Patient not taking: Reported on 2024) 30 tablet 0     No current facility-administered medications for this visit.       Past Medical History:   Diagnosis Date    Anemia requiring transfusions 2023    Chronic hepatitis C (HCC)     Cirrhosis of liver (HCC)     Fracture of right clavicle 3/2015    History of kidney stones     Hypertension     no meds--- pt stopped on her own--- off meds x 1 yr-- per pt-- b/p stable    Liver disease dx--    HEP C--- not currently seeing a m.d.-- due to  no insurance per pt    Mixed hyperlipidemia     Neuropathy 2023    PVD (peripheral vascular disease)     Sciatica     Severe anemia 2023    Severe protein-calorie malnutrition 2020    Symptomatic anemia 2020       Physical Examination:   Height: 1.638 m (5' 4.5\"), Weight - Scale: 61.2 kg (135 lb), BP: (!) 153/71    Constitutional: she

## 2025-04-15 NOTE — TELEPHONE ENCOUNTER
Patient is tentatively scheduled for an aortobifemoral bypass on 5/14/25 with Dr. Saud Maguire. However, before proceeding with the surgery he would like cardiac workup/clearance for the surgery. Patient has seen Dr. Conner in the past. Please contact patient with next steps for cardiac care at [phone].

## 2025-04-29 ENCOUNTER — RESULTS FOLLOW-UP (OUTPATIENT)
Age: 65
End: 2025-04-29

## 2025-04-29 ENCOUNTER — TELEPHONE (OUTPATIENT)
Age: 65
End: 2025-04-29

## 2025-04-29 ENCOUNTER — OFFICE VISIT (OUTPATIENT)
Age: 65
End: 2025-04-29
Payer: COMMERCIAL

## 2025-04-29 ENCOUNTER — HOSPITAL ENCOUNTER (INPATIENT)
Age: 65
LOS: 3 days | Discharge: HOME | DRG: 812 | End: 2025-05-02
Attending: INTERNAL MEDICINE | Admitting: INTERNAL MEDICINE
Payer: COMMERCIAL

## 2025-04-29 VITALS
HEIGHT: 66 IN | DIASTOLIC BLOOD PRESSURE: 72 MMHG | SYSTOLIC BLOOD PRESSURE: 130 MMHG | WEIGHT: 133 LBS | BODY MASS INDEX: 21.38 KG/M2 | HEART RATE: 106 BPM

## 2025-04-29 DIAGNOSIS — I48.91 TRANSIENT ATRIAL FIBRILLATION (HCC): ICD-10-CM

## 2025-04-29 DIAGNOSIS — D64.9 ANEMIA: ICD-10-CM

## 2025-04-29 DIAGNOSIS — I73.9 PAD (PERIPHERAL ARTERY DISEASE): Chronic | ICD-10-CM

## 2025-04-29 DIAGNOSIS — E78.5 HYPERLIPIDEMIA, UNSPECIFIED HYPERLIPIDEMIA TYPE: Chronic | ICD-10-CM

## 2025-04-29 DIAGNOSIS — D64.9 SEVERE ANEMIA: Primary | ICD-10-CM

## 2025-04-29 DIAGNOSIS — Z01.810 PRE-OPERATIVE CARDIOVASCULAR EXAMINATION: Primary | ICD-10-CM

## 2025-04-29 DIAGNOSIS — Z01.810 PRE-OPERATIVE CARDIOVASCULAR EXAMINATION: ICD-10-CM

## 2025-04-29 DIAGNOSIS — R73.9 HYPERGLYCEMIA: ICD-10-CM

## 2025-04-29 PROBLEM — D62 ACUTE BLOOD LOSS ANEMIA (ABLA): Status: ACTIVE | Noted: 2025-04-29

## 2025-04-29 LAB
ALBUMIN SERPL-MCNC: 4 G/DL (ref 3.2–4.6)
ALBUMIN/GLOB SERPL: 1.6 (ref 1–1.9)
ALP SERPL-CCNC: 76 U/L (ref 35–104)
ALT SERPL-CCNC: 21 U/L (ref 8–45)
ANION GAP SERPL CALC-SCNC: 18 MMOL/L (ref 7–16)
AST SERPL-CCNC: 33 U/L (ref 15–37)
BILIRUB SERPL-MCNC: 0.4 MG/DL (ref 0–1.2)
BUN SERPL-MCNC: 8 MG/DL (ref 8–23)
CALCIUM SERPL-MCNC: 9 MG/DL (ref 8.8–10.2)
CHLORIDE SERPL-SCNC: 102 MMOL/L (ref 98–107)
CHOLEST SERPL-MCNC: 131 MG/DL (ref 0–200)
CO2 SERPL-SCNC: 20 MMOL/L (ref 20–29)
CREAT SERPL-MCNC: 0.79 MG/DL (ref 0.6–1.1)
ERYTHROCYTE [DISTWIDTH] IN BLOOD BY AUTOMATED COUNT: 19.1 % (ref 11.9–14.6)
EST. AVERAGE GLUCOSE BLD GHB EST-MCNC: 128 MG/DL
GLOBULIN SER CALC-MCNC: 2.6 G/DL (ref 2.3–3.5)
GLUCOSE SERPL-MCNC: 103 MG/DL (ref 70–99)
HBA1C MFR BLD: 6.1 % (ref 0–5.6)
HCT VFR BLD AUTO: 19.9 % (ref 35.8–46.3)
HCT VFR BLD AUTO: 22.5 % (ref 35.8–46.3)
HDLC SERPL-MCNC: 57 MG/DL (ref 40–60)
HDLC SERPL: 2.3 (ref 0–5)
HGB BLD-MCNC: 5.8 G/DL (ref 11.7–15.4)
HGB BLD-MCNC: 6.6 G/DL (ref 11.7–15.4)
HISTORY CHECK: NORMAL
LDLC SERPL CALC-MCNC: 62 MG/DL (ref 0–100)
MCH RBC QN AUTO: 23.3 PG (ref 26.1–32.9)
MCHC RBC AUTO-ENTMCNC: 29.1 G/DL (ref 31.4–35)
MCV RBC AUTO: 79.9 FL (ref 82–102)
NRBC # BLD: 0 K/UL (ref 0–0.2)
PLATELET # BLD AUTO: 113 K/UL (ref 150–450)
PMV BLD AUTO: ABNORMAL FL (ref 9.4–12.3)
POTASSIUM SERPL-SCNC: 3.9 MMOL/L (ref 3.5–5.1)
PROT SERPL-MCNC: 6.6 G/DL (ref 6.3–8.2)
RBC # BLD AUTO: 2.49 M/UL (ref 4.05–5.2)
SODIUM SERPL-SCNC: 139 MMOL/L (ref 136–145)
TRIGL SERPL-MCNC: 59 MG/DL (ref 0–150)
VLDLC SERPL CALC-MCNC: 12 MG/DL (ref 6–23)
WBC # BLD AUTO: 4.1 K/UL (ref 4.3–11.1)

## 2025-04-29 PROCEDURE — 6370000000 HC RX 637 (ALT 250 FOR IP): Performed by: INTERNAL MEDICINE

## 2025-04-29 PROCEDURE — 1160F RVW MEDS BY RX/DR IN RCRD: CPT | Performed by: INTERNAL MEDICINE

## 2025-04-29 PROCEDURE — P9016 RBC LEUKOCYTES REDUCED: HCPCS

## 2025-04-29 PROCEDURE — 94760 N-INVAS EAR/PLS OXIMETRY 1: CPT

## 2025-04-29 PROCEDURE — 85018 HEMOGLOBIN: CPT

## 2025-04-29 PROCEDURE — 1159F MED LIST DOCD IN RCRD: CPT | Performed by: INTERNAL MEDICINE

## 2025-04-29 PROCEDURE — 93000 ELECTROCARDIOGRAM COMPLETE: CPT | Performed by: INTERNAL MEDICINE

## 2025-04-29 PROCEDURE — 99285 EMERGENCY DEPT VISIT HI MDM: CPT

## 2025-04-29 PROCEDURE — 86900 BLOOD TYPING SEROLOGIC ABO: CPT

## 2025-04-29 PROCEDURE — 36415 COLL VENOUS BLD VENIPUNCTURE: CPT

## 2025-04-29 PROCEDURE — 99204 OFFICE O/P NEW MOD 45 MIN: CPT | Performed by: INTERNAL MEDICINE

## 2025-04-29 PROCEDURE — 86901 BLOOD TYPING SEROLOGIC RH(D): CPT

## 2025-04-29 PROCEDURE — 84466 ASSAY OF TRANSFERRIN: CPT

## 2025-04-29 PROCEDURE — 2500000003 HC RX 250 WO HCPCS: Performed by: INTERNAL MEDICINE

## 2025-04-29 PROCEDURE — 1100000000 HC RM PRIVATE

## 2025-04-29 PROCEDURE — 86923 COMPATIBILITY TEST ELECTRIC: CPT

## 2025-04-29 PROCEDURE — 1123F ACP DISCUSS/DSCN MKR DOCD: CPT | Performed by: INTERNAL MEDICINE

## 2025-04-29 PROCEDURE — 83540 ASSAY OF IRON: CPT

## 2025-04-29 PROCEDURE — 1125F AMNT PAIN NOTED PAIN PRSNT: CPT | Performed by: INTERNAL MEDICINE

## 2025-04-29 PROCEDURE — 86850 RBC ANTIBODY SCREEN: CPT

## 2025-04-29 PROCEDURE — 6360000002 HC RX W HCPCS: Performed by: INTERNAL MEDICINE

## 2025-04-29 PROCEDURE — 2580000003 HC RX 258: Performed by: INTERNAL MEDICINE

## 2025-04-29 PROCEDURE — 85014 HEMATOCRIT: CPT

## 2025-04-29 RX ORDER — ONDANSETRON 4 MG/1
4 TABLET, ORALLY DISINTEGRATING ORAL EVERY 8 HOURS PRN
Status: DISCONTINUED | OUTPATIENT
Start: 2025-04-29 | End: 2025-05-02 | Stop reason: HOSPADM

## 2025-04-29 RX ORDER — ATORVASTATIN CALCIUM 40 MG/1
40 TABLET, FILM COATED ORAL DAILY
Status: DISCONTINUED | OUTPATIENT
Start: 2025-04-30 | End: 2025-05-02 | Stop reason: HOSPADM

## 2025-04-29 RX ORDER — SODIUM CHLORIDE 9 MG/ML
INJECTION, SOLUTION INTRAVENOUS PRN
Status: DISCONTINUED | OUTPATIENT
Start: 2025-04-29 | End: 2025-05-02 | Stop reason: HOSPADM

## 2025-04-29 RX ORDER — AMLODIPINE BESYLATE 5 MG/1
5 TABLET ORAL DAILY
Status: DISCONTINUED | OUTPATIENT
Start: 2025-04-30 | End: 2025-05-02 | Stop reason: HOSPADM

## 2025-04-29 RX ORDER — DULOXETIN HYDROCHLORIDE 60 MG/1
60 CAPSULE, DELAYED RELEASE ORAL DAILY
Status: DISCONTINUED | OUTPATIENT
Start: 2025-04-30 | End: 2025-05-02 | Stop reason: HOSPADM

## 2025-04-29 RX ORDER — ACETAMINOPHEN 325 MG/1
650 TABLET ORAL EVERY 6 HOURS PRN
Status: DISCONTINUED | OUTPATIENT
Start: 2025-04-29 | End: 2025-05-02 | Stop reason: HOSPADM

## 2025-04-29 RX ORDER — LOSARTAN POTASSIUM 50 MG/1
50 TABLET ORAL DAILY
Status: DISCONTINUED | OUTPATIENT
Start: 2025-04-30 | End: 2025-05-02 | Stop reason: HOSPADM

## 2025-04-29 RX ORDER — ONDANSETRON 2 MG/ML
4 INJECTION INTRAMUSCULAR; INTRAVENOUS EVERY 6 HOURS PRN
Status: DISCONTINUED | OUTPATIENT
Start: 2025-04-29 | End: 2025-05-02 | Stop reason: HOSPADM

## 2025-04-29 RX ORDER — ACETAMINOPHEN 650 MG/1
650 SUPPOSITORY RECTAL EVERY 6 HOURS PRN
Status: DISCONTINUED | OUTPATIENT
Start: 2025-04-29 | End: 2025-05-02 | Stop reason: HOSPADM

## 2025-04-29 RX ORDER — SODIUM CHLORIDE 9 MG/ML
INJECTION, SOLUTION INTRAVENOUS PRN
Status: DISCONTINUED | OUTPATIENT
Start: 2025-04-29 | End: 2025-04-30

## 2025-04-29 RX ORDER — SODIUM CHLORIDE, SODIUM LACTATE, POTASSIUM CHLORIDE, CALCIUM CHLORIDE 600; 310; 30; 20 MG/100ML; MG/100ML; MG/100ML; MG/100ML
INJECTION, SOLUTION INTRAVENOUS CONTINUOUS
Status: ACTIVE | OUTPATIENT
Start: 2025-04-29 | End: 2025-04-30

## 2025-04-29 RX ORDER — DOCUSATE SODIUM 100 MG/1
100 CAPSULE, LIQUID FILLED ORAL 2 TIMES DAILY PRN
Status: DISCONTINUED | OUTPATIENT
Start: 2025-04-29 | End: 2025-05-02 | Stop reason: HOSPADM

## 2025-04-29 RX ORDER — AMLODIPINE BESYLATE 5 MG/1
1 TABLET ORAL DAILY
COMMUNITY

## 2025-04-29 RX ORDER — SODIUM CHLORIDE 0.9 % (FLUSH) 0.9 %
5-40 SYRINGE (ML) INJECTION EVERY 12 HOURS SCHEDULED
Status: DISCONTINUED | OUTPATIENT
Start: 2025-04-29 | End: 2025-05-02 | Stop reason: HOSPADM

## 2025-04-29 RX ORDER — CETIRIZINE HYDROCHLORIDE 10 MG/1
10 TABLET ORAL DAILY
Status: DISCONTINUED | OUTPATIENT
Start: 2025-04-30 | End: 2025-05-02 | Stop reason: HOSPADM

## 2025-04-29 RX ORDER — PANTOPRAZOLE SODIUM 40 MG/1
40 TABLET, DELAYED RELEASE ORAL
Status: DISCONTINUED | OUTPATIENT
Start: 2025-04-30 | End: 2025-04-29 | Stop reason: SDUPTHER

## 2025-04-29 RX ORDER — SODIUM CHLORIDE 0.9 % (FLUSH) 0.9 %
5-40 SYRINGE (ML) INJECTION PRN
Status: DISCONTINUED | OUTPATIENT
Start: 2025-04-29 | End: 2025-05-02 | Stop reason: HOSPADM

## 2025-04-29 RX ORDER — ATORVASTATIN CALCIUM 40 MG/1
40 TABLET, FILM COATED ORAL DAILY
Qty: 30 TABLET | Refills: 11 | Status: SHIPPED | OUTPATIENT
Start: 2025-04-29

## 2025-04-29 RX ORDER — GABAPENTIN 300 MG/1
300 CAPSULE ORAL 3 TIMES DAILY
Status: DISCONTINUED | OUTPATIENT
Start: 2025-04-29 | End: 2025-05-02 | Stop reason: HOSPADM

## 2025-04-29 RX ADMIN — SODIUM CHLORIDE, PRESERVATIVE FREE 40 MG: 5 INJECTION INTRAVENOUS at 21:54

## 2025-04-29 RX ADMIN — SODIUM CHLORIDE, PRESERVATIVE FREE 10 ML: 5 INJECTION INTRAVENOUS at 21:59

## 2025-04-29 RX ADMIN — GABAPENTIN 300 MG: 300 CAPSULE ORAL at 21:54

## 2025-04-29 RX ADMIN — SODIUM CHLORIDE, SODIUM LACTATE, POTASSIUM CHLORIDE, AND CALCIUM CHLORIDE: .6; .31; .03; .02 INJECTION, SOLUTION INTRAVENOUS at 21:54

## 2025-04-29 ASSESSMENT — LIFESTYLE VARIABLES
HOW MANY STANDARD DRINKS CONTAINING ALCOHOL DO YOU HAVE ON A TYPICAL DAY: PATIENT DOES NOT DRINK
HOW OFTEN DO YOU HAVE A DRINK CONTAINING ALCOHOL: NEVER

## 2025-04-29 ASSESSMENT — ENCOUNTER SYMPTOMS
RIGHT EYE: 0
SHORTNESS OF BREATH: 0

## 2025-04-29 NOTE — ED TRIAGE NOTES
Patient states was sent from [facility] for low hemoglobin. States needs blood transfusion.

## 2025-04-29 NOTE — ED NOTES
Bedside and Verbal shift change report given to LILIANA SANTIAGO (oncoming nurse) by LILIANA Jean (offgoing nurse). Report included the following information ED SBAR, MAR, Recent Results, and Neuro Assessment.

## 2025-04-29 NOTE — ED NOTES
Bedside and Verbal shift change report given to LILIANA SANTIAGO (oncoming nurse) by LILIANA Jean (offgoing nurse). Report included the following information ED SBAR, MAR, Recent Results, and Neuro Assessment.     Transition to [place] one week after last dose of Zepbound due to insurance changes.  Ramping rx's sent to build from 0.5mg/week to 2.4mg/week doses over 4 months if tolerated. Will skip lowest dose as tolerating Zepbound well.   Elias Palma MD

## 2025-04-29 NOTE — H&P
Hospitalist History and Physical   Admit Date:  2025  5:15 PM   Name:  Natasha Elkins   Age:  65 y.o.  Sex:  female  :  1960   MRN:  [id number]   Room:  [place] [address]    Presenting/Chief Complaint: Abnormal labs     Reason(s) for Admission: WARD vs ABL anemia    History of Present Illness:   Natasha Elkins is a 65 y.o. female with medical history of iron deficiency anemia on iron infusions, paroxysmal atrial fibrillation not on any anticoagulation, portal hypertensive gastropathy evaluated by previous EGD about a year and a half ago also at that time patient did have secondary esophageal varices without any bleeding at that time, hyperlipidemia peripheral vascular disease history of EtOH abuse however she has been in remission from roughly 2 years history of hepatitis C resulting in cirrhosis of the liver and hypertension and chronic back pain with sciatica peripheral neuropathy has descending aortic stenosis and is supposed to go undergo aortobifemoral bypass with prosthetic with the possible common femoral endarterectomies coming up in the next month.  As preop evaluation patient went to see cardiology today and had some blood work done subsequently got a call at home when they found out her hemoglobin was 5.8.  Patient denies any hematemesis denies any melena denies any black stools or bleeding per rectum most likely this is probably an iron deficiency issue at any rate does have history of varices on the previous EGD and hence we will need to transfuse the patient and get GI involved and hence I will admit the patient for GI bleeding keep her n.p.o. after midnight and consult GI in a.m. for the scopes start the patient on Protonix IV for now.      Assessment & Plan:     Principal Problem:  Acute blood loss anemia (ABLA) versus iron deficiency anemia  Transfuse 1 unit of packed red blood cells  Protonix 40 mg IV twice daily  GI consult in a.m.  Patient denies any hematemesis or melena  Will need  Exam:  Vital signs: 158/76 106 21 98.2 97%  General:    Well nourished.    Head:  Normocephalic, atraumatic  Eyes:  Sclerae appear normal.  Pupils equally round.  ENT:  Nares appear normal.  Moist oral mucosa  Neck:  No restricted ROM.  Trachea midline   CV:   RRR.  No m/r/g.  No jugular venous distension.  Lungs:   CTAB.  No wheezing, rhonchi, or rales.  Symmetric expansion.  Abdomen:   Soft, nontender, nondistended.  Extremities: No cyanosis or clubbing.  No edema  Skin:     No rashes.  Normal coloration.   Warm and dry.    Neuro:  CN II-XII grossly intact.  Sensation intact.   Psych:  Normal mood and affect.      Orders Placed This Encounter   Medications    0.9 % sodium chloride infusion       Prior to Admit Medications:  Current Outpatient Medications   Medication Instructions    amLODIPine (NORVASC) 5 MG tablet 1 tablet, DAILY    atorvastatin (LIPITOR) 40 mg, Oral, DAILY    cetirizine (ZYRTEC) 10 mg, DAILY    docusate sodium (COLACE) 100 mg, PRN    DULoxetine (CYMBALTA) 20 mg, Oral, DAILY, For neuropathy    gabapentin (NEURONTIN) 300 mg, 3 TIMES DAILY    olmesartan (BENICAR) 20 MG tablet     pantoprazole (PROTONIX) 40 mg, Oral, DAILY BEFORE BREAKFAST    potassium chloride (KLOR-CON M) 20 MEQ extended release tablet 1 tablet, DAILY    thiamine 100 mg, Oral, DAILY       I have personally reviewed labs and tests:  Recent Results (from the past 24 hours)   Hemoglobin A1C    Collection Time: 04/29/25  9:28 AM   Result Value Ref Range    Hemoglobin A1C 6.1 (H) 0 - 5.6 %    Estimated Avg Glucose 128 mg/dL   CBC    Collection Time: 04/29/25  9:28 AM   Result Value Ref Range    WBC 4.1 (L) 4.3 - 11.1 K/uL    RBC 2.49 (L) 4.05 - 5.2 M/uL    Hemoglobin 5.8 (LL) 11.7 - 15.4 g/dL    Hematocrit 19.9 (L) 35.8 - 46.3 %    MCV 79.9 (L) 82 - 102 FL    MCH 23.3 (L) 26.1 - 32.9 PG    MCHC 29.1 (L) 31.4 - 35.0 g/dL    RDW 19.1 (H) 11.9 - 14.6 %    Platelets 113 (L) 150 - 450 K/uL    MPV Unable to calculate. Recommend adding IPF.

## 2025-04-29 NOTE — PROGRESS NOTES
[address] [facility], [place] [address]  PHONE: [phone]    SUBJECTIVE:   Natasha Elkins is a 65 y.o. female 1960   seen for a consultation visit regarding the following:     Chief Complaint   Patient presents with    Cardiac Clearance              Consultation is requested for evaluation of Cardiac Clearance   .    History of present illness: 65 y.o. female with PMH HTN, HLD, PAD, h/o afib presenting for pre-operative risk stratification before aorto-bifemoral bypass and possible common femoral endarterectomies. Patient to undergo surgery 5/24/25. She reports severe claudication even walking a few steps. No resting pain. Denies chest pain, dyspnea, light-headedness, syncope or palpitations. She was diagnosed with afib in 2022 during hospitalization and has not had a recurrence since then (including on outpatient Holter). She did not start on anticoagulation due to GI bleeds.      Past Medical History, Past Surgical History, Family history, Social History, and Medications were all reviewed with the patient today and updated as necessary.       Allergies   Allergen Reactions    Penicillins Other (See Comments)     Past Medical History:   Diagnosis Date    Anemia requiring transfusions 09/08/2023    Chronic hepatitis C (HCC)     Cirrhosis of liver (HCC)     Fracture of right clavicle 3/2015    History of kidney stones     Hypertension     no meds--- pt stopped on her own--- off meds x 1 yr-- per pt-- b/p stable    Liver disease dx--2011    HEP C--- not currently seeing a m.d.-- due to  no insurance per pt    Mixed hyperlipidemia     Neuropathy 09/08/2023    PVD (peripheral vascular disease)     Sciatica     Severe anemia 07/25/2023    Severe protein-calorie malnutrition 8/29/2020    Symptomatic anemia 08/27/2020     Past Surgical History:   Procedure Laterality Date    BREAST BIOPSY  1984    cyst    COLONOSCOPY N/A 9/12/2022    COLORECTAL CANCER SCREENING, NOT HIGH RISK Rm 2226 performed by

## 2025-04-29 NOTE — CONSENT
Informed Consent for Blood Component Transfusion Note    I have discussed with the patient the rationale for blood component transfusion; its benefits in treating or preventing fatigue, organ damage, or death; and its risk which includes mild transfusion reactions, rare risk of blood borne infection, or more serious but rare reactions. I have discussed the alternatives to transfusion, including the risk and consequences of not receiving transfusion. The patient had an opportunity to ask questions and had agreed to proceed with transfusion of blood components.    Electronically signed by Russell Starr PA-C on 4/29/25 at 5:14 PM EDT

## 2025-04-29 NOTE — ED PROVIDER NOTES
Emergency Department Provider Note       [facility] [place]ERGENCY DEPT   PCP: Tashia De Leon APRN - NP   Age: 65 y.o.   Sex: female     DISPOSITION Admitted 04/29/2025 07:37:12 PM    ICD-10-CM    1. Severe anemia  D64.9           Medical Decision Making   In summary, Natasha Elkins is a 65 y.o. female who presented to the emergency department today with concerns over low hemoglobin from her presurgery check-in. Ddx include, but are not limited to,  iron deficiency anemia, anemia of chronic disease, infection, hemorrhage, etc .     On exam she afebrile, nontachycardic.  CBC obtained in outpatient office showed 5.8 hemoglobin with a 19.9 hematocrit with subsequent labs showing possible iron deficiency anemia picture.  White blood cells at 4.1.  Platelets at 113 which is an increase from 8/6/2024 where they were at 89.  CMP was unremarkable for any hepatobiliary abnormailities, electrolyte abnormalities, kidney dysfunction, or other concerning findings.. .  She noted no abdominal pain chest pain or other concerning intrathoracic or intra-abdominal concerns.  She had no changes in stool concerning for upper or lower GI bleeding.  At this point there were no concerns for other emergent cause.  She has had this in the past and was admitted with a plethora of lab work undergone without any significant findings.    Natasha Elkins is currently non toxic, well appearing and protecting own airway without difficulty. Therefore, it is in my clinical judgement that her presentation is most consistent with iron deficiency anemia. At this time, based on symptoms, imagine, and associated lab work, it is in my clinical judgement that she is not safe to return home but should be admitted to the hospital service for H&H trending and further lab work. The admitting service was consulted and has agreed to admit the patient. At time of admission, patient was stable in the emergency department. She has been adequately informed of  their pending admission, all questions were answered.     ED Course as of 04/29/25 2019   Tue Apr 29, 2025   1841 Consulted hospitalist.  Awaiting reply. [CL]      ED Course User Index  [CL] Russell Starr PA-C     1 acute, uncomplicated illness or injury.  Discussion with external consultants.  Shared medical decision making was utilized in creating the patients health plan today.  I independently ordered and reviewed each unique test.    I reviewed external records: provider visit note from outside specialist.           The patient was admitted and I have discussed patient management with the admitting provider.          History     Natasha Elkins is a 65-year-old female who presents with concerns related to a history of low hemoglobin levels and severe leg pain. She reports that she is scheduled for surgery due to an aortic issue in her abdomen that is obstructing blood flow, though she does not recall the specific term. She mentions undergoing lab work today in preparation for the surgery. Natasha has experienced similar issues in the past, consistently noting severe leg pain that necessitated emergency department visits. She describes the pain as excruciating, sometimes causing her to scream. During previous episodes, her hemoglobin levels have been critically low, once as low as 5 g/dL, and currently reported at 5.8 g/dL. She expresses confusion about the cause but believes it may be related to her current condition. She denies any changes in stool color, such as dark or bright red, and reports no significant changes in bowel frequency, though she notes a change in eating habits since being out of work for nearly two months.  She has no abdominal pain or other concerning symptoms at this time. Previously employed at [employer], she now consumes more processed foods, such as those from [employer], which may contribute to her nutritional status. Natasha denies experiencing chest pain, shortness of breath,

## 2025-04-29 NOTE — TELEPHONE ENCOUNTER
Hgb 5.9    Triage will notify Dr. Joseph.  Per Dr. Joseph, please have pt go to ER.    Triage left message for pt to call our office back on her voicemail.  Triage left message with dia Basurto's EC to have pt call our office.    2:19pm pt returns call and triage informed her of Dr. Joseph's response. She verbalizes understanding and agrees to plan.

## 2025-04-30 ENCOUNTER — ANESTHESIA EVENT (OUTPATIENT)
Dept: ENDOSCOPY | Age: 65
End: 2025-04-30
Payer: COMMERCIAL

## 2025-04-30 ENCOUNTER — APPOINTMENT (OUTPATIENT)
Dept: ULTRASOUND IMAGING | Age: 65
DRG: 812 | End: 2025-04-30
Payer: COMMERCIAL

## 2025-04-30 ENCOUNTER — ANESTHESIA (OUTPATIENT)
Dept: ENDOSCOPY | Age: 65
End: 2025-04-30
Payer: COMMERCIAL

## 2025-04-30 PROBLEM — D64.9 SEVERE ANEMIA: Status: ACTIVE | Noted: 2025-04-30

## 2025-04-30 PROBLEM — D64.9 ANEMIA: Status: ACTIVE | Noted: 2025-04-29

## 2025-04-30 PROBLEM — I10 PRIMARY HYPERTENSION: Status: ACTIVE | Noted: 2025-04-30

## 2025-04-30 PROBLEM — M79.10 MUSCULAR PAIN: Status: ACTIVE | Noted: 2025-04-30

## 2025-04-30 LAB
ANION GAP SERPL CALC-SCNC: 10 MMOL/L (ref 7–16)
BUN SERPL-MCNC: 9 MG/DL (ref 8–23)
CALCIUM SERPL-MCNC: 9 MG/DL (ref 8.8–10.2)
CHLORIDE SERPL-SCNC: 106 MMOL/L (ref 98–107)
CO2 SERPL-SCNC: 26 MMOL/L (ref 20–29)
CREAT SERPL-MCNC: 0.82 MG/DL (ref 0.6–1.1)
ERYTHROCYTE [DISTWIDTH] IN BLOOD BY AUTOMATED COUNT: 18.3 % (ref 11.9–14.6)
GLUCOSE SERPL-MCNC: 122 MG/DL (ref 70–99)
HCT VFR BLD AUTO: 20.9 % (ref 35.8–46.3)
HCT VFR BLD AUTO: 26.4 % (ref 35.8–46.3)
HGB BLD-MCNC: 6.2 G/DL (ref 11.7–15.4)
HGB BLD-MCNC: 8.1 G/DL (ref 11.7–15.4)
HISTORY CHECK: NORMAL
IRON SATN MFR SERPL: 6 % (ref 20–50)
IRON SERPL-MCNC: 21 UG/DL (ref 35–100)
LPA SERPL-SCNC: <8.4 NMOL/L
MCH RBC QN AUTO: 23.5 PG (ref 26.1–32.9)
MCHC RBC AUTO-ENTMCNC: 29.7 G/DL (ref 31.4–35)
MCV RBC AUTO: 79.2 FL (ref 82–102)
NRBC # BLD: 0 K/UL (ref 0–0.2)
PLATELET # BLD AUTO: 99 K/UL (ref 150–450)
PMV BLD AUTO: 10.9 FL (ref 9.4–12.3)
POTASSIUM SERPL-SCNC: 3.8 MMOL/L (ref 3.5–5.1)
RBC # BLD AUTO: 2.64 M/UL (ref 4.05–5.2)
SODIUM SERPL-SCNC: 142 MMOL/L (ref 136–145)
TIBC SERPL-MCNC: 333 UG/DL (ref 240–450)
TRANSFERRIN SERPL-MCNC: 266 MG/DL (ref 200–360)
UIBC SERPL-MCNC: 312 UG/DL (ref 112–347)
WBC # BLD AUTO: 2.3 K/UL (ref 4.3–11.1)

## 2025-04-30 PROCEDURE — 7100000011 HC PHASE II RECOVERY - ADDTL 15 MIN: Performed by: INTERNAL MEDICINE

## 2025-04-30 PROCEDURE — 36415 COLL VENOUS BLD VENIPUNCTURE: CPT

## 2025-04-30 PROCEDURE — 43244 EGD VARICES LIGATION: CPT | Performed by: INTERNAL MEDICINE

## 2025-04-30 PROCEDURE — 2709999900 HC NON-CHARGEABLE SUPPLY: Performed by: INTERNAL MEDICINE

## 2025-04-30 PROCEDURE — 85014 HEMATOCRIT: CPT

## 2025-04-30 PROCEDURE — 6360000002 HC RX W HCPCS: Performed by: STUDENT IN AN ORGANIZED HEALTH CARE EDUCATION/TRAINING PROGRAM

## 2025-04-30 PROCEDURE — 3609017100 HC EGD: Performed by: INTERNAL MEDICINE

## 2025-04-30 PROCEDURE — 3700000001 HC ADD 15 MINUTES (ANESTHESIA): Performed by: INTERNAL MEDICINE

## 2025-04-30 PROCEDURE — 80048 BASIC METABOLIC PNL TOTAL CA: CPT

## 2025-04-30 PROCEDURE — 2580000003 HC RX 258: Performed by: NURSE PRACTITIONER

## 2025-04-30 PROCEDURE — 76705 ECHO EXAM OF ABDOMEN: CPT

## 2025-04-30 PROCEDURE — 3609012300 HC EGD BAND LIGATION ESOPHGEAL/GASTRIC VARICES: Performed by: INTERNAL MEDICINE

## 2025-04-30 PROCEDURE — P9016 RBC LEUKOCYTES REDUCED: HCPCS

## 2025-04-30 PROCEDURE — 85018 HEMOGLOBIN: CPT

## 2025-04-30 PROCEDURE — 0DBN8ZX EXCISION OF SIGMOID COLON, VIA NATURAL OR ARTIFICIAL OPENING ENDOSCOPIC, DIAGNOSTIC: ICD-10-PCS | Performed by: INTERNAL MEDICINE

## 2025-04-30 PROCEDURE — 99222 1ST HOSP IP/OBS MODERATE 55: CPT | Performed by: INTERNAL MEDICINE

## 2025-04-30 PROCEDURE — 6360000002 HC RX W HCPCS: Performed by: INTERNAL MEDICINE

## 2025-04-30 PROCEDURE — 06L38CZ OCCLUSION OF ESOPHAGEAL VEIN WITH EXTRALUMINAL DEVICE, VIA NATURAL OR ARTIFICIAL OPENING ENDOSCOPIC: ICD-10-PCS | Performed by: INTERNAL MEDICINE

## 2025-04-30 PROCEDURE — 85027 COMPLETE CBC AUTOMATED: CPT

## 2025-04-30 PROCEDURE — 6370000000 HC RX 637 (ALT 250 FOR IP): Performed by: NURSE PRACTITIONER

## 2025-04-30 PROCEDURE — 6370000000 HC RX 637 (ALT 250 FOR IP): Performed by: INTERNAL MEDICINE

## 2025-04-30 PROCEDURE — 7100000010 HC PHASE II RECOVERY - FIRST 15 MIN: Performed by: INTERNAL MEDICINE

## 2025-04-30 PROCEDURE — 2580000003 HC RX 258: Performed by: INTERNAL MEDICINE

## 2025-04-30 PROCEDURE — 3700000000 HC ANESTHESIA ATTENDED CARE: Performed by: INTERNAL MEDICINE

## 2025-04-30 PROCEDURE — 1100000003 HC PRIVATE W/ TELEMETRY

## 2025-04-30 PROCEDURE — 2500000003 HC RX 250 WO HCPCS: Performed by: INTERNAL MEDICINE

## 2025-04-30 PROCEDURE — 6360000002 HC RX W HCPCS: Performed by: NURSE PRACTITIONER

## 2025-04-30 PROCEDURE — 36430 TRANSFUSION BLD/BLD COMPNT: CPT

## 2025-04-30 RX ORDER — LIDOCAINE HYDROCHLORIDE 20 MG/ML
INJECTION, SOLUTION EPIDURAL; INFILTRATION; INTRACAUDAL; PERINEURAL
Status: DISCONTINUED | OUTPATIENT
Start: 2025-04-30 | End: 2025-04-30 | Stop reason: SDUPTHER

## 2025-04-30 RX ORDER — POLYETHYLENE GLYCOL 3350 17 G/17G
238 POWDER, FOR SOLUTION ORAL ONCE
Status: COMPLETED | OUTPATIENT
Start: 2025-04-30 | End: 2025-04-30

## 2025-04-30 RX ORDER — CYCLOBENZAPRINE HCL 10 MG
10 TABLET ORAL 3 TIMES DAILY PRN
Status: DISCONTINUED | OUTPATIENT
Start: 2025-04-30 | End: 2025-05-02 | Stop reason: HOSPADM

## 2025-04-30 RX ORDER — BISACODYL 5 MG/1
10 TABLET, DELAYED RELEASE ORAL 2 TIMES DAILY
Status: DISPENSED | OUTPATIENT
Start: 2025-04-30 | End: 2025-05-01

## 2025-04-30 RX ORDER — SODIUM CHLORIDE 9 MG/ML
INJECTION, SOLUTION INTRAVENOUS PRN
Status: DISCONTINUED | OUTPATIENT
Start: 2025-04-30 | End: 2025-04-30

## 2025-04-30 RX ORDER — PROPOFOL 10 MG/ML
INJECTION, EMULSION INTRAVENOUS
Status: DISCONTINUED | OUTPATIENT
Start: 2025-04-30 | End: 2025-04-30 | Stop reason: SDUPTHER

## 2025-04-30 RX ADMIN — GABAPENTIN 300 MG: 300 CAPSULE ORAL at 15:22

## 2025-04-30 RX ADMIN — BISACODYL 10 MG: 5 TABLET, COATED ORAL at 15:23

## 2025-04-30 RX ADMIN — GABAPENTIN 300 MG: 300 CAPSULE ORAL at 08:57

## 2025-04-30 RX ADMIN — CETIRIZINE HYDROCHLORIDE 10 MG: 10 TABLET, FILM COATED ORAL at 08:57

## 2025-04-30 RX ADMIN — SODIUM CHLORIDE, PRESERVATIVE FREE 10 ML: 5 INJECTION INTRAVENOUS at 20:59

## 2025-04-30 RX ADMIN — AMLODIPINE BESYLATE 5 MG: 5 TABLET ORAL at 08:57

## 2025-04-30 RX ADMIN — SODIUM CHLORIDE, PRESERVATIVE FREE 40 MG: 5 INJECTION INTRAVENOUS at 21:52

## 2025-04-30 RX ADMIN — GABAPENTIN 300 MG: 300 CAPSULE ORAL at 20:59

## 2025-04-30 RX ADMIN — LIDOCAINE HYDROCHLORIDE 50 MG: 20 INJECTION, SOLUTION EPIDURAL; INFILTRATION; INTRACAUDAL; PERINEURAL at 12:04

## 2025-04-30 RX ADMIN — SODIUM CHLORIDE, PRESERVATIVE FREE 10 ML: 5 INJECTION INTRAVENOUS at 08:58

## 2025-04-30 RX ADMIN — PROPOFOL 180 MCG/KG/MIN: 10 INJECTION, EMULSION INTRAVENOUS at 12:05

## 2025-04-30 RX ADMIN — PROPOFOL 120 MG: 10 INJECTION, EMULSION INTRAVENOUS at 12:04

## 2025-04-30 RX ADMIN — SODIUM CHLORIDE, PRESERVATIVE FREE 40 MG: 5 INJECTION INTRAVENOUS at 08:57

## 2025-04-30 RX ADMIN — ATORVASTATIN CALCIUM 40 MG: 40 TABLET, FILM COATED ORAL at 08:57

## 2025-04-30 RX ADMIN — SODIUM CHLORIDE 125 MG: 9 INJECTION, SOLUTION INTRAVENOUS at 22:02

## 2025-04-30 RX ADMIN — CYCLOBENZAPRINE 10 MG: 10 TABLET, FILM COATED ORAL at 20:59

## 2025-04-30 RX ADMIN — POLYETHYLENE GLYCOL 3350 238 G: 17 POWDER, FOR SOLUTION ORAL at 20:58

## 2025-04-30 RX ADMIN — LOSARTAN POTASSIUM 50 MG: 50 TABLET, FILM COATED ORAL at 08:57

## 2025-04-30 RX ADMIN — DULOXETINE HYDROCHLORIDE 60 MG: 60 CAPSULE, DELAYED RELEASE ORAL at 08:57

## 2025-04-30 ASSESSMENT — PAIN SCALES - GENERAL
PAINLEVEL_OUTOF10: 5
PAINLEVEL_OUTOF10: 0

## 2025-04-30 ASSESSMENT — PAIN DESCRIPTION - LOCATION: LOCATION: LEG;BUTTOCKS

## 2025-04-30 ASSESSMENT — PAIN DESCRIPTION - ORIENTATION: ORIENTATION: RIGHT;LEFT

## 2025-04-30 NOTE — PERIOP NOTE
TRANSFER - IN REPORT:    Verbal report received from Gerber FORD on Natasha Elkins  being received from 3rd floor med surg room 345 for endoscopy: esophagoscopy:   EGD    Report consisted of patient’s Situation, Background, Assessment and   Recommendations(SBAR).     Information from the following report(s) MAR, Blood products was reveiwed with the receiving nurse.    Opportunity for questions and clarification was provided.      Assessment completed upon patient’s arrival to unit and care assumed.

## 2025-04-30 NOTE — PROGRESS NOTES
Pt original hemoglobin 5.8, blood transfusion 1 unit in ER. Post transfusion H&H shows hemo at 6.6 MD notified and wants blood transfusion to be ordered in the morning.

## 2025-04-30 NOTE — PROGRESS NOTES
Hospitalist Progress Note   Admit Date:  2025  5:15 PM   Name:  Natasha Elkins   Age:  65 y.o.  Sex:  female  :  1960   MRN:  [id number]   Room:  [facility]    Presenting Complaint: Abnormal labs     Reason(s) for Admission: Severe anemia [D64.9]  Acute blood loss anemia (ABLA) [D62]     Hospital Course:   Natasha Elkins is a 65 y.o. female with medical history of iron deficiency anemia on iron infusions, paroxysmal atrial fibrillation not on any anticoagulation, portal hypertensive gastropathy evaluated by previous EGD about a year and a half ago also at that time patient did have secondary esophageal varices without any bleeding at that time, hyperlipidemia peripheral vascular disease history of EtOH abuse however she has been in remission from roughly 2 years history of hepatitis C resulting in cirrhosis of the liver and hypertension and chronic back pain with sciatica peripheral neuropathy has descending aortic stenosis and is supposed to go undergo aortobifemoral bypass with prosthetic with the possible common femoral endarterectomies coming up in the next month.  As preop evaluation patient went to see cardiology today and had some blood work done subsequently got a call at home when they found out her hemoglobin was 5.8.  Patient denies any hematemesis denies any melena denies any black stools or bleeding per rectum most likely this is probably an iron deficiency issue at any rate does have history of varices on the previous EGD and hence we will need to transfuse the patient and get GI involved and hence I will admit the patient for GI bleeding keep her n.p.o. after midnight and consult GI in a.m. for the scopes start the patient on Protonix IV for now.     Subjective & 24hr Events (25):   Pt resting in bed. Went over DX and plan of care with patient. No family at bedside for am rounds.     Hgb 5.8 on arrival received 1 unit of blood in the ER and hemoglobin chas to 6.6,

## 2025-04-30 NOTE — CARE COORDINATION
CASE MANAGEMENT ASSESSMENT NOTE    Patient is a 65 year old female with severe anemia.      Patient assessment completed at bedside.  Patient presents to assessment alert and oriented, and answers questions appropriately.  She lives at home with a roomate.  Family is supportive  At baseline, she is ambulatory with a walker.  Independent with ADLs.  She is an active .  She has [employer] health plan medicare insurance.  She is established with PCP, Tashia De Leon.  Patient is requesting financial assist application.  Will provider one to her.  She declines need for therapy services at this time.    At this time, anticipate patient to be discharged home.  PT/OT evals are not anticipated to be needed in this case.  Case management will continue to follow.  Please notify if there are any changes.     Attending Physician: Will Callahan MD  Admit Problem: Severe anemia [D64.9]  Acute blood loss anemia (ABLA) [D62]  Date/Time of Admission: 4/29/2025  5:15 PM  Problem List:  Patient Active Problem List   Diagnosis    Severe protein-calorie malnutrition    Alcohol dependence (HCC)    Elevated LFTs    Leukocytosis    Hypokalemia    Hyponatremia    CAP (community acquired pneumonia)    Melena    Chronic hepatitis C without hepatic coma (HCC)    Hypomagnesemia    Atrial flutter (HCC)    Gastrointestinal hemorrhage    PAF (paroxysmal atrial fibrillation) (HCC)    Hypoproteinemia    Neuropathy    Portal hypertensive gastropathy (HCC)    Secondary esophageal varices without bleeding (HCC)    Iron deficiency anemia due to chronic blood loss    Peripheral vascular disease, unspecified    Acute blood loss anemia (ABLA)    Anemia    Primary hypertension    Muscular pain          04/30/25 1025   Service Assessment   Patient Orientation Alert and Oriented   Cognition Alert   History Provided By Patient   Primary Caregiver Self   Accompanied By/Relationship Shaina Jordan   Support Systems Family Members;Friends/Neighbors    Patient's Healthcare Decision Maker is: Legal Next of Kin   PCP Verified by CM Yes  (Tashia De Leon)   Last Visit to PCP Within last 3 months   Prior Functional Level Independent in ADLs/IADLs   Current Functional Level Independent in ADLs/IADLs   Can patient return to prior living arrangement Yes   Ability to make needs known: Good   Family able to assist with home care needs: Yes   Would you like for me to discuss the discharge plan with any other family members/significant others, and if so, who? Yes   Financial Resources Medicare;Other (Comment)  ([employer] Medicare)   Community Resources None   CM/SW Referral Other (see comment)  (N/A at this time.)   Social/Functional History   Lives With Other (Comment)  (Roomate)   Type of Home Apartment   Home Layout One level   Home Access Level entry   Bathroom Shower/Tub None   Bathroom Equipment None   Home Equipment Walker - Rolling   Receives Help From Family   Active  Yes   Mode of Transportation Car   Discharge Planning   Type of Residence Apartment   Living Arrangements Other (Comment)  (Roomate)         Franki Cotton RN 04/30/25 10:29 AM

## 2025-04-30 NOTE — PERIOP NOTE
MD Cotton at bedside with patient. Pt VSS stable. Pain and Nausea controlled at this time. Verbal sign out per MD when pacu care is completed. Plan of care continues.

## 2025-04-30 NOTE — PERIOP NOTE
TRANSFER - OUT REPORT:    Verbal report given to Maico FORD on Natasha Elkins  being transferred to [facility] for routine progression of patient care       Report consisted of patient's Situation, Background, Assessment and   Recommendations(SBAR).     Information from the following report(s) Nurse Handoff Report, Adult Overview, Surgery Report, MAR, and Cardiac Rhythm SR  was reviewed with the receiving nurse.           Lines:   Peripheral IV 04/29/25 Left Forearm (Active)   Site Assessment Clean, dry & intact 04/30/25 1227   Line Status Infusing 04/30/25 1227   Line Care Connections checked and tightened 04/30/25 1227   Phlebitis Assessment No symptoms 04/30/25 1227   Infiltration Assessment 0 04/30/25 1227   Alcohol Cap Used Yes 04/30/25 0720   Dressing Status Clean, dry & intact 04/30/25 1227   Dressing Type Transparent 04/30/25 1227       Peripheral IV 04/29/25 Right Forearm (Active)   Site Assessment Clean, dry & intact 04/30/25 1227   Line Status Capped 04/30/25 1227   Line Care Connections checked and tightened 04/30/25 1227   Phlebitis Assessment No symptoms 04/30/25 1227   Infiltration Assessment 0 04/30/25 1227   Alcohol Cap Used Yes 04/30/25 0720   Dressing Status Clean, dry & intact 04/30/25 1227   Dressing Type Transparent 04/30/25 1227        Opportunity for questions and clarification was provided.      Patient transported with:  O2 @ 1lpm

## 2025-04-30 NOTE — ANESTHESIA POSTPROCEDURE EVALUATION
Department of Anesthesiology  Postprocedure Note    Patient: Natasha Elkins  MRN: [id number]  YOB: 1960  Date of evaluation: 4/30/2025    Procedure Summary       Date: 04/30/25 Room / Location: [facility] [place] ENDO 01 / [facility] ENDOSCOPY    Anesthesia Start: 1155 Anesthesia Stop: 1226    Procedure: ESOPHAGOGASTRODUODENOSCOPY/ COOK BANDING (Upper GI Region) Diagnosis:       Anemia      (Anemia [D64.9])    Surgeons: Kaci Hollins MD Responsible Provider: Dawna Cotton MD    Anesthesia Type: TIVA ASA Status: 3            Anesthesia Type: No value filed.    Meenakshi Phase I:      Meenakshi Phase II: Meenakshi Score: 6    Anesthesia Post Evaluation    Patient location during evaluation: PACU  Patient participation: complete - patient participated  Level of consciousness: awake and alert  Airway patency: patent  Nausea & Vomiting: no nausea and no vomiting  Cardiovascular status: hemodynamically stable  Respiratory status: acceptable, nonlabored ventilation and spontaneous ventilation  Hydration status: euvolemic  Comments: BP (!) 111/56   Pulse 86   Temp 98.6 °F (37 °C) (Oral)   Resp 12   Ht 1.676 m (5' 6\")   Wt 60.3 kg (133 lb)   SpO2 100%   BMI 21.47 kg/m²     Multimodal analgesia pain management approach  Pain management: adequate and satisfactory to patient    No notable events documented.

## 2025-04-30 NOTE — CONSULTS
GASTROENTEROLOGY CONSULT NOTE     CC: Anemia     HPI:   Natasha Elkins is 65 y.o. y/o female with h/o iron deficiency anemia and cirrhosis presenting with severe anemia with hgb 5.8 found during pre op evaluation. Patient denies overt GI bleeding including melena, hematemesis, and hematochezia. She has had chronic iron deficiency anemia for a few years. Previous evaluation with EGD/colonoscopy was unrevealing. IT was discussed at one point that she should have VCE   but it was not done.       EGD 9/2023  Esophagus: Very small esophageal varices without stigmata of bleeding or high risk lesions.   Stomach: Small sliding hiatal hernia. Moderately edematous and erythematous mucosa in the gastric fundus and body, consistent with portal hypertensive gastropathy. No gastric varices. Biopsies obtained.   Duodenum/jejunum: normal      Colonoscopy 9/2022  Fair prep  Colon: Mild melanosis coli, otherwise normal.  Rectum: Normal    PE:   Vitals:    04/30/25 0857   BP: 132/63   Pulse:    Resp:    Temp:    SpO2:       General:  The patient is in no acute distress.    Respiratory: Respiratory effort is normal.    Cardiovascular:  Regular rate and rhythm.     Abdomen:  Soft, non tender to palpation.    Extremities: No edema bilaterally. No erythema  Neurologic:  Alert and oriented x3.        Labs:  Lab Results   Component Value Date    HGB 6.2 (LL) 04/30/2025    WBC 2.3 (L) 04/30/2025    PLT 99 (L) 04/30/2025    MCV 79.2 (L) 04/30/2025    IRON 21 (L) 04/29/2025    FERRITIN 19 08/06/2024    TIBC 333 04/29/2025    CREATININE 0.82 04/30/2025    ALT 21 04/29/2025    AST 33 04/29/2025       Assessment/Plan:   Iron deficiency anemia without overt GI bleeding   H/o cirrhsois       - EGD today   - Keep NPO   - Recommend dose of IV iron prior to discharge         Kaci Hollins MD  [facility] Gastroenterology

## 2025-04-30 NOTE — ED NOTES
TRANSFER - OUT REPORT:    Verbal report given to LILIANA Mackenzie on Natasha Elkins  being transferred to [facility] for routine progression of patient care       Report consisted of patient's Situation, Background, Assessment and   Recommendations(SBAR).     Information from the following report(s) ED SBAR was reviewed with the receiving nurse.    Lines:   Peripheral IV 04/29/25 Left Forearm (Active)       Peripheral IV 04/29/25 Right Forearm (Active)        Opportunity for questions and clarification was provided.      Patient transported with:  Tech

## 2025-04-30 NOTE — ANESTHESIA PRE PROCEDURE
Department of Anesthesiology  Preprocedure Note       Name:  Natasha Elkins   Age:  65 y.o.  :  1960                                          MRN:  [id number]         Date:  2025      Surgeon: Surgeon(s):  Kaci Hollins MD    Procedure: Procedure(s):  ESOPHAGOGASTRODUODENOSCOPY    Medications prior to admission:   Prior to Admission medications    Medication Sig Start Date End Date Taking? Authorizing Provider   amLODIPine (NORVASC) 5 MG tablet Take 1 tablet by mouth daily    Marge Clemens MD   atorvastatin (LIPITOR) 40 MG tablet Take 1 tablet by mouth daily 25   Rui Joseph DO   gabapentin (NEURONTIN) 300 MG capsule Take 1 capsule by mouth 3 times daily.    Marge Clemens MD   DULoxetine (CYMBALTA) 20 MG extended release capsule Take 1 capsule by mouth daily For neuropathy  Patient taking differently: Take 3 capsules by mouth daily For neuropathy 23   Yayo Garduno MD   thiamine 100 MG tablet Take 1 tablet by mouth daily  Patient not taking: Reported on 2025  Ryan Gregorio MD   cetirizine (ZYRTEC) 10 MG tablet Take 1 tablet by mouth daily 22   Marge Clemens MD   docusate sodium (COLACE) 100 MG capsule Take 1 capsule by mouth as needed 3/6/23   Marge Clemens MD   potassium chloride (KLOR-CON M) 20 MEQ extended release tablet Take 1 tablet by mouth daily  Patient not taking: Reported on 2025    Marge Clemens MD   olmesartan (BENICAR) 20 MG tablet     Marge Clemens MD   pantoprazole (PROTONIX) 40 MG tablet Take 1 tablet by mouth every morning (before breakfast) 9/15/22   Elizabeth Castano MD       Current medications:    Current Facility-Administered Medications   Medication Dose Route Frequency Provider Last Rate Last Admin    0.9 % sodium chloride infusion   IntraVENous PRN Laura Porter MD        cyclobenzaprine (FLEXERIL) tablet 10 mg  10 mg Oral TID PRN Shahram

## 2025-04-30 NOTE — ACP (ADVANCE CARE PLANNING)
Advance Care Planning   General Advance Care Planning (ACP) Conversation    Date of Conversation: 4/29/2025  Conducted with: Patient with Decision Making Capacity  Other persons present: None    Healthcare Decision Maker:    Primary Decision Maker: Shaina Jordan - Other Relative - [phone]    Today we documented Decision Maker(s) consistent with Legal Next of Kin hierarchy.  Content/Action Overview:  DECLINED ACP Conversation - will revisit periodically    Length of Voluntary ACP Conversation in minutes:  <16 minutes (Non-Billable)    Franki Cotton RN

## 2025-04-30 NOTE — PLAN OF CARE
EGD complete, see procedure report. Small varices seen on exam, not actively bleeding and no evidence of active bleeding but one band placed prophylacticly.      - Recommend colonoscopy tomorrow to further evaluate anemia   - Clear liquid diet today   - NPO midnight   - Bowel prep ordered   - Transfuse for hgb <7   - Twice daily PPI   - Recommend dose of IV iron prior to discharge   - Patient complaining of abdominal distension. Abdominal ultrasound ordered to evaluate for ascites.     Kaci Hollins MD  [facility]astroenter[facility]

## 2025-05-01 ENCOUNTER — ANESTHESIA (OUTPATIENT)
Dept: ENDOSCOPY | Age: 65
End: 2025-05-01
Payer: COMMERCIAL

## 2025-05-01 ENCOUNTER — ANESTHESIA EVENT (OUTPATIENT)
Dept: ENDOSCOPY | Age: 65
End: 2025-05-01
Payer: COMMERCIAL

## 2025-05-01 LAB
ABO + RH BLD: NORMAL
ANION GAP SERPL CALC-SCNC: 13 MMOL/L (ref 7–16)
BLD PROD TYP BPU: NORMAL
BLD PROD TYP BPU: NORMAL
BLOOD BANK BLOOD PRODUCT EXPIRATION DATE: NORMAL
BLOOD BANK BLOOD PRODUCT EXPIRATION DATE: NORMAL
BLOOD BANK CMNT PATIENT-IMP: NORMAL
BLOOD BANK DISPENSE STATUS: NORMAL
BLOOD BANK DISPENSE STATUS: NORMAL
BLOOD BANK ISBT PRODUCT BLOOD TYPE: 9500
BLOOD BANK ISBT PRODUCT BLOOD TYPE: 9500
BLOOD BANK PRODUCT CODE: NORMAL
BLOOD BANK PRODUCT CODE: NORMAL
BLOOD BANK UNIT TYPE AND RH: NORMAL
BLOOD BANK UNIT TYPE AND RH: NORMAL
BLOOD GROUP ANTIBODIES SERPL: NORMAL
BPU ID: NORMAL
BPU ID: NORMAL
BUN SERPL-MCNC: 5 MG/DL (ref 8–23)
CALCIUM SERPL-MCNC: 9.1 MG/DL (ref 8.8–10.2)
CHLORIDE SERPL-SCNC: 106 MMOL/L (ref 98–107)
CO2 SERPL-SCNC: 26 MMOL/L (ref 20–29)
CREAT SERPL-MCNC: 0.69 MG/DL (ref 0.6–1.1)
CROSSMATCH RESULT: NORMAL
CROSSMATCH RESULT: NORMAL
ERYTHROCYTE [DISTWIDTH] IN BLOOD BY AUTOMATED COUNT: 17.6 % (ref 11.9–14.6)
GLUCOSE SERPL-MCNC: 112 MG/DL (ref 70–99)
HCT VFR BLD AUTO: 27.3 % (ref 35.8–46.3)
HGB BLD-MCNC: 8.3 G/DL (ref 11.7–15.4)
MCH RBC QN AUTO: 24.1 PG (ref 26.1–32.9)
MCHC RBC AUTO-ENTMCNC: 30.4 G/DL (ref 31.4–35)
MCV RBC AUTO: 79.4 FL (ref 82–102)
NRBC # BLD: 0.02 K/UL (ref 0–0.2)
PLATELET # BLD AUTO: 92 K/UL (ref 150–450)
PMV BLD AUTO: 10.9 FL (ref 9.4–12.3)
POTASSIUM SERPL-SCNC: 3.9 MMOL/L (ref 3.5–5.1)
RBC # BLD AUTO: 3.44 M/UL (ref 4.05–5.2)
SODIUM SERPL-SCNC: 145 MMOL/L (ref 136–145)
SPECIMEN EXP DATE BLD: NORMAL
UNIT DIVISION: 0
UNIT DIVISION: 0
UNIT ISSUE DATE/TIME: NORMAL
UNIT ISSUE DATE/TIME: NORMAL
WBC # BLD AUTO: 3 K/UL (ref 4.3–11.1)

## 2025-05-01 PROCEDURE — 3609027000 HC COLONOSCOPY: Performed by: STUDENT IN AN ORGANIZED HEALTH CARE EDUCATION/TRAINING PROGRAM

## 2025-05-01 PROCEDURE — 2580000003 HC RX 258: Performed by: INTERNAL MEDICINE

## 2025-05-01 PROCEDURE — 80048 BASIC METABOLIC PNL TOTAL CA: CPT

## 2025-05-01 PROCEDURE — 7100000010 HC PHASE II RECOVERY - FIRST 15 MIN: Performed by: STUDENT IN AN ORGANIZED HEALTH CARE EDUCATION/TRAINING PROGRAM

## 2025-05-01 PROCEDURE — 2500000003 HC RX 250 WO HCPCS: Performed by: INTERNAL MEDICINE

## 2025-05-01 PROCEDURE — 1100000000 HC RM PRIVATE

## 2025-05-01 PROCEDURE — 3700000000 HC ANESTHESIA ATTENDED CARE: Performed by: STUDENT IN AN ORGANIZED HEALTH CARE EDUCATION/TRAINING PROGRAM

## 2025-05-01 PROCEDURE — 36415 COLL VENOUS BLD VENIPUNCTURE: CPT

## 2025-05-01 PROCEDURE — 6370000000 HC RX 637 (ALT 250 FOR IP): Performed by: INTERNAL MEDICINE

## 2025-05-01 PROCEDURE — 85027 COMPLETE CBC AUTOMATED: CPT

## 2025-05-01 PROCEDURE — 30233N1 TRANSFUSION OF NONAUTOLOGOUS RED BLOOD CELLS INTO PERIPHERAL VEIN, PERCUTANEOUS APPROACH: ICD-10-PCS | Performed by: STUDENT IN AN ORGANIZED HEALTH CARE EDUCATION/TRAINING PROGRAM

## 2025-05-01 PROCEDURE — 7100000011 HC PHASE II RECOVERY - ADDTL 15 MIN: Performed by: STUDENT IN AN ORGANIZED HEALTH CARE EDUCATION/TRAINING PROGRAM

## 2025-05-01 PROCEDURE — 2580000003 HC RX 258: Performed by: NURSE ANESTHETIST, CERTIFIED REGISTERED

## 2025-05-01 PROCEDURE — 2709999900 HC NON-CHARGEABLE SUPPLY: Performed by: STUDENT IN AN ORGANIZED HEALTH CARE EDUCATION/TRAINING PROGRAM

## 2025-05-01 PROCEDURE — 6360000002 HC RX W HCPCS: Performed by: NURSE ANESTHETIST, CERTIFIED REGISTERED

## 2025-05-01 PROCEDURE — 3700000001 HC ADD 15 MINUTES (ANESTHESIA): Performed by: STUDENT IN AN ORGANIZED HEALTH CARE EDUCATION/TRAINING PROGRAM

## 2025-05-01 PROCEDURE — 88305 TISSUE EXAM BY PATHOLOGIST: CPT

## 2025-05-01 PROCEDURE — 6360000002 HC RX W HCPCS: Performed by: INTERNAL MEDICINE

## 2025-05-01 RX ORDER — PROPOFOL 10 MG/ML
INJECTION, EMULSION INTRAVENOUS
Status: DISCONTINUED | OUTPATIENT
Start: 2025-05-01 | End: 2025-05-01 | Stop reason: SDUPTHER

## 2025-05-01 RX ORDER — SODIUM CHLORIDE, SODIUM LACTATE, POTASSIUM CHLORIDE, CALCIUM CHLORIDE 600; 310; 30; 20 MG/100ML; MG/100ML; MG/100ML; MG/100ML
INJECTION, SOLUTION INTRAVENOUS
Status: DISCONTINUED | OUTPATIENT
Start: 2025-05-01 | End: 2025-05-01 | Stop reason: SDUPTHER

## 2025-05-01 RX ORDER — MAGNESIUM HYDROXIDE/ALUMINUM HYDROXICE/SIMETHICONE 120; 1200; 1200 MG/30ML; MG/30ML; MG/30ML
30 SUSPENSION ORAL EVERY 6 HOURS PRN
Status: DISCONTINUED | OUTPATIENT
Start: 2025-05-01 | End: 2025-05-02 | Stop reason: HOSPADM

## 2025-05-01 RX ORDER — SIMETHICONE 80 MG
80 TABLET,CHEWABLE ORAL 4 TIMES DAILY PRN
Status: DISCONTINUED | OUTPATIENT
Start: 2025-05-01 | End: 2025-05-02 | Stop reason: HOSPADM

## 2025-05-01 RX ORDER — LIDOCAINE HYDROCHLORIDE 20 MG/ML
INJECTION, SOLUTION EPIDURAL; INFILTRATION; INTRACAUDAL; PERINEURAL
Status: DISCONTINUED | OUTPATIENT
Start: 2025-05-01 | End: 2025-05-01 | Stop reason: SDUPTHER

## 2025-05-01 RX ADMIN — LOSARTAN POTASSIUM 50 MG: 50 TABLET, FILM COATED ORAL at 08:22

## 2025-05-01 RX ADMIN — SODIUM CHLORIDE, PRESERVATIVE FREE 10 ML: 5 INJECTION INTRAVENOUS at 08:24

## 2025-05-01 RX ADMIN — SODIUM CHLORIDE, PRESERVATIVE FREE 40 MG: 5 INJECTION INTRAVENOUS at 08:23

## 2025-05-01 RX ADMIN — GABAPENTIN 300 MG: 300 CAPSULE ORAL at 08:22

## 2025-05-01 RX ADMIN — GABAPENTIN 300 MG: 300 CAPSULE ORAL at 14:45

## 2025-05-01 RX ADMIN — SODIUM CHLORIDE, PRESERVATIVE FREE 40 MG: 5 INJECTION INTRAVENOUS at 21:51

## 2025-05-01 RX ADMIN — LIDOCAINE HYDROCHLORIDE 30 MG: 20 INJECTION, SOLUTION EPIDURAL; INFILTRATION; INTRACAUDAL; PERINEURAL at 17:39

## 2025-05-01 RX ADMIN — SODIUM CHLORIDE, PRESERVATIVE FREE 10 ML: 5 INJECTION INTRAVENOUS at 21:54

## 2025-05-01 RX ADMIN — DULOXETINE HYDROCHLORIDE 60 MG: 60 CAPSULE, DELAYED RELEASE ORAL at 08:22

## 2025-05-01 RX ADMIN — AMLODIPINE BESYLATE 5 MG: 5 TABLET ORAL at 08:22

## 2025-05-01 RX ADMIN — PROPOFOL 300 MCG/KG/MIN: 10 INJECTION, EMULSION INTRAVENOUS at 17:40

## 2025-05-01 RX ADMIN — PROPOFOL 50 MG: 10 INJECTION, EMULSION INTRAVENOUS at 17:39

## 2025-05-01 RX ADMIN — SODIUM CHLORIDE, SODIUM LACTATE, POTASSIUM CHLORIDE, AND CALCIUM CHLORIDE: 600; 310; 30; 20 INJECTION, SOLUTION INTRAVENOUS at 17:35

## 2025-05-01 RX ADMIN — CETIRIZINE HYDROCHLORIDE 10 MG: 10 TABLET, FILM COATED ORAL at 08:22

## 2025-05-01 RX ADMIN — ATORVASTATIN CALCIUM 40 MG: 40 TABLET, FILM COATED ORAL at 08:22

## 2025-05-01 RX ADMIN — GABAPENTIN 300 MG: 300 CAPSULE ORAL at 21:51

## 2025-05-01 ASSESSMENT — PAIN SCALES - GENERAL: PAINLEVEL_OUTOF10: 0

## 2025-05-01 NOTE — ANESTHESIA PRE PROCEDURE
Lab Results   Component Value Date/Time     05/01/2025 06:38 AM    K 3.9 05/01/2025 06:38 AM     05/01/2025 06:38 AM    CO2 26 05/01/2025 06:38 AM    BUN 5 05/01/2025 06:38 AM    CREATININE 0.69 05/01/2025 06:38 AM    GFRAA >60 09/14/2022 07:06 AM    AGRATIO 0.6 10/29/2020 12:01 PM    LABGLOM >90 05/01/2025 06:38 AM    LABGLOM >60 09/10/2023 04:35 AM    GLUCOSE 112 05/01/2025 06:38 AM    CALCIUM 9.1 05/01/2025 06:38 AM    BILITOT 0.4 04/29/2025 09:28 AM    ALKPHOS 76 04/29/2025 09:28 AM    ALKPHOS 131 10/29/2020 12:01 PM    AST 33 04/29/2025 09:28 AM    ALT 21 04/29/2025 09:28 AM       POC Tests: No results for input(s): \"POCGLU\", \"POCNA\", \"POCK\", \"POCCL\", \"POCBUN\", \"POCHEMO\", \"POCHCT\" in the last 72 hours.    Coags:   Lab Results   Component Value Date/Time    PROTIME 13.8 11/16/2023 04:17 PM    INR 1.0 11/16/2023 04:17 PM    APTT 25.5 08/27/2020 08:26 PM       HCG (If Applicable): No results found for: \"PREGTESTUR\", \"PREGSERUM\", \"HCG\", \"HCGQUANT\"     ABGs: No results found for: \"PHART\", \"PO2ART\", \"GTS2QQB\", \"IKU3MIE\", \"BEART\", \"P6GKFWDA\"     Type & Screen (If Applicable):  No results found for: \"LABABO\"    Drug/Infectious Status (If Applicable):  Lab Results   Component Value Date/Time    HEPCAB Reactive 07/25/2023 05:55 AM       COVID-19 Screening (If Applicable):   Lab Results   Component Value Date/Time    COVID19 Not detected 09/08/2023 04:51 PM           Anesthesia Evaluation  Patient summary reviewed and Nursing notes reviewed   no history of anesthetic complications:   Airway: Mallampati: II  TM distance: >3 FB   Neck ROM: full  Mouth opening: > = 3 FB   Dental:    (+) upper dentures and lower dentures      Pulmonary:Negative Pulmonary ROS breath sounds clear to auscultation                             Cardiovascular:  Exercise tolerance: poor (<4 METS)  (+) hypertension:, dysrhythmias (not on AC): atrial fibrillation and atrial flutter        Rhythm: regular  Rate: normal                 ROS

## 2025-05-01 NOTE — PROGRESS NOTES
Hospitalist Progress Note   Admit Date:  2025  5:15 PM   Name:  Natasha Elkins   Age:  65 y.o.  Sex:  female  :  1960   MRN:  [id number]   Room:  [facility]    Presenting Complaint: Abnormal labs     Reason(s) for Admission: Severe anemia [D64.9]  Acute blood loss anemia (ABLA) [D62]     Hospital Course:   Natasha Elkins is a 65 y.o. female with medical history of iron deficiency anemia on iron infusions, paroxysmal atrial fibrillation not on any anticoagulation, portal hypertensive gastropathy evaluated by previous EGD about a year and a half ago also at that time patient did have secondary esophageal varices without any bleeding at that time, hyperlipidemia peripheral vascular disease history of EtOH abuse however she has been in remission from roughly 2 years history of hepatitis C resulting in cirrhosis of the liver and hypertension and chronic back pain with sciatica peripheral neuropathy has descending aortic stenosis and is supposed to go undergo aortobifemoral bypass with prosthetic with the possible common femoral endarterectomies coming up in the next month.  As preop evaluation patient went to see cardiology today and had some blood work done subsequently got a call at home when they found out her hemoglobin was 5.8.  Patient denies any hematemesis denies any melena denies any black stools or bleeding per rectum most likely this is probably an iron deficiency issue at any rate does have history of varices on the previous EGD and hence we will need to transfuse the patient and get GI involved and hence I will admit the patient for GI bleeding keep her n.p.o. after midnight and consult GI in a.m. for the scopes start the patient on Protonix IV for now.     Subjective & 24hr Events (25):   Pt resting in bed.  No family at bedside for a.m. rounds  No new complaints or concerns overnight  Patient afebrile; blood pressure stable  Patient had an EGD yesterday that showed nonbleeding  Compatible     Unit Number [id number]     Product Code Blood Bank RC LR,2     Unit Divison 00     Dispense Status Blood Bank TRANSFUSED     Unit Issue Date/Time [id number]     Product Code Blood Bank [id number]     Blood Bank Unit Type and Rh O NEG     Blood Bank ISBT Product Blood Type 9500     Blood Bank Blood Product Expiration Date [id number]     Crossmatch Result Compatible    Hemoglobin and Hematocrit    Collection Time: 04/29/25 10:18 PM   Result Value Ref Range    Hemoglobin 6.6 (LL) 11.7 - 15.4 g/dL    Hematocrit 22.5 (L) 35.8 - 46.3 %   Iron and TIBC    Collection Time: 04/29/25 10:18 PM   Result Value Ref Range    Iron 21 (L) 35 - 100 ug/dL    TIBC 333 240 - 450 ug/dL    Iron % Saturation 6 (L) 20 - 50 %    UIBC 312.0 112.0 - 347.0 ug/dL   Transferrin    Collection Time: 04/29/25 10:18 PM   Result Value Ref Range    Transferrin 266 200 - 360 mg/dL   CBC    Collection Time: 04/30/25  5:24 AM   Result Value Ref Range    WBC 2.3 (L) 4.3 - 11.1 K/uL    RBC 2.64 (L) 4.05 - 5.2 M/uL    Hemoglobin 6.2 (LL) 11.7 - 15.4 g/dL    Hematocrit 20.9 (L) 35.8 - 46.3 %    MCV 79.2 (L) 82.0 - 102.0 FL    MCH 23.5 (L) 26.1 - 32.9 PG    MCHC 29.7 (L) 31.4 - 35.0 g/dL    RDW 18.3 (H) 11.9 - 14.6 %    Platelets 99 (L) 150 - 450 K/uL    MPV 10.9 9.4 - 12.3 FL    nRBC 0.00 0.0 - 0.2 K/uL   Basic Metabolic Panel w/ Reflex to MG    Collection Time: 04/30/25  5:24 AM   Result Value Ref Range    Sodium 142 136 - 145 mmol/L    Potassium 3.8 3.5 - 5.1 mmol/L    Chloride 106 98 - 107 mmol/L    CO2 26 20 - 29 mmol/L    Anion Gap 10 7 - 16 mmol/L    Glucose 122 (H) 70 - 99 mg/dL    BUN 9 8 - 23 MG/DL    Creatinine 0.82 0.60 - 1.10 MG/DL    Est, Glom Filt Rate 79 >60 ml/min/1.73m2    Calcium 9.0 8.8 - 10.2 MG/DL   PREPARE RBC (CROSSMATCH), 1 Units    Collection Time: 04/30/25  6:15 AM   Result Value Ref Range    History Check Historical check performed    Hemoglobin and Hematocrit    Collection Time: 04/30/25  2:05 PM   Result Value

## 2025-05-01 NOTE — OP NOTE
Operative Report    Patient: Natasha Elkins MRN: [id number]      YOB: 1960  Age: 65 y.o.  Sex: female            Indications:  Anemia    Preoperative Evaluation: The patient was evaluated prior to the procedure in the GI lab admission area, the patient ASA was recorded .  Consent was obtained from the patient with the risk of perforation bleeding and aspiration.    Anesthesia: EDY-per anesthesia    Complications: None; patient tolerated the procedure well.    EBL -insignificant      Procedure: The patient was sedated in the left lateral decubitus position.  Scope was advanced from the rectum to the terminal ileum.  Per gastroenterology society guideline recommendations, right side of the colon was examined twice. The scope was withdrawn to the rectum, retroflexed view was performed.  The rectal exam was normal.  Preparation was inadequate. [place] score of 5(1+2+2) .    Findings:    Normal terminal ileum   One sigmoid colon polyp, 5 mm in size, removed via cold snare.  Suboptimal bowel prep  Diverticulosis    Postoperative Diagnosis:   One polyp  Diverticulosis    Recommendations:   Repeat colonoscopy in 1 year for screening purposes  Follow up outpatient with Dr. Hollins for potential capsule endoscopy if anemia persists.  Resume regular diet.   GI will sign off at this time.   Await for biopsy results, you will receive a pathology letter within 2 weeks.     Signed By:  Ernesto Levine MD     May 1, 2025

## 2025-05-01 NOTE — ANESTHESIA POSTPROCEDURE EVALUATION
Department of Anesthesiology  Postprocedure Note    Patient: Natasha Elkins  MRN: [id number]  YOB: 1960  Date of evaluation: 5/1/2025    Procedure Summary       Date: 05/01/25 [place] [facility] [place] [facility]    Anesthesia Start: 1735 Anesthesia Stop: 1802    Procedure: COLONOSCOPY polypectomy (Lower GI Region) Diagnosis:       Anemia      (Anemia [D64.9])    Surgeons: Ernesto Levine MD Responsible Provider: Nithin Hernandez MD    Anesthesia Type: TIVA ASA Status: 3            Anesthesia Type: TIVA    Meenakshi Phase I:      Meenakshi Phase II: Meenakshi Score: 10    Anesthesia Post Evaluation    Patient location during evaluation: PACU  Patient participation: complete - patient participated  Level of consciousness: awake and alert  Airway patency: patent  Nausea & Vomiting: no nausea and no vomiting  Cardiovascular status: hemodynamically stable  Respiratory status: acceptable, nonlabored ventilation and spontaneous ventilation  Hydration status: euvolemic  Comments: BP (!) 123/58   Pulse 77   Temp 98.2 °F (36.8 °C) (Tympanic)   Resp 21   Ht 1.676 m (5' 6\")   Wt 60.3 kg (133 lb)   SpO2 97%   BMI 21.47 kg/m²     Multimodal analgesia pain management approach  Pain management: adequate and satisfactory to patient    No notable events documented.

## 2025-05-01 NOTE — PERIOP NOTE
TRANSFER - IN REPORT:    Verbal report received from Gerber FORD on Natasha Elkins  being received from [facility] for endoscopy: colonoscopy:       Report consisted of patient’s Situation, Background, Assessment and   Recommendations(SBAR).     Information from the following report(s) verbal and chart was reveiwed with the receiving nurse.    Opportunity for questions and clarification was provided.      Assessment completed upon patient’s arrival to unit and care assumed.

## 2025-05-01 NOTE — CARE COORDINATION
Per IDR, patient to have colonoscopy late today, likely DC tomorrow.  Financial assist form provided to patient per her request.  Will also provide patient with resources on how to apply for medicaid.

## 2025-05-02 VITALS
HEIGHT: 66 IN | BODY MASS INDEX: 21.38 KG/M2 | TEMPERATURE: 98.4 F | SYSTOLIC BLOOD PRESSURE: 146 MMHG | RESPIRATION RATE: 15 BRPM | HEART RATE: 78 BPM | WEIGHT: 133 LBS | DIASTOLIC BLOOD PRESSURE: 86 MMHG | OXYGEN SATURATION: 96 %

## 2025-05-02 PROBLEM — K57.90 DIVERTICULOSIS: Status: ACTIVE | Noted: 2025-05-02

## 2025-05-02 LAB
ANION GAP SERPL CALC-SCNC: 14 MMOL/L (ref 7–16)
BUN SERPL-MCNC: 5 MG/DL (ref 8–23)
CALCIUM SERPL-MCNC: 8.7 MG/DL (ref 8.8–10.2)
CHLORIDE SERPL-SCNC: 105 MMOL/L (ref 98–107)
CO2 SERPL-SCNC: 22 MMOL/L (ref 20–29)
CREAT SERPL-MCNC: 0.72 MG/DL (ref 0.6–1.1)
ERYTHROCYTE [DISTWIDTH] IN BLOOD BY AUTOMATED COUNT: 18.3 % (ref 11.9–14.6)
GLUCOSE SERPL-MCNC: 135 MG/DL (ref 70–99)
HCT VFR BLD AUTO: 27.5 % (ref 35.8–46.3)
HGB BLD-MCNC: 8.3 G/DL (ref 11.7–15.4)
MAGNESIUM SERPL-MCNC: 1.4 MG/DL (ref 1.8–2.4)
MCH RBC QN AUTO: 24.3 PG (ref 26.1–32.9)
MCHC RBC AUTO-ENTMCNC: 30.2 G/DL (ref 31.4–35)
MCV RBC AUTO: 80.6 FL (ref 82–102)
NRBC # BLD: 0.02 K/UL (ref 0–0.2)
PLATELET # BLD AUTO: 86 K/UL (ref 150–450)
PMV BLD AUTO: ABNORMAL FL (ref 9.4–12.3)
POTASSIUM SERPL-SCNC: 3.3 MMOL/L (ref 3.5–5.1)
RBC # BLD AUTO: 3.41 M/UL (ref 4.05–5.2)
SODIUM SERPL-SCNC: 141 MMOL/L (ref 136–145)
WBC # BLD AUTO: 3.5 K/UL (ref 4.3–11.1)

## 2025-05-02 PROCEDURE — 85027 COMPLETE CBC AUTOMATED: CPT

## 2025-05-02 PROCEDURE — 6360000002 HC RX W HCPCS: Performed by: NURSE PRACTITIONER

## 2025-05-02 PROCEDURE — 80048 BASIC METABOLIC PNL TOTAL CA: CPT

## 2025-05-02 PROCEDURE — 36415 COLL VENOUS BLD VENIPUNCTURE: CPT

## 2025-05-02 PROCEDURE — 6360000002 HC RX W HCPCS: Performed by: INTERNAL MEDICINE

## 2025-05-02 PROCEDURE — 83735 ASSAY OF MAGNESIUM: CPT

## 2025-05-02 PROCEDURE — 2580000003 HC RX 258: Performed by: INTERNAL MEDICINE

## 2025-05-02 PROCEDURE — 6370000000 HC RX 637 (ALT 250 FOR IP): Performed by: INTERNAL MEDICINE

## 2025-05-02 RX ORDER — MAGNESIUM SULFATE IN WATER 40 MG/ML
2000 INJECTION, SOLUTION INTRAVENOUS ONCE
Status: COMPLETED | OUTPATIENT
Start: 2025-05-02 | End: 2025-05-02

## 2025-05-02 RX ORDER — PANTOPRAZOLE SODIUM 40 MG/1
40 TABLET, DELAYED RELEASE ORAL
Qty: 30 TABLET | Refills: 0 | Status: CANCELLED | OUTPATIENT
Start: 2025-05-02 | End: 2025-06-01

## 2025-05-02 RX ORDER — PANTOPRAZOLE SODIUM 40 MG/1
40 TABLET, DELAYED RELEASE ORAL 2 TIMES DAILY
Qty: 60 TABLET | Refills: 0 | Status: SHIPPED | OUTPATIENT
Start: 2025-05-02 | End: 2025-06-01

## 2025-05-02 RX ORDER — POTASSIUM CHLORIDE 1500 MG/1
20 TABLET, EXTENDED RELEASE ORAL DAILY
Qty: 30 TABLET | Refills: 0 | Status: SHIPPED | OUTPATIENT
Start: 2025-05-02 | End: 2025-06-01

## 2025-05-02 RX ORDER — DULOXETIN HYDROCHLORIDE 60 MG/1
60 CAPSULE, DELAYED RELEASE ORAL DAILY
Qty: 30 CAPSULE | Refills: 0
Start: 2025-05-02

## 2025-05-02 RX ORDER — POTASSIUM CHLORIDE 1500 MG/1
40 TABLET, EXTENDED RELEASE ORAL ONCE
Status: DISCONTINUED | OUTPATIENT
Start: 2025-05-02 | End: 2025-05-02 | Stop reason: HOSPADM

## 2025-05-02 RX ADMIN — SODIUM CHLORIDE, PRESERVATIVE FREE 40 MG: 5 INJECTION INTRAVENOUS at 09:25

## 2025-05-02 RX ADMIN — DULOXETINE HYDROCHLORIDE 60 MG: 60 CAPSULE, DELAYED RELEASE ORAL at 09:24

## 2025-05-02 RX ADMIN — LOSARTAN POTASSIUM 50 MG: 50 TABLET, FILM COATED ORAL at 09:24

## 2025-05-02 RX ADMIN — ATORVASTATIN CALCIUM 40 MG: 40 TABLET, FILM COATED ORAL at 09:24

## 2025-05-02 RX ADMIN — GABAPENTIN 300 MG: 300 CAPSULE ORAL at 09:24

## 2025-05-02 RX ADMIN — MAGNESIUM SULFATE HEPTAHYDRATE 2000 MG: 40 INJECTION, SOLUTION INTRAVENOUS at 09:35

## 2025-05-02 RX ADMIN — AMLODIPINE BESYLATE 5 MG: 5 TABLET ORAL at 09:24

## 2025-05-02 RX ADMIN — CETIRIZINE HYDROCHLORIDE 10 MG: 10 TABLET, FILM COATED ORAL at 09:24

## 2025-05-02 NOTE — CARE COORDINATION
Patient with discharge orders for today. During hospitalization, RNCM provided patient with financial assist application and resources on how to apply for medicaid.  No additional needs made known to CM. Patient has met all treatment goals and milestones for discharge. Family to provide transportation home. CM following until patient is discharged.        05/02/25 1131   Services At/After Discharge   Transition of Care Consult (CM Consult) N/A   Services At/After Discharge None   [place] Resource Information Provided? No   Mode of Transport at Discharge Other (see comment)  (Family)   Confirm Follow Up Transport Family   Condition of Participation: Discharge Planning   The Plan for Transition of Care is related to the following treatment goals: Patient to discharge home and return to baseline level of function.   The Patient and/or Patient Representative was provided with a Choice of Provider? Patient   Freedom of Choice list was provided with basic dialogue that supports the patient's individualized plan of care/goals, treatment preferences, and shares the quality data associated with the providers?  Yes

## 2025-05-02 NOTE — DISCHARGE SUMMARY
Hospitalist Discharge Summary   Admit Date:  2025  5:15 PM   DC Note date: 2025  Name:  Natasha Elkins   Age:  65 y.o.  Sex:  female  :  1960   MRN:  [id number]   Room:  [facility]  PCP:  Tashia De Leon APRN - NP    Presenting Complaint: Abnormal labs     Initial Admission Diagnosis: Severe anemia [D64.9]  Acute blood loss anemia (ABLA) [D62]     Problem List for this Hospitalization (present on admission):    Principal Problem:    Acute blood loss anemia (ABLA)  Active Problems:    Chronic hepatitis C without hepatic coma (HCC)    Hypomagnesemia    Atrial flutter (HCC)    PAF (paroxysmal atrial fibrillation) (HCC)    Hypokalemia    Portal hypertensive gastropathy (HCC)    Secondary esophageal varices without bleeding (HCC)    Iron deficiency anemia due to chronic blood loss    Anemia    Primary hypertension    Muscular pain    Severe anemia  Resolved Problems:    * No resolved hospital problems. *      Hospital Course:  Natasha Elkins is a 65 y.o. female with medical history of iron deficiency anemia on iron infusions, paroxysmal atrial fibrillation not on any anticoagulation, portal hypertensive gastropathy evaluated by previous EGD about a year and a half ago also at that time patient did have secondary esophageal varices without any bleeding at that time, hyperlipidemia peripheral vascular disease history of EtOH abuse however she has been in remission from roughly 2 years history of hepatitis C resulting in cirrhosis of the liver and hypertension and chronic back pain with sciatica peripheral neuropathy has descending aortic stenosis and is supposed to go undergo aortobifemoral bypass with prosthetic with the possible common femoral endarterectomies coming up in the next month.  As preop evaluation patient went to see cardiology today and had some blood work done subsequently got a call at home when they found out her hemoglobin was 5.8.  Patient denies any hematemesis denies any

## 2025-05-02 NOTE — PROGRESS NOTES
Chart opened to check pt's Hgb for O2 protocol. And Severe Anemia diagnosis. Hgb 8.3. Oxygen not required at this time since it's >8.

## 2025-05-06 ENCOUNTER — TELEPHONE (OUTPATIENT)
Dept: VASCULAR SURGERY | Age: 65
End: 2025-05-06

## 2025-05-06 NOTE — TELEPHONE ENCOUNTER
Called pt per Dr. Saud Maguire' instructions to notify her there is a possibility that her surgery could be canceled (5/14) due to her low Hemoglobin count. She stated she understands because she spent three days in the hospital last week and received two pints of blood and some iron getting prepared for the surgery. I told her I will keep her posted. She understands.

## 2025-05-07 ENCOUNTER — HOSPITAL ENCOUNTER (OUTPATIENT)
Dept: SURGERY | Age: 65
Discharge: HOME | End: 2025-05-10
Payer: COMMERCIAL

## 2025-05-07 ENCOUNTER — TELEPHONE (OUTPATIENT)
Dept: VASCULAR SURGERY | Age: 65
End: 2025-05-07

## 2025-05-07 VITALS
SYSTOLIC BLOOD PRESSURE: 131 MMHG | OXYGEN SATURATION: 100 % | HEIGHT: 66 IN | BODY MASS INDEX: 21.36 KG/M2 | RESPIRATION RATE: 16 BRPM | DIASTOLIC BLOOD PRESSURE: 68 MMHG | HEART RATE: 93 BPM | WEIGHT: 132.9 LBS

## 2025-05-07 LAB
ALBUMIN SERPL-MCNC: 4 G/DL (ref 3.2–4.6)
ALBUMIN/GLOB SERPL: 1.5 (ref 1–1.9)
ALP SERPL-CCNC: 97 U/L (ref 35–104)
ALT SERPL-CCNC: 18 U/L (ref 8–45)
ANION GAP SERPL CALC-SCNC: 11 MMOL/L (ref 7–16)
APTT PPP: 30.2 SEC (ref 23.3–37.4)
AST SERPL-CCNC: 27 U/L (ref 15–37)
BILIRUB SERPL-MCNC: 0.4 MG/DL (ref 0–1.2)
BUN SERPL-MCNC: 8 MG/DL (ref 8–23)
CALCIUM SERPL-MCNC: 9.1 MG/DL (ref 8.8–10.2)
CHLORIDE SERPL-SCNC: 100 MMOL/L (ref 98–107)
CO2 SERPL-SCNC: 27 MMOL/L (ref 20–29)
CREAT SERPL-MCNC: 0.69 MG/DL (ref 0.6–1.1)
ERYTHROCYTE [DISTWIDTH] IN BLOOD BY AUTOMATED COUNT: 20.4 % (ref 11.9–14.6)
GLOBULIN SER CALC-MCNC: 2.6 G/DL (ref 2.3–3.5)
GLUCOSE SERPL-MCNC: 148 MG/DL (ref 70–99)
HCT VFR BLD AUTO: 30.1 % (ref 35.8–46.3)
HGB BLD-MCNC: 8.8 G/DL (ref 11.7–15.4)
INR PPP: 1.2
MCH RBC QN AUTO: 25 PG (ref 26.1–32.9)
MCHC RBC AUTO-ENTMCNC: 29.2 G/DL (ref 31.4–35)
MCV RBC AUTO: 85.5 FL (ref 82–102)
NRBC # BLD: 0 K/UL (ref 0–0.2)
PLATELET # BLD AUTO: 78 K/UL (ref 150–450)
PMV BLD AUTO: ABNORMAL FL (ref 9.4–12.3)
POTASSIUM SERPL-SCNC: 4.2 MMOL/L (ref 3.5–5.1)
PROT SERPL-MCNC: 6.6 G/DL (ref 6.3–8.2)
PROTHROMBIN TIME: 15.6 SEC (ref 11.3–14.9)
RBC # BLD AUTO: 3.52 M/UL (ref 4.05–5.2)
SODIUM SERPL-SCNC: 138 MMOL/L (ref 136–145)
WBC # BLD AUTO: 3.7 K/UL (ref 4.3–11.1)

## 2025-05-07 PROCEDURE — 85027 COMPLETE CBC AUTOMATED: CPT

## 2025-05-07 PROCEDURE — 80053 COMPREHEN METABOLIC PANEL: CPT

## 2025-05-07 PROCEDURE — 85610 PROTHROMBIN TIME: CPT

## 2025-05-07 PROCEDURE — 85730 THROMBOPLASTIN TIME PARTIAL: CPT

## 2025-05-07 RX ORDER — LEVOCETIRIZINE DIHYDROCHLORIDE 5 MG/1
5 TABLET, FILM COATED ORAL PRN
COMMUNITY

## 2025-05-07 RX ORDER — LIDOCAINE 50 MG/G
1 PATCH TOPICAL PRN
COMMUNITY

## 2025-05-07 RX ORDER — IBUPROFEN 800 MG/1
400 TABLET, FILM COATED ORAL EVERY 6 HOURS PRN
COMMUNITY

## 2025-05-07 ASSESSMENT — PAIN SCALES - GENERAL: PAINLEVEL_OUTOF10: 0

## 2025-05-07 NOTE — TELEPHONE ENCOUNTER
Per Dr. Maguire' instructions, I called the pt to notify her that due to other tests that are needed and being scheduled to be sure there are no risks for vascular surgery, Dr. Maguire will cancel her surgery sched on 5/14 until further notice. The pt says she has been asked by other doctor to call [facility] to schedule an appt with Dr. Grinnel for a pill she will take that will take pictures as it goes down her GI tract. She asked if we could call to get the appt scheduled for her. I told her I don't know if that will get her in sooner, but I will check. She understood that all other tests must be done and we will reschedule.

## 2025-05-07 NOTE — PERIOP NOTE
PT & Hgb results sent to anesthesia for review.  Labs automatically routed to ordering provider via Epic documentation.

## 2025-05-07 NOTE — PERIOP NOTE
Patient verified name and     Order for consent was found in EHR and does match case posting; patient verified.     Type 3 surgery, walk-in assessment complete.    Labs per surgeon: none at time of PAT visit  Labs per anesthesia protocol: CMP, CBC, PT/INR, PTT, albumin, T/S held DOS; results pending  [id number] WDL of anesthesia protocol      Patient provided with and instructed on educational handouts including Guide to Surgery, Preventing Surgical Site Infections, Pain Management, and [facility] [facility]esthesia Brochure.    Patient answered medical/surgical history questions at their best of ability. All prior to admission medications documented in EPIC. Original medication prescription bottle was not visualized during patient appointment.     Patient instructed to hold all vitamins 7 days prior to surgery and NSAIDS 5 days prior to surgery, patient verbalized understanding.     Patient teach back successful and patient demonstrates knowledge of instructions.     PLEASE CONTINUE TAKING ALL PRESCRIPTION MEDICATIONS UP TO THE DAY OF SURGERY UNLESS OTHERWISE DIRECTED BELOW. You may take Tylenol, allergy,  and/or indigestion medications.     TAKE ONLY THESE MEDICATIONS ON THE DAY OF SURGERY   gabapentin (Neurontin)  Atorvastatin (Lipitor)  Xyzal if needed     Duloxetine (Cymbalta)   Amlodipine (Norvasc)     Pantoprazole (Protonix)       DISCONTINUE all vitamins and supplements now. DISCONTINUE Non-Steroidal Anti-Inflammatory (NSAIDS) such as Advil and Aleve now.     Home Medications to Hold- please continue all other medications except these.    Potassium chloride (KLOR-CON M) - hold day surgery     Lidocaine patch - hold day of surgery       Comments   LINDA-HEX shower the night before surgery and the morning of surgery.     On the day before surgery (Tuesday) please take 2 Tylenol in the morning and then again before bed. You may use either regular or extra strength.           Please do not bring home medications

## 2025-05-08 ENCOUNTER — RESULTS FOLLOW-UP (OUTPATIENT)
Dept: GASTROENTEROLOGY | Age: 65
End: 2025-05-08

## 2025-05-08 NOTE — PROGRESS NOTES
Physician Progress Note      PATIENT:               CHRIS SYKES  [facility] #:                  [id number]  :                       1960  ADMIT DATE:       2025 5:15 PM  DISCH DATE:        2025 12:51 PM  RESPONDING  PROVIDER #:        IRAM MATHIS          QUERY TEXT:    Acute blood loss anemia is documented in the medical record H&P .Patient   denies overt GI bleeding including melena, hematemesis, and hematochezia.   Please provide additional clinical indicators supportive of your   documentation. Or please document if the diagnosis of Acute blood loss anemia   has been ruled out after study.    The clinical indicators include:  ED Provider note  -   She had no changes in stool concerning for upper or   lower GI bleeding.    H&P,  -    Patient denies any hematemesis or melena  Will need iron infusions post scope in the morning; Acute blood loss anemia   (ABLA) versus iron deficiency anemia, Transfuse 1 unit of packed red blood   cells    GI Consult  - Iron deficiency anemia without overt GI bleeding, Recommend   dose of IV iron prior to discharge    IM PN  - EGD revealed nonbleeding varices that were banded on ; hemoglobin now stable at 8.3  Currently n.p.o. ; plan for colonoscopy this afternoon   Continue on Protonix    DS  - Patient had 1 polyp that was biopsied, diagnosis of diverticulosis,   no hemorrhoids or bleeding noted on colonoscopy.  Not the cause of anemia;   Reminded patient that on her EGD she did have varices that were banded but   they were nonbleeding, also not the cause of the anemia.  Thank you,  BLU [place] CD[facility]  Options provided:  -- Acute blood loss anemia present as evidenced by, Please document evidence.  -- Patients have Iron deficiency anemia, after study Acute blood loss anemia   ruled out  -- Other - I will add my own diagnosis  -- Disagree - Not applicable / Not valid  -- Disagree - Clinically unable to determine /

## 2025-05-12 NOTE — TELEPHONE ENCOUNTER
----- Message from Dr. Rui Joseph, DO sent at 5/12/2025 12:58 PM EDT -----  Please let pt know her stress test was normal. This indicates there is no evidence of significant blockages in the coronary arteries and she will not need any further heart testing before her upcoming surgery. Please let me know if she has any other questions or concerns. Thank you.

## 2025-05-13 ENCOUNTER — TELEPHONE (OUTPATIENT)
Dept: VASCULAR SURGERY | Age: 65
End: 2025-05-13

## 2025-05-13 NOTE — TELEPHONE ENCOUNTER
Called pt to see if she has a date for her Gastroenterology appt. So we canm establish a date for her surgery with Dr. Saud Maguire. There was no answer, LVM.

## 2025-05-19 ENCOUNTER — OFFICE VISIT (OUTPATIENT)
Dept: GASTROENTEROLOGY | Age: 65
End: 2025-05-19

## 2025-05-19 DIAGNOSIS — D64.9 SYMPTOMATIC ANEMIA: Primary | ICD-10-CM

## 2025-05-21 ENCOUNTER — TELEPHONE (OUTPATIENT)
Dept: VASCULAR SURGERY | Age: 65
End: 2025-05-21

## 2025-05-21 NOTE — TELEPHONE ENCOUNTER
The pt called back stating she took the gastro pill that takes pictures of your stomach on 5/19. She asked if I could see the report yet. I told her if it were there I couldn't be sure but I will let Dr. Maguire know that it's done so he can take a look and he or I will call her back to let her know if she is still a good candidate for Aorta-bi fem surgery. She expressed understanding.   
Statement Selected

## 2025-05-22 ENCOUNTER — CLINICAL DOCUMENTATION (OUTPATIENT)
Dept: VASCULAR SURGERY | Age: 65
End: 2025-05-22

## 2025-05-22 NOTE — PROGRESS NOTES
Patient was initially scheduled for aortobifem.  However patient had bleeding from esophageal varices.  She recently got scoped endoscopy and a colonoscopy.  And is also undergoing another test from GI.  I did discuss with her GI doctor with her liver disease and esophageal varices puts her at a significant mortality morbidity for any elective case especially for an aorta reconstruction.  I called patient his morning discussed with there is a possibility that if her liver disease is severe and she may not be a surgical candidate.  Once all information send I will have the patient come back to my office to discuss long-term plans.  As of right now I am not planning on doing any surgical intervention.    Saud Maguire

## 2025-05-27 ENCOUNTER — TELEPHONE (OUTPATIENT)
Dept: VASCULAR SURGERY | Age: 65
End: 2025-05-27

## 2025-05-27 NOTE — TELEPHONE ENCOUNTER
The pt called in to check in regarding her last conversation with Dr. Maguire post cancellation of her surgery. She's just wondering when he will be ready to see her to discuss future treatment or surgery. I printed out the last note with a note to let Dr. Maguire know she called and put it on his desk.   family or friend will provide/none

## 2025-05-28 ENCOUNTER — TELEPHONE (OUTPATIENT)
Dept: VASCULAR SURGERY | Age: 65
End: 2025-05-28

## 2025-05-28 NOTE — TELEPHONE ENCOUNTER
Called pt to notify her Dr. Maguire says when she's released from the G.I doctor, is when she can be scheduled to come back to see him. I didn't get an answer, but I was able to leave a VM.

## 2025-06-10 ENCOUNTER — TELEPHONE (OUTPATIENT)
Dept: VASCULAR SURGERY | Age: 65
End: 2025-06-10

## 2025-06-10 NOTE — TELEPHONE ENCOUNTER
In response to the pt's Epic note asking \"What's going on?\" I reviewed her chart and saw that her message to Gastro[facility]ology asking for results of her pill cam test. Information in [facility]astro notes is incomplete. I called the patient to let her know that their response is incomplete at this time and that she should try calling them again. She stated she called a few times yesterday and did not speak to anyone. She stated she may just stop by. I reminded her that once she's released from Gastro is when Dr. Maguire can see her back here. She expressed understanding.

## 2025-06-12 ENCOUNTER — TELEPHONE (OUTPATIENT)
Dept: GASTROENTEROLOGY | Age: 65
End: 2025-06-12

## 2025-06-13 ENCOUNTER — TELEPHONE (OUTPATIENT)
Dept: GASTROENTEROLOGY | Age: 65
End: 2025-06-13

## 2025-06-13 NOTE — PROGRESS NOTES
Procedure indication: Anemia    Procedure details:   First gastric image at 00:00:19  First duodenal image at  01:26:37  Capsule did not reach cecum.     Total gastric transit time: 1h 26 min  Total small bowel transit time:  >6.5 hours    Findings:   No signs of bleeding from the sections seen during this exam.     Recommendations:   No further GI work up will be done at this time unless her anemia gets worse with overt signs of bleeding.

## 2025-06-13 NOTE — TELEPHONE ENCOUNTER
Called patient with VCE results per Dr Levine:    See below, no signs of bleeding on capsule endoscopy.   Ok to proceed with vascular surgery.         Procedure indication: Anemia     Procedure details:   First gastric image at 00:00:19  First duodenal image at  01:26:37  Capsule did not reach cecum.      Total gastric transit time: 1h 26 min  Total small bowel transit time:  >6.5 hours     Findings:   No signs of bleeding from the sections seen during this exam.      Recommendations:   No further GI work up will be done at this time unless her anemia gets worse with overt signs of bleeding.     No answer. LVM with results/ recommendations, office # for any questions or concerns.

## 2025-06-13 NOTE — TELEPHONE ENCOUNTER
See below, no signs of bleeding on capsule endoscopy.   Ok to proceed with vascular surgery.       Procedure indication: Anemia    Procedure details:   First gastric image at 00:00:19  First duodenal image at  01:26:37  Capsule did not reach cecum.     Total gastric transit time: 1h 26 min  Total small bowel transit time:  >6.5 hours    Findings:   No signs of bleeding from the sections seen during this exam.     Recommendations:   No further GI work up will be done at this time unless her anemia gets worse with overt signs of bleeding.

## 2025-06-27 ENCOUNTER — OFFICE VISIT (OUTPATIENT)
Dept: VASCULAR SURGERY | Age: 65
End: 2025-06-27
Payer: COMMERCIAL

## 2025-06-27 VITALS
DIASTOLIC BLOOD PRESSURE: 73 MMHG | HEIGHT: 66 IN | HEART RATE: 87 BPM | BODY MASS INDEX: 20.57 KG/M2 | WEIGHT: 128 LBS | OXYGEN SATURATION: 93 % | SYSTOLIC BLOOD PRESSURE: 127 MMHG

## 2025-06-27 DIAGNOSIS — I73.9 PVD (PERIPHERAL VASCULAR DISEASE) WITH CLAUDICATION: Primary | ICD-10-CM

## 2025-06-27 PROCEDURE — 3078F DIAST BP <80 MM HG: CPT | Performed by: SURGERY

## 2025-06-27 PROCEDURE — 1123F ACP DISCUSS/DSCN MKR DOCD: CPT | Performed by: SURGERY

## 2025-06-27 PROCEDURE — 99213 OFFICE O/P EST LOW 20 MIN: CPT | Performed by: SURGERY

## 2025-06-27 PROCEDURE — 3074F SYST BP LT 130 MM HG: CPT | Performed by: SURGERY

## 2025-06-27 PROCEDURE — 1159F MED LIST DOCD IN RCRD: CPT | Performed by: SURGERY

## 2025-06-27 NOTE — PROGRESS NOTES
Diagnosis Date    Anemia requiring transfusions 09/08/2023    Chronic hepatitis C (HCC)     Cirrhosis of liver (HCC)     ETOH abuse     in remission from roughly 2 years    Fracture of right clavicle 3/2015    H/O echocardiogram 09/12/2022    Left Ventricle: Normal left ventricular systolic function with a visually estimated EF of 60 - 65%. Left ventricle size is normal. Normal wall thickness. Normal wall motion. Normal diastolic function.   Technical qualifiers: Procedure performed with the patient in a supine position, color flow Doppler was performed and pulse wave and/or continuous wave Doppler was performed.    History of blood transfusion     History of blood transfusion     History of kidney stones     Hypertension     no meds--- pt stopped on her own--- off meds x 1 yr-- per pt-- b/p stable    Liver disease dx--2011    HEP C--- not currently seeing a m.d.-- due to  no insurance per pt    Mixed hyperlipidemia     Neuropathy 09/08/2023    Portal hypertensive gastropathy (HCC)     PVD (peripheral vascular disease)     Sciatica     Severe anemia 07/25/2023    Severe protein-calorie malnutrition 8/29/2020    Symptomatic anemia 08/27/2020       Physical Examination:   Height: 1.676 m (5' 6\"), Weight - Scale: 58.1 kg (128 lb), BP: 127/73    Constitutional: she appears well-developed. No distress.   HENT:   Head: Atraumatic.   Eyes: Pupils are equal, round, and reactive to light.   Neck: Normal range of motion.   Cardiovascular: Regular rhythm.    Pulmonary/Chest: Effort normal and breath sounds normal. No respiratory distress.   Abdominal: Soft. Bowel sounds are normal. she exhibits no distension. There is no tenderness. There is no guarding. No hernia.   Musculoskeletal: Normal range of motion.   Neurological: She is alert. CN II- XII grossly intact   Vascular: Palpable femoral pulses nonpalpable pedal pulses no open wounds or ulcers    Imaging:          Recommendations/Plans:   MsLinda Natasha JAILYN TiradoElkins is a 65

## 2025-07-22 ENCOUNTER — TELEPHONE (OUTPATIENT)
Dept: VASCULAR SURGERY | Age: 65
End: 2025-07-22

## 2025-07-22 NOTE — TELEPHONE ENCOUNTER
Patient left message on voicemail stating that she needed something, but no further message left.      Returned call to patient and left message on voicemail asking her to return call.

## 2025-07-23 NOTE — TELEPHONE ENCOUNTER
Patient asking for return to work letter.  Emailed patient letter via [email or web address]

## (undated) DEVICE — CONTAINER FORMALIN PREFILLED 10% NBF 60ML

## (undated) DEVICE — CANNULA NSL ORAL AD FOR CAPNOFLEX CO2 O2 AIRLFE

## (undated) DEVICE — NEEDLE SYR 18GA L1.5IN RED PLAS HUB S STL BLNT FILL W/O

## (undated) DEVICE — BLOCK BITE AD 60FR W/ VELC STRP ADDRESSES MOST PT AND

## (undated) DEVICE — KENDALL RADIOLUCENT FOAM MONITORING ELECTRODE RECTANGULAR SHAPE: Brand: KENDALL

## (undated) DEVICE — DISPOSABLE BIOPSY VALVE MAJ-1555: Brand: SINGLE USE BIOPSY VALVE (STERILE)

## (undated) DEVICE — AIRLIFE™ OXYGEN TUBING 7 FEET (2.1 M) CRUSH RESISTANT OXYGEN TUBING, VINYL TIPPED: Brand: AIRLIFE™

## (undated) DEVICE — ENDOSCOPIC KIT 1.1+ OP4 CA DE 2 GWN AAMI LEVEL 3

## (undated) DEVICE — YANKAUER,BULB TIP,W/O VENT,RIGID,STERILE: Brand: MEDLINE

## (undated) DEVICE — SINGLE PORT MANIFOLD: Brand: NEPTUNE 2

## (undated) DEVICE — SYRINGE MED 3ML CLR PLAS STD N CTRL LUERLOCK TIP DISP

## (undated) DEVICE — GAUZE,SPONGE,4"X4",12PLY,WOVEN,NS,LF: Brand: MEDLINE

## (undated) DEVICE — SNARE ENDOSCP POLYP 2.4 MM 240 CM 10 MM 2.8 MM CAPTIVATOR

## (undated) DEVICE — NEEDLE SYRINGE 18GA L1.5IN RED PLAS HUB S STL BLNT FILL W/O

## (undated) DEVICE — SYRINGE MED 10ML LUERLOCK TIP W/O SFTY DISP

## (undated) DEVICE — LUBE JELLY FOIL PACK 1.4 OZ: Brand: MEDLINE INDUSTRIES, INC.

## (undated) DEVICE — SYRINGE, LUER SLIP, STERILE, 60ML: Brand: MEDLINE

## (undated) DEVICE — CONNECTOR TBNG OD5-7MM O2 END DISP

## (undated) DEVICE — TRAP SPEC POLYP REM STRNR CLN DSGN MAGNIFYING WIND DISP

## (undated) DEVICE — MOUTHPIECE ENDOSCP L CTRL OPN AND SIDE PORTS DISP

## (undated) DEVICE — SYRINGE MEDICAL 3ML CLEAR PLASTIC STANDARD NON CONTROL LUERLOCK TIP DISPOSABLE

## (undated) DEVICE — SIX SHOOTER SAEED MULTI-BAND LIGATOR: Brand: SAEED

## (undated) DEVICE — FORCEPS BX L240CM JAW DIA2.8MM L CAP W/ NDL MIC MESH TOOTH